# Patient Record
Sex: FEMALE | Race: WHITE | Employment: UNEMPLOYED | URBAN - NONMETROPOLITAN AREA
[De-identification: names, ages, dates, MRNs, and addresses within clinical notes are randomized per-mention and may not be internally consistent; named-entity substitution may affect disease eponyms.]

---

## 2017-10-09 ENCOUNTER — HOSPITAL ENCOUNTER (OUTPATIENT)
Dept: MAMMOGRAPHY | Age: 52
Discharge: HOME | End: 2017-10-09
Payer: COMMERCIAL

## 2017-10-09 DIAGNOSIS — Z12.39 BREAST CANCER SCREENING: ICD-10-CM

## 2017-10-09 PROCEDURE — G0202 SCR MAMMO BI INCL CAD: HCPCS

## 2017-10-30 ENCOUNTER — HOSPITAL ENCOUNTER (OUTPATIENT)
Dept: MRI IMAGING | Age: 52
Discharge: HOME | End: 2017-10-30
Payer: COMMERCIAL

## 2017-10-30 DIAGNOSIS — M54.12 CERVICAL RADICULAR PAIN: ICD-10-CM

## 2017-10-30 DIAGNOSIS — M12.00 CHRONIC POSTRHEUMATIC ARTHROPATHY (HCC): ICD-10-CM

## 2017-10-30 DIAGNOSIS — M05.79 RHEUMATOID ARTHRITIS INVOLVING MULTIPLE SITES WITH POSITIVE RHEUMATOID FACTOR (HCC): ICD-10-CM

## 2017-10-30 DIAGNOSIS — M54.2 NECK PAIN: ICD-10-CM

## 2017-10-30 PROCEDURE — 72141 MRI NECK SPINE W/O DYE: CPT

## 2019-04-22 ENCOUNTER — HOSPITAL ENCOUNTER (OUTPATIENT)
Dept: INTERVENTIONAL RADIOLOGY/VASCULAR | Age: 54
Discharge: HOME | End: 2019-04-22
Payer: COMMERCIAL

## 2019-04-22 ENCOUNTER — HOSPITAL ENCOUNTER (OUTPATIENT)
Dept: NON INVASIVE DIAGNOSTICS | Age: 54
Discharge: HOME | End: 2019-04-22
Payer: COMMERCIAL

## 2019-04-22 ENCOUNTER — ANCILLARY ORDERS (OUTPATIENT)
Dept: INTERVENTIONAL RADIOLOGY/VASCULAR | Age: 54
End: 2019-04-22

## 2019-04-22 VITALS — WEIGHT: 103 LBS | BODY MASS INDEX: 19.45 KG/M2 | HEIGHT: 61 IN

## 2019-04-22 DIAGNOSIS — Z01.810 PREOPERATIVE CARDIOVASCULAR EXAMINATION: ICD-10-CM

## 2019-04-22 LAB
LV EF: 60 %
LVEF MODALITY: NORMAL

## 2019-04-22 PROCEDURE — 93923 UPR/LXTR ART STDY 3+ LVLS: CPT

## 2019-04-22 PROCEDURE — 93017 CV STRESS TEST TRACING ONLY: CPT

## 2019-04-22 PROCEDURE — 3430000000 HC RX DIAGNOSTIC RADIOPHARMACEUTICAL: Performed by: PODIATRIST

## 2019-04-22 PROCEDURE — 2709999900 HC NON-CHARGEABLE SUPPLY

## 2019-04-22 PROCEDURE — 6360000002 HC RX W HCPCS

## 2019-04-22 PROCEDURE — 78452 HT MUSCLE IMAGE SPECT MULT: CPT

## 2019-04-22 PROCEDURE — 93306 TTE W/DOPPLER COMPLETE: CPT

## 2019-04-22 PROCEDURE — A9500 TC99M SESTAMIBI: HCPCS | Performed by: PODIATRIST

## 2019-04-22 RX ADMIN — Medication 9.3 MILLICURIE: at 10:30

## 2019-04-22 RX ADMIN — Medication 32.1 MILLICURIE: at 11:27

## 2019-04-29 ENCOUNTER — HOSPITAL ENCOUNTER (OUTPATIENT)
Age: 54
Discharge: HOME | End: 2019-04-29
Payer: COMMERCIAL

## 2019-04-29 LAB
ANION GAP SERPL CALCULATED.3IONS-SCNC: 15 MEQ/L (ref 8–16)
BUN BLDV-MCNC: 8 MG/DL (ref 7–22)
CALCIUM SERPL-MCNC: 9.4 MG/DL (ref 8.5–10.5)
CHLORIDE BLD-SCNC: 105 MEQ/L (ref 98–111)
CO2: 25 MEQ/L (ref 23–33)
CREAT SERPL-MCNC: 0.6 MG/DL (ref 0.4–1.2)
ERYTHROCYTE [DISTWIDTH] IN BLOOD BY AUTOMATED COUNT: 13.1 % (ref 11.5–14.5)
ERYTHROCYTE [DISTWIDTH] IN BLOOD BY AUTOMATED COUNT: 48.8 FL (ref 35–45)
GFR SERPL CREATININE-BSD FRML MDRD: > 90 ML/MIN/1.73M2
GLUCOSE BLD-MCNC: 103 MG/DL (ref 70–108)
HCT VFR BLD CALC: 45.7 % (ref 37–47)
HEMOGLOBIN: 14.9 GM/DL (ref 12–16)
MCH RBC QN AUTO: 33.1 PG (ref 26–33)
MCHC RBC AUTO-ENTMCNC: 32.6 GM/DL (ref 32.2–35.5)
MCV RBC AUTO: 101.6 FL (ref 81–99)
PLATELET # BLD: 319 THOU/MM3 (ref 130–400)
PMV BLD AUTO: 10.2 FL (ref 9.4–12.4)
POTASSIUM SERPL-SCNC: 4.2 MEQ/L (ref 3.5–5.2)
RBC # BLD: 4.5 MILL/MM3 (ref 4.2–5.4)
SODIUM BLD-SCNC: 145 MEQ/L (ref 135–145)
VITAMIN D 25-HYDROXY: 47 NG/ML (ref 30–100)
WBC # BLD: 6.7 THOU/MM3 (ref 4.8–10.8)

## 2019-04-29 PROCEDURE — 82306 VITAMIN D 25 HYDROXY: CPT

## 2019-04-29 PROCEDURE — 85027 COMPLETE CBC AUTOMATED: CPT

## 2019-04-29 PROCEDURE — 36415 COLL VENOUS BLD VENIPUNCTURE: CPT

## 2019-04-29 PROCEDURE — 80048 BASIC METABOLIC PNL TOTAL CA: CPT

## 2019-05-16 RX ORDER — FAMCICLOVIR 500 MG/1
500 TABLET, FILM COATED ORAL 3 TIMES DAILY PRN
COMMUNITY

## 2019-05-23 ENCOUNTER — ANESTHESIA EVENT (OUTPATIENT)
Dept: OPERATING ROOM | Age: 54
End: 2019-05-23
Payer: COMMERCIAL

## 2019-05-23 ENCOUNTER — ANESTHESIA (OUTPATIENT)
Dept: OPERATING ROOM | Age: 54
End: 2019-05-23
Payer: COMMERCIAL

## 2019-05-23 ENCOUNTER — HOSPITAL ENCOUNTER (OUTPATIENT)
Age: 54
Setting detail: OUTPATIENT SURGERY
Discharge: HOME | End: 2019-05-23
Attending: PODIATRIST | Admitting: PODIATRIST
Payer: COMMERCIAL

## 2019-05-23 VITALS
BODY MASS INDEX: 19.45 KG/M2 | HEIGHT: 61 IN | HEART RATE: 85 BPM | SYSTOLIC BLOOD PRESSURE: 138 MMHG | OXYGEN SATURATION: 97 % | WEIGHT: 103 LBS | RESPIRATION RATE: 16 BRPM | TEMPERATURE: 98.4 F | DIASTOLIC BLOOD PRESSURE: 77 MMHG

## 2019-05-23 VITALS
SYSTOLIC BLOOD PRESSURE: 121 MMHG | TEMPERATURE: 99.5 F | OXYGEN SATURATION: 100 % | RESPIRATION RATE: 7 BRPM | DIASTOLIC BLOOD PRESSURE: 66 MMHG

## 2019-05-23 DIAGNOSIS — M79.671 RIGHT FOOT PAIN: Primary | ICD-10-CM

## 2019-05-23 LAB
EKG ATRIAL RATE: 68 BPM
EKG P AXIS: 61 DEGREES
EKG P-R INTERVAL: 154 MS
EKG Q-T INTERVAL: 398 MS
EKG QRS DURATION: 86 MS
EKG QTC CALCULATION (BAZETT): 423 MS
EKG R AXIS: -38 DEGREES
EKG T AXIS: 40 DEGREES
EKG VENTRICULAR RATE: 68 BPM

## 2019-05-23 PROCEDURE — 7100000001 HC PACU RECOVERY - ADDTL 15 MIN: Performed by: PODIATRIST

## 2019-05-23 PROCEDURE — 2500000003 HC RX 250 WO HCPCS: Performed by: PODIATRIST

## 2019-05-23 PROCEDURE — 7100000010 HC PHASE II RECOVERY - FIRST 15 MIN: Performed by: PODIATRIST

## 2019-05-23 PROCEDURE — 7100000000 HC PACU RECOVERY - FIRST 15 MIN: Performed by: PODIATRIST

## 2019-05-23 PROCEDURE — 93005 ELECTROCARDIOGRAM TRACING: CPT | Performed by: STUDENT IN AN ORGANIZED HEALTH CARE EDUCATION/TRAINING PROGRAM

## 2019-05-23 PROCEDURE — 3600000004 HC SURGERY LEVEL 4 BASE: Performed by: PODIATRIST

## 2019-05-23 PROCEDURE — 2580000003 HC RX 258: Performed by: PODIATRIST

## 2019-05-23 PROCEDURE — 6360000002 HC RX W HCPCS: Performed by: PODIATRIST

## 2019-05-23 PROCEDURE — 6360000002 HC RX W HCPCS: Performed by: ANESTHESIOLOGY

## 2019-05-23 PROCEDURE — 7100000011 HC PHASE II RECOVERY - ADDTL 15 MIN: Performed by: PODIATRIST

## 2019-05-23 PROCEDURE — 3700000001 HC ADD 15 MINUTES (ANESTHESIA): Performed by: PODIATRIST

## 2019-05-23 PROCEDURE — 93010 ELECTROCARDIOGRAM REPORT: CPT | Performed by: INTERNAL MEDICINE

## 2019-05-23 PROCEDURE — 6360000002 HC RX W HCPCS: Performed by: NURSE ANESTHETIST, CERTIFIED REGISTERED

## 2019-05-23 PROCEDURE — 2720000010 HC SURG SUPPLY STERILE: Performed by: PODIATRIST

## 2019-05-23 PROCEDURE — 2709999900 HC NON-CHARGEABLE SUPPLY: Performed by: PODIATRIST

## 2019-05-23 PROCEDURE — C1713 ANCHOR/SCREW BN/BN,TIS/BN: HCPCS | Performed by: PODIATRIST

## 2019-05-23 PROCEDURE — 3700000000 HC ANESTHESIA ATTENDED CARE: Performed by: PODIATRIST

## 2019-05-23 PROCEDURE — 3600000014 HC SURGERY LEVEL 4 ADDTL 15MIN: Performed by: PODIATRIST

## 2019-05-23 DEVICE — WIRE FIX L150MM DIA1.6MM THRD L15MM FOREFOOT/MIDFOOT COMPR: Type: IMPLANTABLE DEVICE | Status: FUNCTIONAL

## 2019-05-23 DEVICE — SCREW BNE L18MM DIA2.7MM ANK S STL ST VAR ANG LOK FULL THRD: Type: IMPLANTABLE DEVICE | Status: FUNCTIONAL

## 2019-05-23 DEVICE — IMPLANTABLE DEVICE: Type: IMPLANTABLE DEVICE | Status: FUNCTIONAL

## 2019-05-23 DEVICE — SCREW BNE L16MM DIA2.7MM ANK S STL ST VAR ANG LOK FULL THRD: Type: IMPLANTABLE DEVICE | Status: FUNCTIONAL

## 2019-05-23 DEVICE — SCREW BNE L14MM DIA2.7MM ANK S STL ST VAR ANG LOK FULL THRD: Type: IMPLANTABLE DEVICE | Status: FUNCTIONAL

## 2019-05-23 RX ORDER — CEFAZOLIN SODIUM 1 G/50ML
1 INJECTION, SOLUTION INTRAVENOUS
Status: COMPLETED | OUTPATIENT
Start: 2019-05-23 | End: 2019-05-23

## 2019-05-23 RX ORDER — LIDOCAINE HYDROCHLORIDE 20 MG/ML
INJECTION, SOLUTION INTRAVENOUS PRN
Status: DISCONTINUED | OUTPATIENT
Start: 2019-05-23 | End: 2019-05-23 | Stop reason: SDUPTHER

## 2019-05-23 RX ORDER — IBUPROFEN 800 MG/1
800 TABLET ORAL 2 TIMES DAILY PRN
Qty: 60 TABLET | Refills: 0 | Status: SHIPPED | OUTPATIENT
Start: 2019-05-23 | End: 2019-08-28 | Stop reason: ALTCHOICE

## 2019-05-23 RX ORDER — OXYCODONE HYDROCHLORIDE AND ACETAMINOPHEN 5; 325 MG/1; MG/1
1 TABLET ORAL EVERY 4 HOURS PRN
Status: CANCELLED | OUTPATIENT
Start: 2019-05-23

## 2019-05-23 RX ORDER — LABETALOL HYDROCHLORIDE 5 MG/ML
10 INJECTION, SOLUTION INTRAVENOUS EVERY 10 MIN PRN
Status: DISCONTINUED | OUTPATIENT
Start: 2019-05-23 | End: 2019-05-23 | Stop reason: HOSPADM

## 2019-05-23 RX ORDER — PROMETHAZINE HYDROCHLORIDE 25 MG/ML
12.5 INJECTION, SOLUTION INTRAMUSCULAR; INTRAVENOUS
Status: DISCONTINUED | OUTPATIENT
Start: 2019-05-23 | End: 2019-05-23 | Stop reason: HOSPADM

## 2019-05-23 RX ORDER — SODIUM CHLORIDE 0.9 % (FLUSH) 0.9 %
10 SYRINGE (ML) INJECTION PRN
Status: CANCELLED | OUTPATIENT
Start: 2019-05-23

## 2019-05-23 RX ORDER — SODIUM CHLORIDE 0.9 % (FLUSH) 0.9 %
10 SYRINGE (ML) INJECTION PRN
Status: DISCONTINUED | OUTPATIENT
Start: 2019-05-23 | End: 2019-05-23 | Stop reason: HOSPADM

## 2019-05-23 RX ORDER — DEXAMETHASONE SODIUM PHOSPHATE 4 MG/ML
INJECTION, SOLUTION INTRA-ARTICULAR; INTRALESIONAL; INTRAMUSCULAR; INTRAVENOUS; SOFT TISSUE PRN
Status: DISCONTINUED | OUTPATIENT
Start: 2019-05-23 | End: 2019-05-23 | Stop reason: SDUPTHER

## 2019-05-23 RX ORDER — FENTANYL CITRATE 50 UG/ML
INJECTION, SOLUTION INTRAMUSCULAR; INTRAVENOUS PRN
Status: DISCONTINUED | OUTPATIENT
Start: 2019-05-23 | End: 2019-05-23 | Stop reason: SDUPTHER

## 2019-05-23 RX ORDER — OXYCODONE HYDROCHLORIDE AND ACETAMINOPHEN 5; 325 MG/1; MG/1
1 TABLET ORAL EVERY 6 HOURS PRN
Qty: 28 TABLET | Refills: 0 | Status: SHIPPED | OUTPATIENT
Start: 2019-05-23 | End: 2019-05-30

## 2019-05-23 RX ORDER — PROPOFOL 10 MG/ML
INJECTION, EMULSION INTRAVENOUS CONTINUOUS PRN
Status: DISCONTINUED | OUTPATIENT
Start: 2019-05-23 | End: 2019-05-23 | Stop reason: SDUPTHER

## 2019-05-23 RX ORDER — OXYCODONE HYDROCHLORIDE AND ACETAMINOPHEN 5; 325 MG/1; MG/1
2 TABLET ORAL EVERY 4 HOURS PRN
Status: CANCELLED | OUTPATIENT
Start: 2019-05-23

## 2019-05-23 RX ORDER — SODIUM CHLORIDE 0.9 % (FLUSH) 0.9 %
10 SYRINGE (ML) INJECTION EVERY 12 HOURS SCHEDULED
Status: DISCONTINUED | OUTPATIENT
Start: 2019-05-23 | End: 2019-05-23 | Stop reason: HOSPADM

## 2019-05-23 RX ORDER — HYDROXYZINE PAMOATE 25 MG/1
CAPSULE ORAL
Qty: 28 CAPSULE | Refills: 0 | Status: SHIPPED | OUTPATIENT
Start: 2019-05-23 | End: 2019-08-28 | Stop reason: ALTCHOICE

## 2019-05-23 RX ORDER — SODIUM CHLORIDE 0.9 % (FLUSH) 0.9 %
10 SYRINGE (ML) INJECTION EVERY 12 HOURS SCHEDULED
Status: CANCELLED | OUTPATIENT
Start: 2019-05-23

## 2019-05-23 RX ORDER — SODIUM CHLORIDE 9 MG/ML
INJECTION, SOLUTION INTRAVENOUS CONTINUOUS
Status: DISCONTINUED | OUTPATIENT
Start: 2019-05-23 | End: 2019-05-23 | Stop reason: HOSPADM

## 2019-05-23 RX ORDER — ONDANSETRON 2 MG/ML
4 INJECTION INTRAMUSCULAR; INTRAVENOUS EVERY 6 HOURS PRN
Status: CANCELLED | OUTPATIENT
Start: 2019-05-23

## 2019-05-23 RX ORDER — FENTANYL CITRATE 50 UG/ML
50 INJECTION, SOLUTION INTRAMUSCULAR; INTRAVENOUS EVERY 5 MIN PRN
Status: DISCONTINUED | OUTPATIENT
Start: 2019-05-23 | End: 2019-05-23 | Stop reason: HOSPADM

## 2019-05-23 RX ORDER — ONDANSETRON 2 MG/ML
INJECTION INTRAMUSCULAR; INTRAVENOUS PRN
Status: DISCONTINUED | OUTPATIENT
Start: 2019-05-23 | End: 2019-05-23 | Stop reason: SDUPTHER

## 2019-05-23 RX ORDER — MIDAZOLAM HYDROCHLORIDE 1 MG/ML
INJECTION INTRAMUSCULAR; INTRAVENOUS PRN
Status: DISCONTINUED | OUTPATIENT
Start: 2019-05-23 | End: 2019-05-23 | Stop reason: SDUPTHER

## 2019-05-23 RX ORDER — BUPIVACAINE HYDROCHLORIDE 5 MG/ML
INJECTION, SOLUTION EPIDURAL; INTRACAUDAL PRN
Status: DISCONTINUED | OUTPATIENT
Start: 2019-05-23 | End: 2019-05-23 | Stop reason: ALTCHOICE

## 2019-05-23 RX ORDER — PROPOFOL 10 MG/ML
INJECTION, EMULSION INTRAVENOUS PRN
Status: DISCONTINUED | OUTPATIENT
Start: 2019-05-23 | End: 2019-05-23 | Stop reason: SDUPTHER

## 2019-05-23 RX ADMIN — PROPOFOL 200 MCG/KG/MIN: 10 INJECTION, EMULSION INTRAVENOUS at 13:21

## 2019-05-23 RX ADMIN — FENTANYL CITRATE 25 MCG: 50 INJECTION INTRAMUSCULAR; INTRAVENOUS at 14:00

## 2019-05-23 RX ADMIN — DEXAMETHASONE SODIUM PHOSPHATE 8 MG: 4 INJECTION, SOLUTION INTRAMUSCULAR; INTRAVENOUS at 13:25

## 2019-05-23 RX ADMIN — FENTANYL CITRATE 25 MCG: 50 INJECTION INTRAMUSCULAR; INTRAVENOUS at 13:51

## 2019-05-23 RX ADMIN — FENTANYL CITRATE 25 MCG: 50 INJECTION INTRAMUSCULAR; INTRAVENOUS at 15:10

## 2019-05-23 RX ADMIN — LIDOCAINE HYDROCHLORIDE 60 MG: 20 INJECTION, SOLUTION INTRAVENOUS at 13:18

## 2019-05-23 RX ADMIN — PROPOFOL 140 MG: 10 INJECTION, EMULSION INTRAVENOUS at 13:18

## 2019-05-23 RX ADMIN — FENTANYL CITRATE 50 MCG: 50 INJECTION, SOLUTION INTRAMUSCULAR; INTRAVENOUS at 16:31

## 2019-05-23 RX ADMIN — CEFAZOLIN SODIUM 1 G: 1 INJECTION, SOLUTION INTRAVENOUS at 13:26

## 2019-05-23 RX ADMIN — FENTANYL CITRATE 25 MCG: 50 INJECTION INTRAMUSCULAR; INTRAVENOUS at 13:38

## 2019-05-23 RX ADMIN — PROPOFOL 30 MG: 10 INJECTION, EMULSION INTRAVENOUS at 15:37

## 2019-05-23 RX ADMIN — PROPOFOL 20 MG: 10 INJECTION, EMULSION INTRAVENOUS at 15:42

## 2019-05-23 RX ADMIN — MIDAZOLAM HYDROCHLORIDE 2 MG: 1 INJECTION, SOLUTION INTRAMUSCULAR; INTRAVENOUS at 13:13

## 2019-05-23 RX ADMIN — ONDANSETRON HYDROCHLORIDE 4 MG: 4 INJECTION, SOLUTION INTRAMUSCULAR; INTRAVENOUS at 13:25

## 2019-05-23 RX ADMIN — PROPOFOL 20 MG: 10 INJECTION, EMULSION INTRAVENOUS at 15:46

## 2019-05-23 RX ADMIN — SODIUM CHLORIDE: 9 INJECTION, SOLUTION INTRAVENOUS at 11:45

## 2019-05-23 ASSESSMENT — PULMONARY FUNCTION TESTS
PIF_VALUE: 9
PIF_VALUE: 8
PIF_VALUE: 2
PIF_VALUE: 8
PIF_VALUE: 10
PIF_VALUE: 3
PIF_VALUE: 9
PIF_VALUE: 8
PIF_VALUE: 8
PIF_VALUE: 7
PIF_VALUE: 9
PIF_VALUE: 8
PIF_VALUE: 12
PIF_VALUE: 12
PIF_VALUE: 8
PIF_VALUE: 2
PIF_VALUE: 8
PIF_VALUE: 8
PIF_VALUE: 9
PIF_VALUE: 9
PIF_VALUE: 8
PIF_VALUE: 9
PIF_VALUE: 8
PIF_VALUE: 9
PIF_VALUE: 9
PIF_VALUE: 8
PIF_VALUE: 8
PIF_VALUE: 9
PIF_VALUE: 8
PIF_VALUE: 9
PIF_VALUE: 3
PIF_VALUE: 3
PIF_VALUE: 8
PIF_VALUE: 9
PIF_VALUE: 9
PIF_VALUE: 4
PIF_VALUE: 8
PIF_VALUE: 9
PIF_VALUE: 8
PIF_VALUE: 8
PIF_VALUE: 2
PIF_VALUE: 8
PIF_VALUE: 9
PIF_VALUE: 9
PIF_VALUE: 12
PIF_VALUE: 8
PIF_VALUE: 3
PIF_VALUE: 8
PIF_VALUE: 8
PIF_VALUE: 9
PIF_VALUE: 8
PIF_VALUE: 9
PIF_VALUE: 9
PIF_VALUE: 2
PIF_VALUE: 8
PIF_VALUE: 9
PIF_VALUE: 8
PIF_VALUE: 9
PIF_VALUE: 8
PIF_VALUE: 9
PIF_VALUE: 8
PIF_VALUE: 8
PIF_VALUE: 9
PIF_VALUE: 8
PIF_VALUE: 9
PIF_VALUE: 8
PIF_VALUE: 9
PIF_VALUE: 8
PIF_VALUE: 18
PIF_VALUE: 8
PIF_VALUE: 8
PIF_VALUE: 9
PIF_VALUE: 12
PIF_VALUE: 9
PIF_VALUE: 9
PIF_VALUE: 8
PIF_VALUE: 9
PIF_VALUE: 9
PIF_VALUE: 8
PIF_VALUE: 9
PIF_VALUE: 8
PIF_VALUE: 8
PIF_VALUE: 9
PIF_VALUE: 9
PIF_VALUE: 8
PIF_VALUE: 10
PIF_VALUE: 7
PIF_VALUE: 3
PIF_VALUE: 9
PIF_VALUE: 9
PIF_VALUE: 12
PIF_VALUE: 8
PIF_VALUE: 9
PIF_VALUE: 8
PIF_VALUE: 8
PIF_VALUE: 9
PIF_VALUE: 9
PIF_VALUE: 8
PIF_VALUE: 9
PIF_VALUE: 2
PIF_VALUE: 9
PIF_VALUE: 8
PIF_VALUE: 9
PIF_VALUE: 8
PIF_VALUE: 9
PIF_VALUE: 8
PIF_VALUE: 8
PIF_VALUE: 9
PIF_VALUE: 8
PIF_VALUE: 5
PIF_VALUE: 9
PIF_VALUE: 8
PIF_VALUE: 12
PIF_VALUE: 8
PIF_VALUE: 9
PIF_VALUE: 8
PIF_VALUE: 9
PIF_VALUE: 9
PIF_VALUE: 10
PIF_VALUE: 8
PIF_VALUE: 9
PIF_VALUE: 8
PIF_VALUE: 9
PIF_VALUE: 9
PIF_VALUE: 8
PIF_VALUE: 2
PIF_VALUE: 8
PIF_VALUE: 17
PIF_VALUE: 8
PIF_VALUE: 9
PIF_VALUE: 8
PIF_VALUE: 13
PIF_VALUE: 8
PIF_VALUE: 9
PIF_VALUE: 9
PIF_VALUE: 5
PIF_VALUE: 8
PIF_VALUE: 9
PIF_VALUE: 9
PIF_VALUE: 8
PIF_VALUE: 9
PIF_VALUE: 9

## 2019-05-23 ASSESSMENT — PAIN SCALES - GENERAL
PAINLEVEL_OUTOF10: 7
PAINLEVEL_OUTOF10: 4
PAINLEVEL_OUTOF10: 3

## 2019-05-23 ASSESSMENT — PAIN - FUNCTIONAL ASSESSMENT: PAIN_FUNCTIONAL_ASSESSMENT: 0-10

## 2019-05-23 NOTE — BRIEF OP NOTE
Brief Postoperative Note  ______________________________________________________________    Patient: Beverly Webb  YOB: 1965  MRN: [id number]  Date of Procedure: 5/23/2019    Pre-Op Diagnosis: RIGHT HALLUX VALGUS, HAMMER TOES 2-5 RIGHT FOOT, SEVERE MPJ DISLOCATION 1-5, RHEUMATOID ARTHRITIS    Post-Op Diagnosis: Same       Procedure(s):  RIGHT 1ST MPJ FUSION, METATARSAL HEAD RESECTION 2-5, TOES 2-5 ARTHRODESIS & PLANTAR SKIN PLASTY    Anesthesia: General, TIVA    Surgeon(s):  Bipin Wagner DPM    Assistant: Brantley Prader, PGY-3    Estimated Blood Loss (mL): less than 50     Complications: None    Specimens:   * No specimens in log *    Implants:  Implant Name Type Inv.  Item Serial No.  Lot No. LRB No. Used   PLATE FUSION VARI-ANGL 1ST MTP 0 DEG RT SM Screw/Plate/Nail/Tod PLATE FUSION VARI-ANGL 1ST MTP 0 DEG RT SM  SYNTHES  Right 1   WIRE COMPRSS [id number] 15MM THR Screw/Plate/Nail/Tod WIRE COMPRSS [id number] 15MM THR  SYNTHES  Right 2   SCREW VARI-ANGL LK 2.7X14MM Screw/Plate/Nail/Tod SCREW VARI-ANGL LK 2.7X14MM  SYNTHES  Right 1   SCREW LK SLFT W/STARDRIVE RECESS 3.5Y41GC Screw/Plate/Nail/Tod SCREW LK SLFT W/STARDRIVE RECESS 0.5T48RG  SYNTHES  Right 1   SCREW VARI-ANGL LK 2.7X18MM Screw/Plate/Nail/Tod SCREW VARI-ANGL LK 2.7X18MM  SYNTHES  Right 4         Drains: * No LDAs found *    Findings: Consistent with diagnosis    Brantley Prader, DPM  Date: 5/23/2019  Time: 4:11 PM

## 2019-05-23 NOTE — OP NOTE
Operative Report  5/23/2019  Sera Marie  48 y.o. Pre-Op Diagnosis: RIGHT HALLUX VALGUS, HAMMER TOES 2-5 RIGHT FOOT, SEVERE MPJ DISLOCATION 1-5, RHEUMATOID ARTHRITIS    Post-Op Diagnosis: Same       Procedure(s):  RIGHT 1ST MPJ FUSION, METATARSAL HEAD RESECTION 2-5, TOES 2-4 ARTHRODESIS, DEROTATIONAL ARTHROPLASTY OF 5TH DIGIT & PLANTAR SKIN PLASTY WITH ADVANCING SKIN FLAP     Anesthesia: General, TIVA    Surgeon(s):  Hernan Wagner DPM    Assistant: Lisy Hutchinson, PGY-3    Estimated Blood Loss (mL): less than 50     Complications: None    Specimens:   * No specimens in log *    Implants:  Implant Name Type Inv. Item Serial No.  Lot No. LRB No. Used   PLATE FUSION VARI-ANGL 1ST MTP 0 DEG RT SM Screw/Plate/Nail/Tod PLATE FUSION VARI-ANGL 1ST MTP 0 DEG RT SM  SYNTHES  Right 1   WIRE COMPRSS [id number] 15MM THR Screw/Plate/Nail/Tod WIRE COMPRSS 1.8C064NP 15MM THR  SYNTHES  Right 2   SCREW VARI-ANGL LK 2.7X14MM Screw/Plate/Nail/Tod SCREW VARI-ANGL LK 2.7X14MM  SYNTHES  Right 1   SCREW LK SLFT W/STARDRIVE RECESS 5.2H32AA Screw/Plate/Nail/Tod SCREW LK SLFT W/STARDRIVE RECESS 2.1F60DR  SYNTHES  Right 1   SCREW VARI-ANGL LK 2.7X18MM Screw/Plate/Nail/Tod SCREW VARI-ANGL LK 2.7X18MM  SYNTHES  Right 4         Drains: * No LDAs found *    Findings: Consistent with diagnosis    Indications: Patient is a 48 y.o. female with a chief complaint of painful dislocated toes of the right foot who is being seen by Dr. Dmitri Fernández and being treated for associated symptoms of the right foot related to rheumatoid arthritis. At this time all conservative options have been exhausted and surgical intervention is necessary. The procedure has been explained to the patient and they understand the risks, benefits and possible complications including infection, non-healing surgical wound, DVT, PE, CRPS, non-union, malunion, continued pain, loss of digit, loss of limb, loss of life.   No promises have been made as to surgical outcome. Procedure: The patient was transported from the pre-op holding to the operating room and placed in a supine position. A pneumatic ankle tourniquet was applied to the right ankle. A pre-operative injection of 30cc of 0.5% marcaine plain was injected into the right foot in a PT and ring block. The right foot was then prepped and draped in the normal aspetic manner. The right foot was then exsanguinated with an esmark and the tourniquet was inflated to 250 mmHg. Attention was directed to the dorsomedial aspect of the first metatarsophalangeal joint of the right foot. A skin marker was used to make an initial incision site over the area approximately 6 cm in length. A 15 scalpel blade was then used to make a full thickness incision and blunt dissection was then carried down to the dorsomedial aspect of the first   metatarsophalangeal joint capsule. It is important to note that all small bleeding vessels were cauterized at this point in time. The capsule was then longitudinally incised and reflected medially and laterally thus exposing the articular cartilage of the 1st MPJ. A guide pin for the Synthes 1st MPJ reamers was driven down to the shaft of the 1st metatarsal. A 20 mm reamer was used to reamer the articular cartilage off of the 1st metatarsal head and a rongeur was used to remove any remaining over hanging bony ledges. The guide pin was then removed from the metatarsal and driven down the proximal phalanx of the hallux. The reamer was used to remove the articular cartilage from the base of the proximal phalanx. The guide pin was removed and the joint was compressed on itself and temporarily fixated with two 0.062 inch k-wires. A Synthes 1st MPJ plate was then temporarily fixated to the dorsal aspect of the joint with olive pins. Fluoroscopy was used to confirm adequate position of the plate and correction of the 1st MPJ.  The distal holes of the plate were then pre-drilled and 3 locking screws were placed in the plate. The proximal holes were then pre-drilled and 3 locking screws were placed in the plate maintaining excellent correction and position of the the 1st MPJ. The proximal k-wire was removed and the distal k-wire was bent and left in place as an additional point of fixation. Fluoroscopy was used to confirm position of the plate and length of all screws. Attention was then directed to the plantar aspect of the right forefoot. A skin marker was used to plan out an elliptical incision over metatarsal heads 2-5. A #15 scalpel blade was used to make a full thickness incision removing the skin over the metatarsal heads. The scalpel blade was then used to make a linear incision over each metatarsal head and a rheumatoid nodule was removed over the 3rd metatarsal head thus exposing each metatarsal head and neck. A TPS sagittal saw was then used to make transverse cuts at each metatarsal neck and metatarsal heads 2-5 were resected with a scalpel blade. Fluoroscopy was used to check length of each resected metatarsal and a small amount of the 3rd metatarsal was removed for a uniform parabola. Attention was then directed to the dorsal aspect of digits 2-5 on the right foot. A skin marker was used to plan out an elliptical transverse incision over the PIPJ of each digit. A #15 scalpel blade was used to make a full thickness incision over each PIPJ and a wedge of skin was removed. The extensor tendon was transversely resected over each PIPJ thus exposing the articular surface of the PIPJ. A TPS sagittal saw was then used to resect the head of the proximal phalanx of digits 2-5 and base of the intermediate phalanx of digits 2-4. A 0.062 inch k-wire was retrograde drilled through the intermediate phalanx and out the end of each toe.  Each k-wire was then driven into the proximal phalanx and down the shaft of each metatarsal. Fluoroscopy was utilized to confirm adequate alignment of each toe and placement of each k-wire down the shaft of each metatarsal. The k-wires were bent and cut and Jergens balls were placed over the tip of each wire. The incision were flushed with copious amounts of sterile saline. Plantarly the distal skin was advanced proximally to obtain an adequate fat pad below each metatarsal. The subcutaneous tissues were re-appoximated with 3-0 vicryl. The skin was closed using 4-0 monocryl. The incisions were dressed with mepitel Ag, 4x4's, kerlix, etc.  The pneumatic ankle tourniquet was then deflated and an immediate hyperemic response was noted to all digits of the right foot. Cast padding, ace bandage, posterior splint, another ace bandage, and a stockinette was then applied. The patient was transported to the PACU with VSS and NVS intact to all digits of the right foot. No complications were noted throughout the procedure. The patient is to be discharged per PACU protocol. The patient is to follow-up with Dr. Durga Mary in the office.     SP Lopez DPM PGY-I  Foot and Ankle Surgical Resident  5/23/2019  4:13 PM

## 2019-05-23 NOTE — ANESTHESIA PRE PROCEDURE
Department of Anesthesiology  Preprocedure Note       Name:  Lyle Tai   Age:  48 y.o.  :  1965                                          MRN:  [id number]         Date:  2019      Surgeon: Fransisco Cruz):  Adal Wagner DPM    Procedure: RIGHT 1ST MPJ FUSION, METATARSAL HEAD RESECTION 2-5, TOES 2-5 ARTHROPLASTY/ARTHRODESIS & PLANTAR SKIN FLAP (Right Foot)    Medications prior to admission:   Prior to Admission medications    Medication Sig Start Date End Date Taking? Authorizing Provider   Tofacitinib Citrate (XELJANZ XR) 11 MG TB24 Take 0.5 tablets by mouth every other day   Yes Historical Provider, MD   OLIVE LEAF PO Take  by mouth. Yes Historical Provider, MD   Glucosamine-Chondroitin (JOINT SUPPORT PO) Take  by mouth. Yes Historical Provider, MD   therapeutic multivitamin-minerals (THERAGRAN-M) tablet Take 1 tablet by mouth daily. Yes Historical Provider, MD   VITAMIN D-3 (COLECALCIFEROL) 400 UNITS TABS Take  by mouth daily. Yes Historical Provider, MD   calcium carbonate-vitamin D (CALCIUM + D) 600-200 MG-UNIT TABS Take  by mouth. Yes Historical Provider, MD   MAGNESIUM ACETATE by Does not apply route. Yes Historical Provider, MD   Probiotic Product (PROBIOTIC FORMULA PO) Take  by mouth. Yes Historical Provider, MD   Acetaminophen (TYLENOL ARTHRITIS PAIN PO) Take  by mouth.    Yes Historical Provider, MD   predniSONE (DELTASONE) 5 MG tablet Take 5 mg by mouth 2 times daily    Yes Historical Provider, MD   famciclovir (FAMVIR) 500 MG tablet Take 500 mg by mouth 3 times daily as needed (for shingles outbreak)    Historical Provider, MD       Current medications:    Current Facility-Administered Medications   Medication Dose Route Frequency Provider Last Rate Last Dose    sodium chloride flush 0.9 % injection 10 mL  10 mL Intravenous 2 times per day Sherrel [place], DPM        sodium chloride flush 0.9 % injection 10 mL  10 mL Intravenous PRN Lonnierel [place], DPSAMPSON        ceFAZolin (ANCEF) 1 g in dextrose 5 % 50 mL IVPB (premix)  1 g Intravenous On Call to Shaheen [address], DPM        0.9 % sodium chloride infusion   Intravenous Continuous Adal C Wagner,  mL/hr at 05/23/19 1145         Allergies: Allergies   Allergen Reactions    Ciprofibrate Other (See Comments)     Chesty tight, numbness in arms, almost passed out    Methylprednisolone Other (See Comments)     Cause heart palpitations    Succinylcholine Other (See Comments)     Magligment hypothermia  (Muscle rigidity, high fever and fast heart rate)    Tramadol Other (See Comments)     Dizzy  Unable to walk        Problem List:    Patient Active Problem List   Diagnosis Code    Nausea & vomiting R11.2    Gastric erosion 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.9    TRINI (acute kidney injury) ([facility] [address] N17.9    Biliary dyskinesia K82.8       Past Medical History:        Diagnosis Date    TRINI (acute kidney injury) ([facility] [address] 12/8/2012    Arthritis     Malignant hyperthermia     RA (rheumatoid arthritis) ([facility] [address]        Past Surgical History:        Procedure Laterality Date    COLONOSCOPY      SKIN BIOPSY  05/2018    lower left leg    TUBAL LIGATION         Social History:    Social History     Tobacco Use    Smoking status: Never Smoker    Smokeless tobacco: Never Used   Substance Use Topics    Alcohol use:  No                                Counseling given: Not Answered      Vital Signs (Current):   Vitals:    05/16/19 1005 05/23/19 1059 05/23/19 1103   BP:  (!) 144/74    Pulse:  77    Resp:  18    Temp:  98.1 °F (36.7 °C)    TempSrc:  Oral    SpO2:  100%    Weight: 103 lb (46.7 kg)     Height: 5' 0.75\" (1.543 m)  5' 0.75\" (1.543 m)                                              BP Readings from Last 3 Encounters:   05/23/19 (!) 144/74   10/08/13 120/84   07/09/13 108/86       NPO Status: Time of last liquid consumption: 2100                        Time of last solid consumption: 2000                        Date of last liquid consumption: 05/22/19 risks discussed with patient, spouse and child/children.       Plan discussed with CRNA and surgical team.                  Amanda Mendez MD   5/23/2019

## 2019-05-24 NOTE — ANESTHESIA POSTPROCEDURE EVALUATION
Department of Anesthesiology  Postprocedure Note    Patient: Kristopher Carter  MRN: [id number]  YOB: 1965  Date of evaluation: 5/23/2019  Time:  8:21 PM     Procedure Summary     Date:  05/23/19 Room / Location:  [place] [facility] [place] [facility]    Anesthesia Start:  1276 Anesthesia Stop:  1371    Procedure:  RIGHT 1ST MPJ FUSION, METATARSAL HEAD RESECTION 2-5, TOES 2-5 ARTHROPLASTY/ARTHRODESIS & PLANTAR SKIN FLAP (Right Foot) Diagnosis:  (RIGHT HALLUX VALGUS, HAMMER TOES, SEVERE MRJ DISLOCATION, RHEUMATOID ARTHRITIS)    Surgeon:  Marisol Salomon DPM Responsible Provider:  Marlou Fabry, MD    Anesthesia Type:  general, TIVA ASA Status:  1          Anesthesia Type: general, TIVA    José Phase I: José Score: 10    José Phase II:      Last vitals: Reviewed and per EMR flowsheets.        Anesthesia Post Evaluation    Patient location during evaluation: PACU  Patient participation: complete - patient participated  Level of consciousness: awake and alert  Pain score: 3  Airway patency: patent  Nausea & Vomiting: no nausea and no vomiting  Complications: no  Cardiovascular status: hemodynamically stable  Respiratory status: spontaneous ventilation and acceptable  Hydration status: stable

## 2019-08-19 ENCOUNTER — HOSPITAL ENCOUNTER (OUTPATIENT)
Age: 54
Discharge: HOME | End: 2019-08-19
Payer: COMMERCIAL

## 2019-08-19 LAB
ANION GAP SERPL CALCULATED.3IONS-SCNC: 11 MEQ/L (ref 8–16)
BUN BLDV-MCNC: 11 MG/DL (ref 7–22)
CALCIUM SERPL-MCNC: 9.6 MG/DL (ref 8.5–10.5)
CHLORIDE BLD-SCNC: 103 MEQ/L (ref 98–111)
CO2: 25 MEQ/L (ref 23–33)
CREAT SERPL-MCNC: 0.5 MG/DL (ref 0.4–1.2)
ERYTHROCYTE [DISTWIDTH] IN BLOOD BY AUTOMATED COUNT: 13.4 % (ref 11.5–14.5)
ERYTHROCYTE [DISTWIDTH] IN BLOOD BY AUTOMATED COUNT: 51.1 FL (ref 35–45)
GFR SERPL CREATININE-BSD FRML MDRD: > 90 ML/MIN/1.73M2
GLUCOSE BLD-MCNC: 100 MG/DL (ref 70–108)
HCT VFR BLD CALC: 45.8 % (ref 37–47)
HEMOGLOBIN: 15 GM/DL (ref 12–16)
MCH RBC QN AUTO: 33.5 PG (ref 26–33)
MCHC RBC AUTO-ENTMCNC: 32.8 GM/DL (ref 32.2–35.5)
MCV RBC AUTO: 102.2 FL (ref 81–99)
PLATELET # BLD: 350 THOU/MM3 (ref 130–400)
PMV BLD AUTO: 10.2 FL (ref 9.4–12.4)
POTASSIUM SERPL-SCNC: 4.3 MEQ/L (ref 3.5–5.2)
RBC # BLD: 4.48 MILL/MM3 (ref 4.2–5.4)
SODIUM BLD-SCNC: 139 MEQ/L (ref 135–145)
WBC # BLD: 10.9 THOU/MM3 (ref 4.8–10.8)

## 2019-08-19 PROCEDURE — 85027 COMPLETE CBC AUTOMATED: CPT

## 2019-08-19 PROCEDURE — 36415 COLL VENOUS BLD VENIPUNCTURE: CPT

## 2019-08-19 PROCEDURE — 80048 BASIC METABOLIC PNL TOTAL CA: CPT

## 2019-08-28 NOTE — PROGRESS NOTES
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 Patient did have surgery here 5/23/19 and did well without any problems; notes in epic.

## 2019-09-04 ENCOUNTER — ANESTHESIA EVENT (OUTPATIENT)
Dept: OPERATING ROOM | Age: 54
End: 2019-09-04
Payer: COMMERCIAL

## 2019-09-05 ENCOUNTER — ANESTHESIA (OUTPATIENT)
Dept: OPERATING ROOM | Age: 54
End: 2019-09-05
Payer: COMMERCIAL

## 2019-09-05 ENCOUNTER — HOSPITAL ENCOUNTER (OUTPATIENT)
Age: 54
Setting detail: OUTPATIENT SURGERY
Discharge: HOME | End: 2019-09-05
Attending: PODIATRIST | Admitting: PODIATRIST
Payer: COMMERCIAL

## 2019-09-05 VITALS
DIASTOLIC BLOOD PRESSURE: 74 MMHG | TEMPERATURE: 97.9 F | HEART RATE: 68 BPM | BODY MASS INDEX: 18.98 KG/M2 | RESPIRATION RATE: 18 BRPM | WEIGHT: 100.53 LBS | SYSTOLIC BLOOD PRESSURE: 154 MMHG | OXYGEN SATURATION: 98 % | HEIGHT: 61 IN

## 2019-09-05 VITALS
OXYGEN SATURATION: 99 % | RESPIRATION RATE: 16 BRPM | SYSTOLIC BLOOD PRESSURE: 105 MMHG | DIASTOLIC BLOOD PRESSURE: 55 MMHG

## 2019-09-05 DIAGNOSIS — M79.675 CHRONIC TOE PAIN, LEFT FOOT: Primary | ICD-10-CM

## 2019-09-05 DIAGNOSIS — G89.29 CHRONIC TOE PAIN, LEFT FOOT: Primary | ICD-10-CM

## 2019-09-05 PROCEDURE — 2580000003 HC RX 258: Performed by: NURSE ANESTHETIST, CERTIFIED REGISTERED

## 2019-09-05 PROCEDURE — 2500000003 HC RX 250 WO HCPCS: Performed by: PODIATRIST

## 2019-09-05 PROCEDURE — 6360000002 HC RX W HCPCS: Performed by: STUDENT IN AN ORGANIZED HEALTH CARE EDUCATION/TRAINING PROGRAM

## 2019-09-05 PROCEDURE — 2580000003 HC RX 258: Performed by: PODIATRIST

## 2019-09-05 PROCEDURE — 7100000001 HC PACU RECOVERY - ADDTL 15 MIN: Performed by: PODIATRIST

## 2019-09-05 PROCEDURE — 7100000011 HC PHASE II RECOVERY - ADDTL 15 MIN: Performed by: PODIATRIST

## 2019-09-05 PROCEDURE — 3700000000 HC ANESTHESIA ATTENDED CARE: Performed by: PODIATRIST

## 2019-09-05 PROCEDURE — 7100000010 HC PHASE II RECOVERY - FIRST 15 MIN: Performed by: PODIATRIST

## 2019-09-05 PROCEDURE — 88305 TISSUE EXAM BY PATHOLOGIST: CPT

## 2019-09-05 PROCEDURE — 3700000001 HC ADD 15 MINUTES (ANESTHESIA): Performed by: PODIATRIST

## 2019-09-05 PROCEDURE — 7100000000 HC PACU RECOVERY - FIRST 15 MIN: Performed by: PODIATRIST

## 2019-09-05 PROCEDURE — 88311 DECALCIFY TISSUE: CPT

## 2019-09-05 PROCEDURE — 6360000002 HC RX W HCPCS: Performed by: ANESTHESIOLOGY

## 2019-09-05 PROCEDURE — 6360000002 HC RX W HCPCS: Performed by: NURSE ANESTHETIST, CERTIFIED REGISTERED

## 2019-09-05 PROCEDURE — 2500000003 HC RX 250 WO HCPCS: Performed by: NURSE ANESTHETIST, CERTIFIED REGISTERED

## 2019-09-05 PROCEDURE — 6360000002 HC RX W HCPCS: Performed by: PODIATRIST

## 2019-09-05 PROCEDURE — 3600000004 HC SURGERY LEVEL 4 BASE: Performed by: PODIATRIST

## 2019-09-05 PROCEDURE — C1713 ANCHOR/SCREW BN/BN,TIS/BN: HCPCS | Performed by: PODIATRIST

## 2019-09-05 PROCEDURE — 2720000010 HC SURG SUPPLY STERILE: Performed by: PODIATRIST

## 2019-09-05 PROCEDURE — 3600000014 HC SURGERY LEVEL 4 ADDTL 15MIN: Performed by: PODIATRIST

## 2019-09-05 PROCEDURE — 2709999900 HC NON-CHARGEABLE SUPPLY: Performed by: PODIATRIST

## 2019-09-05 DEVICE — SCREW BNE L10MM DIA2.7MM ANK S STL ST VAR ANG LOK FULL THRD: Type: IMPLANTABLE DEVICE | Site: FOOT | Status: FUNCTIONAL

## 2019-09-05 DEVICE — SCREW BNE L20MM DIA2.7MM CORT S STL ST T8 STARDRV RECESS: Type: IMPLANTABLE DEVICE | Site: FOOT | Status: FUNCTIONAL

## 2019-09-05 DEVICE — DR GRAFT
Type: IMPLANTABLE DEVICE | Site: FOOT | Status: FUNCTIONAL
Brand: ALLOMATRIX

## 2019-09-05 DEVICE — IMPLANTABLE DEVICE: Type: IMPLANTABLE DEVICE | Site: FOOT | Status: FUNCTIONAL

## 2019-09-05 DEVICE — WIRE FIX L150MM DIA1.6MM THRD L10MM TI PARTIALLY THRDED FOR: Type: IMPLANTABLE DEVICE | Site: FOOT | Status: FUNCTIONAL

## 2019-09-05 DEVICE — WIRE FIX L150MM DIA1.6MM THRD L15MM FOREFOOT/MIDFOOT COMPR: Type: IMPLANTABLE DEVICE | Site: FOOT | Status: FUNCTIONAL

## 2019-09-05 DEVICE — SCREW BNE L14MM DIA2.7MM ANK S STL ST VAR ANG LOK FULL THRD: Type: IMPLANTABLE DEVICE | Site: FOOT | Status: FUNCTIONAL

## 2019-09-05 DEVICE — SCREW BNE L18MM DIA2.7MM ANK S STL ST VAR ANG LOK FULL THRD: Type: IMPLANTABLE DEVICE | Site: FOOT | Status: FUNCTIONAL

## 2019-09-05 RX ORDER — SODIUM CHLORIDE, SODIUM LACTATE, POTASSIUM CHLORIDE, CALCIUM CHLORIDE 600; 310; 30; 20 MG/100ML; MG/100ML; MG/100ML; MG/100ML
INJECTION, SOLUTION INTRAVENOUS CONTINUOUS PRN
Status: DISCONTINUED | OUTPATIENT
Start: 2019-09-05 | End: 2019-09-05 | Stop reason: SDUPTHER

## 2019-09-05 RX ORDER — MORPHINE SULFATE 2 MG/ML
2 INJECTION, SOLUTION INTRAMUSCULAR; INTRAVENOUS EVERY 5 MIN PRN
Status: DISCONTINUED | OUTPATIENT
Start: 2019-09-05 | End: 2019-09-05 | Stop reason: HOSPADM

## 2019-09-05 RX ORDER — HYDROXYZINE PAMOATE 25 MG/1
CAPSULE ORAL
Qty: 28 CAPSULE | Refills: 0 | Status: SHIPPED | OUTPATIENT
Start: 2019-09-05 | End: 2019-12-09

## 2019-09-05 RX ORDER — PROPOFOL 10 MG/ML
INJECTION, EMULSION INTRAVENOUS CONTINUOUS PRN
Status: DISCONTINUED | OUTPATIENT
Start: 2019-09-05 | End: 2019-09-05 | Stop reason: SDUPTHER

## 2019-09-05 RX ORDER — PROPOFOL 10 MG/ML
INJECTION, EMULSION INTRAVENOUS PRN
Status: DISCONTINUED | OUTPATIENT
Start: 2019-09-05 | End: 2019-09-05 | Stop reason: SDUPTHER

## 2019-09-05 RX ORDER — SODIUM CHLORIDE 0.9 % (FLUSH) 0.9 %
10 SYRINGE (ML) INJECTION EVERY 12 HOURS SCHEDULED
Status: DISCONTINUED | OUTPATIENT
Start: 2019-09-05 | End: 2019-09-05 | Stop reason: HOSPADM

## 2019-09-05 RX ORDER — LIDOCAINE HCL/PF 100 MG/5ML
SYRINGE (ML) INJECTION PRN
Status: DISCONTINUED | OUTPATIENT
Start: 2019-09-05 | End: 2019-09-05 | Stop reason: SDUPTHER

## 2019-09-05 RX ORDER — PROMETHAZINE HYDROCHLORIDE 25 MG/ML
12.5 INJECTION, SOLUTION INTRAMUSCULAR; INTRAVENOUS ONCE
Status: COMPLETED | OUTPATIENT
Start: 2019-09-05 | End: 2019-09-05

## 2019-09-05 RX ORDER — FENTANYL CITRATE 50 UG/ML
50 INJECTION, SOLUTION INTRAMUSCULAR; INTRAVENOUS EVERY 5 MIN PRN
Status: DISCONTINUED | OUTPATIENT
Start: 2019-09-05 | End: 2019-09-05 | Stop reason: HOSPADM

## 2019-09-05 RX ORDER — HYDROCODONE BITARTRATE AND ACETAMINOPHEN 5; 325 MG/1; MG/1
1 TABLET ORAL EVERY 4 HOURS PRN
Status: DISCONTINUED | OUTPATIENT
Start: 2019-09-05 | End: 2019-09-05 | Stop reason: HOSPADM

## 2019-09-05 RX ORDER — BUPIVACAINE HYDROCHLORIDE 5 MG/ML
INJECTION, SOLUTION EPIDURAL; INTRACAUDAL PRN
Status: DISCONTINUED | OUTPATIENT
Start: 2019-09-05 | End: 2019-09-05 | Stop reason: ALTCHOICE

## 2019-09-05 RX ORDER — HYDROCODONE BITARTRATE AND ACETAMINOPHEN 5; 325 MG/1; MG/1
2 TABLET ORAL EVERY 4 HOURS PRN
Status: DISCONTINUED | OUTPATIENT
Start: 2019-09-05 | End: 2019-09-05 | Stop reason: HOSPADM

## 2019-09-05 RX ORDER — GLYCOPYRROLATE 1 MG/5 ML
SYRINGE (ML) INTRAVENOUS PRN
Status: DISCONTINUED | OUTPATIENT
Start: 2019-09-05 | End: 2019-09-05 | Stop reason: SDUPTHER

## 2019-09-05 RX ORDER — ONDANSETRON 2 MG/ML
INJECTION INTRAMUSCULAR; INTRAVENOUS PRN
Status: DISCONTINUED | OUTPATIENT
Start: 2019-09-05 | End: 2019-09-05 | Stop reason: SDUPTHER

## 2019-09-05 RX ORDER — LABETALOL 20 MG/4 ML (5 MG/ML) INTRAVENOUS SYRINGE
10 EVERY 10 MIN PRN
Status: DISCONTINUED | OUTPATIENT
Start: 2019-09-05 | End: 2019-09-05 | Stop reason: HOSPADM

## 2019-09-05 RX ORDER — LIDOCAINE HYDROCHLORIDE 10 MG/ML
INJECTION, SOLUTION INFILTRATION; PERINEURAL PRN
Status: DISCONTINUED | OUTPATIENT
Start: 2019-09-05 | End: 2019-09-05 | Stop reason: ALTCHOICE

## 2019-09-05 RX ORDER — SODIUM CHLORIDE 0.9 % (FLUSH) 0.9 %
10 SYRINGE (ML) INJECTION PRN
Status: DISCONTINUED | OUTPATIENT
Start: 2019-09-05 | End: 2019-09-05 | Stop reason: HOSPADM

## 2019-09-05 RX ORDER — DEXAMETHASONE SODIUM PHOSPHATE 4 MG/ML
INJECTION, SOLUTION INTRA-ARTICULAR; INTRALESIONAL; INTRAMUSCULAR; INTRAVENOUS; SOFT TISSUE PRN
Status: DISCONTINUED | OUTPATIENT
Start: 2019-09-05 | End: 2019-09-05 | Stop reason: SDUPTHER

## 2019-09-05 RX ORDER — CEFAZOLIN SODIUM 1 G/50ML
1 INJECTION, SOLUTION INTRAVENOUS
Status: COMPLETED | OUTPATIENT
Start: 2019-09-05 | End: 2019-09-05

## 2019-09-05 RX ORDER — KETAMINE HCL IN NACL, ISO-OSM 100MG/10ML
SYRINGE (ML) INJECTION PRN
Status: DISCONTINUED | OUTPATIENT
Start: 2019-09-05 | End: 2019-09-05 | Stop reason: SDUPTHER

## 2019-09-05 RX ORDER — SODIUM CHLORIDE 0.9 % (FLUSH) 0.9 %
10 SYRINGE (ML) INJECTION PRN
Status: DISCONTINUED | OUTPATIENT
Start: 2019-09-05 | End: 2019-09-05 | Stop reason: SDUPTHER

## 2019-09-05 RX ORDER — FENTANYL CITRATE 50 UG/ML
INJECTION, SOLUTION INTRAMUSCULAR; INTRAVENOUS PRN
Status: DISCONTINUED | OUTPATIENT
Start: 2019-09-05 | End: 2019-09-05 | Stop reason: SDUPTHER

## 2019-09-05 RX ORDER — SODIUM CHLORIDE 0.9 % (FLUSH) 0.9 %
10 SYRINGE (ML) INJECTION EVERY 12 HOURS SCHEDULED
Status: DISCONTINUED | OUTPATIENT
Start: 2019-09-05 | End: 2019-09-05 | Stop reason: SDUPTHER

## 2019-09-05 RX ORDER — MIDAZOLAM HYDROCHLORIDE 1 MG/ML
INJECTION INTRAMUSCULAR; INTRAVENOUS PRN
Status: DISCONTINUED | OUTPATIENT
Start: 2019-09-05 | End: 2019-09-05 | Stop reason: SDUPTHER

## 2019-09-05 RX ORDER — OXYCODONE HYDROCHLORIDE AND ACETAMINOPHEN 5; 325 MG/1; MG/1
1 TABLET ORAL EVERY 6 HOURS PRN
Qty: 28 TABLET | Refills: 0 | Status: SHIPPED | OUTPATIENT
Start: 2019-09-05 | End: 2019-09-12

## 2019-09-05 RX ORDER — SODIUM CHLORIDE 9 MG/ML
INJECTION, SOLUTION INTRAVENOUS CONTINUOUS
Status: DISCONTINUED | OUTPATIENT
Start: 2019-09-05 | End: 2019-09-05 | Stop reason: HOSPADM

## 2019-09-05 RX ADMIN — MORPHINE SULFATE 2 MG: 2 INJECTION, SOLUTION INTRAMUSCULAR; INTRAVENOUS at 11:11

## 2019-09-05 RX ADMIN — FENTANYL CITRATE 50 MCG: 50 INJECTION INTRAMUSCULAR; INTRAVENOUS at 11:19

## 2019-09-05 RX ADMIN — FENTANYL CITRATE 50 MCG: 50 INJECTION INTRAMUSCULAR; INTRAVENOUS at 10:06

## 2019-09-05 RX ADMIN — HYDROMORPHONE HYDROCHLORIDE 0.5 MG: 1 INJECTION, SOLUTION INTRAMUSCULAR; INTRAVENOUS; SUBCUTANEOUS at 13:00

## 2019-09-05 RX ADMIN — Medication 0.2 MG: at 07:53

## 2019-09-05 RX ADMIN — SODIUM CHLORIDE, POTASSIUM CHLORIDE, SODIUM LACTATE AND CALCIUM CHLORIDE: 600; 310; 30; 20 INJECTION, SOLUTION INTRAVENOUS at 09:37

## 2019-09-05 RX ADMIN — FENTANYL CITRATE 50 MCG: 50 INJECTION INTRAMUSCULAR; INTRAVENOUS at 07:53

## 2019-09-05 RX ADMIN — FENTANYL CITRATE 50 MCG: 50 INJECTION INTRAMUSCULAR; INTRAVENOUS at 08:48

## 2019-09-05 RX ADMIN — MORPHINE SULFATE 2 MG: 2 INJECTION, SOLUTION INTRAMUSCULAR; INTRAVENOUS at 11:16

## 2019-09-05 RX ADMIN — FENTANYL CITRATE 50 MCG: 50 INJECTION INTRAMUSCULAR; INTRAVENOUS at 08:19

## 2019-09-05 RX ADMIN — FENTANYL CITRATE 50 MCG: 50 INJECTION INTRAMUSCULAR; INTRAVENOUS at 11:25

## 2019-09-05 RX ADMIN — FENTANYL CITRATE 50 MCG: 50 INJECTION INTRAMUSCULAR; INTRAVENOUS at 08:26

## 2019-09-05 RX ADMIN — Medication 50 MG: at 07:53

## 2019-09-05 RX ADMIN — PROPOFOL 115 MCG/KG/MIN: 10 INJECTION, EMULSION INTRAVENOUS at 07:53

## 2019-09-05 RX ADMIN — CEFAZOLIN SODIUM 1 G: 1 INJECTION, SOLUTION INTRAVENOUS at 08:08

## 2019-09-05 RX ADMIN — FENTANYL CITRATE 50 MCG: 50 INJECTION INTRAMUSCULAR; INTRAVENOUS at 09:45

## 2019-09-05 RX ADMIN — PROPOFOL 150 MG: 10 INJECTION, EMULSION INTRAVENOUS at 07:53

## 2019-09-05 RX ADMIN — PROMETHAZINE HYDROCHLORIDE 12.5 MG: 25 INJECTION INTRAMUSCULAR; INTRAVENOUS at 12:30

## 2019-09-05 RX ADMIN — ONDANSETRON HYDROCHLORIDE 4 MG: 4 INJECTION, SOLUTION INTRAMUSCULAR; INTRAVENOUS at 10:15

## 2019-09-05 RX ADMIN — Medication 100 MG: at 07:53

## 2019-09-05 RX ADMIN — SODIUM CHLORIDE: 9 INJECTION, SOLUTION INTRAVENOUS at 07:48

## 2019-09-05 RX ADMIN — MIDAZOLAM HYDROCHLORIDE 2 MG: 1 INJECTION, SOLUTION INTRAMUSCULAR; INTRAVENOUS at 07:48

## 2019-09-05 RX ADMIN — DEXAMETHASONE SODIUM PHOSPHATE 10 MG: 4 INJECTION, SOLUTION INTRAMUSCULAR; INTRAVENOUS at 07:53

## 2019-09-05 ASSESSMENT — PULMONARY FUNCTION TESTS
PIF_VALUE: 12
PIF_VALUE: 1
PIF_VALUE: 15
PIF_VALUE: 2
PIF_VALUE: 12
PIF_VALUE: 1
PIF_VALUE: 2
PIF_VALUE: 13
PIF_VALUE: 12
PIF_VALUE: 12
PIF_VALUE: 4
PIF_VALUE: 15
PIF_VALUE: 12
PIF_VALUE: 12
PIF_VALUE: 15
PIF_VALUE: 2
PIF_VALUE: 12
PIF_VALUE: 12
PIF_VALUE: 10
PIF_VALUE: 10
PIF_VALUE: 7
PIF_VALUE: 15
PIF_VALUE: 12
PIF_VALUE: 11
PIF_VALUE: 12
PIF_VALUE: 15
PIF_VALUE: 12
PIF_VALUE: 11
PIF_VALUE: 15
PIF_VALUE: 12
PIF_VALUE: 2
PIF_VALUE: 15
PIF_VALUE: 12
PIF_VALUE: 15
PIF_VALUE: 12
PIF_VALUE: 15
PIF_VALUE: 12
PIF_VALUE: 12
PIF_VALUE: 1
PIF_VALUE: 12
PIF_VALUE: 13
PIF_VALUE: 12
PIF_VALUE: 13
PIF_VALUE: 12
PIF_VALUE: 15
PIF_VALUE: 11
PIF_VALUE: 11
PIF_VALUE: 12
PIF_VALUE: 1
PIF_VALUE: 11
PIF_VALUE: 12
PIF_VALUE: 13
PIF_VALUE: 12
PIF_VALUE: 9
PIF_VALUE: 12
PIF_VALUE: 11
PIF_VALUE: 15
PIF_VALUE: 11
PIF_VALUE: 12
PIF_VALUE: 15
PIF_VALUE: 12
PIF_VALUE: 13
PIF_VALUE: 2
PIF_VALUE: 12
PIF_VALUE: 12
PIF_VALUE: 13
PIF_VALUE: 15
PIF_VALUE: 12
PIF_VALUE: 1
PIF_VALUE: 12
PIF_VALUE: 13
PIF_VALUE: 15
PIF_VALUE: 12
PIF_VALUE: 2
PIF_VALUE: 21
PIF_VALUE: 15
PIF_VALUE: 1
PIF_VALUE: 12
PIF_VALUE: 13
PIF_VALUE: 12
PIF_VALUE: 12
PIF_VALUE: 13
PIF_VALUE: 15
PIF_VALUE: 12
PIF_VALUE: 2
PIF_VALUE: 15
PIF_VALUE: 15
PIF_VALUE: 12
PIF_VALUE: 13
PIF_VALUE: 13
PIF_VALUE: 11
PIF_VALUE: 13
PIF_VALUE: 12
PIF_VALUE: 11
PIF_VALUE: 12
PIF_VALUE: 12
PIF_VALUE: 2
PIF_VALUE: 12
PIF_VALUE: 12
PIF_VALUE: 1
PIF_VALUE: 15
PIF_VALUE: 12
PIF_VALUE: 15
PIF_VALUE: 13
PIF_VALUE: 12
PIF_VALUE: 12
PIF_VALUE: 15
PIF_VALUE: 12
PIF_VALUE: 13
PIF_VALUE: 12
PIF_VALUE: 12
PIF_VALUE: 15
PIF_VALUE: 22
PIF_VALUE: 15
PIF_VALUE: 1
PIF_VALUE: 12
PIF_VALUE: 12
PIF_VALUE: 2
PIF_VALUE: 2
PIF_VALUE: 12
PIF_VALUE: 13
PIF_VALUE: 12
PIF_VALUE: 13
PIF_VALUE: 12
PIF_VALUE: 11
PIF_VALUE: 12
PIF_VALUE: 12
PIF_VALUE: 13
PIF_VALUE: 12
PIF_VALUE: 12
PIF_VALUE: 15
PIF_VALUE: 13

## 2019-09-05 ASSESSMENT — PAIN DESCRIPTION - ONSET: ONSET: ON-GOING

## 2019-09-05 ASSESSMENT — PAIN DESCRIPTION - ORIENTATION
ORIENTATION: LEFT

## 2019-09-05 ASSESSMENT — PAIN DESCRIPTION - DESCRIPTORS
DESCRIPTORS: ACHING
DESCRIPTORS: ACHING
DESCRIPTORS: ACHING;DISCOMFORT
DESCRIPTORS: THROBBING

## 2019-09-05 ASSESSMENT — PAIN DESCRIPTION - FREQUENCY
FREQUENCY: CONTINUOUS
FREQUENCY: INTERMITTENT

## 2019-09-05 ASSESSMENT — PAIN SCALES - GENERAL
PAINLEVEL_OUTOF10: 8
PAINLEVEL_OUTOF10: 6
PAINLEVEL_OUTOF10: 6
PAINLEVEL_OUTOF10: 8
PAINLEVEL_OUTOF10: 6
PAINLEVEL_OUTOF10: 4
PAINLEVEL_OUTOF10: 6

## 2019-09-05 ASSESSMENT — PAIN DESCRIPTION - PROGRESSION
CLINICAL_PROGRESSION: GRADUALLY IMPROVING
CLINICAL_PROGRESSION: GRADUALLY WORSENING
CLINICAL_PROGRESSION: NOT CHANGED

## 2019-09-05 ASSESSMENT — PAIN DESCRIPTION - LOCATION
LOCATION: KNEE
LOCATION: FOOT
LOCATION: FOOT

## 2019-09-05 ASSESSMENT — PAIN DESCRIPTION - PAIN TYPE
TYPE: SURGICAL PAIN

## 2019-09-05 ASSESSMENT — PAIN - FUNCTIONAL ASSESSMENT
PAIN_FUNCTIONAL_ASSESSMENT: PREVENTS OR INTERFERES SOME ACTIVE ACTIVITIES AND ADLS
PAIN_FUNCTIONAL_ASSESSMENT: 0-10

## 2019-09-05 NOTE — PROGRESS NOTES
Pt states pain is getting worse.  Due to pt having nausea Dr. Jocelin Longoria called and order received for Dilaudid

## 2019-09-05 NOTE — PROGRESS NOTES
Oriented to sds   9      Refuses flu and pneumonia vaccine. Family updated to register with volunteer out at . Informed family to take belonging with them. Keep nothing of value in room unattended. Informed to get the case number for surgery from volunteer out front to be able to follow them through surgery.

## 2019-09-05 NOTE — ANESTHESIA POSTPROCEDURE EVALUATION
Department of Anesthesiology  Postprocedure Note    Patient: Sheela Leonid  MRN: [id number]  YOB: 1965  Date of evaluation: 9/5/2019  Time:  2:20 PM     Procedure Summary     Date:  09/05/19 Room / Location:  [place] [facility]  / [place] [facility]    Anesthesia Start:  0748 Anesthesia Stop:  1101    Procedure:  LEFT 1ST MPJ FUSION, METATARSAL HEADS 2-5 RESECTION, ARTHRODESIS 2-4 TOES, ARTHROPLASTY 5TH TOE ALL ON THE LEFT (Left Foot) Diagnosis:  (OSTEOPOROSIS, RHEUMATOID ARTHRITIS, HAMMERTOES, HAV AND PAIN ON THE LEFT)    Surgeon:  Cheryl Joseph DPM Responsible Provider:  Eri Sevilla MD    Anesthesia Type:  general ASA Status:  2          Anesthesia Type: general    José Phase I: José Score: 10    José Phase II: José Score: 10    Last vitals: Reviewed and per EMR flowsheets.        Anesthesia Post Evaluation    Patient location during evaluation: PACU  Patient participation: complete - patient participated  Level of consciousness: awake  Airway patency: patent  Nausea & Vomiting: no vomiting and nausea  Complications: no  Cardiovascular status: hemodynamically stable  Respiratory status: acceptable  Hydration status: stable

## 2019-09-06 NOTE — OP NOTE
[address] [place] [address]                                OPERATIVE REPORT    PATIENT NAME: JUSTIN CUNNINGHAM                        :        1965  MED REC NO:   [id number]                           ROOM:  ACCOUNT NO:   [de-identified]                           ADMIT DATE: 2019  PROVIDER:     Judith Fulton DPM    DATE OF PROCEDURE:  2019    PREOPERATIVE DIAGNOSES:  Osteoporosis, rheumatoid arthritis, hammertoes,  HAV, and pain all in left foot. POSTOPERATIVE DIAGNOSES:  Osteoporosis, rheumatoid arthritis,  hammertoes, HAV, and pain all in left foot. OPERATIONS PERFORMED:  1. First MPJ fusion. 2.  Metatarsal head resection 2 through 5.  3.  Arthrodesis 2 through 4 toes and arthroplasty fifth toe, all on the  left. ANESTHESIA:  General.    SURGEON:  Adal Wagner DPM    ASSISTANT:  Judith Fulton DPM, PGY-3    HEMOSTASIS:  Thigh tourniquet 300 mmHg. ESTIMATED BLOOD LOSS:  Approximately 50 mL. MATERIALS:  3-0 Vicryl and 4-0 Monocryl. INJECTABLES:  30 mL of 0.5% Marcaine plain, 30 mL of 1% lidocaine plain. SPECIMENS:  MPJ joints, rheumatoid arthritis left foot. IMPLANTS:  [facility], one Synthes MPJ plate and  accompanying 2.7 screws and four 1.6 K-wires. DRAINS:  None. FINDINGS:  Preoperative diagnosis confirmed. INDICATIONS:  The patient is a 68-year-old female with pain to her left  foot secondary to rheumatoid arthritis. Dr. Alisson Merritt has been treating  conservatively. At the time of surgery, all conservative options had  been exhausted and surgical intervention has been deemed necessary. Dr. Alisson Merritt has explained the procedure in detail to the patient, outlined the  risks and benefits and possible complications and the patient elects to  go forward with the procedure. No guarantees or promises were made as  to the surgical outcome.     Next, Dr. Alisson Merritt met with the patient in the preoperative holding area and  again reviewed the surgical procedure and answered any questions  regarding the case. Consent was reviewed and signed. Dr. Alli Alvarez marked  the operative extremity. Next, the patient was then transferred to the  operating room, placed on the OR table in the supine position. Timeout  was taken, verifying the patient and planned procedure; then general  anesthesia was induced. A well-padded thigh tourniquet was applied to  the left thigh. The operative extremity was then prepped and draped in  normal sterile technique with Chlorhexidine scrub; 30 mL of 0.5%  Marcaine plain, and 30 mL of 1% lidocaine plain was used during the  procedure. PROCEDURE IN DETAIL:  FIRST MPJ FUSION, LEFT FOOT:  Attention was then directed to the first  metatarsophalangeal on the left foot and then an ellipse type of  incision was made from the metatarsal neck distally to the mid shaft of  the proximal phalanx. This allowed us to remove any redundant tissue  after bone was resected and the tourniquet was used. A 15 blade was  used to make a full-thickness incision through the skin and the ellipse  was removed. The extensor hallucis longus was then identified and a  full-thickness incision was made on bone just medial to the EHL tendon  through the length of our incision. The metatarsophalangeal joint was  encountered and freed up medially from any soft tissue and again freed  up laterally exposing the metatarsal head. A 15 blade was then used to  free up the base of the proximal phalanx starting in the joint and  sweeping medially and laterally, releasing the soft tissues from the  base of the proximal phalanx. Once the metatarsal head and phalanx were  adequately released from the soft tissue, McGlamry elevator was then  inserted underneath the metatarsal head and proximally releasing any  sesamoid adhesions or any other soft tissue attachments that were not  released with a 15 blade.   Excellent and  perpendicular as well to the long axis of the bone. This was done on  digits 2 through 5. Next, a TPS saw was used to make a cut on digits 2  through 4 removing the cartilage on the base of the middle phalanx. Once the cartilage was removed, 1.6 K-wires were retrograded from the  middle phalanx out at the distal aspect of the toe on all digits 2  through 5. After the K-wires were retrograded out at the distal aspect  of the toe, they were then threaded into the proximal phalanx and then  fluoroscopy was used to reduce the phalanx onto its corresponding  metatarsal and guide the wire into the medullary canal of its  corresponding metatarsal.  Fluoroscopy was used to ensure that each  K-wire from metatarsal 2 through 5 was able to be threaded into the  medullary canal of metatarsal 2 through 5.       4. Skin plasty advancement to the plantar foot, Left  Attention was again directed to the plantar aspect of the left foot. It was noted that the plantar fat pad was significantly displaced distally. The soft tissue distal to the incision was undermined at the level of the subcutaneous layer. Once the level was undermined, it was able to be advanced proximally so as to relocated the plantar fat bad back underneath of the distal metatarsal shafts. 3-0 vicryl was used to maintain the new location of the plantar fat pad to the proximal most aspect of the plantar incision. The skin incision was re approximated with 4-0 monocryl    Next, the Dorsal incisions were flushed with copious amounts of Irrisept, and the deep tissue was reapproximated with 3-0 Vicryl, skin was reapproximated with 4-0 Monocryl. The tourniquet was let down and hyperemic response was noted of all digits of the foot. The incisions were then dressed with Mepitel Ag, gauze, Kerlix, Ace, cast padding, and a posterior splint. The patient was then transported to the PACU with vital signs and neurovascular intact to all digits of the left foot.   No complications noted and tolerated the procedure. The patient will be discharged per PACU protocol and will follow with Dr. Grisel Lang upon discharge.     SP Arreguin DPM PGY3    D: 09/05/2019 11:07:32       T: 09/05/2019 14:23:04     [email or web address]  Job#: [id number]     Doc#: [id number]    CC:

## 2019-11-17 ENCOUNTER — HOSPITAL ENCOUNTER (EMERGENCY)
Age: 54
Discharge: HOME | End: 2019-11-17
Attending: EMERGENCY MEDICINE
Payer: COMMERCIAL

## 2019-11-17 ENCOUNTER — APPOINTMENT (OUTPATIENT)
Dept: CT IMAGING | Age: 54
End: 2019-11-17
Payer: COMMERCIAL

## 2019-11-17 VITALS
SYSTOLIC BLOOD PRESSURE: 134 MMHG | BODY MASS INDEX: 18.88 KG/M2 | OXYGEN SATURATION: 99 % | WEIGHT: 100 LBS | RESPIRATION RATE: 19 BRPM | TEMPERATURE: 98.6 F | DIASTOLIC BLOOD PRESSURE: 74 MMHG | HEIGHT: 61 IN | HEART RATE: 88 BPM

## 2019-11-17 DIAGNOSIS — H57.04 EPISODIC MYDRIASIS OF LEFT EYE: Primary | ICD-10-CM

## 2019-11-17 DIAGNOSIS — T44.3X1S: ICD-10-CM

## 2019-11-17 LAB
ANION GAP SERPL CALCULATED.3IONS-SCNC: 13 MEQ/L (ref 8–16)
BASOPHILS # BLD: 0.4 %
BASOPHILS ABSOLUTE: 0 THOU/MM3 (ref 0–0.1)
BUN BLDV-MCNC: 10 MG/DL (ref 7–22)
CALCIUM SERPL-MCNC: 9.3 MG/DL (ref 8.5–10.5)
CHLORIDE BLD-SCNC: 103 MEQ/L (ref 98–111)
CO2: 23 MEQ/L (ref 23–33)
CREAT SERPL-MCNC: 0.5 MG/DL (ref 0.4–1.2)
EKG ATRIAL RATE: 78 BPM
EKG P AXIS: 62 DEGREES
EKG P-R INTERVAL: 144 MS
EKG Q-T INTERVAL: 360 MS
EKG QRS DURATION: 80 MS
EKG QTC CALCULATION (BAZETT): 410 MS
EKG R AXIS: -29 DEGREES
EKG T AXIS: 21 DEGREES
EKG VENTRICULAR RATE: 78 BPM
EOSINOPHIL # BLD: 1.8 %
EOSINOPHILS ABSOLUTE: 0.2 THOU/MM3 (ref 0–0.4)
ERYTHROCYTE [DISTWIDTH] IN BLOOD BY AUTOMATED COUNT: 12.8 % (ref 11.5–14.5)
ERYTHROCYTE [DISTWIDTH] IN BLOOD BY AUTOMATED COUNT: 47.5 FL (ref 35–45)
GFR SERPL CREATININE-BSD FRML MDRD: > 90 ML/MIN/1.73M2
GLUCOSE BLD-MCNC: 124 MG/DL (ref 70–108)
GLUCOSE BLD-MCNC: 126 MG/DL (ref 70–108)
HCT VFR BLD CALC: 42.9 % (ref 37–47)
HEMOGLOBIN: 14.1 GM/DL (ref 12–16)
IMMATURE GRANS (ABS): 0.03 THOU/MM3 (ref 0–0.07)
IMMATURE GRANULOCYTES: 0.4 %
LYMPHOCYTES # BLD: 11.5 %
LYMPHOCYTES ABSOLUTE: 1 THOU/MM3 (ref 1–4.8)
MCH RBC QN AUTO: 33.3 PG (ref 26–33)
MCHC RBC AUTO-ENTMCNC: 32.9 GM/DL (ref 32.2–35.5)
MCV RBC AUTO: 101.2 FL (ref 81–99)
MONOCYTES # BLD: 4.1 %
MONOCYTES ABSOLUTE: 0.3 THOU/MM3 (ref 0.4–1.3)
NUCLEATED RED BLOOD CELLS: 0 /100 WBC
OSMOLALITY CALCULATION: 278 MOSMOL/KG (ref 275–300)
PLATELET # BLD: 311 THOU/MM3 (ref 130–400)
PLATELET ESTIMATE: ADEQUATE
PMV BLD AUTO: 10 FL (ref 9.4–12.4)
POTASSIUM SERPL-SCNC: 4 MEQ/L (ref 3.5–5.2)
RBC # BLD: 4.24 MILL/MM3 (ref 4.2–5.4)
SCAN OF BLOOD SMEAR: NORMAL
SEG NEUTROPHILS: 81.8 %
SEGMENTED NEUTROPHILS ABSOLUTE COUNT: 7 THOU/MM3 (ref 1.8–7.7)
SODIUM BLD-SCNC: 139 MEQ/L (ref 135–145)
WBC # BLD: 8.5 THOU/MM3 (ref 4.8–10.8)

## 2019-11-17 PROCEDURE — 93005 ELECTROCARDIOGRAM TRACING: CPT | Performed by: EMERGENCY MEDICINE

## 2019-11-17 PROCEDURE — 99284 EMERGENCY DEPT VISIT MOD MDM: CPT

## 2019-11-17 PROCEDURE — 93010 ELECTROCARDIOGRAM REPORT: CPT | Performed by: INTERNAL MEDICINE

## 2019-11-17 PROCEDURE — 80048 BASIC METABOLIC PNL TOTAL CA: CPT

## 2019-11-17 PROCEDURE — 36415 COLL VENOUS BLD VENIPUNCTURE: CPT

## 2019-11-17 PROCEDURE — 85025 COMPLETE CBC W/AUTO DIFF WBC: CPT

## 2019-11-17 PROCEDURE — 82948 REAGENT STRIP/BLOOD GLUCOSE: CPT

## 2019-11-17 PROCEDURE — 70450 CT HEAD/BRAIN W/O DYE: CPT

## 2019-11-17 ASSESSMENT — ENCOUNTER SYMPTOMS
ABDOMINAL PAIN: 0
BACK PAIN: 0
NAUSEA: 0
COUGH: 0
RHINORRHEA: 0
EYE PAIN: 0
SORE THROAT: 0
VOMITING: 0
WHEEZING: 0
DIARRHEA: 0
EYE DISCHARGE: 0
SHORTNESS OF BREATH: 0

## 2019-12-13 ENCOUNTER — HOSPITAL ENCOUNTER (OUTPATIENT)
Age: 54
Setting detail: OUTPATIENT SURGERY
Discharge: HOME | End: 2019-12-13
Attending: PODIATRIST | Admitting: PODIATRIST
Payer: COMMERCIAL

## 2019-12-13 VITALS
TEMPERATURE: 98.6 F | RESPIRATION RATE: 16 BRPM | WEIGHT: 99.2 LBS | HEART RATE: 69 BPM | HEIGHT: 61 IN | BODY MASS INDEX: 18.73 KG/M2 | OXYGEN SATURATION: 100 % | SYSTOLIC BLOOD PRESSURE: 145 MMHG | DIASTOLIC BLOOD PRESSURE: 79 MMHG

## 2019-12-13 DIAGNOSIS — M79.671 RIGHT FOOT PAIN: Primary | ICD-10-CM

## 2019-12-13 PROCEDURE — 3600000014 HC SURGERY LEVEL 4 ADDTL 15MIN: Performed by: PODIATRIST

## 2019-12-13 PROCEDURE — 2709999900 HC NON-CHARGEABLE SUPPLY: Performed by: PODIATRIST

## 2019-12-13 PROCEDURE — 2500000003 HC RX 250 WO HCPCS: Performed by: PODIATRIST

## 2019-12-13 PROCEDURE — 2580000003 HC RX 258: Performed by: STUDENT IN AN ORGANIZED HEALTH CARE EDUCATION/TRAINING PROGRAM

## 2019-12-13 PROCEDURE — 7100000011 HC PHASE II RECOVERY - ADDTL 15 MIN: Performed by: PODIATRIST

## 2019-12-13 PROCEDURE — 3600000004 HC SURGERY LEVEL 4 BASE: Performed by: PODIATRIST

## 2019-12-13 PROCEDURE — 7100000010 HC PHASE II RECOVERY - FIRST 15 MIN: Performed by: PODIATRIST

## 2019-12-13 PROCEDURE — 6360000002 HC RX W HCPCS: Performed by: STUDENT IN AN ORGANIZED HEALTH CARE EDUCATION/TRAINING PROGRAM

## 2019-12-13 RX ORDER — SODIUM CHLORIDE 9 MG/ML
INJECTION, SOLUTION INTRAVENOUS CONTINUOUS
Status: DISCONTINUED | OUTPATIENT
Start: 2019-12-13 | End: 2019-12-13 | Stop reason: HOSPADM

## 2019-12-13 RX ORDER — BUPIVACAINE HYDROCHLORIDE 5 MG/ML
INJECTION, SOLUTION EPIDURAL; INTRACAUDAL PRN
Status: DISCONTINUED | OUTPATIENT
Start: 2019-12-13 | End: 2019-12-13 | Stop reason: ALTCHOICE

## 2019-12-13 RX ORDER — ONDANSETRON 2 MG/ML
4 INJECTION INTRAMUSCULAR; INTRAVENOUS EVERY 6 HOURS PRN
Status: DISCONTINUED | OUTPATIENT
Start: 2019-12-13 | End: 2019-12-13 | Stop reason: HOSPADM

## 2019-12-13 RX ORDER — DOCUSATE SODIUM 100 MG/1
100 CAPSULE, LIQUID FILLED ORAL 2 TIMES DAILY
Status: DISCONTINUED | OUTPATIENT
Start: 2019-12-13 | End: 2019-12-13 | Stop reason: HOSPADM

## 2019-12-13 RX ORDER — SODIUM CHLORIDE 0.9 % (FLUSH) 0.9 %
10 SYRINGE (ML) INJECTION PRN
Status: DISCONTINUED | OUTPATIENT
Start: 2019-12-13 | End: 2019-12-13 | Stop reason: HOSPADM

## 2019-12-13 RX ORDER — OXYCODONE HYDROCHLORIDE AND ACETAMINOPHEN 5; 325 MG/1; MG/1
1 TABLET ORAL EVERY 6 HOURS PRN
Qty: 28 TABLET | Refills: 0 | Status: SHIPPED | OUTPATIENT
Start: 2019-12-13 | End: 2019-12-20

## 2019-12-13 RX ORDER — SODIUM CHLORIDE 0.9 % (FLUSH) 0.9 %
10 SYRINGE (ML) INJECTION EVERY 12 HOURS SCHEDULED
Status: DISCONTINUED | OUTPATIENT
Start: 2019-12-13 | End: 2019-12-13 | Stop reason: HOSPADM

## 2019-12-13 RX ADMIN — SODIUM CHLORIDE: 9 INJECTION, SOLUTION INTRAVENOUS at 13:59

## 2019-12-13 RX ADMIN — CEFAZOLIN 1 G: 1 INJECTION, POWDER, FOR SOLUTION INTRAMUSCULAR; INTRAVENOUS at 15:56

## 2019-12-13 ASSESSMENT — PAIN SCALES - GENERAL: PAINLEVEL_OUTOF10: 0

## 2019-12-13 ASSESSMENT — PAIN - FUNCTIONAL ASSESSMENT: PAIN_FUNCTIONAL_ASSESSMENT: 0-10

## 2019-12-13 ASSESSMENT — PAIN DESCRIPTION - DESCRIPTORS: DESCRIPTORS: ACHING

## 2020-07-17 ENCOUNTER — HOSPITAL ENCOUNTER (INPATIENT)
Age: 55
LOS: 3 days | Discharge: HOME HEALTH | DRG: 470 | End: 2020-07-20
Attending: INTERNAL MEDICINE | Admitting: INTERNAL MEDICINE
Payer: COMMERCIAL

## 2020-07-17 PROBLEM — S72.002S: Status: ACTIVE | Noted: 2020-07-17

## 2020-07-17 LAB
ANION GAP SERPL CALCULATED.3IONS-SCNC: 9 MEQ/L (ref 8–16)
BUN BLDV-MCNC: 12 MG/DL (ref 7–22)
CALCIUM SERPL-MCNC: 8.1 MG/DL (ref 8.5–10.5)
CHLORIDE BLD-SCNC: 104 MEQ/L (ref 98–111)
CO2: 24 MEQ/L (ref 23–33)
CREAT SERPL-MCNC: 0.5 MG/DL (ref 0.4–1.2)
ERYTHROCYTE [DISTWIDTH] IN BLOOD BY AUTOMATED COUNT: 13 % (ref 11.5–14.5)
ERYTHROCYTE [DISTWIDTH] IN BLOOD BY AUTOMATED COUNT: 49.1 FL (ref 35–45)
GFR SERPL CREATININE-BSD FRML MDRD: > 90 ML/MIN/1.73M2
GLUCOSE BLD-MCNC: 105 MG/DL (ref 70–108)
HCT VFR BLD CALC: 41.5 % (ref 37–47)
HEMOGLOBIN: 13.7 GM/DL (ref 12–16)
INR BLD: 1.13 (ref 0.85–1.13)
MAGNESIUM: 1.8 MG/DL (ref 1.6–2.4)
MCH RBC QN AUTO: 33.7 PG (ref 26–33)
MCHC RBC AUTO-ENTMCNC: 33 GM/DL (ref 32.2–35.5)
MCV RBC AUTO: 102 FL (ref 81–99)
PLATELET # BLD: 258 THOU/MM3 (ref 130–400)
PMV BLD AUTO: 10.4 FL (ref 9.4–12.4)
POTASSIUM REFLEX MAGNESIUM: 3.4 MEQ/L (ref 3.5–5.2)
RBC # BLD: 4.07 MILL/MM3 (ref 4.2–5.4)
SODIUM BLD-SCNC: 137 MEQ/L (ref 135–145)
WBC # BLD: 11.9 THOU/MM3 (ref 4.8–10.8)

## 2020-07-17 PROCEDURE — 80048 BASIC METABOLIC PNL TOTAL CA: CPT

## 2020-07-17 PROCEDURE — 6360000002 HC RX W HCPCS: Performed by: INTERNAL MEDICINE

## 2020-07-17 PROCEDURE — 6820000001 HC L2 TRAUMA SURGERY EVALUATION: Performed by: ORTHOPAEDIC SURGERY

## 2020-07-17 PROCEDURE — 6360000002 HC RX W HCPCS

## 2020-07-17 PROCEDURE — 1200000000 HC SEMI PRIVATE

## 2020-07-17 PROCEDURE — 85610 PROTHROMBIN TIME: CPT

## 2020-07-17 PROCEDURE — 85027 COMPLETE CBC AUTOMATED: CPT

## 2020-07-17 PROCEDURE — 99223 1ST HOSP IP/OBS HIGH 75: CPT | Performed by: FAMILY MEDICINE

## 2020-07-17 PROCEDURE — 6370000000 HC RX 637 (ALT 250 FOR IP): Performed by: INTERNAL MEDICINE

## 2020-07-17 PROCEDURE — 36415 COLL VENOUS BLD VENIPUNCTURE: CPT

## 2020-07-17 PROCEDURE — 83735 ASSAY OF MAGNESIUM: CPT

## 2020-07-17 RX ORDER — SODIUM CHLORIDE 0.9 % (FLUSH) 0.9 %
10 SYRINGE (ML) INJECTION EVERY 12 HOURS SCHEDULED
Status: DISCONTINUED | OUTPATIENT
Start: 2020-07-17 | End: 2020-07-20 | Stop reason: HOSPADM

## 2020-07-17 RX ORDER — M-VIT,TX,IRON,MINS/CALC/FOLIC 27MG-0.4MG
1 TABLET ORAL DAILY
Status: DISCONTINUED | OUTPATIENT
Start: 2020-07-18 | End: 2020-07-20 | Stop reason: HOSPADM

## 2020-07-17 RX ORDER — ACETAMINOPHEN 325 MG/1
650 TABLET ORAL EVERY 6 HOURS PRN
Status: DISCONTINUED | OUTPATIENT
Start: 2020-07-17 | End: 2020-07-20 | Stop reason: HOSPADM

## 2020-07-17 RX ORDER — SODIUM CHLORIDE 0.9 % (FLUSH) 0.9 %
10 SYRINGE (ML) INJECTION PRN
Status: DISCONTINUED | OUTPATIENT
Start: 2020-07-17 | End: 2020-07-20 | Stop reason: HOSPADM

## 2020-07-17 RX ORDER — OYSTER SHELL CALCIUM WITH VITAMIN D 500; 200 MG/1; [IU]/1
1 TABLET, FILM COATED ORAL DAILY
Status: DISCONTINUED | OUTPATIENT
Start: 2020-07-18 | End: 2020-07-20 | Stop reason: HOSPADM

## 2020-07-17 RX ORDER — M-VIT,TX,IRON,MINS/CALC/FOLIC 27MG-0.4MG
1 TABLET ORAL DAILY
Status: DISCONTINUED | OUTPATIENT
Start: 2020-07-17 | End: 2020-07-17

## 2020-07-17 RX ORDER — MORPHINE SULFATE 2 MG/ML
2 INJECTION, SOLUTION INTRAMUSCULAR; INTRAVENOUS
Status: DISCONTINUED | OUTPATIENT
Start: 2020-07-17 | End: 2020-07-20 | Stop reason: HOSPADM

## 2020-07-17 RX ORDER — VITAMIN B COMPLEX
1000 TABLET ORAL DAILY
Status: DISCONTINUED | OUTPATIENT
Start: 2020-07-18 | End: 2020-07-20 | Stop reason: HOSPADM

## 2020-07-17 RX ORDER — ACETAMINOPHEN 650 MG/1
650 SUPPOSITORY RECTAL EVERY 6 HOURS PRN
Status: DISCONTINUED | OUTPATIENT
Start: 2020-07-17 | End: 2020-07-20 | Stop reason: HOSPADM

## 2020-07-17 RX ORDER — PROMETHAZINE HYDROCHLORIDE 25 MG/1
12.5 TABLET ORAL EVERY 6 HOURS PRN
Status: DISCONTINUED | OUTPATIENT
Start: 2020-07-17 | End: 2020-07-20 | Stop reason: HOSPADM

## 2020-07-17 RX ORDER — POLYETHYLENE GLYCOL 3350 17 G/17G
17 POWDER, FOR SOLUTION ORAL DAILY PRN
Status: DISCONTINUED | OUTPATIENT
Start: 2020-07-17 | End: 2020-07-20 | Stop reason: HOSPADM

## 2020-07-17 RX ORDER — ONDANSETRON 2 MG/ML
4 INJECTION INTRAMUSCULAR; INTRAVENOUS EVERY 6 HOURS PRN
Status: DISCONTINUED | OUTPATIENT
Start: 2020-07-17 | End: 2020-07-18 | Stop reason: SDUPTHER

## 2020-07-17 RX ORDER — PREDNISONE 1 MG/1
5 TABLET ORAL 2 TIMES DAILY
Status: DISCONTINUED | OUTPATIENT
Start: 2020-07-17 | End: 2020-07-20 | Stop reason: HOSPADM

## 2020-07-17 RX ORDER — MORPHINE SULFATE 4 MG/ML
INJECTION, SOLUTION INTRAMUSCULAR; INTRAVENOUS
Status: COMPLETED
Start: 2020-07-17 | End: 2020-07-17

## 2020-07-17 RX ORDER — MORPHINE SULFATE 4 MG/ML
4 INJECTION, SOLUTION INTRAMUSCULAR; INTRAVENOUS
Status: DISCONTINUED | OUTPATIENT
Start: 2020-07-17 | End: 2020-07-20 | Stop reason: HOSPADM

## 2020-07-17 RX ORDER — OYSTER SHELL CALCIUM WITH VITAMIN D 500; 200 MG/1; [IU]/1
1 TABLET, FILM COATED ORAL DAILY
Status: DISCONTINUED | OUTPATIENT
Start: 2020-07-17 | End: 2020-07-17

## 2020-07-17 RX ADMIN — PREDNISONE 5 MG: 5 TABLET ORAL at 20:30

## 2020-07-17 RX ADMIN — MORPHINE SULFATE 4 MG: 4 INJECTION, SOLUTION INTRAMUSCULAR; INTRAVENOUS at 23:34

## 2020-07-17 RX ADMIN — MORPHINE SULFATE 4 MG: 4 INJECTION, SOLUTION INTRAMUSCULAR; INTRAVENOUS at 21:27

## 2020-07-17 RX ADMIN — PROMETHAZINE HYDROCHLORIDE 12.5 MG: 25 TABLET ORAL at 21:37

## 2020-07-17 RX ADMIN — MORPHINE SULFATE 4 MG: 4 INJECTION, SOLUTION INTRAMUSCULAR; INTRAVENOUS at 19:18

## 2020-07-17 RX ADMIN — MORPHINE SULFATE 4 MG: 4 INJECTION, SOLUTION INTRAMUSCULAR; INTRAVENOUS at 19:30

## 2020-07-17 ASSESSMENT — PAIN SCALES - GENERAL
PAINLEVEL_OUTOF10: 7
PAINLEVEL_OUTOF10: 8
PAINLEVEL_OUTOF10: 7
PAINLEVEL_OUTOF10: 7
PAINLEVEL_OUTOF10: 8
PAINLEVEL_OUTOF10: 8

## 2020-07-17 ASSESSMENT — PAIN DESCRIPTION - PAIN TYPE: TYPE: ACUTE PAIN

## 2020-07-17 ASSESSMENT — PAIN DESCRIPTION - ORIENTATION: ORIENTATION: RIGHT

## 2020-07-17 ASSESSMENT — PAIN DESCRIPTION - LOCATION: LOCATION: HIP

## 2020-07-17 NOTE — PROGRESS NOTES
0'\"   Transfer from [facility] Dr Aristides Pritchett pt Stanley Valerio [birth year] right hip fx, fell over a kid Hx rheumatoid arthritis on prednisaone vitals, EKG CX OK blood work neg ortho consulted [facility]  Dr Sancho Chu

## 2020-07-17 NOTE — PROGRESS NOTES
[id number] :Pt admitted to [address] In for Complaints of righ hip pain from a hip fracture. IV infusing in RFA without difficulty, 700 ml remaining in the bag. IV site free of s/s of infection or infiltration. Vital signs obtained. Assessment and data collection initiated. Patient complains of right hip pain, of a 7, while lying still. Denies chest pain or shortness of breath, denies nausea. Two nurse skin assessment performed by Chetan Arias RN and Alex Hollins RN. Oriented to room. Patient has an abrasion on right elbow, dried blood noted. Explained patients right to have family, representative or physician notified of their admission. Patient has Declined for physician to be notified. Patient has Declined for family/representative to be notified. The patient is interested in [facility]. [facility] meds to beds program?:  No    Policies and procedures for [facility] explained. All questions answered with no further questions at this time. Fall prevention and safety brochure discussed with patient. Bed alarm on. Call light in reach. Perfect serve message sent to Dr. Alicia Dasilva for orders, and pain medication.

## 2020-07-18 ENCOUNTER — APPOINTMENT (OUTPATIENT)
Dept: GENERAL RADIOLOGY | Age: 55
DRG: 470 | End: 2020-07-18
Attending: INTERNAL MEDICINE
Payer: COMMERCIAL

## 2020-07-18 ENCOUNTER — ANESTHESIA (OUTPATIENT)
Dept: OPERATING ROOM | Age: 55
DRG: 470 | End: 2020-07-18
Payer: COMMERCIAL

## 2020-07-18 ENCOUNTER — ANESTHESIA EVENT (OUTPATIENT)
Dept: OPERATING ROOM | Age: 55
DRG: 470 | End: 2020-07-18
Payer: COMMERCIAL

## 2020-07-18 VITALS — DIASTOLIC BLOOD PRESSURE: 52 MMHG | OXYGEN SATURATION: 96 % | SYSTOLIC BLOOD PRESSURE: 91 MMHG | TEMPERATURE: 97.7 F

## 2020-07-18 LAB
ANION GAP SERPL CALCULATED.3IONS-SCNC: 12 MEQ/L (ref 8–16)
BUN BLDV-MCNC: 9 MG/DL (ref 7–22)
CALCIUM SERPL-MCNC: 8.2 MG/DL (ref 8.5–10.5)
CHLORIDE BLD-SCNC: 102 MEQ/L (ref 98–111)
CO2: 22 MEQ/L (ref 23–33)
CREAT SERPL-MCNC: 0.4 MG/DL (ref 0.4–1.2)
ERYTHROCYTE [DISTWIDTH] IN BLOOD BY AUTOMATED COUNT: 13.2 % (ref 11.5–14.5)
ERYTHROCYTE [DISTWIDTH] IN BLOOD BY AUTOMATED COUNT: 51 FL (ref 35–45)
GFR SERPL CREATININE-BSD FRML MDRD: > 90 ML/MIN/1.73M2
GLUCOSE BLD-MCNC: 83 MG/DL (ref 70–108)
HCT VFR BLD CALC: 40.7 % (ref 37–47)
HEMOGLOBIN: 13.2 GM/DL (ref 12–16)
INR BLD: 1.18 (ref 0.85–1.13)
MAGNESIUM: 1.8 MG/DL (ref 1.6–2.4)
MCH RBC QN AUTO: 33.7 PG (ref 26–33)
MCHC RBC AUTO-ENTMCNC: 32.4 GM/DL (ref 32.2–35.5)
MCV RBC AUTO: 103.8 FL (ref 81–99)
PLATELET # BLD: 219 THOU/MM3 (ref 130–400)
PMV BLD AUTO: 10.4 FL (ref 9.4–12.4)
POTASSIUM REFLEX MAGNESIUM: 3.4 MEQ/L (ref 3.5–5.2)
RBC # BLD: 3.92 MILL/MM3 (ref 4.2–5.4)
SARS-COV-2, NAAT: NOT DETECTED
SODIUM BLD-SCNC: 136 MEQ/L (ref 135–145)
WBC # BLD: 6.8 THOU/MM3 (ref 4.8–10.8)

## 2020-07-18 PROCEDURE — 6360000002 HC RX W HCPCS: Performed by: INTERNAL MEDICINE

## 2020-07-18 PROCEDURE — 3700000000 HC ANESTHESIA ATTENDED CARE: Performed by: ORTHOPAEDIC SURGERY

## 2020-07-18 PROCEDURE — U0002 COVID-19 LAB TEST NON-CDC: HCPCS

## 2020-07-18 PROCEDURE — 36415 COLL VENOUS BLD VENIPUNCTURE: CPT

## 2020-07-18 PROCEDURE — 6360000002 HC RX W HCPCS: Performed by: ORTHOPAEDIC SURGERY

## 2020-07-18 PROCEDURE — 99232 SBSQ HOSP IP/OBS MODERATE 35: CPT | Performed by: FAMILY MEDICINE

## 2020-07-18 PROCEDURE — 80048 BASIC METABOLIC PNL TOTAL CA: CPT

## 2020-07-18 PROCEDURE — 6370000000 HC RX 637 (ALT 250 FOR IP): Performed by: ORTHOPAEDIC SURGERY

## 2020-07-18 PROCEDURE — 73502 X-RAY EXAM HIP UNI 2-3 VIEWS: CPT

## 2020-07-18 PROCEDURE — 2709999900 HC NON-CHARGEABLE SUPPLY: Performed by: ORTHOPAEDIC SURGERY

## 2020-07-18 PROCEDURE — 7100000000 HC PACU RECOVERY - FIRST 15 MIN: Performed by: ORTHOPAEDIC SURGERY

## 2020-07-18 PROCEDURE — 6360000002 HC RX W HCPCS: Performed by: NURSE ANESTHETIST, CERTIFIED REGISTERED

## 2020-07-18 PROCEDURE — 2580000003 HC RX 258: Performed by: ORTHOPAEDIC SURGERY

## 2020-07-18 PROCEDURE — 3600000015 HC SURGERY LEVEL 5 ADDTL 15MIN: Performed by: ORTHOPAEDIC SURGERY

## 2020-07-18 PROCEDURE — 85027 COMPLETE CBC AUTOMATED: CPT

## 2020-07-18 PROCEDURE — 2580000003 HC RX 258: Performed by: NURSE ANESTHETIST, CERTIFIED REGISTERED

## 2020-07-18 PROCEDURE — 85610 PROTHROMBIN TIME: CPT

## 2020-07-18 PROCEDURE — 6360000002 HC RX W HCPCS

## 2020-07-18 PROCEDURE — 3700000001 HC ADD 15 MINUTES (ANESTHESIA): Performed by: ORTHOPAEDIC SURGERY

## 2020-07-18 PROCEDURE — 2500000003 HC RX 250 WO HCPCS: Performed by: NURSE ANESTHETIST, CERTIFIED REGISTERED

## 2020-07-18 PROCEDURE — 6370000000 HC RX 637 (ALT 250 FOR IP): Performed by: INTERNAL MEDICINE

## 2020-07-18 PROCEDURE — 1200000000 HC SEMI PRIVATE

## 2020-07-18 PROCEDURE — C1713 ANCHOR/SCREW BN/BN,TIS/BN: HCPCS | Performed by: ORTHOPAEDIC SURGERY

## 2020-07-18 PROCEDURE — C1776 JOINT DEVICE (IMPLANTABLE): HCPCS | Performed by: ORTHOPAEDIC SURGERY

## 2020-07-18 PROCEDURE — 3600000005 HC SURGERY LEVEL 5 BASE: Performed by: ORTHOPAEDIC SURGERY

## 2020-07-18 PROCEDURE — 7100000001 HC PACU RECOVERY - ADDTL 15 MIN: Performed by: ORTHOPAEDIC SURGERY

## 2020-07-18 PROCEDURE — 83735 ASSAY OF MAGNESIUM: CPT

## 2020-07-18 PROCEDURE — 0SR90JZ REPLACEMENT OF RIGHT HIP JOINT WITH SYNTHETIC SUBSTITUTE, OPEN APPROACH: ICD-10-PCS | Performed by: ORTHOPAEDIC SURGERY

## 2020-07-18 PROCEDURE — 2500000003 HC RX 250 WO HCPCS: Performed by: ORTHOPAEDIC SURGERY

## 2020-07-18 DEVICE — REDAPT LOCKING SCREW 15MM
Type: IMPLANTABLE DEVICE | Site: HIP | Status: FUNCTIONAL
Brand: REDAPT

## 2020-07-18 DEVICE — REFLECTION INTERFIT THREADED HOLE COVER
Type: IMPLANTABLE DEVICE | Site: HIP | Status: FUNCTIONAL
Brand: REFLECTION

## 2020-07-18 DEVICE — REDAPT LOCKING SCREW 20MM
Type: IMPLANTABLE DEVICE | Site: HIP | Status: FUNCTIONAL
Brand: REDAPT

## 2020-07-18 DEVICE — POLARSTEM COLLAR STANDARD                                    NON-CEMENTED WITH TI/HA 1
Type: IMPLANTABLE DEVICE | Site: HIP | Status: FUNCTIONAL
Brand: POLARSTEM

## 2020-07-18 DEVICE — COBALT CHROME 12/14 TAPER FEMORAL                                    HEAD 36MM +4: Type: IMPLANTABLE DEVICE | Site: HIP | Status: FUNCTIONAL

## 2020-07-18 DEVICE — REDAPT LOCKING SCREW 25MM
Type: IMPLANTABLE DEVICE | Site: HIP | Status: FUNCTIONAL
Brand: REDAPT

## 2020-07-18 DEVICE — REDAPT LOCKING SCREW 40MM
Type: IMPLANTABLE DEVICE | Site: HIP | Status: FUNCTIONAL
Brand: REDAPT

## 2020-07-18 DEVICE — R3 0 DEGREE XLPE ACETABULAR LINER                                    36MM INNER DIAMETER X OUTER DIAMETER 52MM
Type: IMPLANTABLE DEVICE | Site: HIP | Status: FUNCTIONAL
Brand: R3

## 2020-07-18 DEVICE — REFLECTION SPHERICAL HEAD SCREW 50MM
Type: IMPLANTABLE DEVICE | Site: HIP | Status: FUNCTIONAL
Brand: REFLECTION

## 2020-07-18 RX ORDER — MEPERIDINE HYDROCHLORIDE 25 MG/ML
12.5 INJECTION INTRAMUSCULAR; INTRAVENOUS; SUBCUTANEOUS ONCE
Status: COMPLETED | OUTPATIENT
Start: 2020-07-18 | End: 2020-07-18

## 2020-07-18 RX ORDER — CYCLOBENZAPRINE HCL 10 MG
10 TABLET ORAL 3 TIMES DAILY PRN
Status: DISCONTINUED | OUTPATIENT
Start: 2020-07-18 | End: 2020-07-20 | Stop reason: HOSPADM

## 2020-07-18 RX ORDER — CEFAZOLIN SODIUM 1 G/50ML
1 INJECTION, SOLUTION INTRAVENOUS EVERY 8 HOURS
Status: COMPLETED | OUTPATIENT
Start: 2020-07-18 | End: 2020-07-19

## 2020-07-18 RX ORDER — KETOROLAC TROMETHAMINE 30 MG/ML
15 INJECTION, SOLUTION INTRAMUSCULAR; INTRAVENOUS EVERY 6 HOURS
Status: CANCELLED | OUTPATIENT
Start: 2020-07-18 | End: 2020-07-20

## 2020-07-18 RX ORDER — MEPERIDINE HYDROCHLORIDE 25 MG/ML
INJECTION INTRAMUSCULAR; INTRAVENOUS; SUBCUTANEOUS
Status: COMPLETED
Start: 2020-07-18 | End: 2020-07-18

## 2020-07-18 RX ORDER — HYDROCODONE BITARTRATE AND ACETAMINOPHEN 5; 325 MG/1; MG/1
2 TABLET ORAL EVERY 4 HOURS PRN
Status: DISCONTINUED | OUTPATIENT
Start: 2020-07-18 | End: 2020-07-18 | Stop reason: ALTCHOICE

## 2020-07-18 RX ORDER — ONDANSETRON 2 MG/ML
4 INJECTION INTRAMUSCULAR; INTRAVENOUS EVERY 6 HOURS PRN
Status: DISCONTINUED | OUTPATIENT
Start: 2020-07-18 | End: 2020-07-20 | Stop reason: HOSPADM

## 2020-07-18 RX ORDER — BUPIVACAINE HYDROCHLORIDE 7.5 MG/ML
INJECTION, SOLUTION INTRASPINAL PRN
Status: DISCONTINUED | OUTPATIENT
Start: 2020-07-18 | End: 2020-07-18 | Stop reason: SDUPTHER

## 2020-07-18 RX ORDER — SODIUM CHLORIDE 9 MG/ML
INJECTION, SOLUTION INTRAVENOUS CONTINUOUS PRN
Status: DISCONTINUED | OUTPATIENT
Start: 2020-07-18 | End: 2020-07-18 | Stop reason: SDUPTHER

## 2020-07-18 RX ORDER — TRANEXAMIC ACID 100 MG/ML
INJECTION, SOLUTION INTRAVENOUS PRN
Status: DISCONTINUED | OUTPATIENT
Start: 2020-07-18 | End: 2020-07-18 | Stop reason: SDUPTHER

## 2020-07-18 RX ORDER — HYDROCODONE BITARTRATE AND ACETAMINOPHEN 5; 325 MG/1; MG/1
1 TABLET ORAL EVERY 4 HOURS PRN
Status: DISCONTINUED | OUTPATIENT
Start: 2020-07-18 | End: 2020-07-18 | Stop reason: ALTCHOICE

## 2020-07-18 RX ORDER — OXYCODONE HYDROCHLORIDE AND ACETAMINOPHEN 5; 325 MG/1; MG/1
2 TABLET ORAL EVERY 4 HOURS PRN
Status: DISCONTINUED | OUTPATIENT
Start: 2020-07-18 | End: 2020-07-20 | Stop reason: HOSPADM

## 2020-07-18 RX ORDER — FENTANYL CITRATE 50 UG/ML
INJECTION, SOLUTION INTRAMUSCULAR; INTRAVENOUS PRN
Status: DISCONTINUED | OUTPATIENT
Start: 2020-07-18 | End: 2020-07-18 | Stop reason: SDUPTHER

## 2020-07-18 RX ORDER — HYDROCODONE BITARTRATE AND ACETAMINOPHEN 5; 325 MG/1; MG/1
1-2 TABLET ORAL EVERY 4 HOURS PRN
Qty: 50 TABLET | Refills: 0 | Status: SHIPPED | OUTPATIENT
Start: 2020-07-18 | End: 2020-07-25

## 2020-07-18 RX ORDER — POTASSIUM CHLORIDE 20 MEQ/1
40 TABLET, EXTENDED RELEASE ORAL ONCE
Status: COMPLETED | OUTPATIENT
Start: 2020-07-18 | End: 2020-07-18

## 2020-07-18 RX ORDER — MIDAZOLAM HYDROCHLORIDE 1 MG/ML
INJECTION INTRAMUSCULAR; INTRAVENOUS PRN
Status: DISCONTINUED | OUTPATIENT
Start: 2020-07-18 | End: 2020-07-18 | Stop reason: SDUPTHER

## 2020-07-18 RX ORDER — PROPOFOL 10 MG/ML
INJECTION, EMULSION INTRAVENOUS CONTINUOUS PRN
Status: DISCONTINUED | OUTPATIENT
Start: 2020-07-18 | End: 2020-07-18 | Stop reason: SDUPTHER

## 2020-07-18 RX ORDER — OXYCODONE HYDROCHLORIDE AND ACETAMINOPHEN 5; 325 MG/1; MG/1
1 TABLET ORAL EVERY 4 HOURS PRN
Status: DISCONTINUED | OUTPATIENT
Start: 2020-07-18 | End: 2020-07-20 | Stop reason: HOSPADM

## 2020-07-18 RX ORDER — CEFAZOLIN SODIUM 1 G/3ML
INJECTION, POWDER, FOR SOLUTION INTRAMUSCULAR; INTRAVENOUS PRN
Status: DISCONTINUED | OUTPATIENT
Start: 2020-07-18 | End: 2020-07-18 | Stop reason: SDUPTHER

## 2020-07-18 RX ADMIN — SODIUM CHLORIDE: 9 INJECTION, SOLUTION INTRAVENOUS at 10:41

## 2020-07-18 RX ADMIN — MORPHINE SULFATE 4 MG: 4 INJECTION, SOLUTION INTRAMUSCULAR; INTRAVENOUS at 04:13

## 2020-07-18 RX ADMIN — MORPHINE SULFATE 4 MG: 4 INJECTION, SOLUTION INTRAMUSCULAR; INTRAVENOUS at 02:16

## 2020-07-18 RX ADMIN — OXYCODONE HYDROCHLORIDE AND ACETAMINOPHEN 1 TABLET: 5; 325 TABLET ORAL at 14:03

## 2020-07-18 RX ADMIN — MEPERIDINE HYDROCHLORIDE: 25 INJECTION, SOLUTION INTRAMUSCULAR; INTRAVENOUS; SUBCUTANEOUS at 10:55

## 2020-07-18 RX ADMIN — PROMETHAZINE HYDROCHLORIDE 12.5 MG: 25 TABLET ORAL at 04:26

## 2020-07-18 RX ADMIN — CEFAZOLIN 2000 MG: 1 INJECTION, POWDER, FOR SOLUTION INTRAMUSCULAR; INTRAVENOUS; PARENTERAL at 09:52

## 2020-07-18 RX ADMIN — SODIUM CHLORIDE: 9 INJECTION, SOLUTION INTRAVENOUS at 09:29

## 2020-07-18 RX ADMIN — FENTANYL CITRATE 50 MCG: 50 INJECTION, SOLUTION INTRAMUSCULAR; INTRAVENOUS at 09:34

## 2020-07-18 RX ADMIN — OXYCODONE HYDROCHLORIDE AND ACETAMINOPHEN 1 TABLET: 5; 325 TABLET ORAL at 18:17

## 2020-07-18 RX ADMIN — CEFAZOLIN SODIUM 1 G: 1 INJECTION, SOLUTION INTRAVENOUS at 19:50

## 2020-07-18 RX ADMIN — POTASSIUM CHLORIDE 40 MEQ: 1500 TABLET, EXTENDED RELEASE ORAL at 14:08

## 2020-07-18 RX ADMIN — MEPERIDINE HYDROCHLORIDE: 25 INJECTION INTRAMUSCULAR; INTRAVENOUS; SUBCUTANEOUS at 10:55

## 2020-07-18 RX ADMIN — FENTANYL CITRATE 50 MCG: 50 INJECTION, SOLUTION INTRAMUSCULAR; INTRAVENOUS at 10:20

## 2020-07-18 RX ADMIN — PROPOFOL 25 MCG/KG/MIN: 10 INJECTION, EMULSION INTRAVENOUS at 09:54

## 2020-07-18 RX ADMIN — TRANEXAMIC ACID 1000 MG: 100 INJECTION, SOLUTION INTRAVENOUS at 09:57

## 2020-07-18 RX ADMIN — MIDAZOLAM HYDROCHLORIDE 1 MG: 1 INJECTION, SOLUTION INTRAMUSCULAR; INTRAVENOUS at 09:34

## 2020-07-18 RX ADMIN — MULTIPLE VITAMINS W/ MINERALS TAB 1 TABLET: TAB at 14:05

## 2020-07-18 RX ADMIN — OXYCODONE HYDROCHLORIDE AND ACETAMINOPHEN 1 TABLET: 5; 325 TABLET ORAL at 22:29

## 2020-07-18 RX ADMIN — PREDNISONE 5 MG: 5 TABLET ORAL at 21:04

## 2020-07-18 RX ADMIN — MORPHINE SULFATE 4 MG: 4 INJECTION, SOLUTION INTRAMUSCULAR; INTRAVENOUS at 06:31

## 2020-07-18 RX ADMIN — MIDAZOLAM HYDROCHLORIDE 1 MG: 1 INJECTION, SOLUTION INTRAMUSCULAR; INTRAVENOUS at 09:29

## 2020-07-18 RX ADMIN — FENTANYL CITRATE 50 MCG: 50 INJECTION, SOLUTION INTRAMUSCULAR; INTRAVENOUS at 10:10

## 2020-07-18 RX ADMIN — VITAMIN D, TAB 1000IU (100/BT) 1000 UNITS: 25 TAB at 14:05

## 2020-07-18 RX ADMIN — MORPHINE SULFATE 4 MG: 4 INJECTION, SOLUTION INTRAMUSCULAR; INTRAVENOUS at 08:29

## 2020-07-18 RX ADMIN — Medication 1 TABLET: at 14:05

## 2020-07-18 RX ADMIN — PREDNISONE 5 MG: 5 TABLET ORAL at 14:05

## 2020-07-18 RX ADMIN — FENTANYL CITRATE 50 MCG: 50 INJECTION, SOLUTION INTRAMUSCULAR; INTRAVENOUS at 09:29

## 2020-07-18 RX ADMIN — BUPIVACAINE HYDROCHLORIDE IN DEXTROSE 1.8 ML: 7.5 INJECTION, SOLUTION SUBARACHNOID at 09:39

## 2020-07-18 ASSESSMENT — PULMONARY FUNCTION TESTS
PIF_VALUE: 0
PIF_VALUE: 1
PIF_VALUE: 0
PIF_VALUE: 1
PIF_VALUE: 0
PIF_VALUE: 0
PIF_VALUE: 1
PIF_VALUE: 1
PIF_VALUE: 0
PIF_VALUE: 1
PIF_VALUE: 0
PIF_VALUE: 0
PIF_VALUE: 1
PIF_VALUE: 0
PIF_VALUE: 1
PIF_VALUE: 0
PIF_VALUE: 1
PIF_VALUE: 0
PIF_VALUE: 1
PIF_VALUE: 0
PIF_VALUE: 1
PIF_VALUE: 0
PIF_VALUE: 1
PIF_VALUE: 0
PIF_VALUE: 1
PIF_VALUE: 0
PIF_VALUE: 1
PIF_VALUE: 0
PIF_VALUE: 0
PIF_VALUE: 1
PIF_VALUE: 0
PIF_VALUE: 1
PIF_VALUE: 0
PIF_VALUE: 1
PIF_VALUE: 0
PIF_VALUE: 1
PIF_VALUE: 0
PIF_VALUE: 1
PIF_VALUE: 0

## 2020-07-18 ASSESSMENT — PAIN SCALES - GENERAL
PAINLEVEL_OUTOF10: 2
PAINLEVEL_OUTOF10: 7
PAINLEVEL_OUTOF10: 2
PAINLEVEL_OUTOF10: 7
PAINLEVEL_OUTOF10: 9
PAINLEVEL_OUTOF10: 2
PAINLEVEL_OUTOF10: 6
PAINLEVEL_OUTOF10: 2
PAINLEVEL_OUTOF10: 4
PAINLEVEL_OUTOF10: 0
PAINLEVEL_OUTOF10: 8
PAINLEVEL_OUTOF10: 6
PAINLEVEL_OUTOF10: 2
PAINLEVEL_OUTOF10: 9

## 2020-07-18 NOTE — PROGRESS NOTES
preop holding:  Temp 98.3  Bilateral nares swabbed per protocol  Pt ring and hair clip removed and handed to  Kaitlin Brower  Pt prewarmed with warming blanket

## 2020-07-18 NOTE — H&P
Laterality Date    ARTHRODESIS Right 5/23/2019    RIGHT 1ST MPJ FUSION, METATARSAL HEAD RESECTION 2-5, TOES 2-5 ARTHROPLASTY/ARTHRODESIS & PLANTAR SKIN FLAP performed by Viviana Guerrero DPM at [address] Left 9/5/2019    LEFT 1ST MPJ FUSION, METATARSAL HEADS 2-5 RESECTION, ARTHRODESIS 2-4 TOES, ARTHROPLASTY 5TH TOE ALL ON THE LEFT performed by Viviana Guerrero DPM at [address] [facility] [address] Right 12/13/2019    RT HALLUX OSTECTOMY performed by Viviana Guerrero DPM at [place] [address] Right     thumb fusion    SKIN BIOPSY  05/2018    lower left leg, melanoma    TUBAL LIGATION         Medications Prior to Admission:      Prior to Admission medications    Medication Sig Start Date End Date Taking? Authorizing Provider   Rizatriptan Benzoate (MAXALT PO) Take by mouth as needed   Yes Historical Provider, MD   Tofacitinib Citrate (XELJANZ XR) 11 MG TB24 Take 0.5 tablets by mouth daily    Yes Historical Provider, MD   famciclovir (FAMVIR) 500 MG tablet Take 500 mg by mouth 3 times daily as needed (for shingles outbreak)   Yes Historical Provider, MD   OLIVE LEAF PO Take by mouth daily    Yes Historical Provider, MD   Glucosamine-Chondroitin (JOINT SUPPORT PO) Take 1 tablet by mouth daily    Yes Historical Provider, MD   therapeutic multivitamin-minerals (THERAGRAN-M) tablet Take 1 tablet by mouth daily. Yes Historical Provider, MD   VITAMIN D-3 (COLECALCIFEROL) 400 UNITS TABS Take  by mouth daily.    Yes Historical Provider, MD   calcium carbonate-vitamin D (CALCIUM + D) 600-200 MG-UNIT TABS Take 1 tablet by mouth daily    Yes Historical Provider, MD   MAGNESIUM ACETATE by Does not apply route For bowels   Yes Historical Provider, MD   Probiotic Product (PROBIOTIC FORMULA PO) Take by mouth daily    Yes Historical Provider, MD   Acetaminophen (TYLENOL ARTHRITIS PAIN PO) Take 650 mg by mouth every 4 hours as needed    Yes Historical Provider, MD   predniSONE (DELTASONE) 5 MG tablet Take 5 mg by mouth 2 times daily    Yes Historical Provider, MD       Allergies:  Succinylcholine; Ciprofibrate; Methylprednisolone; and Tramadol    Social History:      The patient currently lives home    TOBACCO:   reports that she has never smoked. She has never used smokeless tobacco.  ETOH:   reports no history of alcohol use. Family History:       Reviewed in detail and negative for DM, CAD, Cancer, CVA. Positive as follows:        Problem Relation Age of Onset    Heart Disease Mother        Diet:  Diet NPO, After Midnight    REVIEW OF SYSTEMS:   Pertinent positives as noted in the HPI. All other systems reviewed and negative. PHYSICAL EXAM:    /70   Pulse 75   Temp 98.6 °F (37 °C) (Oral)   Resp 18   Ht 5' 1\" (1.549 m)   Wt 101 lb (45.8 kg)   LMP 11/06/2011   SpO2 100%   BMI 19.08 kg/m²     General appearance:  Mild distress, appears stated age and cooperative. HEENT:  MMM, Atraumatic, EOMI  Neck: Supple, with full range of motion. No jugular venous distention. Trachea midline. Respiratory:  Normal respiratory effort, tachypneic  Cardiovascular:  Regular rate and rhythm with normal S1/S2   Abdomen: Soft, non-tender, non-distended  Musculoskeletal:  No clubbing, cyanosis or edema bilaterally, right leg shortened and externally rotated, tender to touch laterally near hip  Skin: Skin color, texture, turgor normal.  No rashes or lesions. Neurologic:  Grossly non-focal.  Psychiatric:  Anxious, in pain,   Peripheral Pulses: +2 palpable, equal bilaterally       Labs:     Recent Labs     07/17/20 1918   WBC 11.9*   HGB 13.7   HCT 41.5        Recent Labs     07/17/20 1918      K 3.4*      CO2 24   BUN 12   CREATININE 0.5   CALCIUM 8.1*     No results for input(s): AST, ALT, BILIDIR, BILITOT, ALKPHOS in the last 72 hours. Recent Labs     07/17/20 1918   INR 1.13     No results for input(s): Daleen Cocks in the last 72 hours.     Urinalysis:      Lab Results Component Value Date    NITRU NEGATIVE 12/24/2012    WBCUA 10-15W/CLUMPS 12/24/2012    BACTERIA NONE 12/24/2012    RBCUA 3-5 12/24/2012    BLOODU NEGATIVE 12/24/2012    SPECGRAV 1.014 12/06/2012    GLUCOSEU NEGATIVE 12/24/2012       Intake & Output:  I/O last 3 completed shifts: In: 178.4 [P.O.:50; I.V.:128.4]  Out: 800 [Urine:800]  No intake/output data recorded. Radiology:   Hip xray - right hip fracture    No orders to display        DVT prophylaxis:scd    Code Status: Full Code    Active Hospital Problems    Diagnosis Date Noted    Hip fx, left, sequela [S72.002S] 07/17/2020         Thank you Ever Mane for the opportunity to be involved in this patient's care.     Electronically signed by Kevan Vicente MD on 7/18/2020 at 12:35 AM

## 2020-07-18 NOTE — CONSULTS
Orthopedic Consult    Requesting Physician: Kailey Coulter:  Right hip fracture     HISTORY OF PRESENT ILLNESS:      The patient is a 50 y.o. female  who presents with right hip fracture after a fall. Was seen at [facility] and sent to [facility] for orthopedic care. No prior injuries to right hip. Pain with ROM.  NVi sensation intact    Past Medical History:    Past Medical History:   Diagnosis Date    TRINI [facility]acute kidney injury) ([facility] [address] 12/8/2012    Arthritis     Malignant hyperthermia     daughters    RA (rheumatoid arthritis) ([facility] [address]        Past Surgical History:    Past Surgical History:   Procedure Laterality Date    ARTHRODESIS Right 5/23/2019    RIGHT 1ST MPJ FUSION, METATARSAL HEAD RESECTION 2-5, TOES 2-5 ARTHROPLASTY/ARTHRODESIS & PLANTAR SKIN FLAP performed by Gelene Osler, DPM at [address] Left 9/5/2019    LEFT 1ST MPJ FUSION, METATARSAL HEADS 2-5 RESECTION, ARTHRODESIS 2-4 TOES, ARTHROPLASTY 5TH TOE ALL ON THE LEFT performed by Gelene Osler, DPM at [address] Right 12/13/2019    RT HALLUX OSTECTOMY performed by Gelene Osler, DPM at [address] Right     thumb fusion    SKIN BIOPSY  05/2018    lower left leg, melanoma    TUBAL LIGATION         Medications Prior to Admission:   Current Facility-Administered Medications   Medication Dose Route Frequency Provider Last Rate Last Dose    potassium chloride (KLOR-CON M) extended release tablet 40 mEq  40 mEq Oral Once Virgen Alamo MD        predniSONE (DELTASONE) tablet 5 mg  5 mg Oral BID Ramón Bermudez MD   5 mg at 07/17/20 2030    Vitamin D (CHOLECALCIFEROL) tablet 1,000 Units  1,000 Units Oral Daily Ramón Bermudez MD        sodium chloride flush 0.9 % injection 10 mL  10 mL Intravenous 2 times per day Ramón Bermudez MD        sodium chloride flush 0.9 % injection 10 mL  10 mL Intravenous PRN Ramón Bermudez MD        acetaminophen (TYLENOL) tablet 650 mg  650 mg Oral Q6H PRN Lashae Hutson MD        Or    acetaminophen (TYLENOL) suppository 650 mg  650 mg Rectal Q6H PRN Lashae Hutson MD        polyethylene glycol (GLYCOLAX) packet 17 g  17 g Oral Daily PRN Lashae Hutson MD        promethazine (PHENERGAN) tablet 12.5 mg  12.5 mg Oral Q6H PRN Lashae Hutson MD   12.5 mg at 07/18/20 0426    Or    ondansetron (ZOFRAN) injection 4 mg  4 mg Intravenous Q6H PRN Lashae Hutson MD        morphine (PF) injection 2 mg  2 mg Intravenous Q2H PRN Lashae Hutson MD        Or   Christy Chiquita morphine injection 4 mg  4 mg Intravenous Q2H PRN Lashae Hutson MD   4 mg at 07/18/20 5632    therapeutic multivitamin-minerals 1 tablet  1 tablet Oral Daily Lashae Hutson MD        calcium-vitamin D (OSCAL-500) 500-200 MG-UNIT per tablet 1 tablet  1 tablet Oral Daily Lashae Hutson MD             Allergies:  Succinylcholine; Ciprofibrate; Methylprednisolone; and Tramadol    Social History:   Social History     Tobacco Use   Smoking Status Never Smoker   Smokeless Tobacco Never Used     Social History     Substance and Sexual Activity   Alcohol Use No     Social History     Substance and Sexual Activity   Drug Use No       Family History:  Family History   Problem Relation Age of Onset    Heart Disease Mother          REVIEW OF SYSTEMS:  Gen: Negative for nausea, vomiting, diarrhea, fever, chills, night sweats  Heart: Negative for HTN, palpitations, chest pain  Lungs: Negative for wheezes, asthma or SOB  GI: Negative for nausea, vomiting  Endo: Negative for diabetes  Heme: Negative for DVT       PHYSICAL EXAM:  Patient Vitals for the past 24 hrs:   BP Temp Temp src Pulse Resp SpO2 Height Weight   07/18/20 0815 134/62 98.7 °F (37.1 °C) Oral 80 18 97 % -- --   07/18/20 0404 116/68 98.8 °F (37.1 °C) Oral 66 18 96 % -- --   07/17/20 2330 129/70 98.6 °F (37 °C) Oral 75 18 100 % -- --   07/17/20 2016 110/71 98.5 °F (36.9 °C) Oral 64 18 100 % -- -- 07/17/20 1804 -- -- -- -- -- -- 5' 1\" (1.549 m) 101 lb (45.8 kg)   07/17/20 1753 138/67 98.6 °F (37 °C) Oral 70 20 100 % -- --     Gen: alert and oriented  Head: normorcephalic, atraumatic  Neck: supple  Heart: RRR  Lungs: No audible wheezes  Abdomen: soft  Pelvis: stable  Extremity right hip pain. Worse with movement shortned externally rotated     DATA:  CBC:   Lab Results   Component Value Date    WBC 6.8 07/18/2020    HGB 13.2 07/18/2020     07/18/2020     BMP:    Lab Results   Component Value Date     07/18/2020    K 3.4 07/18/2020     07/18/2020    CO2 22 07/18/2020    BUN 9 07/18/2020    CREATININE 0.4 07/18/2020    CALCIUM 8.2 07/18/2020    GLUCOSE 83 07/18/2020     PT/INR:    Lab Results   Component Value Date    INR 1.18 07/18/2020     Troponin:    Lab Results   Component Value Date    TROPONINI <0.006 06/23/2013       Radiology: right femoral neck fracture     ASSESSMENT:Active Problems:    Hip fx, left, sequela  Resolved Problems:    * No resolved hospital problems. *       PLAN:  1)  As discussed with Dr Elliott Phelps for right total hip arthroplasty. Risk benefits discussed with patient. Patient would like to proceed at this time.          Suzette Dougherty

## 2020-07-18 NOTE — ANESTHESIA PRE PROCEDURE
Department of Anesthesiology  Preprocedure Note       Name:  Anju Martinez   Age:  50 y.o.  :  1965                                          MRN:  [id number]         Date:  2020      Surgeon: Mireya Valentin):  Jasper Mathur MD    Procedure: Procedure(s):  right total hip arthroplasty    Medications prior to admission:   Prior to Admission medications    Medication Sig Start Date End Date Taking? Authorizing Provider   Rizatriptan Benzoate (MAXALT PO) Take by mouth as needed   Yes Historical Provider, MD   Tofacitinib Citrate (XELJANZ XR) 11 MG TB24 Take 0.5 tablets by mouth daily    Yes Historical Provider, MD   famciclovir (FAMVIR) 500 MG tablet Take 500 mg by mouth 3 times daily as needed (for shingles outbreak)   Yes Historical Provider, MD   OLIVE LEAF PO Take by mouth daily    Yes Historical Provider, MD   Glucosamine-Chondroitin (JOINT SUPPORT PO) Take 1 tablet by mouth daily    Yes Historical Provider, MD   therapeutic multivitamin-minerals (THERAGRAN-M) tablet Take 1 tablet by mouth daily. Yes Historical Provider, MD   VITAMIN D-3 (COLECALCIFEROL) 400 UNITS TABS Take  by mouth daily.    Yes Historical Provider, MD   calcium carbonate-vitamin D (CALCIUM + D) 600-200 MG-UNIT TABS Take 1 tablet by mouth daily    Yes Historical Provider, MD   MAGNESIUM ACETATE by Does not apply route For bowels   Yes Historical Provider, MD   Probiotic Product (PROBIOTIC FORMULA PO) Take by mouth daily    Yes Historical Provider, MD   Acetaminophen (TYLENOL ARTHRITIS PAIN PO) Take 650 mg by mouth every 4 hours as needed    Yes Historical Provider, MD   predniSONE (DELTASONE) 5 MG tablet Take 5 mg by mouth 2 times daily    Yes Historical Provider, MD       Current medications:    Current Facility-Administered Medications   Medication Dose Route Frequency Provider Last Rate Last Dose    potassium chloride (KLOR-CON M) extended release tablet 40 mEq  40 mEq Oral Once Alessia Duarte MD        predniSONE (DELTASONE) tablet 5 mg  5 mg Oral BID Ken Lizama MD   5 mg at 07/17/20 2030    Vitamin D (CHOLECALCIFEROL) tablet 1,000 Units  1,000 Units Oral Daily Ken Lizama MD        sodium chloride flush 0.9 % injection 10 mL  10 mL Intravenous 2 times per day Ken Lizama MD        sodium chloride flush 0.9 % injection 10 mL  10 mL Intravenous PRN Ken Lizama MD        acetaminophen (TYLENOL) tablet 650 mg  650 mg Oral Q6H PRN Ken Lizama MD        Or   [facility] acetaminophen (TYLENOL) suppository 650 mg  650 mg Rectal Q6H PRN Ken Lizama MD        polyethylene glycol (GLYCOLAX) packet 17 g  17 g Oral Daily PRN Ken Lizama MD        promethazine (PHENERGAN) tablet 12.5 mg  12.5 mg Oral Q6H PRN Ken Lizama MD   12.5 mg at 07/18/20 0426    Or    ondansetron (ZOFRAN) injection 4 mg  4 mg Intravenous Q6H PRN Ken Lizama MD        morphine (PF) injection 2 mg  2 mg Intravenous Q2H PRN Ken Lizama MD        Or   [facility] morphine injection 4 mg  4 mg Intravenous Q2H PRN Ken Lizama MD   4 mg at 07/18/20 6424    therapeutic multivitamin-minerals 1 tablet  1 tablet Oral Daily Ken Lizama MD        calcium-vitamin D (OSCAL-500) 500-200 MG-UNIT per tablet 1 tablet  1 tablet Oral Daily Ken Lizama MD           Allergies:     Allergies   Allergen Reactions    Succinylcholine Other (See Comments)     Malignant hyperthermia  (Muscle rigidity, high fever and fast heart rate) in daughters    Ciprofibrate Other (See Comments)     Chesty tight, numbness in arms, almost passed out    Methylprednisolone Other (See Comments)     Cause heart palpitations    Tramadol Other (See Comments)     Dizzy  Unable to walk        Problem List:    Patient Active Problem List   Diagnosis Code    Nausea & vomiting R11.2    Gastric erosion K25.9    TRINI (acute kidney injury) ([facility] [address] N17.9    Biliary dyskinesia K82.8    Hip fx, left, sequela S72.002S       Past Medical History: Diagnosis Date    TRINI ([facility]) ([facility] [address] 12/8/2012    Arthritis     Malignant hyperthermia     daughters    RA (rheumatoid arthritis) ([facility] [address]        Past Surgical History:        Procedure Laterality Date    ARTHRODESIS Right 5/23/2019    RIGHT 1ST MPJ FUSION, METATARSAL HEAD RESECTION 2-5, TOES 2-5 ARTHROPLASTY/ARTHRODESIS & PLANTAR SKIN FLAP performed by Dony Brennan DPM at [address] Left 9/5/2019    LEFT 1ST MPJ FUSION, METATARSAL HEADS 2-5 RESECTION, ARTHRODESIS 2-4 TOES, ARTHROPLASTY 5TH TOE ALL ON THE LEFT performed by Dony Brennan DPM at [address] [facility] Right 12/13/2019    RT HALLUX OSTECTOMY performed by Dony Brennan DPM at [address] [facility] [address]     thumb fusion    SKIN BIOPSY  05/2018    lower left leg, melanoma    TUBAL LIGATION         Social History:    Social History     Tobacco Use    Smoking status: Never Smoker    Smokeless tobacco: Never Used   Substance Use Topics    Alcohol use: No                                Counseling given: Not Answered      Vital Signs (Current):   Vitals:    07/17/20 2016 07/17/20 2330 07/18/20 0404 07/18/20 0815   BP: 110/71 129/70 116/68 134/62   Pulse: 64 75 66 80   Resp: 18 18 18 18   Temp: 98.5 °F (36.9 °C) 98.6 °F (37 °C) 98.8 °F (37.1 °C) 98.7 °F (37.1 °C)   TempSrc: Oral Oral Oral Oral   SpO2: 100% 100% 96% 97%   Weight:       Height:                                                  BP Readings from Last 3 Encounters:   07/18/20 134/62   12/13/19 (!) 145/79   11/17/19 134/74       NPO Status:                                                                                 BMI:   Wt Readings from Last 3 Encounters:   07/17/20 101 lb (45.8 kg)   12/13/19 99 lb 3.2 oz (45 kg)   11/17/19 100 lb (45.4 kg)     Body mass index is 19.08 kg/m².     CBC:   Lab Results   Component Value Date    WBC 6.8 07/18/2020    RBC 3.92 07/18/2020    HGB 13.2 07/18/2020    HCT 40.7 07/18/2020    [facility]

## 2020-07-18 NOTE — PLAN OF CARE
Problem: Skin Integrity:  Goal: Will show no infection signs and symptoms  Description: Will show no infection signs and symptoms  7/18/2020 1725 by García Herring RN  Outcome: Ongoing  7/18/2020 0606 by Shira Zapata RN  Outcome: Ongoing  Note: No signs of new skin breakdown with each assessment. Skin remains warm, dry, intact. Mucous membranes pink & moist. Patient understands the importance of frequent repositioning in order to prevent any skin breakdown. Goal: Absence of new skin breakdown  Description: Absence of new skin breakdown  7/18/2020 1725 by García Herring RN  Outcome: Ongoing  7/18/2020 0606 by Shira Zapata RN  Outcome: Ongoing  Note: Patients skin remains free of any new skin breakdown. Problem: Falls - Risk of:  Goal: Will remain free from falls  Description: Will remain free from falls  7/18/2020 1725 by García Herring RN  Outcome: Ongoing  7/18/2020 0606 by Shira Zapata RN  Outcome: Ongoing  Note: Patient remained free of falls. Call light within reach and used appropriately. Bed alarmed and in lowest position, side rails up x2, personal items within reach and non skid socks worn when ambulating. Patient on bedrest.   Goal: Absence of physical injury  Description: Absence of physical injury  7/18/2020 1725 by García Herring RN  Outcome: Ongoing  7/18/2020 0606 by Shira Zapata RN  Outcome: Ongoing  Note: Patient remains free of any new physical injury. Problem: Pain:  Goal: Pain level will decrease  Description: Pain level will decrease  7/18/2020 1725 by García Herring RN  Outcome: Ongoing  7/18/2020 0606 by Shira Zapata RN  Outcome: Ongoing  Note: Patient rates pain 8/10 with a pain goal of 5/10. Pain controlled with medication and repositioning. Patient satisfied.   Goal: Control of acute pain  Description: Control of acute pain  Outcome: Ongoing  Goal: Control of chronic pain  Description: Control of chronic pain  Outcome: Ongoing     Problem: Neurological  Goal: Maximum potential motor/sensory/cognitive function  7/18/2020 1725 by Neal Olson RN  Outcome: Ongoing  7/18/2020 0606 by Antwan Hammonds RN  Outcome: Ongoing  Note: Patient is alert and oriented x4. All extremities have pulses of +1 or +2. Denies any numbness and tingling. Limited movement to LLE d/t injury/trauma. Problem: Cardiovascular  Goal: No DVT, peripheral vascular complications  3/43/5833 1725 by Neal Olson RN  Outcome: Ongoing  7/18/2020 0606 by Antwan Hammonds RN  Outcome: Ongoing  Note: No signs and symptoms of DVT. Calves soft and nontender. Patient compliant with SCDs to help in prevention of DVTs. Problem: Respiratory  Goal: No pulmonary complications  8/65/4917 1725 by Neal Olson RN  Outcome: Ongoing  7/18/2020 0606 by Antwan Hammonds RN  Outcome: Ongoing  Note: Lung sounds clear and chest expansion symmetrical. O2 sats are 92% or greater on room air. Problem: GI  Goal: No bowel complications  8/16/9108 1725 by Neal Olson RN  Outcome: Ongoing  7/18/2020 0606 by Antwan Hammonds RN  Outcome: Ongoing  Note: Patient denies any n/v/d. Bowel sounds active x4 quadrants. No bowel movement yet this shift. Problem:   Goal: Adequate urinary output  7/18/2020 1725 by Neal Olson RN  Outcome: Ongoing  7/18/2020 0606 by Antwan Hammonds RN  Outcome: Ongoing  Note: Patient has adequate clear, yellow urine output via loza. Denies any dysuria or urgency. Problem: Nutrition  Goal: Optimal nutrition therapy  7/18/2020 1725 by Neal Olson RN  Outcome: Ongoing  7/18/2020 0606 by Antwan Hammonds RN  Outcome: Ongoing  Note: Patient on general diet tolerating well, pt on NPO after midnight. Pt had a couple episodes of nausea with phenergan given and relieving nausea.      Problem: Musculor/Skeletal Functional Status  Goal: Highest potential functional level  7/18/2020 1725 by Neal Olson RN  Outcome: Ongoing  7/18/2020 0606 by Antwan Hammonds RN  Outcome: Ongoing  Note: Patient on bedrest at this time. Limited movement to LLE d/t injury/trauma. Patient also has rheumatoid arthritis. Problem: Discharge Planning:  Goal: Discharged to appropriate level of care  Description: Discharged to appropriate level of care  7/18/2020 1725 by Mirtha Santana RN  Outcome: Ongoing  7/18/2020 0606 by Abimbola Palomares RN  Outcome: Ongoing  Note:   Discharge planning in progress. Patient is planning to go home with  at discharge.  and  assisting with discharge needs. Care plan reviewed with patient and spouse. Patient and spouse verbalize understanding of the plan of care and contribute to goal setting.

## 2020-07-18 NOTE — PROGRESS NOTES
[facility]  OCCUPATIONAL THERAPY MISSED TREATMENT NOTE  [facility] 7K  [id number]/[id number]-A      Date: 2020  Patient Name: Evelyn Dorsey        CSN: [id number]   : 1965  (50 y.o.)  Gender: female                REASON FOR MISSED TREATMENT: OT evaluation on hold at this time. Pt pending ortho consult. Will re-attempt as time allows and once plan of care has been established.

## 2020-07-18 NOTE — PROGRESS NOTES
Patient has family history of Malignant hyperthermia due to succinylcholine use. Please let the anesthesiologist know to use different muscle relaxant. Also patient has RA, please be aware when intubate and prevent hyperextention of the head.

## 2020-07-18 NOTE — ANESTHESIA POSTPROCEDURE EVALUATION
Department of Anesthesiology  Postprocedure Note    Patient: Maricarmen Benton  MRN: [id number]  YOB: 1965  Date of evaluation: 7/18/2020  Time:  12:00 PM     Procedure Summary     Date:  07/18/20 Room / Location:  [address] [facility] [place]    Anesthesia Start:  0929 Anesthesia Stop:  1048    Procedure:  right total hip arthroplasty (Right Hip) Diagnosis:  (Right Hip Fx.)    Surgeon:  Bulmaro Esqueda MD Responsible Provider:  Ofe Rice MD    Anesthesia Type:  spinal ASA Status:  3          Anesthesia Type: spinal    José Phase I: José Score: 10    José Phase II:      Last vitals: Reviewed and per EMR flowsheets.        Anesthesia Post Evaluation    Patient location during evaluation: PACU  Patient participation: complete - patient participated  Level of consciousness: awake  Airway patency: patent  Nausea & Vomiting: no vomiting and no nausea  Complications: no  Cardiovascular status: hemodynamically stable  Respiratory status: acceptable  Hydration status: stable

## 2020-07-18 NOTE — OP NOTE
infusion or need for diuretic therapy in surgery 07/18/20 0815   Site Assessment No urethral drainage 07/18/20 0815   Urine Color Yellow 07/18/20 0815   Urine Appearance Clear 07/18/20 0815   Output (mL) 50 mL 07/18/20 0409       Findings: confirmed    Detailed Description of Procedure:       INDICATION FOR PROCEDURE     The patient is an 50y.o.-year-old with a history of a fall. Patient complained of right hip pain. The patient has rheumatoid arthritis  and was admitted with a  Closed displaced femoral neck fracture. Hospitalist was consulted for medical clearance. They felt patient to be of acceptable risk. Discussed with patient and elected to proceed. PROCEDURE DETAILS     The patient was taken to the operating room, underwent a general anesthetic, placed in lateral position, fracture side up. Care was taken to padall bony prominences. Timeout was taken, consent was confirmed. The patient received appropriate IV antibiotics with in 30 minutes of incision. Started with a lateral approach to the hip with skin knife followed by electrocautery down to the iliotibial band. The iliotibial band was then split. Charnley C retractor was put in position and took down the anterior third of the gluteus medius tendon. Placed the leg in a figure-of-four, made a cut about a fingerbreadth above the lesser trochanter. We removed the remaining head and neck. Placed Hohmann's around the acetabulum, cleaned soft tissue from the acetabulum deepened the acetabulum Reamed up sequentially to size 52. Implanted size 52 cup at 45 degrees abduction, 20 degrees of anteversion. We utilized the readapt locking cup. Patient has very poor bone quality. Also her posterior wall is very deficient. We placed multiple screws 1 nonlocking screw and additional locking screws throughout the construct to get the best purchase. We had reamed as deep as we felt comfortable. She is a fairly small stature so the cup was a bit proud.   A standard 36 mm liner was implanted. We then used the  followed by the canal finder. We lateralized, reamed a bit and broached to a size 1 and implanted a size 1 stem. We trialed head and neck lengths, elected to go with a 4 neck length. It was stable throughout all range of motion. Limb lengths appear to be appropriate. Wounds were thoroughly irrigated. Repaired the abductor and capsule back with #5 Ethibond through bone tunnels. The capsule with injected with Duromorph, Toradol, marcaine with epi, and tranexamic acid. The iliotibial band was repaired with #2 Tony Braga. Skin was repaired with 0 Quill and Prineo . The patient was then awakened and returned to the recovery room in fair condition. POSTOPERATIVE PLAN: Weightbearing as tolerated, total hip arthroplasty protocol. First postop visit will be a clinical exam, no x-rays in 8 weeks. The physician assistant assisted throughout the procedure with positioning, draping, retraction, wound closure, dressing and splint application.     Dario Chandra MD      Electronically signed by Dario Chandra MD on 7/18/2020 at 10:26 AM

## 2020-07-18 NOTE — PROGRESS NOTES
Hospitalist Progress Note      Patient:  Feng Merrill    Unit/Bed:7K-07/007-A  YOB: 1965  MRN: [id number]   Acct: [de-identified]   PCP: Мария Nunez  Date of Admission: 7/17/2020    Assessment and Plan:        Right femoral neck fracture  - pain control  - IVF  - Ortho consult  - PT/OT after intervention  - scd  - ppi   - telemetry monitoring  - Preoperative/surgery risk: RCRI 0, - class 1 with 3.9% , 30 day risk of death, MI or cardiac death  Patient scheduled for right hip fracture repair as of 7/18/2020. Continue pain control. Rheumatoid Arthritis  - continue prednisone  - hold DMARD  Patient has increased risk of fractures because she is a chronically on steroid  Hypokalemia  -Correct when necessary     Leukocytosis  - Likely reactive       PMH, PSH, SH, FHX reviewed in chart review snap shot in EPIC    CC: Right hip pain    HPI: Initial admission HPI by admitting physician reviewed as below:  50 y.o. female with hx of arthritis, who presented to [address] [place] [address] with a fall that she sustained earlier today. She was transferred to our facility from an outside ER. Patient fell over a kid leading to a right sided hip pain which was later found to be her hip fracture. Patient is known autoimmune related arthritis and is on prednisone and DMARD. When I evaluated the patient, she was found to have significant  pain and discomfort of the right hip especially with movement. She had received morphine which was helping until they try to reposition her. During my examination, patient was falling asleep but also complaining of feeling uneasy and in pain.  was at bedside. Patient denies any chest pain, shortness of breath, abdominal pain, numbness tingling sensations at this time. Her main concern is pain control at this time. She is admitted to the medical floor with consultations to orthopedic surgery.   For further details and updates please refer to assessment and plan at the beginning of the note. ROS (10 point review of systems completed. Pertinent positives noted. Otherwise ROS is negative) : Right hip pain  PMH:  Per HPI and reviewed in the chart  SHX: Reviewed in the chart, also the patient  FHX: Reviewed in the chart, also with the patient  Allergies: Reviewed in the chart  Medications:       potassium chloride  40 mEq Oral Once    predniSONE  5 mg Oral BID    vitamin D3  1,000 Units Oral Daily    sodium chloride flush  10 mL Intravenous 2 times per day    therapeutic multivitamin-minerals  1 tablet Oral Daily    calcium-vitamin D  1 tablet Oral Daily   Subjective 7/18/2020:  at the bedside, patient mentioned her hip pain under control currently after taking pain medication, patient scheduled for ORIF today. Currently hemodynamically stable. 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 Nursing notes, labs, vitals reviewed. Vital Signs:   Vitals reviewed and compared with the previous ones  /62   Pulse 80   Temp 98.7 °F (37.1 °C) (Oral)   Resp 18   Ht 5' 1\" (1.549 m)   Wt 101 lb (45.8 kg)   LMP 11/06/2011   SpO2 97%   BMI 19.08 kg/m²      Intake/Output Summary (Last 24 hours) at 7/18/2020 0900  Last data filed at 7/18/2020 0409  Gross per 24 hour   Intake 859.2 ml   Output 850 ml   Net 9.2 ml        General:   Age-appropriate, very thin  HEENT:  normocephalic and atraumatic. No scleral icterus. PERRLA. Neck: supple. No thyromegaly. Lungs: clear to auscultation. No abnormal breathing sounds appreciated. Cardiac: S1, S2, RRR without murmur. No JVD. Abdomen: soft. Increased abdominal girth, nontender. Bowel sounds positive. Extremities: Tenderness of the right hip, right leg internally rotated, no bilateral pedal edema. Vasculature: capillary refill < 3 seconds. Pedal pulses intact bilaterally.   Skin: warm and dry. Not clammy. Psych:  Alert to time, person and place. Communicable. Affect appropriate  Lymph:  No supraclavicular ,and no anterior cervical lymphadenopathy. Neurologic:  No focal deficit. CN II-XII grossly intact. Motor and Sensory capacity intact in all extremities. Labs:   Recent Labs     07/17/20 1918 07/18/20  0802   WBC 11.9* 6.8   HGB 13.7 13.2   HCT 41.5 40.7    219     Recent Labs     07/17/20 1918 07/18/20  0802    136   K 3.4* 3.4*    102   CO2 24 22*   BUN 12 9   CREATININE 0.5 0.4   CALCIUM 8.1* 8.2*     No results for input(s): AST, ALT, BILIDIR, BILITOT, ALKPHOS in the last 72 hours. Recent Labs     07/17/20 1918 07/18/20  0802   INR 1.13 1.18*     No results for input(s): Thelda Rough in the last 72 hours. Microbiology:    Blood culture #1: No results found for: BC    Blood culture #2:No results found for: Elda Aviles    Organism:No results found for: ORG    No results found for: LABGRAM    MRSA culture only:No results found for: [address] [place] [address]    Urine culture: No results found for: LABURIN    Respiratory culture: No results found for: CULTRESP    Aerobic and Anaerobic :  No results found for: LABAERO  No results found for: LABANAE    Urinalysis:      Lab Results   Component Value Date    NITRU NEGATIVE 12/24/2012    WBCUA 10-15W/CLUMPS 12/24/2012    BACTERIA NONE 12/24/2012    RBCUA 3-5 12/24/2012    BLOODU NEGATIVE 12/24/2012    SPECGRAV 1.014 12/06/2012    GLUCOSEU NEGATIVE 12/24/2012       Radiology:  XR HIP RIGHT (2-3 VIEWS)   Final Result      Fracture of the femoral neck.       Final report electronically signed by Dr. Willy Marroquin on 7/18/2020 8:57 AM        Xr Hip Right (2-3 Views)    Result Date: 7/18/2020  PROCEDURE: XR HIP RIGHT (2-3 VIEWS) CLINICAL INFORMATION: Right hip fracture TECHNIQUE: 2 views of the right hip COMPARISON: None FINDINGS: There is a comminuted fracture of the femoral neck with slight superior displacement of the distal fracture fragment. No dislocation is identified. Bones are osteopenic. Phleboliths are present in the pelvis. There are scattered soft tissue calcifications. Fracture of the femoral neck. Final report electronically signed by Dr. Perry Dallas on 7/18/2020 8:57 AM      Discussed plan with patient and family. Patient and family verbalized understanding and agree. All questions addressed with concerns. Please excuse my TYPOS!     Electronically signed by Ck Lambert MD on 7/18/2020 at 9:00 AM

## 2020-07-18 NOTE — PROGRESS NOTES
1045  Pt. Awake and oriented on adm. To pacu. Spinal level with normal sensation to mid thigh on right side and dull sensation on the left. Gross movement of feet bilaterally. Pt. States pain is \"just a little\". Left hip dressing intact and dry. Ice pack applied. 1055  Pt. Medicated for shivering. 1058  Pt. Resting quietly with eyes closed. o2 sat < 90%. o2 per nasal cannula applied at 2 liters. 1105  Pt. Taking ice chips without difficulty. 1120  Pt. Resting quietly without complaints. 1130  Report called to 7K.  1215  pacu criteria met. Transfer to [facility] [phone].

## 2020-07-18 NOTE — PROGRESS NOTES
[facility]  PHYSICAL THERAPY MISSED TREATMENT NOTE  ACUTE CARE  STR ORTHOPEDICS 7K              Missed Treatment  Holding PT evaluation at this time. Pt pending ortho consult. Will re-attempt as time allows and plan of care has been established.

## 2020-07-18 NOTE — PLAN OF CARE
Problem: Skin Integrity:  Goal: Will show no infection signs and symptoms  Description: Will show no infection signs and symptoms  Outcome: Ongoing  Note: No signs of new skin breakdown with each assessment. Skin remains warm, dry, intact. Mucous membranes pink & moist. Patient understands the importance of frequent repositioning in order to prevent any skin breakdown. Problem: Skin Integrity:  Goal: Absence of new skin breakdown  Description: Absence of new skin breakdown  Outcome: Ongoing  Note: Patients skin remains free of any new skin breakdown. Problem: Falls - Risk of:  Goal: Will remain free from falls  Description: Will remain free from falls  Outcome: Ongoing  Note: Patient remained free of falls. Call light within reach and used appropriately. Bed alarmed and in lowest position, side rails up x2, personal items within reach and non skid socks worn when ambulating. Patient on bedrest.      Problem: Falls - Risk of:  Goal: Absence of physical injury  Description: Absence of physical injury  Outcome: Ongoing  Note: Patient remains free of any new physical injury. Problem: Pain:  Goal: Pain level will decrease  Description: Pain level will decrease  Outcome: Ongoing  Note: Patient rates pain 8/10 with a pain goal of 5/10. Pain controlled with medication and repositioning. Patient satisfied. Problem: Neurological  Goal: Maximum potential motor/sensory/cognitive function  Outcome: Ongoing  Note: Patient is alert and oriented x4. All extremities have pulses of +1 or +2. Denies any numbness and tingling. Limited movement to LLE d/t injury/trauma. Problem: Cardiovascular  Goal: No DVT, peripheral vascular complications  Outcome: Ongoing  Note: No signs and symptoms of DVT. Calves soft and nontender. Patient compliant with SCDs to help in prevention of DVTs.      Problem: Respiratory  Goal: No pulmonary complications  Outcome: Ongoing  Note: Lung sounds clear and chest expansion symmetrical. O2 sats are 92% or greater on room air. Problem: GI  Goal: No bowel complications  Outcome: Ongoing  Note: Patient denies any n/v/d. Bowel sounds active x4 quadrants. No bowel movement yet this shift. Problem:   Goal: Adequate urinary output  Outcome: Ongoing  Note: Patient has adequate clear, yellow urine output via loza. Denies any dysuria or urgency. Problem: Nutrition  Goal: Optimal nutrition therapy  Outcome: Ongoing  Note: Patient on general diet tolerating well, pt on NPO after midnight. Pt had a couple episodes of nausea with phenergan given and relieving nausea. Problem: Musculor/Skeletal Functional Status  Goal: Highest potential functional level  Outcome: Ongoing  Note: Patient on bedrest at this time. Limited movement to LLE d/t injury/trauma. Patient also has rheumatoid arthritis. Problem: Discharge Planning:  Goal: Discharged to appropriate level of care  Description: Discharged to appropriate level of care  Outcome: Ongoing  Note:   Discharge planning in progress. Patient is planning to go home with  at discharge.  and  assisting with discharge needs. Care plan reviewed with patient. Patient verbalize understanding of the plan of care and contribute to goal setting.

## 2020-07-19 ENCOUNTER — APPOINTMENT (OUTPATIENT)
Dept: GENERAL RADIOLOGY | Age: 55
DRG: 470 | End: 2020-07-19
Attending: INTERNAL MEDICINE
Payer: COMMERCIAL

## 2020-07-19 LAB
ANION GAP SERPL CALCULATED.3IONS-SCNC: 9 MEQ/L (ref 8–16)
BASOPHILS # BLD: 0.1 %
BASOPHILS ABSOLUTE: 0 THOU/MM3 (ref 0–0.1)
BUN BLDV-MCNC: 6 MG/DL (ref 7–22)
CALCIUM SERPL-MCNC: 8.2 MG/DL (ref 8.5–10.5)
CHLORIDE BLD-SCNC: 108 MEQ/L (ref 98–111)
CO2: 22 MEQ/L (ref 23–33)
CREAT SERPL-MCNC: 0.5 MG/DL (ref 0.4–1.2)
EOSINOPHIL # BLD: 0.3 %
EOSINOPHILS ABSOLUTE: 0 THOU/MM3 (ref 0–0.4)
ERYTHROCYTE [DISTWIDTH] IN BLOOD BY AUTOMATED COUNT: 13 % (ref 11.5–14.5)
ERYTHROCYTE [DISTWIDTH] IN BLOOD BY AUTOMATED COUNT: 49.5 FL (ref 35–45)
GFR SERPL CREATININE-BSD FRML MDRD: > 90 ML/MIN/1.73M2
GLUCOSE BLD-MCNC: 113 MG/DL (ref 70–108)
HCT VFR BLD CALC: 36.9 % (ref 37–47)
HEMOGLOBIN: 12.1 GM/DL (ref 12–16)
IMMATURE GRANS (ABS): 0.02 THOU/MM3 (ref 0–0.07)
IMMATURE GRANULOCYTES: 0.3 %
LYMPHOCYTES # BLD: 5.4 %
LYMPHOCYTES ABSOLUTE: 0.4 THOU/MM3 (ref 1–4.8)
MCH RBC QN AUTO: 33.8 PG (ref 26–33)
MCHC RBC AUTO-ENTMCNC: 32.8 GM/DL (ref 32.2–35.5)
MCV RBC AUTO: 103.1 FL (ref 81–99)
MONOCYTES # BLD: 6.7 %
MONOCYTES ABSOLUTE: 0.5 THOU/MM3 (ref 0.4–1.3)
NUCLEATED RED BLOOD CELLS: 0 /100 WBC
PLATELET # BLD: 193 THOU/MM3 (ref 130–400)
PMV BLD AUTO: 10.5 FL (ref 9.4–12.4)
POTASSIUM REFLEX MAGNESIUM: 4.1 MEQ/L (ref 3.5–5.2)
RBC # BLD: 3.58 MILL/MM3 (ref 4.2–5.4)
SEG NEUTROPHILS: 87.2 %
SEGMENTED NEUTROPHILS ABSOLUTE COUNT: 6.1 THOU/MM3 (ref 1.8–7.7)
SODIUM BLD-SCNC: 139 MEQ/L (ref 135–145)
WBC # BLD: 7 THOU/MM3 (ref 4.8–10.8)

## 2020-07-19 PROCEDURE — 99231 SBSQ HOSP IP/OBS SF/LOW 25: CPT | Performed by: FAMILY MEDICINE

## 2020-07-19 PROCEDURE — 94760 N-INVAS EAR/PLS OXIMETRY 1: CPT

## 2020-07-19 PROCEDURE — 97116 GAIT TRAINING THERAPY: CPT

## 2020-07-19 PROCEDURE — 85025 COMPLETE CBC W/AUTO DIFF WBC: CPT

## 2020-07-19 PROCEDURE — 6370000000 HC RX 637 (ALT 250 FOR IP): Performed by: ORTHOPAEDIC SURGERY

## 2020-07-19 PROCEDURE — 73552 X-RAY EXAM OF FEMUR 2/>: CPT

## 2020-07-19 PROCEDURE — 1200000000 HC SEMI PRIVATE

## 2020-07-19 PROCEDURE — 97166 OT EVAL MOD COMPLEX 45 MIN: CPT

## 2020-07-19 PROCEDURE — 97530 THERAPEUTIC ACTIVITIES: CPT

## 2020-07-19 PROCEDURE — 97535 SELF CARE MNGMENT TRAINING: CPT

## 2020-07-19 PROCEDURE — 2580000003 HC RX 258: Performed by: ORTHOPAEDIC SURGERY

## 2020-07-19 PROCEDURE — 36415 COLL VENOUS BLD VENIPUNCTURE: CPT

## 2020-07-19 PROCEDURE — 6360000002 HC RX W HCPCS: Performed by: ORTHOPAEDIC SURGERY

## 2020-07-19 PROCEDURE — 97162 PT EVAL MOD COMPLEX 30 MIN: CPT

## 2020-07-19 PROCEDURE — 80048 BASIC METABOLIC PNL TOTAL CA: CPT

## 2020-07-19 RX ADMIN — OXYCODONE HYDROCHLORIDE AND ACETAMINOPHEN 1 TABLET: 5; 325 TABLET ORAL at 21:20

## 2020-07-19 RX ADMIN — OXYCODONE HYDROCHLORIDE AND ACETAMINOPHEN 1 TABLET: 5; 325 TABLET ORAL at 08:55

## 2020-07-19 RX ADMIN — PREDNISONE 5 MG: 5 TABLET ORAL at 20:09

## 2020-07-19 RX ADMIN — MULTIPLE VITAMINS W/ MINERALS TAB 1 TABLET: TAB at 08:56

## 2020-07-19 RX ADMIN — CYCLOBENZAPRINE 10 MG: 10 TABLET, FILM COATED ORAL at 08:55

## 2020-07-19 RX ADMIN — Medication 1 TABLET: at 08:56

## 2020-07-19 RX ADMIN — CYCLOBENZAPRINE 10 MG: 10 TABLET, FILM COATED ORAL at 17:05

## 2020-07-19 RX ADMIN — ENOXAPARIN SODIUM 30 MG: 30 INJECTION SUBCUTANEOUS at 08:56

## 2020-07-19 RX ADMIN — PREDNISONE 5 MG: 5 TABLET ORAL at 08:56

## 2020-07-19 RX ADMIN — OXYCODONE HYDROCHLORIDE AND ACETAMINOPHEN 1 TABLET: 5; 325 TABLET ORAL at 03:12

## 2020-07-19 RX ADMIN — VITAMIN D, TAB 1000IU (100/BT) 1000 UNITS: 25 TAB at 08:56

## 2020-07-19 RX ADMIN — POLYETHYLENE GLYCOL 3350 17 G: 17 POWDER, FOR SOLUTION ORAL at 08:59

## 2020-07-19 RX ADMIN — OXYCODONE HYDROCHLORIDE AND ACETAMINOPHEN 1 TABLET: 5; 325 TABLET ORAL at 13:01

## 2020-07-19 RX ADMIN — Medication 10 ML: at 09:06

## 2020-07-19 RX ADMIN — CEFAZOLIN SODIUM 1 G: 1 INJECTION, SOLUTION INTRAVENOUS at 03:12

## 2020-07-19 RX ADMIN — OXYCODONE HYDROCHLORIDE AND ACETAMINOPHEN 1 TABLET: 5; 325 TABLET ORAL at 17:04

## 2020-07-19 RX ADMIN — Medication 10 ML: at 20:10

## 2020-07-19 ASSESSMENT — PAIN SCALES - GENERAL
PAINLEVEL_OUTOF10: 4
PAINLEVEL_OUTOF10: 5
PAINLEVEL_OUTOF10: 7
PAINLEVEL_OUTOF10: 7
PAINLEVEL_OUTOF10: 4
PAINLEVEL_OUTOF10: 2
PAINLEVEL_OUTOF10: 4
PAINLEVEL_OUTOF10: 2
PAINLEVEL_OUTOF10: 4

## 2020-07-19 ASSESSMENT — PAIN - FUNCTIONAL ASSESSMENT: PAIN_FUNCTIONAL_ASSESSMENT: PREVENTS OR INTERFERES SOME ACTIVE ACTIVITIES AND ADLS

## 2020-07-19 ASSESSMENT — PAIN DESCRIPTION - LOCATION
LOCATION: HIP

## 2020-07-19 ASSESSMENT — PAIN DESCRIPTION - ORIENTATION
ORIENTATION: RIGHT

## 2020-07-19 ASSESSMENT — PAIN DESCRIPTION - PAIN TYPE
TYPE: ACUTE PAIN

## 2020-07-19 ASSESSMENT — PAIN DESCRIPTION - FREQUENCY: FREQUENCY: CONTINUOUS

## 2020-07-19 ASSESSMENT — PAIN DESCRIPTION - PROGRESSION: CLINICAL_PROGRESSION: NOT CHANGED

## 2020-07-19 ASSESSMENT — PAIN DESCRIPTION - DESCRIPTORS: DESCRIPTORS: ACHING;NAGGING

## 2020-07-19 ASSESSMENT — PAIN DESCRIPTION - ONSET: ONSET: ON-GOING

## 2020-07-19 NOTE — PLAN OF CARE
Problem: Skin Integrity:  Goal: Will show no infection signs and symptoms  Description: Will show no infection signs and symptoms  7/19/2020 0550 by Kellie Herrera RN  Outcome: Ongoing  Note: No signs of new skin breakdown with each assessment. Skin remains warm, dry, intact. Mucous membranes pink & moist. Patient understands the importance of frequent repositioning in order to prevent any skin breakdown. Surgical incision to right hip prineo tape dressing clean, dry, and intact. Problem: Skin Integrity:  Goal: Absence of new skin breakdown  Description: Absence of new skin breakdown  7/19/2020 0550 by Kellie Herrera RN  Outcome: Ongoing  Note: Patient remains free of any new skin breakdown. Problem: Falls - Risk of:  Goal: Will remain free from falls  Description: Will remain free from falls  7/19/2020 0550 by Kellie Herrera RN  Outcome: Ongoing  Note: Patient remained free of falls. Call light within reach and used appropriately. Bed alarmed and in lowest position, side rails up x2, personal items within reach and non skid socks worn when ambulating. Patient hasn't been up yet this shift. Will be working with PT/OT for day shift. Problem: Falls - Risk of:  Goal: Absence of physical injury  Description: Absence of physical injury  7/19/2020 0550 by Kellie Herrera RN  Outcome: Ongoing  Note: Patient remains free of any new physical injury. Problem: Pain:  Goal: Pain level will decrease  Description: Pain level will decrease  7/19/2020 0550 by Kellie Herrera RN  Outcome: Ongoing  Note: Patient rates pain 2/10 with a pain goal of 1/10. Pain controlled with medication and repositioning. Patient satisfied. Problem: Neurological  Goal: Maximum potential motor/sensory/cognitive function  7/19/2020 0550 by Kellie Herrera RN  Outcome: Ongoing  Note: Patient is alert and oriented x4. All extremities have pulses of +1 or +2. Denies any numbness and tingling.  Limited movement to LLE d/t injury/trauma. Problem: Cardiovascular  Goal: No DVT, peripheral vascular complications  0/08/2389 0550 by Tania Nunez RN  Outcome: Ongoing  Note: No signs and symptoms of DVT. Calves soft and nontender. Patient compliant with SCDs to help in prevention of DVTs. Problem: GI  Goal: No bowel complications  3/88/5847 0550 by Tania Nunez RN  Outcome: Ongoing  Note: Patient denies any n/v/d. Bowel sounds active x4 quadrants. No bowel movement yet this shift. Problem:   Goal: Adequate urinary output  7/19/2020 0550 by Tania Nunez RN  Outcome: Ongoing  Note: Patient has adequate clear, yellow urine output via loza. Denies any dysuria or urgency. Problem: Nutrition  Goal: Optimal nutrition therapy  7/19/2020 0550 by Tania Nunez RN  Outcome: Ongoing  Note: Patient on general diet tolerating well. Denies any N/V. Problem: Musculor/Skeletal Functional Status  Goal: Highest potential functional level  7/19/2020 0550 by Tania Nunez RN  Outcome: Ongoing  Note: Limited movement to LLE d/t injury/trauma. Patient also has rheumatoid arthritis. PT/OT working with patient during day. Problem: Discharge Planning:  Goal: Discharged to appropriate level of care  Description: Discharged to appropriate level of care  7/19/2020 0550 by Tania Nunez RN  Outcome: Ongoing  Note: Discharge planning in progress. Patient is planning to go home with  at discharge.  and  assisting with discharge needs. Care plan reviewed with patient. Patient verbalize understanding of the plan of care and contribute to goal setting.

## 2020-07-19 NOTE — PROGRESS NOTES
[facility] 62  INPATIENT OCCUPATIONAL THERAPY  [facility] ORTHOPEDICS 7K  EVALUATION    Time:   Time In: 1306  Time Out: 1048  Timed Code Treatment Minutes: 23 Minutes  Minutes: 33          Date: 2020  Patient Name: Anusha Johansen,   Gender: female      MRN: [id number]  : 1965  (50 y.o.)  Referring Practitioner: Micheal Silver MD  Diagnosis: Hip fx, left, sequela  Additional Pertinent Hx: Per EMR: \"48 y.o. female with hx of arthritis, who presented to [facility] with a fall that she sustained earlier today. She was transferred to our facility from an outside ER. Patient fell over a kid leading to a right sided hip pain which was later found to be her hip fracture. Patient is known autoimmune related arthritis and is on prednisone and DMARD. \" Pt is s/p right KULDEEP on 20. Restrictions/Precautions:  Restrictions/Precautions: Weight Bearing  Right Lower Extremity Weight Bearing: Weight Bearing As Tolerated  Position Activity Restriction  Hip Precautions: No hip flexion > 90 degrees, No ADduction, No hip internal rotation    Subjective  Chart Reviewed: Yes, Orders, Progress Notes, History and Physical, Operative Notes  Patient assessed for rehabilitation services?: Yes  Family / Caregiver Present: Yes()    Subjective: RN approved OT. Pt sitting EOB talking to  upon arrival. Pt agreeable to OT.     Pain:  Pain Assessment  Patient Currently in Pain: Yes  Pain Assessment: 0-10  Pain Level: 4  Pain Type: Acute pain  Pain Location: Hip  Pain Orientation: Right    Social/Functional History:  Lives With: Daughter, Spouse  Type of Home: House  Home Layout: Two level, Able to Live on Main level with bedroom/bathroom  Home Access: Stairs to enter with rails  Entrance Stairs - Number of Steps: 5  Entrance Stairs - Rails: Both  Home Equipment: [employer]   Bathroom Shower/Tub: Tub/Shower unit, Shower chair with back, Walk-in shower  Bathroom Toilet: Handicap height  Bathroom Accessibility: Accessible  IADL Comments: Pt completes meal prep and laundry       ADL Assistance: Independent  Homemaking Assistance: Independent  Homemaking Responsibilities: Yes  Ambulation Assistance: Independent  Transfer Assistance: Independent    Active : Yes  Mode of Transportation: Car, Truck  Leisure & Hobbies: Working in [employer], fill bird feeders, spending time with grandchildren  IADL Comments: Pt completes meal prep and laundry  Additional Comments: Pt reports I prior to admission. Cognition/Orientation:     Overall Cognitive Status: WFL    ADL's:  Grooming: Stand by assistance, Setup(pt sat EOB to complete grooming tasks)  LE Dressing: Dependent/Total       Functional Mobility:  Bed mobility  Sit to Supine: Maximum assistance(with LEs)  Scooting: Stand by assistance    Functional Mobility  Functional - Mobility Device: Rolling Walker  Activity: To/from bathroom  Assist Level: Contact guard assistance  Functional Mobility Comments: Pt with slow pace and small steps     Balance:  Balance  Sitting Balance: Stand by assistance  Standing Balance: Contact guard assistance  Standing Balance  Time: ~4 min  Activity: ambulating in room    Transfers:  Sit to stand: Minimal assistance  Stand to sit: Contact guard assistance    Upper Extremity Assessment:   LUE AROM : WFL  RUE AROM : WFL  Right Hand AROM: Exceptions  Right Hand General AROM: digits 4 and 5 with limited AROM due to arthritis    LUE Strength  Gross LUE Strength: Exceptions to [facility]  LUE Strength Comment: 4/5  RUE Strength  Gross RUE Strength: Exceptions to [facility]  RUE Strength Comment: 4/5    Sensation  Overall Sensation Status: [facility]       Activity Tolerance: Patient limited by pain       Assessment:  Assessment: PTA, pt was completing ADLs and IADLs independently. Pt now requires assist with transfers, functional mobility and ADL tasks.  Skilled OT intervention recommended to increase safety and independence with self car tasks and functional mobility to ensure safe transition to next level of care and return to OF. Performance deficits / Impairments: Decreased functional mobility , Decreased endurance, Decreased coordination, Decreased ADL status, Decreased balance, Decreased strength, Decreased high-level IADLs  REQUIRES OT FOLLOW UP: Yes  Safety Devices in place: Yes    Treatment Initiated: Treatment and education initiated within context of evaluation. Evaluation time included review of current medical information, gathering information related to past medical, social and functional history, completion of standardized testing, formal and informal observation of tasks, assessment of data and development of plan of care and goals. Treatment time included skilled education and facilitation of tasks to increase safety and independence with ADL's for improved functional independence and quality of life. Discharge Recommendations:  Continue to assess pending progress, Patient would benefit from continued therapy after discharge    Patient Education:  OT Education: OT Role, Plan of Care, Precautions, ADL Adaptive Strategies, Family Education  Patient Education: Home Health OT    Equipment Recommendations: Other: Continue to assess pending progress.  reports he is going to look for walker.     Plan:  Times per week: 6x  Current Treatment Recommendations: Strengthening, Patient/Caregiver Education & Training, Balance Training, Functional Mobility Training, Endurance Training, Safety Education & Training, Self-Care / ADL    Goals:  Patient goals : Get better and go home  Short term goals  Time Frame for Short term goals: untiil discharge  Short term goal 1: Complete BUE strengthening exercises x 10 reps to increase strength and endurance needed to complete ADL tasks  Short term goal 2: Complete LB dressing with LHAE PRN and min A to increase independence with dressing tasks  Short term goal 3: Navigate to/from bathroom and  distances with RW, SBA, and no cues for walker safety to increase independence with toileting routine  Short term goal 4: Complete dyn standing task x 4 min with SBA to increase independence with sinkside grooming  Short term goal 5: Complete functional transfers with SBA to increase independence with toileting routine  See long-term goal time frame for expected duration of plan of care. If no long-term goals established, a short length of stay is anticipated. Following session, patient left in safe position with all fall risk precautions in place.

## 2020-07-19 NOTE — PROGRESS NOTES
Orthopaedic Progress Note      SUBJECTIVE   Ms. Nash Dickerson is post op day # 1     Patient notes right total hip arthroplasty after hip fracture       OBJECTIVE      Physical    VITALS:  BP (!) 144/77   Pulse 80   Temp 98.3 °F (36.8 °C) (Oral)   Resp 18   Ht 5' 1\" (1.549 m)   Wt 101 lb (45.8 kg)   LMP 11/06/2011   SpO2 100%   BMI 19.08 kg/m²   I/O last 3 completed shifts:   In: 3731.6 [P.O.:860; I.V.:2871.6]  Out: 4350 [Urine:4150; Blood:200]      Doing well pain controlled  Hx of RA limited ROM of bilateral knees       Data  CBC:   Lab Results   Component Value Date    WBC 7.0 07/19/2020    HGB 12.1 07/19/2020     07/19/2020     BMP:    Lab Results   Component Value Date     07/19/2020    K 4.1 07/19/2020     07/19/2020    CO2 22 07/19/2020    BUN 6 07/19/2020    CREATININE 0.5 07/19/2020    CALCIUM 8.2 07/19/2020    GLUCOSE 113 07/19/2020     Uric Acid:  No components found for: URIC  PT/INR:    Lab Results   Component Value Date    INR 1.18 07/18/2020     Troponin:    Lab Results   Component Value Date    TROPONINI <0.006 06/23/2013     Urine Culture:  No components found for: CURINE      Current Inpatient Medications    Current Facility-Administered Medications: enoxaparin (LOVENOX) injection 30 mg, 30 mg, Subcutaneous, Daily  ondansetron (ZOFRAN) injection 4 mg, 4 mg, Intravenous, Q6H PRN  cyclobenzaprine (FLEXERIL) tablet 10 mg, 10 mg, Oral, TID PRN  oxyCODONE-acetaminophen (PERCOCET) 5-325 MG per tablet 1 tablet, 1 tablet, Oral, Q4H PRN **OR** oxyCODONE-acetaminophen (PERCOCET) 5-325 MG per tablet 2 tablet, 2 tablet, Oral, Q4H PRN  predniSONE (DELTASONE) tablet 5 mg, 5 mg, Oral, BID  Vitamin D (CHOLECALCIFEROL) tablet 1,000 Units, 1,000 Units, Oral, Daily  sodium chloride flush 0.9 % injection 10 mL, 10 mL, Intravenous, 2 times per day  sodium chloride flush 0.9 % injection 10 mL, 10 mL, Intravenous, PRN  acetaminophen (TYLENOL) tablet 650 mg, 650 mg, Oral, Q6H PRN **OR** acetaminophen (TYLENOL) suppository 650 mg, 650 mg, Rectal, Q6H PRN  polyethylene glycol (GLYCOLAX) packet 17 g, 17 g, Oral, Daily PRN  promethazine (PHENERGAN) tablet 12.5 mg, 12.5 mg, Oral, Q6H PRN **OR** [DISCONTINUED] ondansetron (ZOFRAN) injection 4 mg, 4 mg, Intravenous, Q6H PRN  morphine (PF) injection 2 mg, 2 mg, Intravenous, Q2H PRN **OR** morphine injection 4 mg, 4 mg, Intravenous, Q2H PRN  therapeutic multivitamin-minerals 1 tablet, 1 tablet, Oral, Daily  calcium-vitamin D (OSCAL-500) 500-200 MG-UNIT per tablet 1 tablet, 1 tablet, Oral, Daily        PLAN    PT and OT  Discuss discharge plans home vs rehab

## 2020-07-19 NOTE — PROGRESS NOTES
[facility]  INPATIENT PHYSICAL THERAPY  DAILY NOTE  [facility] ORTHOPEDICS 7K - 7K-07/007-A    Time In: 0198  Time Out: 1421  Timed Code Treatment Minutes: 26 Minutes  Minutes: 26          Date: 2020  Patient Name: Anusha Johansen,  Gender:  female        MRN: [id number]  : 1965  (50 y.o.)     Referring Practitioner: Tara Araya MD  Diagnosis: hip fx, sequel  Additional Pertinent Hx: Per EMR: \"48 y.o. female with hx of arthritis, who presented to [facility] with a fall that she sustained earlier today. She was transferred to our facility from an outside ER. Patient fell over a kid leading to a right sided hip pain which was later found to be her hip fracture. Patient is known autoimmune related arthritis and is on prednisone and DMARD. \" Pt is s/p right KULDEEP on 20. Prior Level of Function:  Lives With: Daughter, Spouse  Type of Home: House  Home Layout: Two level, Able to Live on Main level with bedroom/bathroom  Home Access: Stairs to enter with rails  Entrance Stairs - Number of Steps: 5  Entrance Stairs - Rails: Both  Home Equipment: [employer]   Bathroom Shower/Tub: Tub/Shower unit, Shower chair with back, Walk-in shower  Bathroom Toilet: Handicap height  Bathroom Accessibility: Accessible    ADL Assistance: Independent  Homemaking Assistance: Independent  Homemaking Responsibilities: Yes  Ambulation Assistance: Independent  Transfer Assistance: Independent  Active : Yes  IADL Comments: Pt completes meal prep and laundry  Additional Comments: Pt reports I prior to admission. Restrictions/Precautions:  Restrictions/Precautions: Weight Bearing  Right Lower Extremity Weight Bearing: Weight Bearing As Tolerated  Position Activity Restriction  Hip Precautions: No hip flexion > 90 degrees, No ADduction, No hip internal rotation    SUBJECTIVE: RN approved PT session. Pt in supine upon entry and was agreeable to PT interventions.      Of note, pt noted that her knees have been contracted for 15+ years which could be adversely affecting her gait. Pt also reports having significant thigh pain of the right mid shaft with weight bearing activity only. Pt is able to demonstrate AROM seated EOB with limited pain, with WB pain is up to 8/10. RN notified. Concerned that this is related to bone pain and it is significantly limiting her in ambulation. RN notified. Pt is very motivated and eager to ambulate. PAIN: 8/10: with WB through right LE    OBJECTIVE:  Bed Mobility:  Supine to Sit: Stand By Assistance  Sit to Supine: Stand By Assistance   HOB flat, use of bed rail    Transfers:  Sit to Stand: Stand By Assistance, [employer], with increased time for completion, cues for hand placement, to/from chair with arms  Stand to Sit:Stand By Assistance, with increased time for completion, cues for hand placement, to/from chair with arms    Ambulation:  Contact Guard Assistance, with cues for safety, with increased time for completion  Distance: 8ft  Surface: Level Tile  Device:Rolling Walker  Gait Deviations: Forward Flexed Posture, Slow Beatriz, Decreased Step Length Bilaterally, Decreased Weight Shift Right and limited weight bearing on the right, unable to progress left LLE forward, pt dragging left LE to slide fwd    Balance:  Static Standing Balance: Contact Guard Assistance  Dynamic Standing Balance: Contact Guard Assistance    Exercise:  Patient was guided in 1 set(s) 10 reps of exercise to both lower extremities. Ankle pumps, Seated marches and Long arc quads. Exercises were completed for increased independence with functional mobility. Functional Outcome Measures: Completed  -PAC Inpatient Mobility Raw Score : 16  AM-PAC Inpatient T-Scale Score : 40.78    ASSESSMENT:  Assessment: Patient progressing toward established goals. Discussed the need for further rehab prior to d/c home as she is unable to ambulate distances long enough to the bathroom. Activity Tolerance:  Patient tolerance of  treatment: good. Pt is very motivated and eager to return home. Equipment Recommendations: Other: IF d/c home she will need 2WW  Discharge Recommendations:  Continue to assess pending progress, Pt would benefit from rehab prior to d/c home, as she is unable to ambulate household distances    Plan: Times per week: 6xBID,1xqd  Current Treatment Recommendations: Strengthening, Gait Training, Patient/Caregiver Education & Training, Stair training, ROM, Equipment Evaluation, Education, & procurement, Balance Training, Functional Mobility Training, Endurance Training, Neuromuscular Re-education, Home Exercise Program, Transfer Training, Safety Education & Training    Patient Education  Patient Education: Plan of Care, Home Exercise Program, [employer] Mobility, Transfers    Goals:  Patient goals : go home  Short term goals  Time Frame for Short term goals: by discharge  Short term goal 1: bed mobility with supervision A for ease of transfers  Short term goal 2: ambulate > 150ft without AD and supervision A to prepare for home/community mobility  Short term goal 3: sit <> stand with supervision A for ease of transfers  Short term goal 4: negotiate 5 steps with bilat HR and stand-by assist to prepare for home d/c  Long term goals  Time Frame for Long term goals : N/A secondary to short ELOS    Following session, patient left in safe position with all fall risk precautions in place.

## 2020-07-19 NOTE — PROGRESS NOTES
[facility]  INPATIENT PHYSICAL THERAPY  EVALUATION  [facility] ORTHOPEDICS 7K - 7K-07/007-A    Time In: 2507  Time Out: 4914  Timed Code Treatment Minutes: 10 Minutes  Minutes: 23          Date: 2020  Patient Name: Gamaliel Trevizo,  Gender:  female        MRN: [id number]  : 1965  (50 y.o.)      Referring Practitioner: Lary Sagastume MD  Diagnosis: hip fx, sequel  Additional Pertinent Hx: Per EMR: \"48 y.o. female with hx of arthritis, who presented to [facility] with a fall that she sustained earlier today. She was transferred to our facility from an outside ER. Patient fell over a kid leading to a right sided hip pain which was later found to be her hip fracture. Patient is known autoimmune related arthritis and is on prednisone and DMARD. \" Pt is s/p right KULDEEP on 20. Restrictions/Precautions:  Restrictions/Precautions: Weight Bearing  Right Lower Extremity Weight Bearing: Weight Bearing As Tolerated  Position Activity Restriction  Hip Precautions: No hip flexion > 90 degrees, No ADduction, No hip internal rotation    Subjective:  Chart Reviewed: Yes  Patient assessed for rehabilitation services?: Yes  Family / Caregiver Present: Yes  Subjective: RN approved PT evaluation. Pt in supine upon entry and was eager to get OOB. Pt in bedside chair with all needs in reach. Chair alarm on, RN aware.     General:  Overall Orientation Status: Within Functional Limits  Follows Commands: Within Functional Limits    Vision: Impaired  Vision Exceptions: Wears glasses at all times    Hearing: Within functional limits         Pain: 3-4/10: right hip    Social/Functional History:    Lives With: Daughter, Spouse(+ 4 grandchildren)  Type of Home: House  Home Layout: Two level, Able to Live on Main level with bedroom/bathroom  Home Access: Stairs to enter with rails  Entrance Stairs - Number of Steps: 5  Entrance Stairs - Rails: Both  Home Equipment: [employer]     Bathroom Shower/Tub: Tub/Shower unit, Shower chair with back, Walk-in shower  Bathroom Toilet: Handicap height  Bathroom Accessibility: Accessible  IADL Comments: Pt completes meal prep and laundry       ADL Assistance: [address]: Independent  Homemaking Responsibilities: Yes  Ambulation Assistance: Independent  Transfer Assistance: Independent    Active : Yes  Mode of Transportation: Car, [employer]: Working in [employer], fill bird feeders, spending time with grandchildren  Additional Comments: Pt reports I prior to admission. OBJECTIVE:  Range of Motion:  Bilateral Lower Extremity: WFL    Strength:  Bilateral Lower Extremity: pain limited on the right LE, LLE WFL    Balance:  Static Standing Balance: Stand By Assistance, Contact Guard Assistance  Dynamic Standing Balance: Contact Guard Assistance, Minimal Assistance    Bed Mobility:  Supine to Sit: Stand By Assistance, with verbal cues , with increased time for completion, HOB flat    Transfers:  Sit to Stand: [employer], with increased time for completion, cues for hand placement, to/from chair with arms  Stand to Sit:Stand By Assistance, with increased time for completion, cues for hand placement, to/from chair with arms    Ambulation:  Contact Guard Assistance, Minimal Assistance, with cues for safety, with verbal cues , with increased time for completion  Distance: 8ft  Surface: Level Tile  Device:Rolling Walker  Gait Deviations: Forward Flexed Posture, Slow Beatriz, Decreased Step Length Bilaterally, Decreased Weight Shift Bilaterally and Decreased Heel Strike Bilaterally    Exercise:  Patient was guided in 1 set(s) 10 reps of exercise to both lower extremities. Ankle pumps, Glut sets and Quad sets. Exercises were completed for increased independence with functional mobility.     Functional Outcome Measures: Completed  AM-PAC Inpatient Mobility Raw Score : 15  AM-PAC Inpatient T-Scale Score : 39.45    ASSESSMENT:  Activity Tolerance:  Patient tolerance of  treatment: good. Pain limited this date with WB. Treatment Initiated: Treatment and education initiated within context of evaluation. Evaluation time included review of current medical information, gathering information related to past medical, social and functional history, completion of standardized testing, formal and informal observation of tasks, assessment of data and development of plan of care and goals. Treatment time included skilled education and facilitation of tasks to increase safety and independence with functional mobility for improved independence and quality of life. HEP initiated. Reviewed hip precautions. Transfers and gait training completed with cueing for improved functional I and safety with mobility. Assessment: Body structures, Functions, Activity limitations: Decreased functional mobility , Decreased posture, Decreased endurance, Decreased strength, Decreased balance, Increased pain  Assessment: Pt is s/p fall resulting in right displaced femoral neck fracture. Pt is s/p POD1 from KULDEEP by Dr. Warner Sepulveda. Pt is limited by pain at time of evaluation and demonstrates difficulty with gait. She will benefit from skilled PT services during admission and post d/c for improved functional I and safety with mobility. REQUIRES PT FOLLOW UP: Yes    Discharge Recommendations:  Discharge Recommendations: Continue to assess pending progress    Patient Education:  PT Education: Goals, PT Role, Plan of Care    Equipment Recommendations:   Other: IF d/c home she will need 2WW    Plan:  Times per week: 6xBID,1xqd  Current Treatment Recommendations: Strengthening, Gait Training, Patient/Caregiver Education & Training, Stair training, ROM, Equipment Evaluation, Education, & procurement, Balance Training, Functional Mobility Training, Endurance Training, Neuromuscular Re-education, Home Exercise Program, Transfer Training, Safety Education & Training    Goals:  Patient goals : go home  Short term goals  Time Frame for Short term goals: by discharge  Short term goal 1: bed mobility with supervision A for ease of transfers  Short term goal 2: ambulate > 150ft without AD and supervision A to prepare for home/community mobility  Short term goal 3: sit <> stand with supervision A for ease of transfers  Short term goal 4: negotiate 5 steps with bilat HR and stand-by assist to prepare for home d/c  Long term goals  Time Frame for Long term goals : N/A secondary to short ELOS    Following session, patient left in safe position with all fall risk precautions in place.

## 2020-07-19 NOTE — PROGRESS NOTES
Hospitalist Progress Note      Patient:  Katerin Deluna    Unit/Bed:7K-07/007-A  YOB: 1965  MRN: [id number]   Acct: [de-identified]   PCP: Jeff Santo  Date of Admission: 7/17/2020    Assessment and Plan:        Right femoral neck fracture status post right hip arthroplasty as of 7/18/2020  - pain control  - IVF  - Ortho consult  - PT/OT after intervention  - scd  - ppi   - telemetry monitoring  - Preoperative/surgery risk: RCRI 0, - class 1 with 3.9% , 30 day risk of death, MI or cardiac death  Patient scheduled for right hip fracture repair as of 7/18/2020. Continue pain control. 7/19/20: Doing well, did not reach goal for discharge. Patient opted to go home with her , still debating whether going to rehab or home. Rheumatoid Arthritis  - continue prednisone  - hold DMARD  Patient has increased risk of fractures because she is a chronically on steroid  Hypokalemia/resolved  -Correct when necessary     Leukocytosis/resolved  - Likely reactive       PMH, PSH, SH, FHX reviewed in chart review snap shot in EPIC    CC: Right hip pain    HPI: Initial admission HPI by admitting physician reviewed as below:  50 y.o. female with hx of arthritis, who presented to [facility] with a fall that she sustained earlier today. She was transferred to our facility from an outside ER. Patient fell over a kid leading to a right sided hip pain which was later found to be her hip fracture. Patient is known autoimmune related arthritis and is on prednisone and DMARD. When I evaluated the patient, she was found to have significant  pain and discomfort of the right hip especially with movement. She had received morphine which was helping until they try to reposition her. During my examination, patient was falling asleep but also complaining of feeling uneasy and in pain.  was at bedside.   Patient denies any chest pain, shortness of breath, abdominal pain, numbness tingling sensations at this time. Her main concern is pain control at this time. She is admitted to the medical floor with consultations to orthopedic surgery. For further details and updates please refer to assessment and plan at the beginning of the note. ROS (10 point review of systems completed. Pertinent positives noted. Otherwise ROS is negative) : Right hip pain  PMH:  Per HPI and reviewed in the chart  SHX: Reviewed in the chart, also the patient  FHX: Reviewed in the chart, also with the patient  Allergies: Reviewed in the chart  Medications:       enoxaparin  30 mg Subcutaneous Daily    predniSONE  5 mg Oral BID    vitamin D3  1,000 Units Oral Daily    sodium chloride flush  10 mL Intravenous 2 times per day    therapeutic multivitamin-minerals  1 tablet Oral Daily    calcium-vitamin D  1 tablet Oral Daily   Subjective 7/18/2020:  at the bedside, patient mentioned her hip pain under control currently after taking pain medication, patient scheduled for ORIF today. Currently hemodynamically stable. 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 Nursing notes, labs, vitals reviewed. 7/19/2020: Patient improved but did not reach goal for discharge, slightly having pain at the right hip after the procedure. We will monitor her pain tonight and possible discharge tomorrow. Vital Signs:   Vitals reviewed and compared with the previous ones  /66   Pulse 71   Temp 98.7 °F (37.1 °C) (Oral)   Resp 16   Ht 5' 1\" (1.549 m)   Wt 101 lb (45.8 kg)   LMP 11/06/2011   SpO2 97%   BMI 19.08 kg/m²      Intake/Output Summary (Last 24 hours) at 7/19/2020 0804  Last data filed at 7/19/2020 0308  Gross per 24 hour   Intake 3731.6 ml   Output 4350 ml   Net -618.4 ml        General:   Age-appropriate, very thin  HEENT:  normocephalic and atraumatic. No scleral icterus. PERRLA.   Neck: supple. No thyromegaly. Lungs: clear to auscultation. No abnormal breathing sounds appreciated. Cardiac: S1, S2, RRR without murmur. No JVD. Abdomen: soft. Increased abdominal girth, nontender. Bowel sounds positive. Extremities: Tenderness of the right hip covered, no bilateral pedal edema. Vasculature: capillary refill < 3 seconds. Pedal pulses intact bilaterally. Skin:  warm and dry. Not clammy. Psych:  Alert to time, person and place. Communicable. Affect appropriate  Lymph:  No supraclavicular ,and no anterior cervical lymphadenopathy. Neurologic:  No focal deficit. CN II-XII grossly intact. Motor and Sensory capacity intact in all extremities. Labs:   Recent Labs     07/17/20 1918 07/18/20  0802 07/19/20  0626   WBC 11.9* 6.8 7.0   HGB 13.7 13.2 12.1   HCT 41.5 40.7 36.9*    219 193     Recent Labs     07/17/20 1918 07/18/20  0802 07/19/20  0626    136 139   K 3.4* 3.4* 4.1    102 108   CO2 24 22* 22*   BUN 12 9 6*   CREATININE 0.5 0.4 0.5   CALCIUM 8.1* 8.2* 8.2*     No results for input(s): AST, ALT, BILIDIR, BILITOT, ALKPHOS in the last 72 hours. Recent Labs     07/17/20 1918 07/18/20  0802   INR 1.13 1.18*     No results for input(s): Mirian Kalata in the last 72 hours.     Microbiology:    Blood culture #1: No results found for: BC    Blood culture #2:No results found for: Viridiana Marcela    Organism:No results found for: ORG    No results found for: LABGRAM    MRSA culture only:No results found for: [facility]    Urine culture: No results found for: LABURIN    Respiratory culture: No results found for: CULTRESP    Aerobic and Anaerobic :  No results found for: LABAERO  No results found for: LABANAE    Urinalysis:      Lab Results   Component Value Date    NITRU NEGATIVE 12/24/2012    WBCUA 10-15W/CLUMPS 12/24/2012    BACTERIA NONE 12/24/2012    RBCUA 3-5 12/24/2012    BLOODU NEGATIVE 12/24/2012    SPECGRAV 1.014 12/06/2012    GLUCOSEU NEGATIVE 12/24/2012 Radiology:  XR HIP RIGHT (2-3 VIEWS)   Final Result      Satisfactory postsurgical appearance of right hip prosthesis. Final report electronically signed by Dr. Gwyn Martínez on 7/18/2020 11:55 AM      XR HIP RIGHT (2-3 VIEWS)   Final Result      Fracture of the femoral neck. Final report electronically signed by Dr. Gwyn Martínez on 7/18/2020 8:57 AM        Xr Hip Right (2-3 Views)    Result Date: 7/18/2020  PROCEDURE: XR HIP RIGHT (2-3 VIEWS) CLINICAL INFORMATION: Right hip fracture TECHNIQUE: 2 views of the right hip COMPARISON: None FINDINGS: There is a comminuted fracture of the femoral neck with slight superior displacement of the distal fracture fragment. No dislocation is identified. Bones are osteopenic. Phleboliths are present in the pelvis. There are scattered soft tissue calcifications. Fracture of the femoral neck. Final report electronically signed by Dr. Gwyn Martínez on 7/18/2020 8:57 AM      Discussed plan with patient and family. Patient and family verbalized understanding and agree. All questions addressed with concerns. Please excuse my TYPOS!     Electronically signed by Adine Schlatter, MD on 7/19/2020 at 8:04 AM

## 2020-07-20 VITALS
RESPIRATION RATE: 16 BRPM | TEMPERATURE: 97.4 F | HEART RATE: 93 BPM | WEIGHT: 101 LBS | SYSTOLIC BLOOD PRESSURE: 127 MMHG | OXYGEN SATURATION: 97 % | BODY MASS INDEX: 19.07 KG/M2 | HEIGHT: 61 IN | DIASTOLIC BLOOD PRESSURE: 85 MMHG

## 2020-07-20 LAB
ANION GAP SERPL CALCULATED.3IONS-SCNC: 10 MEQ/L (ref 8–16)
BASOPHILS # BLD: 0.1 %
BASOPHILS ABSOLUTE: 0 THOU/MM3 (ref 0–0.1)
BUN BLDV-MCNC: 8 MG/DL (ref 7–22)
CALCIUM SERPL-MCNC: 8.8 MG/DL (ref 8.5–10.5)
CHLORIDE BLD-SCNC: 106 MEQ/L (ref 98–111)
CO2: 24 MEQ/L (ref 23–33)
CREAT SERPL-MCNC: 0.4 MG/DL (ref 0.4–1.2)
EOSINOPHIL # BLD: 0.3 %
EOSINOPHILS ABSOLUTE: 0 THOU/MM3 (ref 0–0.4)
ERYTHROCYTE [DISTWIDTH] IN BLOOD BY AUTOMATED COUNT: 13.1 % (ref 11.5–14.5)
ERYTHROCYTE [DISTWIDTH] IN BLOOD BY AUTOMATED COUNT: 50.1 FL (ref 35–45)
GFR SERPL CREATININE-BSD FRML MDRD: > 90 ML/MIN/1.73M2
GLUCOSE BLD-MCNC: 97 MG/DL (ref 70–108)
HCT VFR BLD CALC: 37.8 % (ref 37–47)
HEMOGLOBIN: 12.3 GM/DL (ref 12–16)
IMMATURE GRANS (ABS): 0.03 THOU/MM3 (ref 0–0.07)
IMMATURE GRANULOCYTES: 0.4 %
LYMPHOCYTES # BLD: 11.5 %
LYMPHOCYTES ABSOLUTE: 0.8 THOU/MM3 (ref 1–4.8)
MCH RBC QN AUTO: 33.7 PG (ref 26–33)
MCHC RBC AUTO-ENTMCNC: 32.5 GM/DL (ref 32.2–35.5)
MCV RBC AUTO: 103.6 FL (ref 81–99)
MONOCYTES # BLD: 7.5 %
MONOCYTES ABSOLUTE: 0.5 THOU/MM3 (ref 0.4–1.3)
NUCLEATED RED BLOOD CELLS: 0 /100 WBC
PLATELET # BLD: 212 THOU/MM3 (ref 130–400)
PMV BLD AUTO: 10.7 FL (ref 9.4–12.4)
POTASSIUM REFLEX MAGNESIUM: 3.8 MEQ/L (ref 3.5–5.2)
RBC # BLD: 3.65 MILL/MM3 (ref 4.2–5.4)
SEG NEUTROPHILS: 80.2 %
SEGMENTED NEUTROPHILS ABSOLUTE COUNT: 5.7 THOU/MM3 (ref 1.8–7.7)
SODIUM BLD-SCNC: 140 MEQ/L (ref 135–145)
WBC # BLD: 7.1 THOU/MM3 (ref 4.8–10.8)

## 2020-07-20 PROCEDURE — 6370000000 HC RX 637 (ALT 250 FOR IP): Performed by: ORTHOPAEDIC SURGERY

## 2020-07-20 PROCEDURE — 97110 THERAPEUTIC EXERCISES: CPT

## 2020-07-20 PROCEDURE — 97530 THERAPEUTIC ACTIVITIES: CPT

## 2020-07-20 PROCEDURE — 36415 COLL VENOUS BLD VENIPUNCTURE: CPT

## 2020-07-20 PROCEDURE — 97116 GAIT TRAINING THERAPY: CPT

## 2020-07-20 PROCEDURE — 99239 HOSP IP/OBS DSCHRG MGMT >30: CPT | Performed by: FAMILY MEDICINE

## 2020-07-20 PROCEDURE — 6360000002 HC RX W HCPCS: Performed by: ORTHOPAEDIC SURGERY

## 2020-07-20 PROCEDURE — 97535 SELF CARE MNGMENT TRAINING: CPT

## 2020-07-20 PROCEDURE — 80048 BASIC METABOLIC PNL TOTAL CA: CPT

## 2020-07-20 PROCEDURE — 85025 COMPLETE CBC W/AUTO DIFF WBC: CPT

## 2020-07-20 PROCEDURE — 6370000000 HC RX 637 (ALT 250 FOR IP): Performed by: NURSE PRACTITIONER

## 2020-07-20 RX ORDER — POLYETHYLENE GLYCOL 3350 17 G/17G
17 POWDER, FOR SOLUTION ORAL DAILY PRN
Qty: 527 G | Refills: 0 | Status: SHIPPED | OUTPATIENT
Start: 2020-07-20 | End: 2020-08-20

## 2020-07-20 RX ORDER — OXYCODONE HYDROCHLORIDE AND ACETAMINOPHEN 5; 325 MG/1; MG/1
1 TABLET ORAL EVERY 6 HOURS PRN
Qty: 8 TABLET | Refills: 0 | Status: SHIPPED | OUTPATIENT
Start: 2020-07-20 | End: 2020-07-22

## 2020-07-20 RX ADMIN — CYCLOBENZAPRINE 10 MG: 10 TABLET, FILM COATED ORAL at 01:38

## 2020-07-20 RX ADMIN — MULTIPLE VITAMINS W/ MINERALS TAB 1 TABLET: TAB at 10:23

## 2020-07-20 RX ADMIN — OXYCODONE HYDROCHLORIDE AND ACETAMINOPHEN 1 TABLET: 5; 325 TABLET ORAL at 08:05

## 2020-07-20 RX ADMIN — CYCLOBENZAPRINE 10 MG: 10 TABLET, FILM COATED ORAL at 10:24

## 2020-07-20 RX ADMIN — Medication 1 TABLET: at 10:24

## 2020-07-20 RX ADMIN — OXYCODONE HYDROCHLORIDE AND ACETAMINOPHEN 1 TABLET: 5; 325 TABLET ORAL at 16:15

## 2020-07-20 RX ADMIN — ENOXAPARIN SODIUM 30 MG: 30 INJECTION SUBCUTANEOUS at 10:23

## 2020-07-20 RX ADMIN — PREDNISONE 5 MG: 5 TABLET ORAL at 10:24

## 2020-07-20 RX ADMIN — OXYCODONE HYDROCHLORIDE AND ACETAMINOPHEN 1 TABLET: 5; 325 TABLET ORAL at 01:36

## 2020-07-20 RX ADMIN — DICLOFENAC 2 G: 10 GEL TOPICAL at 10:24

## 2020-07-20 RX ADMIN — OXYCODONE HYDROCHLORIDE AND ACETAMINOPHEN 1 TABLET: 5; 325 TABLET ORAL at 12:12

## 2020-07-20 RX ADMIN — VITAMIN D, TAB 1000IU (100/BT) 1000 UNITS: 25 TAB at 10:24

## 2020-07-20 ASSESSMENT — PAIN SCALES - GENERAL
PAINLEVEL_OUTOF10: 4
PAINLEVEL_OUTOF10: 5
PAINLEVEL_OUTOF10: 3
PAINLEVEL_OUTOF10: 4
PAINLEVEL_OUTOF10: 6

## 2020-07-20 NOTE — CARE COORDINATION
Date: 2020  Patient Name: Danay Wynne        MRN: [id number]   Account: [de-identified]   : 1965  (50 y.o.)  Gender: female   Referring Practitioner: Farzana Kunz MD  Diagnosis: Hip fx, left, sequela  Additional Pertinent Hx: Per EMR: \"48 y.o. female with hx of arthritis, who presented to [facility] with a fall that she sustained earlier today. She was transferred to our facility from an outside ER. Patient fell over a kid leading to a right sided hip pain which was later found to be her hip fracture. Patient is known autoimmune related arthritis and is on prednisone and DMARD. \" Pt is s/p right KULDEEP on 20. Sera Bean requires a Bedside Commode to complete bathing, toileting, dressing and grooming tasks. Patient requires a Bedside Commode due to Upper Extremity/Lower Extremity Weakness and limited ambulation and is unable to walk to the bathroom at home. Without the Bedside Commode, Sera Bean is at increased risk for falls and would require increased assistance for ADL's and mobility.     Electronically signed by Es Ross RN on 2020 at 9:02 AM

## 2020-07-20 NOTE — PROGRESS NOTES
Sakina Hidalgo was evaluated today and a DME order was entered for a wheeled walker with Bilateral platforms because she requires this to successfully complete daily living tasks of toileting, personal cares, ambulating, meal preparation and taking own medications. Pt has a h/o Rheumatoid Arthritis. A wheeled walker is necessary due to the patient's unsteady gait, upper body weakness, and inability to  an ambulation device; and she can ambulate only by pushing a walker instead of a lesser assistive device such as a cane, crutch, or standard walker. The need for this equipment was discussed with the patient and she understands and is in agreement.

## 2020-07-20 NOTE — PROGRESS NOTES
[facility] [facility]  Occupational Therapy  Daily Note  Time:   Time In: 1100  Time Out: 1139  Timed Code Treatment Minutes: 44 Minutes  Minutes: 39          Date: 2020  Patient Name: Corine Snellen,   Gender: female      Room: -007-A  MRN: [id number]  : 1965  (50 y.o.)  Referring Practitioner: Chavo Donis MD  Diagnosis: Hip fx, left, sequela  Additional Pertinent Hx: Per EMR: \"48 y.o. female with hx of arthritis, who presented to [facility] with a fall that she sustained earlier today. She was transferred to our facility from an outside ER. Patient fell over a kid leading to a right sided hip pain which was later found to be her hip fracture. Patient is known autoimmune related arthritis and is on prednisone and DMARD. \" Pt is s/p right KULDEEP on 20. Restrictions/Precautions:  Restrictions/Precautions: Weight Bearing  Right Lower Extremity Weight Bearing: Weight Bearing As Tolerated  Position Activity Restriction  Hip Precautions: No hip flexion > 90 degrees, No ADduction, No hip internal rotation    SUBJECTIVE: Nurse ok'd session. Patient lying in bed upon arrival. Agreeable to OT session     PAIN: Soreness in RLE     COGNITION: WFL    ADL:   Grooming: Stand By Assistance. Standing sinkside to wash hands after toileting tasks  Bathing: Minimal Assistance. For BLE below knees d/t hip precautions. Education provided to patient on use of long handled sponge, verbalizing understanding. Able to wash art area/bottom while standing with SBA. Washed remaining areas with setup  Upper Extremity Dressing: with set-up. To doff/don gown seated EOB  Lower Extremity Dressing: Stand By Assistance. Utilizing LHAE to doff/don socks after education/demonstration provided  Toileting: Stand By Assistance. For hygiene  Toilet Transfer: Stand By Assistance. To/from RTS using grab bar. *Education provided to patient on [address]- patient interested in purchasing. BALANCE:  Sitting Balance:  Modified Independent. Standing Balance: Stand By Assistance. BED MOBILITY:  Supine to Sit: Supervision - with use of side rail    TRANSFERS:  Sit to Stand:  Contact Guard Assistance - SBA. From various surfaces  Stand to Sit: Stand By Assistance. To various surfaces    FUNCTIONAL MOBILITY:  Assistive Device: Rolling Walker and BUE platforms   Assist Level:  Stand By Assistance. Distance: To and from bathroom  Slow pace, no LOB noted     ASSESSMENT:     Activity Tolerance:  Patient tolerance of  treatment: fair. Discharge Recommendations: Continue to assess pending progress, Patient would benefit from continued therapy after discharge  Equipment Recommendations: Other: Lorrie Cheney- called and ordered 7/20  Plan: Times per week: 6x  Current Treatment Recommendations: Strengthening, Patient/Caregiver Education & Training, Balance Training, Functional Mobility Training, Endurance Training, Safety Education & Training, Self-Care / ADL    Patient Education  Patient Education: ADL's, LHAE     Goals  Short term goals  Time Frame for Short term goals: untiil discharge  Short term goal 1: Complete BUE strengthening exercises x 10 reps to increase strength and endurance needed to complete ADL tasks  Short term goal 2: Complete LB dressing with LHAE PRN and min A to increase independence with dressing tasks  Short term goal 3: Navigate to/from bathroom and HH distances with RW, SBA, and no cues for walker safety to increase independence with toileting routine  Short term goal 4: Complete dyn standing task x 4 min with SBA to increase independence with sinkside grooming  Short term goal 5: Complete functional transfers with SBA to increase independence with toileting routine    Following session, patient left in safe position with all fall risk precautions in place.

## 2020-07-20 NOTE — CARE COORDINATION
7/20/20, 9:46 AM EDT  DISCHARGE PLANNING EVALUATION:    Sera Arndt       Admitted from: ED 7/17/2020/ Lahof 26 day: 3   [facility] Reason for admit: Hip fx, left, sequela [S72.002S] Status: inpatient  Admit order signed?: yes  PMH:  has a past medical history of TRINI (acute kidney injury) ([facility] [address] Arthritis, Malignant hyperthermia, and RA (rheumatoid arthritis) ([facility] [address] Procedure: 7/18/20 surgery by Dr iKa Carmona: right total hip arthroplasty   Pertinent abnormal Imaging:right hip fx  Medications:  Scheduled Meds:   diclofenac sodium  2 g Topical BID    enoxaparin  30 mg Subcutaneous Daily    predniSONE  5 mg Oral BID    vitamin D3  1,000 Units Oral Daily    sodium chloride flush  10 mL Intravenous 2 times per day    therapeutic multivitamin-minerals  1 tablet Oral Daily    calcium-vitamin D  1 tablet Oral Daily     Continuous Infusions:   Pertinent Info/Orders/Treatment Plan:  PT/OT, Pain management. N/V checks. I&O. Diet: DIET GENERAL;   Smoking status:  reports that she has never smoked. She has never used smokeless tobacco.   PCP: Salvatore Zhao  Readmission 30 days or less: no  Readmission Risk Score: 9%    Discharge Planning Evaluation  Current Residence:  Private Residence  Living Arrangements:  Family Members, Children, Spouse/Significant Other   Support Systems:  Children, Family Members, Spouse/Significant Other  Current Services PTA:     Potential Assistance Needed:  N/A  Potential Assistance Purchasing Medications:  No  Does patient want to participate in local refill/ meds to beds program?  No  Type of Home Care Services:  None  Patient expects to be discharged to:  home  Expected Discharge date:  07/21/20  Follow Up Appointment: Best Day/ Time: Wednesday PM    Patient Goals/Plan/Treatment Preferences: I spoke with Sera, along with Roya Orellana, [address] [facility]  and daughters today. Therapy recommending bilateral platform rolling walker and commode.  Ordered from [facility] DME per patient request. To be delivered to her room today. Jorie Cabot, arranging [facility]. Transportation/Food Security/Housekeeping Addressed:  No issues identified.     Evaluation: yes

## 2020-07-20 NOTE — PROGRESS NOTES
during session. PAIN: 4/10: R hip    OBJECTIVE:  Bed Mobility:  Sit to Supine: Stand By Assistance, with increased time for completion, bed flat     Transfers:  Sit to Stand: Contact Guard Assistance  Stand to [employer] Assistance    Ambulation:  Stand By Assistance, Contact Guard Assistance  Distance: ~80ft x1, 10ft x1  Surface: Level Tile  Device:RW and B UE platforms  Gait Deviations: Forward Flexed Posture, Slow Beatriz, Decreased Step Length Bilaterally, Decreased Weight Shift Right, Decreased Gait Speed, Decreased Heel Strike Bilaterally and Mild Path Deviations    **extra time adjusting RW and platforms to proper height for patient safety    Balance:  Dynamic Standing Balance: Stand By Assistance    Exercise:  Patient was guided in 2 set(s) 10 reps of exercise to both lower extremities. Ankle pumps, Glut sets, Seated marches and Long arc quads. Exercises were completed for increased independence with functional mobility. Functional Outcome Measures: Completed  AM-PAC Inpatient Mobility Raw Score : 16  AM-PAC Inpatient T-Scale Score : 40.78    ASSESSMENT:  Assessment: Patient progressing toward established goals. Activity Tolerance:  Patient tolerance of  treatment: good. Equipment Recommendations: Other: IF d/c home she will need 2WW  Discharge Recommendations:  Continue to assess pending progress    Plan: Times per week: 6xBID,1xqd  Current Treatment Recommendations: Strengthening, Gait Training, Patient/Caregiver Education & Training, Stair training, ROM, Equipment Evaluation, Education, & procurement, Balance Training, Functional Mobility Training, Endurance Training, Neuromuscular Re-education, Home Exercise Program, Transfer Training, Safety Education & Training    Patient Education  Patient Education: Plan of Care, Home Exercise Program, Precautions/Restrictions, [employer] Mobility, Transfers, Gait, Verbal Exercise Instruction    Goals:  Patient goals : go home  Short term goals  Time Frame for Short term goals: by discharge  Short term goal 1: bed mobility with supervision A for ease of transfers  Short term goal 2: ambulate > 150ft without AD and supervision A to prepare for home/community mobility  Short term goal 3: sit <> stand with supervision A for ease of transfers  Short term goal 4: negotiate 5 steps with bilat HR and stand-by assist to prepare for home d/c  Long term goals  Time Frame for Long term goals : N/A secondary to short ELOS    Following session, patient left in safe position with all fall risk precautions in place.

## 2020-07-20 NOTE — DISCHARGE SUMMARY
at 7/20/2020 1411  Last data filed at 7/20/2020 0825  Gross per 24 hour   Intake 1490 ml   Output 1400 ml   Net 90 ml       1. General appearance: No apparent distress, appears stated age and cooperative. 2. HEENT: Normal cephalic, atraumatic without obvious deformity. Pupils equal, round, and reactive to light. Extra ocular muscles intact. Conjunctivae/corneas clear. 3. Neck: Supple, with full range of motion. No jugular venous distention. Trachea midline. 4. Respiratory:  Normal respiratory effort. Clear to auscultation, bilaterally without Rales/Wheezes/Rhonchi. 5. Cardiovascular: Regular rate and rhythm with normal S1/S2 without murmurs, rubs or gallops. 6. Abdomen: Soft, non-tender, non-distended with normal bowel sounds. 7. Musculoskeletal: Right hip covered, slight tenderness at the area. No clubbing, cyanosis or edema bilaterally. 8. Skin: Skin color, texture, turgor normal.  No rashes or lesions. 9. Neurologic:  Neurovascularly intact without any focal sensory/motor deficits. Cranial nerves: II-XII intact, grossly non-focal.  10. Psychiatric: Alert and oriented, thought content appropriate, normal insight  11. Capillary Refill: Brisk,< 3 seconds   12. Peripheral Pulses: +2 palpable, equal bilaterally       Discharge Medications:-      Medication List      START taking these medications    enoxaparin 30 MG/0.3ML injection  Commonly known as:  Lovenox  Inject 0.3 mLs into the skin daily     HYDROcodone-acetaminophen 5-325 MG per tablet  Commonly known as:  Norco  Take 1-2 tablets by mouth every 4 hours as needed for Pain for up to 7 days.      polyethylene glycol 17 g packet  Commonly known as:  GLYCOLAX  Take 17 g by mouth daily as needed for Constipation        CONTINUE taking these medications    Calcium + D 600-200 MG-UNIT Tabs  Generic drug:  calcium carbonate-vitamin D     famciclovir 500 MG tablet  Commonly known as:  FAMVIR     JOINT SUPPORT PO     MAGNESIUM ACETATE     MAXALT PO     OLIVE Ref Range    Est, Glom Filt Rate >90 ml/min/1.73m2   Magnesium    Collection Time: 07/17/20  7:18 PM   Result Value Ref Range    Magnesium 1.8 1.6 - 2.4 mg/dL   COVID-19    Collection Time: 07/18/20  2:09 AM   Result Value Ref Range    SARS-CoV-2, NAAT NOT DETECTED NOT DETECT   Basic Metabolic Panel w/ Reflex to MG    Collection Time: 07/18/20  8:02 AM   Result Value Ref Range    Sodium 136 135 - 145 meq/L    Potassium reflex Magnesium 3.4 (L) 3.5 - 5.2 meq/L    Chloride 102 98 - 111 meq/L    CO2 22 (L) 23 - 33 meq/L    Glucose 83 70 - 108 mg/dL    BUN 9 7 - 22 mg/dL    CREATININE 0.4 0.4 - 1.2 mg/dL    Calcium 8.2 (L) 8.5 - 10.5 mg/dL   CBC    Collection Time: 07/18/20  8:02 AM   Result Value Ref Range    WBC 6.8 4.8 - 10.8 thou/mm3    RBC 3.92 (L) 4.20 - 5.40 mill/mm3    Hemoglobin 13.2 12.0 - 16.0 gm/dl    Hematocrit 40.7 37.0 - 47.0 %    .8 (H) 81.0 - 99.0 fL    MCH 33.7 (H) 26.0 - 33.0 pg    MCHC 32.4 32.2 - 35.5 gm/dl    RDW-CV 13.2 11.5 - 14.5 %    RDW-SD 51.0 (H) 35.0 - 45.0 fL    Platelets 588 564 - 675 thou/mm3    MPV 10.4 9.4 - 12.4 fL   Protime-INR    Collection Time: 07/18/20  8:02 AM   Result Value Ref Range    INR 1.18 (H) 0.85 - 1.13   Anion Gap    Collection Time: 07/18/20  8:02 AM   Result Value Ref Range    Anion Gap 12.0 8.0 - 16.0 meq/L   Glomerular Filtration Rate, Estimated    Collection Time: 07/18/20  8:02 AM   Result Value Ref Range    Est, Glom Filt Rate >90 ml/min/1.73m2   Magnesium    Collection Time: 07/18/20  8:02 AM   Result Value Ref Range    Magnesium 1.8 1.6 - 2.4 mg/dL   CBC Auto Differential    Collection Time: 07/19/20  6:26 AM   Result Value Ref Range    WBC 7.0 4.8 - 10.8 thou/mm3    RBC 3.58 (L) 4.20 - 5.40 mill/mm3    Hemoglobin 12.1 12.0 - 16.0 gm/dl    Hematocrit 36.9 (L) 37.0 - 47.0 %    .1 (H) 81.0 - 99.0 fL    MCH 33.8 (H) 26.0 - 33.0 pg    MCHC 32.8 32.2 - 35.5 gm/dl    RDW-CV 13.0 11.5 - 14.5 %    RDW-SD 49.5 (H) 35.0 - 45.0 fL    Platelets 950 938 - 999 thou/mm3    MPV 10.5 9.4 - 12.4 fL    Seg Neutrophils 87.2 %    Lymphocytes 5.4 %    Monocytes 6.7 %    Eosinophils 0.3 %    Basophils 0.1 %    Immature Granulocytes 0.3 %    Segs Absolute 6.1 1.8 - 7.7 thou/mm3    Lymphocytes Absolute 0.4 (L) 1.0 - 4.8 thou/mm3    Monocytes Absolute 0.5 0.4 - 1.3 thou/mm3    Eosinophils Absolute 0.0 0.0 - 0.4 thou/mm3    Basophils Absolute 0.0 0.0 - 0.1 thou/mm3    Immature Grans (Abs) 0.02 0.00 - 0.07 thou/mm3    nRBC 0 /100 wbc   Basic Metabolic Panel w/ Reflex to MG    Collection Time: 07/19/20  6:26 AM   Result Value Ref Range    Sodium 139 135 - 145 meq/L    Potassium reflex Magnesium 4.1 3.5 - 5.2 meq/L    Chloride 108 98 - 111 meq/L    CO2 22 (L) 23 - 33 meq/L    Glucose 113 (H) 70 - 108 mg/dL    BUN 6 (L) 7 - 22 mg/dL    CREATININE 0.5 0.4 - 1.2 mg/dL    Calcium 8.2 (L) 8.5 - 10.5 mg/dL   Anion Gap    Collection Time: 07/19/20  6:26 AM   Result Value Ref Range    Anion Gap 9.0 8.0 - 16.0 meq/L   Glomerular Filtration Rate, Estimated    Collection Time: 07/19/20  6:26 AM   Result Value Ref Range    Est, Glom Filt Rate >90 ml/min/1.73m2   CBC Auto Differential    Collection Time: 07/20/20  7:29 AM   Result Value Ref Range    WBC 7.1 4.8 - 10.8 thou/mm3    RBC 3.65 (L) 4.20 - 5.40 mill/mm3    Hemoglobin 12.3 12.0 - 16.0 gm/dl    Hematocrit 37.8 37.0 - 47.0 %    .6 (H) 81.0 - 99.0 fL    MCH 33.7 (H) 26.0 - 33.0 pg    MCHC 32.5 32.2 - 35.5 gm/dl    RDW-CV 13.1 11.5 - 14.5 %    RDW-SD 50.1 (H) 35.0 - 45.0 fL    Platelets 717 514 - 683 thou/mm3    MPV 10.7 9.4 - 12.4 fL    Seg Neutrophils 80.2 %    Lymphocytes 11.5 %    Monocytes 7.5 %    Eosinophils 0.3 %    Basophils 0.1 %    Immature Granulocytes 0.4 %    Segs Absolute 5.7 1.8 - 7.7 thou/mm3    Lymphocytes Absolute 0.8 (L) 1.0 - 4.8 thou/mm3    Monocytes Absolute 0.5 0.4 - 1.3 thou/mm3    Eosinophils Absolute 0.0 0.0 - 0.4 thou/mm3    Basophils Absolute 0.0 0.0 - 0.1 thou/mm3    Immature Grans (Abs) 0.03 0.00 - 0.07 thou/mm3    nRBC 0 /100 wbc   Basic Metabolic Panel w/ Reflex to MG    Collection Time: 07/20/20  7:29 AM   Result Value Ref Range    Sodium 140 135 - 145 meq/L    Potassium reflex Magnesium 3.8 3.5 - 5.2 meq/L    Chloride 106 98 - 111 meq/L    CO2 24 23 - 33 meq/L    Glucose 97 70 - 108 mg/dL    BUN 8 7 - 22 mg/dL    CREATININE 0.4 0.4 - 1.2 mg/dL    Calcium 8.8 8.5 - 10.5 mg/dL   Anion Gap    Collection Time: 07/20/20  7:29 AM   Result Value Ref Range    Anion Gap 10.0 8.0 - 16.0 meq/L   Glomerular Filtration Rate, Estimated    Collection Time: 07/20/20  7:29 AM   Result Value Ref Range    Est, Glom Filt Rate >90 ml/min/1.73m2        Microbiology:    Blood culture #1: No results found for: BC    Blood culture #2:No results found for: BLOODCULT2    Organism:  No results found for: LABGRAM    MRSA culture only:No results found for: [address] [place] [address]    Urine culture: No results found for: LABURIN  No results found for: ORG     Respiratory culture: No results found for: CULTRESP    Aerobic and Anaerobic :  No results found for: LABAERO  No results found for: LABANAE    Urinalysis:      Lab Results   Component Value Date    NITRU NEGATIVE 12/24/2012    WBCUA 10-15W/CLUMPS 12/24/2012    BACTERIA NONE 12/24/2012    RBCUA 3-5 12/24/2012    BLOODU NEGATIVE 12/24/2012    SPECGRAV 1.014 12/06/2012    GLUCOSEU NEGATIVE 12/24/2012       Radiology:-  Xr Hip Right (2-3 Views)    Result Date: 7/18/2020  PROCEDURE: XR HIP RIGHT (2-3 VIEWS) CLINICAL INFORMATION: Arthroplasty TECHNIQUE: 2 mobile views of the right hip COMPARISON: Right hip 7/18/2020 FINDINGS: The femoral and acetabular components of a new right hip prosthesis are in satisfactory position. No abnormal lucencies are seen. There is no fracture or dislocation. Surgical soft tissue disruption and subcutaneous emphysema are noted. Satisfactory postsurgical appearance of right hip prosthesis.  Final report electronically signed by Dr. Jordin Zuniga on 7/18/2020 11:55 AM    Xr Hip Right (2-3 Views)    Result Date: 7/18/2020  PROCEDURE: XR HIP RIGHT (2-3 VIEWS) CLINICAL INFORMATION: Right hip fracture TECHNIQUE: 2 views of the right hip COMPARISON: None FINDINGS: There is a comminuted fracture of the femoral neck with slight superior displacement of the distal fracture fragment. No dislocation is identified. Bones are osteopenic. Phleboliths are present in the pelvis. There are scattered soft tissue calcifications. Fracture of the femoral neck. Final report electronically signed by Dr. Marline Fitzgerald on 7/18/2020 8:57 AM       Follow-up scheduled after discharge :-    today with Giovanna Anderson  in the next few days     Consultations during this hospital stay:-  [] NONE [] Cardiology  [] Nephrology  [] Hemo onco   [] GI   [] ID  [] Endocrine  [] Pulm    [] Neuro    [] Psych   [] Urology  [] ENT   [] [address]   [x]Ortho    []CV surg    [] Palliative  [] Hospice [] Pain management   [x]    []TCU   [x] PT/OT  OTHERS:-     Disposition: home with home health care PT/OT  Condition at Discharge: Stable    Discharge Medications:      Fredrick Hyatt   Home Medication Instructions PWW:[id number]    Printed on:07/20/20 1411   Medication Information                      Acetaminophen (TYLENOL ARTHRITIS PAIN PO)  Take 650 mg by mouth every 4 hours as needed              calcium carbonate-vitamin D (CALCIUM + D) 600-200 MG-UNIT TABS  Take 1 tablet by mouth daily              enoxaparin (LOVENOX) 30 MG/0.3ML injection  Inject 0.3 mLs into the skin daily             famciclovir (FAMVIR) 500 MG tablet  Take 500 mg by mouth 3 times daily as needed (for shingles outbreak)             Glucosamine-Chondroitin (JOINT SUPPORT PO)  Take 1 tablet by mouth daily              HYDROcodone-acetaminophen (NORCO) 5-325 MG per tablet  Take 1-2 tablets by mouth every 4 hours as needed for Pain for up to 7 days.              MAGNESIUM ACETATE  by Does not apply route For bowels             OLIVE LEAF PO  Take by mouth daily              polyethylene glycol (GLYCOLAX) 17 g packet  Take 17 g by mouth daily as needed for Constipation             predniSONE (DELTASONE) 5 MG tablet  Take 5 mg by mouth 2 times daily              Probiotic Product (PROBIOTIC FORMULA PO)  Take by mouth daily              Rizatriptan Benzoate (MAXALT PO)  Take by mouth as needed             therapeutic multivitamin-minerals (THERAGRAN-M) tablet  Take 1 tablet by mouth daily. Tofacitinib Citrate (XELJANZ XR) 11 MG TB24  Take 0.5 tablets by mouth daily              VITAMIN D-3 (COLECALCIFEROL) 400 UNITS TABS  Take  by mouth daily.                      Time Spent:- 40 minutes    Electronically signed by Adine Schlatter, MD on 7/20/2020 at 2:11 PM  Discharging Hospitalist

## 2020-07-20 NOTE — PROGRESS NOTES
Daily  sodium chloride flush 0.9 % injection 10 mL, 10 mL, Intravenous, 2 times per day  sodium chloride flush 0.9 % injection 10 mL, 10 mL, Intravenous, PRN  acetaminophen (TYLENOL) tablet 650 mg, 650 mg, Oral, Q6H PRN **OR** acetaminophen (TYLENOL) suppository 650 mg, 650 mg, Rectal, Q6H PRN  polyethylene glycol (GLYCOLAX) packet 17 g, 17 g, Oral, Daily PRN  promethazine (PHENERGAN) tablet 12.5 mg, 12.5 mg, Oral, Q6H PRN **OR** [DISCONTINUED] ondansetron (ZOFRAN) injection 4 mg, 4 mg, Intravenous, Q6H PRN  morphine (PF) injection 2 mg, 2 mg, Intravenous, Q2H PRN **OR** morphine injection 4 mg, 4 mg, Intravenous, Q2H PRN  therapeutic multivitamin-minerals 1 tablet, 1 tablet, Oral, Daily  calcium-vitamin D (OSCAL-500) 500-200 MG-UNIT per tablet 1 tablet, 1 tablet, Oral, Daily    . ASSESSMENT AND PLAN  WBAT RLE  PT/OT patient having difficulty with regular walker, please try platform walker. Diclofenec gel for right knee pain.    Will benefit from home health PT/OT  Anticipate home with family

## 2020-07-20 NOTE — PLAN OF CARE
Problem: Skin Integrity:  Goal: Will show no infection signs and symptoms  Description: Will show no infection signs and symptoms  7/20/2020 0334 by Sajan Cruz RN  Outcome: Ongoing  Note: No signs of new skin breakdown with each assessment. Skin remains warm, dry, intact. Mucous membranes pink & moist. Patient understands the importance of frequent repositioning in order to prevent any skin breakdown. Surgical incision to right hip prineo tape dressing clean, dry, and intact. Problem: Skin Integrity:  Goal: Absence of new skin breakdown  Description: Absence of new skin breakdown  7/20/2020 0334 by Sajan Cruz RN  Outcome: Ongoing  Note: Patients skin remains free of any new skin breakdown. Problem: Falls - Risk of:  Goal: Will remain free from falls  Description: Will remain free from falls  7/20/2020 0334 by Sajan Cruz RN  Outcome: Ongoing  Note: Patient remained free of falls. Call light within reach and used appropriately. Bed alarmed and in lowest position, side rails up x2, personal items within reach and non skid socks worn when ambulating. Patient hasn't been up yet this shift. Will be working with PT/OT for day shift. Problem: Falls - Risk of:  Goal: Absence of physical injury  Description: Absence of physical injury  7/20/2020 0334 by Sajan Cruz RN  Outcome: Ongoing  Note: Patient remains free of any new physical injury. Problem: Pain:  Goal: Pain level will decrease  Description: Pain level will decrease  7/20/2020 0334 by Sajan Cruz RN  Outcome: Ongoing  Note: Patient rates pain 2/10 with a pain goal of 1/10. Pain controlled with medication and repositioning. Patient satisfied. Problem: Neurological  Goal: Maximum potential motor/sensory/cognitive function  7/20/2020 0334 by Sajan Cruz RN  Outcome: Ongoing  Note: Patient is alert and oriented x4. All extremities have pulses of +1 or +2. Denies any numbness and tingling.  Limited movement to LLE d/t injury/trauma. Problem: Cardiovascular  Goal: No DVT, peripheral vascular complications  1/35/6022 0334 by Tadeo Nash RN  Outcome: Ongoing  Note: No signs and symptoms of DVT. Calves soft and nontender. Patient compliant with SCDs to help in prevention of DVTs. Problem: Respiratory  Goal: No pulmonary complications  7/06/5772 0334 by Tadeo Nash RN  Outcome: Ongoing  Note: Lung sounds clear and chest expansion symmetrical. O2 sats are 92% or greater on room air. Problem: GI  Goal: No bowel complications  6/03/5093 0334 by Tadeo Nash RN  Outcome: Ongoing  Note: Patient denies any n/v/d. Bowel sounds active x4 quadrants. No bowel movement yet this shift. Problem:   Goal: Adequate urinary output  7/20/2020 0334 by Tadeo Nash RN  Outcome: Ongoing  Note: Patient has adequate clear, yellow urine output via loza. Denies any dysuria or urgency. Problem: Nutrition  Goal: Optimal nutrition therapy  7/20/2020 0334 by Tadeo Nash RN  Outcome: Ongoing  Note: Patient on general diet tolerating well. Denies any N/V. Problem: Musculor/Skeletal Functional Status  Goal: Highest potential functional level  7/20/2020 0334 by Tadeo Nash RN  Outcome: Ongoing  Note: Limited movement to LLE d/t injury/trauma. Patient also has rheumatoid arthritis. PT/OT working with patient during day. Problem: Discharge Planning:  Goal: Discharged to appropriate level of care  Description: Discharged to appropriate level of care  7/20/2020 0334 by Tadeo Nash RN  Outcome: Ongoing  Note: Discharge planning in progress. Patient is planning to go home with  at discharge.  and  assisting with discharge needs. Care plan reviewed with patient. Patient verbalize understanding of the plan of care and contribute to goal setting.

## 2020-07-20 NOTE — PROGRESS NOTES
Pt discharged home with home health and daughter. She has discharge instructions with her and has no questions at this time. She will get percocet from [facility] tomorrow and was given 2 days worth by hospitalist until then. Okay'd by BASSAM Daniel with Dr. Cornelia Solano. All times and dose of medications given in AVS. She was transported by staff to [facility] with face mask in place and all personal belongings.

## 2020-07-20 NOTE — PROGRESS NOTES
Assistance, with head of bed raised, with increased time for completion  Sit to Supine: Stand By Assistance, with increased time for completion     Transfers:  Sit to Stand: Stand By Assistance  Stand to Sit:Stand By Assistance    Ambulation:  Stand By Assistance  Distance: ~100ft x1  Surface: Level Tile  Device:RW and B UE platforms  Gait Deviations: Forward Flexed Posture, Slow Beatriz, Decreased Step Length Bilaterally, Decreased Weight Shift Right, Decreased Gait Speed, Decreased Heel Strike Bilaterally, Narrow Base of Support and Decreased Terminal Knee Extension    Exercise:  Patient was guided in 1 set(s) 10 reps of exercise to both lower extremities. Ankle pumps, Glut sets, Quad sets, Heelslides, Short arc quads, Hip abduction/adduction and Straight leg raises. Exercises were completed for increased independence with functional mobility. Educated on HEP and provided handouts for home. Functional Outcome Measures: Completed  -PAC Inpatient Mobility Raw Score : 16  AM-PAC Inpatient T-Scale Score : 40.78    ASSESSMENT:  Assessment: Patient progressing toward established goals. Activity Tolerance:  Patient tolerance of  treatment: good. Equipment Recommendations: Other: IF d/c home she will need 2WW  Discharge Recommendations:  Home with Home health PT    Plan: Times per week: 6xBID,1xqd  Current Treatment Recommendations: Strengthening, Gait Training, Patient/Caregiver Education & Training, Stair training, ROM, Equipment Evaluation, Education, & procurement, Balance Training, Functional Mobility Training, Endurance Training, Neuromuscular Re-education, Home Exercise Program, Transfer Training, Safety Education & Training    Patient Education  Patient Education: Plan of Care, Home Exercise Program, Precautions/Restrictions, [employer] Mobility, Transfers, Gait, Verbal Exercise Instruction    Goals:  Patient goals : go home  Short term goals  Time Frame for Short term goals: by discharge  Short term goal 1: bed mobility with supervision A for ease of transfers  Short term goal 2: ambulate > 150ft without AD and supervision A to prepare for home/community mobility  Short term goal 3: sit <> stand with supervision A for ease of transfers  Short term goal 4: negotiate 5 steps with bilat HR and stand-by assist to prepare for home d/c  Long term goals  Time Frame for Long term goals : N/A secondary to short ELOS    Following session, patient left in safe position with all fall risk precautions in place.

## 2020-07-20 NOTE — CARE COORDINATION
DISCHARGE/PLANNING EVALUATION  7/20/20, 11:53 AM EDT    Reason for Referral: discharge needs   Mental Status:  Alert, oriented   Decision Making:  Makes own decisions   Family/Social/Home Environment:  Sera lives at home with her , who can help with her care as needed. Sera is independent with personal care, manages household tasks, is an active . She manages meal prep and housekeeping. Current Services including food security, transportation and housekeeping:  No current services, family will help with transportation , housekeeping, meals. Current Equipment: wheelchair   Payment Source: [employer]  Concerns or Barriers to Discharge: [facility]  Post acute provider list with quality measures, geographic area and applicable managed care information provided. Questions regarding selection process answered: declined list, prefers [facility]    Teach Back Method used with patient regarding care plan and discharge plan  Patient  verbalizes understanding of the plan of care and contributes to goal setting. Patient goals, treatment preferences and discharge plan:  Spoke with Sera, who plans home with family support. Care manager is arranging equipment. Sera is requesting [facility]. Will need HH orders. Referral made to [facility].        Electronically signed by VANESSA Brewster on 7/20/2020 at 11:53 AM

## 2020-12-01 ENCOUNTER — HOSPITAL ENCOUNTER (OUTPATIENT)
Dept: WOMENS IMAGING | Age: 55
Discharge: HOME | End: 2020-12-01
Payer: COMMERCIAL

## 2020-12-01 PROCEDURE — 77080 DXA BONE DENSITY AXIAL: CPT

## 2020-12-01 PROCEDURE — 77063 BREAST TOMOSYNTHESIS BI: CPT

## 2020-12-07 ENCOUNTER — HOSPITAL ENCOUNTER (OUTPATIENT)
Dept: WOMENS IMAGING | Age: 55
End: 2020-12-07
Payer: COMMERCIAL

## 2020-12-07 ENCOUNTER — HOSPITAL ENCOUNTER (OUTPATIENT)
Dept: WOMENS IMAGING | Age: 55
Discharge: HOME | End: 2020-12-07
Payer: COMMERCIAL

## 2020-12-07 PROCEDURE — G0279 TOMOSYNTHESIS, MAMMO: HCPCS

## 2021-03-13 ENCOUNTER — APPOINTMENT (OUTPATIENT)
Dept: CT IMAGING | Age: 56
DRG: 392 | End: 2021-03-13
Payer: COMMERCIAL

## 2021-03-13 ENCOUNTER — HOSPITAL ENCOUNTER (INPATIENT)
Age: 56
LOS: 4 days | Discharge: HOME | DRG: 392 | End: 2021-03-17
Attending: FAMILY MEDICINE | Admitting: INTERNAL MEDICINE
Payer: COMMERCIAL

## 2021-03-13 DIAGNOSIS — R19.7 DIARRHEA, UNSPECIFIED TYPE: ICD-10-CM

## 2021-03-13 DIAGNOSIS — R10.32 LEFT LOWER QUADRANT ABDOMINAL PAIN: Primary | ICD-10-CM

## 2021-03-13 PROBLEM — K63.89 COLONIC MASS: Status: ACTIVE | Noted: 2021-03-13

## 2021-03-13 PROBLEM — R10.30 LOWER ABDOMINAL PAIN: Status: ACTIVE | Noted: 2021-03-13

## 2021-03-13 LAB
ALBUMIN SERPL-MCNC: 3.6 G/DL (ref 3.5–5.1)
ALP BLD-CCNC: 66 U/L (ref 38–126)
ALT SERPL-CCNC: 11 U/L (ref 11–66)
ANION GAP SERPL CALCULATED.3IONS-SCNC: 12 MEQ/L (ref 8–16)
AST SERPL-CCNC: 22 U/L (ref 5–40)
BACTERIA: ABNORMAL /HPF
BASOPHILS # BLD: 0.3 %
BASOPHILS ABSOLUTE: 0 THOU/MM3 (ref 0–0.1)
BILIRUB SERPL-MCNC: 0.6 MG/DL (ref 0.3–1.2)
BILIRUBIN DIRECT: < 0.2 MG/DL (ref 0–0.3)
BILIRUBIN URINE: NEGATIVE
BLOOD, URINE: NEGATIVE
BUN BLDV-MCNC: 9 MG/DL (ref 7–22)
CALCIUM SERPL-MCNC: 9.4 MG/DL (ref 8.5–10.5)
CASTS 2: ABNORMAL /LPF
CASTS UA: ABNORMAL /LPF
CHARACTER, URINE: ABNORMAL
CHLORIDE BLD-SCNC: 102 MEQ/L (ref 98–111)
CO2: 25 MEQ/L (ref 23–33)
COLOR: YELLOW
CREAT SERPL-MCNC: 0.5 MG/DL (ref 0.4–1.2)
CRYSTALS, UA: ABNORMAL
EKG ATRIAL RATE: 78 BPM
EKG P AXIS: 60 DEGREES
EKG P-R INTERVAL: 148 MS
EKG Q-T INTERVAL: 408 MS
EKG QRS DURATION: 84 MS
EKG QTC CALCULATION (BAZETT): 465 MS
EKG R AXIS: -32 DEGREES
EKG T AXIS: 21 DEGREES
EKG VENTRICULAR RATE: 78 BPM
EOSINOPHIL # BLD: 0.1 %
EOSINOPHILS ABSOLUTE: 0 THOU/MM3 (ref 0–0.4)
EPITHELIAL CELLS, UA: ABNORMAL /HPF
ERYTHROCYTE [DISTWIDTH] IN BLOOD BY AUTOMATED COUNT: 12.9 % (ref 11.5–14.5)
ERYTHROCYTE [DISTWIDTH] IN BLOOD BY AUTOMATED COUNT: 49 FL (ref 35–45)
GFR SERPL CREATININE-BSD FRML MDRD: > 90 ML/MIN/1.73M2
GLUCOSE BLD-MCNC: 88 MG/DL (ref 70–108)
GLUCOSE URINE: NEGATIVE MG/DL
HCT VFR BLD CALC: 44.1 % (ref 37–47)
HEMOGLOBIN: 14.1 GM/DL (ref 12–16)
IMMATURE GRANS (ABS): 0.05 THOU/MM3 (ref 0–0.07)
IMMATURE GRANULOCYTES: 0.4 %
KETONES, URINE: NEGATIVE
LACTIC ACID: 1.6 MMOL/L (ref 0.5–2.2)
LEUKOCYTE ESTERASE, URINE: ABNORMAL
LIPASE: 34.3 U/L (ref 5.6–51.3)
LYMPHOCYTES # BLD: 13.5 %
LYMPHOCYTES ABSOLUTE: 1.9 THOU/MM3 (ref 1–4.8)
MCH RBC QN AUTO: 32.4 PG (ref 26–33)
MCHC RBC AUTO-ENTMCNC: 32 GM/DL (ref 32.2–35.5)
MCV RBC AUTO: 101.4 FL (ref 81–99)
MISCELLANEOUS 2: ABNORMAL
MONOCYTES # BLD: 6.9 %
MONOCYTES ABSOLUTE: 1 THOU/MM3 (ref 0.4–1.3)
NITRITE, URINE: NEGATIVE
NUCLEATED RED BLOOD CELLS: 0 /100 WBC
OSMOLALITY CALCULATION: 275.6 MOSMOL/KG (ref 275–300)
PH UA: >= 9 (ref 5–9)
PLATELET # BLD: 304 THOU/MM3 (ref 130–400)
PMV BLD AUTO: 10.9 FL (ref 9.4–12.4)
POTASSIUM SERPL-SCNC: 3.5 MEQ/L (ref 3.5–5.2)
PROCALCITONIN: 0.12 NG/ML (ref 0.01–0.09)
PROTEIN UA: NEGATIVE
RBC # BLD: 4.35 MILL/MM3 (ref 4.2–5.4)
RBC URINE: ABNORMAL /HPF
RENAL EPITHELIAL, UA: ABNORMAL
SEG NEUTROPHILS: 78.8 %
SEGMENTED NEUTROPHILS ABSOLUTE COUNT: 11.1 THOU/MM3 (ref 1.8–7.7)
SODIUM BLD-SCNC: 139 MEQ/L (ref 135–145)
SPECIFIC GRAVITY, URINE: 1.01 (ref 1–1.03)
TOTAL PROTEIN: 6.7 G/DL (ref 6.1–8)
TROPONIN T: < 0.01 NG/ML
UROBILINOGEN, URINE: 0.2 EU/DL (ref 0–1)
WBC # BLD: 14.1 THOU/MM3 (ref 4.8–10.8)
WBC UA: ABNORMAL /HPF
YEAST: ABNORMAL

## 2021-03-13 PROCEDURE — 80053 COMPREHEN METABOLIC PANEL: CPT

## 2021-03-13 PROCEDURE — 99253 IP/OBS CNSLTJ NEW/EST LOW 45: CPT | Performed by: SURGERY

## 2021-03-13 PROCEDURE — 82248 BILIRUBIN DIRECT: CPT

## 2021-03-13 PROCEDURE — 74177 CT ABD & PELVIS W/CONTRAST: CPT

## 2021-03-13 PROCEDURE — 99283 EMERGENCY DEPT VISIT LOW MDM: CPT

## 2021-03-13 PROCEDURE — 36415 COLL VENOUS BLD VENIPUNCTURE: CPT

## 2021-03-13 PROCEDURE — 84145 PROCALCITONIN (PCT): CPT

## 2021-03-13 PROCEDURE — 6360000004 HC RX CONTRAST MEDICATION: Performed by: FAMILY MEDICINE

## 2021-03-13 PROCEDURE — 81001 URINALYSIS AUTO W/SCOPE: CPT

## 2021-03-13 PROCEDURE — 96374 THER/PROPH/DIAG INJ IV PUSH: CPT

## 2021-03-13 PROCEDURE — 96375 TX/PRO/DX INJ NEW DRUG ADDON: CPT

## 2021-03-13 PROCEDURE — 93005 ELECTROCARDIOGRAM TRACING: CPT | Performed by: STUDENT IN AN ORGANIZED HEALTH CARE EDUCATION/TRAINING PROGRAM

## 2021-03-13 PROCEDURE — 96376 TX/PRO/DX INJ SAME DRUG ADON: CPT

## 2021-03-13 PROCEDURE — 84484 ASSAY OF TROPONIN QUANT: CPT

## 2021-03-13 PROCEDURE — 83605 ASSAY OF LACTIC ACID: CPT

## 2021-03-13 PROCEDURE — 85025 COMPLETE CBC W/AUTO DIFF WBC: CPT

## 2021-03-13 PROCEDURE — 6360000002 HC RX W HCPCS

## 2021-03-13 PROCEDURE — 2580000003 HC RX 258: Performed by: STUDENT IN AN ORGANIZED HEALTH CARE EDUCATION/TRAINING PROGRAM

## 2021-03-13 PROCEDURE — 2580000003 HC RX 258: Performed by: INTERNAL MEDICINE

## 2021-03-13 PROCEDURE — 1200000003 HC TELEMETRY R&B

## 2021-03-13 PROCEDURE — 6370000000 HC RX 637 (ALT 250 FOR IP): Performed by: INTERNAL MEDICINE

## 2021-03-13 PROCEDURE — 87040 BLOOD CULTURE FOR BACTERIA: CPT

## 2021-03-13 PROCEDURE — 6360000002 HC RX W HCPCS: Performed by: STUDENT IN AN ORGANIZED HEALTH CARE EDUCATION/TRAINING PROGRAM

## 2021-03-13 PROCEDURE — 83690 ASSAY OF LIPASE: CPT

## 2021-03-13 PROCEDURE — 2500000003 HC RX 250 WO HCPCS: Performed by: INTERNAL MEDICINE

## 2021-03-13 PROCEDURE — 6360000002 HC RX W HCPCS: Performed by: INTERNAL MEDICINE

## 2021-03-13 PROCEDURE — 99223 1ST HOSP IP/OBS HIGH 75: CPT | Performed by: INTERNAL MEDICINE

## 2021-03-13 RX ORDER — FAMOTIDINE 20 MG/1
20 TABLET, FILM COATED ORAL 2 TIMES DAILY
Status: DISCONTINUED | OUTPATIENT
Start: 2021-03-13 | End: 2021-03-17 | Stop reason: HOSPADM

## 2021-03-13 RX ORDER — MORPHINE SULFATE 4 MG/ML
4 INJECTION, SOLUTION INTRAMUSCULAR; INTRAVENOUS ONCE
Status: DISCONTINUED | OUTPATIENT
Start: 2021-03-13 | End: 2021-03-13

## 2021-03-13 RX ORDER — SODIUM CHLORIDE 9 MG/ML
INJECTION, SOLUTION INTRAVENOUS CONTINUOUS
Status: DISCONTINUED | OUTPATIENT
Start: 2021-03-13 | End: 2021-03-14

## 2021-03-13 RX ORDER — MORPHINE SULFATE 2 MG/ML
2 INJECTION, SOLUTION INTRAMUSCULAR; INTRAVENOUS
Status: DISCONTINUED | OUTPATIENT
Start: 2021-03-13 | End: 2021-03-17 | Stop reason: HOSPADM

## 2021-03-13 RX ORDER — ONDANSETRON 2 MG/ML
4 INJECTION INTRAMUSCULAR; INTRAVENOUS EVERY 6 HOURS PRN
Status: DISCONTINUED | OUTPATIENT
Start: 2021-03-13 | End: 2021-03-17 | Stop reason: HOSPADM

## 2021-03-13 RX ORDER — SODIUM CHLORIDE 0.9 % (FLUSH) 0.9 %
10 SYRINGE (ML) INJECTION PRN
Status: DISCONTINUED | OUTPATIENT
Start: 2021-03-13 | End: 2021-03-17 | Stop reason: HOSPADM

## 2021-03-13 RX ORDER — POLYETHYLENE GLYCOL 3350 17 G/17G
17 POWDER, FOR SOLUTION ORAL DAILY PRN
Status: DISCONTINUED | OUTPATIENT
Start: 2021-03-13 | End: 2021-03-17 | Stop reason: HOSPADM

## 2021-03-13 RX ORDER — MAGNESIUM SULFATE IN WATER 40 MG/ML
2000 INJECTION, SOLUTION INTRAVENOUS PRN
Status: DISCONTINUED | OUTPATIENT
Start: 2021-03-13 | End: 2021-03-14

## 2021-03-13 RX ORDER — PROMETHAZINE HYDROCHLORIDE 25 MG/1
12.5 TABLET ORAL EVERY 6 HOURS PRN
Status: DISCONTINUED | OUTPATIENT
Start: 2021-03-13 | End: 2021-03-17 | Stop reason: HOSPADM

## 2021-03-13 RX ORDER — POTASSIUM CHLORIDE 7.45 MG/ML
10 INJECTION INTRAVENOUS PRN
Status: DISCONTINUED | OUTPATIENT
Start: 2021-03-13 | End: 2021-03-14

## 2021-03-13 RX ORDER — ONDANSETRON 2 MG/ML
INJECTION INTRAMUSCULAR; INTRAVENOUS
Status: COMPLETED
Start: 2021-03-13 | End: 2021-03-13

## 2021-03-13 RX ORDER — ACETAMINOPHEN 650 MG/1
650 SUPPOSITORY RECTAL EVERY 6 HOURS PRN
Status: DISCONTINUED | OUTPATIENT
Start: 2021-03-13 | End: 2021-03-17 | Stop reason: HOSPADM

## 2021-03-13 RX ORDER — MORPHINE SULFATE 4 MG/ML
4 INJECTION, SOLUTION INTRAMUSCULAR; INTRAVENOUS ONCE
Status: COMPLETED | OUTPATIENT
Start: 2021-03-13 | End: 2021-03-13

## 2021-03-13 RX ORDER — ACETAMINOPHEN 325 MG/1
650 TABLET ORAL EVERY 6 HOURS PRN
Status: DISCONTINUED | OUTPATIENT
Start: 2021-03-13 | End: 2021-03-17 | Stop reason: HOSPADM

## 2021-03-13 RX ORDER — MORPHINE SULFATE 2 MG/ML
2 INJECTION, SOLUTION INTRAMUSCULAR; INTRAVENOUS ONCE
Status: COMPLETED | OUTPATIENT
Start: 2021-03-13 | End: 2021-03-13

## 2021-03-13 RX ORDER — POTASSIUM CHLORIDE 20 MEQ/1
40 TABLET, EXTENDED RELEASE ORAL PRN
Status: DISCONTINUED | OUTPATIENT
Start: 2021-03-13 | End: 2021-03-14

## 2021-03-13 RX ORDER — SODIUM CHLORIDE 0.9 % (FLUSH) 0.9 %
10 SYRINGE (ML) INJECTION EVERY 12 HOURS SCHEDULED
Status: DISCONTINUED | OUTPATIENT
Start: 2021-03-13 | End: 2021-03-17 | Stop reason: HOSPADM

## 2021-03-13 RX ORDER — PREDNISONE 10 MG/1
5 TABLET ORAL 2 TIMES DAILY
Status: CANCELLED | OUTPATIENT
Start: 2021-03-13

## 2021-03-13 RX ADMIN — ONDANSETRON 4 MG: 2 INJECTION INTRAMUSCULAR; INTRAVENOUS at 08:05

## 2021-03-13 RX ADMIN — ACETAMINOPHEN 650 MG: 325 TABLET ORAL at 18:21

## 2021-03-13 RX ADMIN — MORPHINE SULFATE 2 MG: 2 INJECTION, SOLUTION INTRAMUSCULAR; INTRAVENOUS at 19:26

## 2021-03-13 RX ADMIN — SODIUM CHLORIDE, PRESERVATIVE FREE 10 ML: 5 INJECTION INTRAVENOUS at 16:18

## 2021-03-13 RX ADMIN — SODIUM CHLORIDE: 9 INJECTION, SOLUTION INTRAVENOUS at 16:24

## 2021-03-13 RX ADMIN — METRONIDAZOLE 500 MG: 500 INJECTION, SOLUTION INTRAVENOUS at 16:28

## 2021-03-13 RX ADMIN — ENOXAPARIN SODIUM 40 MG: 40 INJECTION SUBCUTANEOUS at 18:16

## 2021-03-13 RX ADMIN — MORPHINE SULFATE 2 MG: 2 INJECTION, SOLUTION INTRAMUSCULAR; INTRAVENOUS at 22:42

## 2021-03-13 RX ADMIN — FAMOTIDINE 20 MG: 20 TABLET, FILM COATED ORAL at 20:39

## 2021-03-13 RX ADMIN — CEFTRIAXONE SODIUM 1000 MG: 1 INJECTION, POWDER, FOR SOLUTION INTRAMUSCULAR; INTRAVENOUS at 11:13

## 2021-03-13 RX ADMIN — IOPAMIDOL 80 ML: 755 INJECTION, SOLUTION INTRAVENOUS at 08:52

## 2021-03-13 RX ADMIN — MORPHINE SULFATE 4 MG: 4 INJECTION, SOLUTION INTRAMUSCULAR; INTRAVENOUS at 08:06

## 2021-03-13 RX ADMIN — MORPHINE SULFATE 2 MG: 2 INJECTION, SOLUTION INTRAMUSCULAR; INTRAVENOUS at 10:44

## 2021-03-13 RX ADMIN — MORPHINE SULFATE 2 MG: 2 INJECTION, SOLUTION INTRAMUSCULAR; INTRAVENOUS at 16:17

## 2021-03-13 ASSESSMENT — PAIN DESCRIPTION - ONSET
ONSET: GRADUAL
ONSET: ON-GOING
ONSET: ON-GOING

## 2021-03-13 ASSESSMENT — ENCOUNTER SYMPTOMS
VOMITING: 0
SINUS PAIN: 0
CHEST TIGHTNESS: 0
EYE ITCHING: 0
DIARRHEA: 1
NAUSEA: 1
RHINORRHEA: 0
EYE DISCHARGE: 0
BLOOD IN STOOL: 0
COLOR CHANGE: 0
ABDOMINAL PAIN: 1
ABDOMINAL DISTENTION: 0
SHORTNESS OF BREATH: 0
EYE REDNESS: 0
SINUS PRESSURE: 0

## 2021-03-13 ASSESSMENT — PAIN SCALES - GENERAL
PAINLEVEL_OUTOF10: 4
PAINLEVEL_OUTOF10: 10
PAINLEVEL_OUTOF10: 3
PAINLEVEL_OUTOF10: 5

## 2021-03-13 ASSESSMENT — PAIN DESCRIPTION - PAIN TYPE
TYPE: ACUTE PAIN

## 2021-03-13 ASSESSMENT — PAIN DESCRIPTION - PROGRESSION
CLINICAL_PROGRESSION: NOT CHANGED
CLINICAL_PROGRESSION: GRADUALLY WORSENING
CLINICAL_PROGRESSION: NOT CHANGED

## 2021-03-13 ASSESSMENT — PAIN - FUNCTIONAL ASSESSMENT
PAIN_FUNCTIONAL_ASSESSMENT: PREVENTS OR INTERFERES SOME ACTIVE ACTIVITIES AND ADLS

## 2021-03-13 ASSESSMENT — PAIN DESCRIPTION - FREQUENCY: FREQUENCY: CONTINUOUS

## 2021-03-13 ASSESSMENT — PAIN DESCRIPTION - LOCATION: LOCATION: HEAD;ABDOMEN

## 2021-03-13 ASSESSMENT — PAIN DESCRIPTION - DESCRIPTORS
DESCRIPTORS: ACHING;CRAMPING
DESCRIPTORS: ACHING;CRAMPING;HEADACHE
DESCRIPTORS: ACHING;HEADACHE;CRAMPING

## 2021-03-13 ASSESSMENT — PAIN DESCRIPTION - ORIENTATION: ORIENTATION: LOWER

## 2021-03-13 NOTE — CONSULTS
[address] [id number]                                  CONSULTATION    PATIENT NAME: JUSTIN CUNNINGHAM                        :        1965  MED REC NO:   [id number]                           ROOM:       0011  ACCOUNT NO:   [de-identified]                           ADMIT DATE: 2021  PROVIDER:     Efrain Mc. Rosie Romero MD    CONSULT DATE:  2021    CHIEF COMPLAINT:  Acute abdominal pain, possible colitis, possible  diverticulitis. HISTORY OF PRESENT ILLNESS:  The patient is a 59-year-old white female  who has a long history of rheumatoid arthritis, is cared for by  rheumatologist in [facility], who has been on steroids long-term and  currently takes 5 mg twice a day. The patient had onset yesterday of  pressure in her lower abdomen that increased in severity. She has had  no nausea or vomiting. She denies any prior history of similar pain,  presented to the emergency room for evaluation. She was noted to have  diffuse lower abdominal pain with questionable peritonitis, and a CT  scan was performed being read as inflammation of the ascending colon,  hepatic flexure with a possible density in the ascending colon versus  inflammatory mass, and then also rectosigmoid inflammatory process. I  was called from the emergency room for possibility of diverticulitis and  the patient again is being seen in the emergency room. Her last  colonoscopy was 10 years ago per Dr. William Ward. She did recently just  have her father diagnosed with colon carcinoma. She denied any blood in  her stool. PAST MEDICAL HISTORY:  Positive mainly for the significant rheumatoid  arthritis. She also has a history of melanoma of the left leg. She  denies diabetes. She does have a history of osteoporosis. No known  coronary artery disease or hypertension. PAST SURGICAL HISTORY:  Includes a remote tubal ligation.   She has had  an excision of a melanoma of the left thigh. She has had a right hip  replacement. She has had bilateral podiatry surgery on her feet, again  secondary to the RA. She has also had a right thumb operated on, again  secondary to RA. MEDICATIONS:  Kylee Cross, _____ p.r.n., glucosamine, vitamin D,  calcium carbonate, probiotics, Tylenol Arthritis, and Deltasone as  mentioned 5 mg b.i.d. SOCIAL HISTORY:  She is a nondrinker, nonsmoker. FAMILY HISTORY:  Not contributable at this time. PHYSICAL EXAMINATION:  GENERAL:  The patient is a 42-year-old white female. She is being seen  in the emergency room bed 12. She does appear to be uncomfortable but  stable. HEAD, EYES, EARS, NOSE, THROAT:  Pupils are equal.  EOMs are intact. Sclerae are clear. External auditory canals are normal.  NECK:  Soft. No palpable masses. CARDIAC:  S1, S2.  CHEST:  Respirations are clear. ABDOMEN:  Nondistended. She is tender diffusely but more in the lower  abdomen. She definitely has guarding. There is no peritoneal  irritation. EXTREMITIES:  Upper and lower extremities, good range of motion. She  has obvious rheumatoid appearance of the hands. Lower legs are slender. No edema. She has a well-healed incision of the left thigh. ASSESSMENT/PLAN:  1. Acute onset of abdominal pain. CT scan is suggestive of more of a  colitis versus just diverticulitis. The patient has no known  diverticulosis in the past, but her last colonoscopy was 10 years ago. Complicating all of this is the fact that she is on steroids which can  certainly mask abdominal processes that are more severe than what the  exam would entail. She has no free air. I believe it would be  appropriate for a Medicine admit with GI consultation. She certainly  could have a mass in the right colon but it also is likely inflammation. Question then would be why the colitis. She has not been on antibiotics  recently. She is also on steroids.   She is not having diarrhea, so

## 2021-03-13 NOTE — H&P
Hospitalist H+P      Patient:  Isabelle Nip    Unit/Bed:12/012A  YOB: 1965  MRN: [id number]   Acct: [de-identified]     PCP: Colton Lewis  Date of Admission: 3/13/2021        Assessment and Plan:        1. Abdominal pain lower quadrants: We will consult general surgery and GI. We will start empiric antibiotic Rocephin and Flagyl, patient has an allergy to Cipro. Full liquid diet, will check a lactic acid in the a.m., will trend WBC along with BMP  2. Colon mass versus possible abscess: We will obtain blood cultures, will empirically start IV antibiotics  3. Diarrhea we will check enteric pathogens molecular panel. CC: Abdominal pain    HPI: 61-year-old white female presents emergency department for evaluation of abdominal pain. She experienced sudden onset of abdominal pain located in her lower abdomen which started last night. During the night pain progressively got worse. She had multiple bouts of diarrhea. She is states she has had nonbloody bowel movements that were diarrhea in nature. She ate her past medical history includes rheumatoid arthritis and multiple surgeries secondary to rheumatoid arthritis. Last colonoscopy was approximately 10 years ago    ROS (14 point review of systems completed. Pertinent positives noted.  Otherwise ROS is negative) : Chills  Abdominal pain  Diarrhea  Nausea    PMH:  Per HPI and       Diagnosis Date    TRINI (acute kidney injury) ([facility] [address] 12/8/2012    Arthritis     Malignant hyperthermia     daughters    RA (rheumatoid arthritis) ([facility] [address]      SHX:        Procedure Laterality Date    ARTHRODESIS Right 5/23/2019    RIGHT 1ST MPJ FUSION, METATARSAL HEAD RESECTION 2-5, TOES 2-5 ARTHROPLASTY/ARTHRODESIS & PLANTAR SKIN FLAP performed by Mónica Thompson DPM at [address] [place] [address] Left 9/5/2019    LEFT 1ST MPJ FUSION, METATARSAL HEADS 2-5 RESECTION, ARTHRODESIS 2-4 TOES, ARTHROPLASTY 5TH TOE ALL ON THE LEFT performed by Mónica Thompson DPM at [address] [place] [address] COLONOSCOPY      HEEL SPUR SURGERY Right 12/13/2019    RT HALLUX OSTECTOMY performed by Andrew Brumfield DPM at [facility] Right 7/18/2020    right total hip arthroplasty performed by Queen Osler, MD at [address] [place] [address] Right     thumb fusion    SKIN BIOPSY  05/2018    lower left leg, melanoma    TUBAL LIGATION       FHX:       Problem Relation Age of Onset    Heart Disease Mother      SOCHX:   Social History     Socioeconomic History    Marital status:      Spouse name: None    Number of children: None    Years of education: None    Highest education level: None   Occupational History    None   Social Needs    Financial resource strain: None    Food insecurity     Worry: None     Inability: None    Transportation needs     Medical: None     Non-medical: None   Tobacco Use    Smoking status: Never Smoker    Smokeless tobacco: Never Used   Substance and Sexual Activity    Alcohol use: No    Drug use: No    Sexual activity: Yes     Partners: Male   Lifestyle    Physical activity     Days per week: None     Minutes per session: None    Stress: None   Relationships    Social connections     Talks on phone: None     Gets together: None     Attends Gnosticism service: None     Active member of club or organization: None     Attends meetings of clubs or organizations: None     Relationship status: None    Intimate partner violence     Fear of current or ex partner: None     Emotionally abused: None     Physically abused: None     Forced sexual activity: None   Other Topics Concern    None   Social History Narrative    None      Allergies: Succinylcholine, Ciprofibrate, Methylprednisolone, and Tramadol  Medications:       cefTRIAXone (ROCEPHIN) IV  1,000 mg Intravenous Once    metroNIDAZOLE  500 mg Intravenous Once     Prior to Admission medications    Medication Sig Start Date End Date Taking?  Authorizing Provider   enoxaparin (LOVENOX) 30 MG/0.3ML injection Inject 0.3 mLs into the skin daily 7/18/20   Edward Jacobson MD   Rizatriptan Benzoate (MAXALT PO) Take by mouth as needed    Historical Provider, MD   Tofacitinib Citrate (XELJANZ XR) 11 MG TB24 Take 0.5 tablets by mouth daily     Historical Provider, MD   famciclovir (FAMVIR) 500 MG tablet Take 500 mg by mouth 3 times daily as needed (for shingles outbreak)    Historical Provider, MD   OLIVE LEAF PO Take by mouth daily     Historical Provider, MD   Glucosamine-Chondroitin (JOINT SUPPORT PO) Take 1 tablet by mouth daily     Historical Provider, MD   therapeutic multivitamin-minerals (THERAGRAN-M) tablet Take 1 tablet by mouth daily. Historical Provider, MD   VITAMIN D-3 (COLECALCIFEROL) 400 UNITS TABS Take  by mouth daily. Historical Provider, MD   calcium carbonate-vitamin D (CALCIUM + D) 600-200 MG-UNIT TABS Take 1 tablet by mouth daily     Historical Provider, MD   MAGNESIUM ACETATE by Does not apply route For bowels    Historical Provider, MD   Probiotic Product (PROBIOTIC FORMULA PO) Take by mouth daily     Historical Provider, MD   Acetaminophen (TYLENOL ARTHRITIS PAIN PO) Take 650 mg by mouth every 4 hours as needed     Historical Provider, MD   predniSONE (DELTASONE) 5 MG tablet Take 5 mg by mouth 2 times daily     Historical Provider, MD      PHYSICAL EXAM:    /63   Pulse 90   Temp 100.4 °F (38 °C) (Oral)   Resp 17   LMP 11/06/2011   SpO2 98%     General appearance:  No apparent distress, appears stated age and cooperative. HEENT:  Normal cephalic, atraumatic without obvious deformity. Pupils equal, round, and reactive to light. Extra ocular muscles intact. Conjunctivae/corneas clear. Neck: Supple, with full range of motion. no jugular venous distention. Trachea midline. no carotid bruits  Respiratory:  Normal respiratory effort. Clear to auscultation, bilaterally without Rales/Wheezes/Rhonchi.  Breath sounds equal bilaterally  Cardiovascular:  Regular rate and rhythm with normal S1/S2 without murmurs, rubs or gallops. PMI non displaced  Abdomen: Abdominal tenderness, guarding and rebound present, hypoactive bowel sounds, psoas sign positive on the left. Musculoskeletal:  No clubbing, cyanosis or edema bilaterally. Limited range of motion with deformity secondary to rheumatoid arthritis. Skin: Skin color, texture, turgor normal.  No rashes or lesions, or suspicious lesions. Neurologic:  Neurovascularly intact without any focal sensory/motor deficits. Cranial nerves: II-XII intact, grossly non-focal.  Psychiatric:  Alert and oriented, thought content appropriate, normal insight  Capillary Refill: Brisk,< 2 seconds   Peripheral Pulses: +2 palpable, equal bilaterally upper and lower extremities  Lymphatics: no lymphadenopathy    Data: (All radiographs, tracings, PFTs, and imaging are personally viewed and interpreted unless otherwise noted).  WBC 14.4   Creatinine 0.5  Recent Labs     03/13/21  0740   WBC 14.1*   HGB 14.1   HCT 44.1         Recent Labs     03/13/21  0740      K 3.5      CO2 25   BUN 9   CREATININE 0.5   CALCIUM 9.4      Recent Labs     03/13/21  0740   AST 22   ALT 11   BILIDIR <0.2   BILITOT 0.6   ALKPHOS 66       No results for input(s): INR in the last 72 hours.  No results for input(s): [place] in the last 72 hours. Radiology reports-images reviewed in PACS  Ct Abdomen Pelvis W Iv Contrast Additional Contrast? None    Result Date: 3/13/2021  PROCEDURE: CT ABDOMEN PELVIS W IV CONTRAST CLINICAL INFORMATION: Acute abdominal pain . COMPARISON: CT scan of the abdomen and pelvis dated 6 December 2012. . TECHNIQUE: Axial 5 mm CT images were obtained through the abdomen and pelvis after the administration of intravenous contrast. Coronal and sagittal reconstructions were obtained.  All CT scans at this facility use dose modulation, iterative reconstruction, and/or weight-based dosing when appropriate to reduce radiation dose to as low as 3/13/2021 9:11 AM      Electronically signed by Harika Reyes DO on 3/13/2021 at 11:39 AM

## 2021-03-13 NOTE — ED NOTES
Patient with complaints of pain, medicated per order. Denies other needs. Will hang antibiotics once blood cultures are drawn.      Casa Aparicio RN  03/13/21 [id number]

## 2021-03-13 NOTE — ED NOTES
Patient updated on current plan of care, denies any needs currently. Patient not in any distress at this time. Spoke with provider about patient's results.      Susana Bella RN  03/13/21 [id number]

## 2021-03-13 NOTE — PROGRESS NOTES
Dr. Loni Plascencia notified patient is having pain/cramping of 7/10 in lower abdomen, and headache. Orders received for morphine. Pt. Stated she received morphine in the ED and it worked well for her pain. Verified with pharmacy.

## 2021-03-13 NOTE — PROGRESS NOTES
Reji Otero NP notified Dr. Ellen Sauceda ordered a General surgery consult. He thought Dr. Aaron Mandujano may have seen her in the ED, but I don't see a note so I'm not sure if the consult was complete. Reji Otero NP replied Dr. Aaron Mandujano saw her in the ED and aware of consult.

## 2021-03-13 NOTE — Clinical Note
Patient Class: Inpatient [101]   REQUIRED: Diagnosis: Colonic mass [[id number]]   Estimated Length of Stay: Estimated stay of more than 2 midnights   Admitting Provider: Grisel Worthington [[id number]]   Preferred Department: [facility]   Telemetry Bed Required?: Yes

## 2021-03-13 NOTE — ED NOTES
Patient ambulated to bathroom without difficulty. Denies needs. No distress noted.      Grisel Pierre RN  03/13/21 [id number]

## 2021-03-13 NOTE — ED PROVIDER NOTES
[place] ENCOUNTER          Pt Name: Guilherme Burch  MRN: [id number]  [place] 1965  Date of evaluation: 3/13/2021  Treating Resident Physician: Jennifer Gutierres DO  Supervising Physician: Nick Altamirano MD    CHIEF COMPLAINT       Chief Complaint   Patient presents with    Abdominal Pain     RLQ     History obtained from the patient. HISTORY OF PRESENT ILLNESS    HPI  Sera Painter is a 50 y.o. female who presents to the emergency department for evaluation of abdominal pain. The patient states that she experienced sudden onset of abdominal pain located to her lower abdomen which started last night, characterized initially as a \"pressure\" and progressing to \"sharp and stabbing \" pain. She reports a severity of 10/10. Reports associated symptoms of diarrhea and states she has had 5 nonbloody diarrheal bowel movements overnight. She reports a prior surgical history of tubal ligation and hip replacement. Reports nausea, no vomiting. The patient has no other acute complaints at this time. REVIEW OF SYSTEMS   Review of Systems   Constitutional: Positive for chills. Negative for fever. HENT: Negative for rhinorrhea, sinus pressure and sinus pain. Eyes: Negative for discharge, redness and itching. Respiratory: Negative for chest tightness and shortness of breath. Cardiovascular: Negative for chest pain. Gastrointestinal: Positive for abdominal pain, diarrhea and nausea. Negative for abdominal distention, blood in stool and vomiting. Genitourinary: Negative for difficulty urinating, dysuria, frequency, hematuria and urgency. Musculoskeletal: Negative for arthralgias. Skin: Negative for color change and pallor. Neurological: Negative for dizziness, light-headedness and headaches.          PAST MEDICAL AND SURGICAL HISTORY     Past Medical History:   Diagnosis Date    TRINI (acute kidney injury) ([facility] [address] 12/8/2012    Arthritis     Does not apply route For bowels, Disp: , Rfl:     Probiotic Product (PROBIOTIC FORMULA PO), Take by mouth daily , Disp: , Rfl:     Acetaminophen (TYLENOL ARTHRITIS PAIN PO), Take 650 mg by mouth every 4 hours as needed , Disp: , Rfl:     predniSONE (DELTASONE) 5 MG tablet, Take 5 mg by mouth 2 times daily , Disp: , Rfl:       SOCIAL HISTORY     Social History     Social History Narrative    Not on file     Social History     Tobacco Use    Smoking status: Never Smoker    Smokeless tobacco: Never Used   Substance Use Topics    Alcohol use: No    Drug use: No         ALLERGIES     Allergies   Allergen Reactions    Succinylcholine Other (See Comments)     Malignant hyperthermia  (Muscle rigidity, high fever and fast heart rate) in daughters    Ciprofibrate Other (See Comments)     Chesty tight, numbness in arms, almost passed out    Methylprednisolone Other (See Comments)     Cause heart palpitations    Tramadol Other (See Comments)     Dizzy  Unable to walk          FAMILY HISTORY     Family History   Problem Relation Age of Onset    Heart Disease Mother          PREVIOUS RECORDS   Previous records reviewed:       PHYSICAL EXAM     ED Triage Vitals [03/13/21 0737]   BP Temp Temp Source Pulse Resp SpO2 Height Weight   129/73 100.4 °F (38 °C) Oral 90 16 100 % -- --     Initial vital signs and nursing assessment reviewed and abnormal from fever. There is no height or weight on file to calculate BMI. Pulsoximetry is normal per my interpretation. Additional Vital Signs:  Vitals:    03/13/21 1112   BP: 119/63   Pulse: 90   Resp: 17   Temp:    SpO2: 98%       Physical Exam  Vitals signs and nursing note reviewed. Constitutional:       General: She is in acute distress. Appearance: She is ill-appearing. Comments: Patient appears ill-appearing and is unable to lie comfortably in bed due to pain. HENT:      Head: Normocephalic and atraumatic. Nose: Nose normal. No congestion or rhinorrhea. Mouth/Throat:      Mouth: Mucous membranes are dry. Pharynx: Oropharynx is clear. No oropharyngeal exudate or posterior oropharyngeal erythema. Eyes:      Extraocular Movements: Extraocular movements intact. Pupils: Pupils are equal, round, and reactive to light. Neck:      Musculoskeletal: Normal range of motion and neck supple. Cardiovascular:      Rate and Rhythm: Normal rate and regular rhythm. Pulses: Normal pulses. Heart sounds: Normal heart sounds. Pulmonary:      Effort: Pulmonary effort is normal.      Breath sounds: Normal breath sounds. Abdominal:      General: Abdomen is flat. Palpations: Abdomen is soft. Tenderness: There is abdominal tenderness. There is guarding and rebound. Comments: There is severe tenderness to palpation in the hypogastric and left lower quadrants with rebound and guarding. Psoas sign positive on the left. Obturator sign negative. Musculoskeletal: Normal range of motion. General: No swelling or tenderness. Right lower leg: No edema. Left lower leg: No edema. Skin:     General: Skin is warm and dry. Neurological:      General: No focal deficit present. Mental Status: She is alert and oriented to person, place, and time. Mental status is at baseline. EKG Interpretation    Interpreted by emergency department physician    Rhythm: normal sinus   Rate: 78  Axis: left  Ectopy: none  Conduction: normal  ST Segments: normal  T Waves: normal  Q Waves: none    Clinical Impression: NSR at a rate of [address]   Initial Assessment:   1. Acute abdominal pain and diarrhea with peritoneal signs. Location of pain suggesting diverticulitis. Plan:    IV, IV fluid bolus   Cardiac workup. Septic workup. Abdominal labs: CBC, BMP, Hepatic function panel, Lipase, lactic acid   UA   Zofran, morphine.         ED RESULTS   Laboratory results:  Labs Reviewed   CBC WITH AUTO DIFFERENTIAL - Abnormal; Notable for the following components:       Result Value    WBC 14.1 (*)     .4 (*)     MCHC 32.0 (*)     RDW-SD 49.0 (*)     Segs Absolute 11.1 (*)     All other components within normal limits   URINE WITH REFLEXED MICRO - Abnormal; Notable for the following components:    Leukocyte Esterase, Urine SMALL (*)     All other components within normal limits   PROCALCITONIN - Abnormal; Notable for the following components:    Procalcitonin 0.12 (*)     All other components within normal limits   CULTURE, BLOOD 1   CULTURE, BLOOD 2   BASIC METABOLIC PANEL   HEPATIC FUNCTION PANEL   LIPASE   TROPONIN   LACTIC ACID, PLASMA   ANION GAP   GLOMERULAR FILTRATION RATE, ESTIMATED   OSMOLALITY       Radiologic studies results:  CT ABDOMEN PELVIS W IV CONTRAST Additional Contrast? None   Final Result      1. Abnormal density in the rectosigmoid colon consistent with inflammatory process. 2. Abnormal mucosal thickening in the right colon and hepatic flexure. 3. Abnormal density in the ascending colon measuring 3.4 x 2.5 x 2.8 cm which could represent an inflammatory process versus mass. 4. Possible fibroid in the fundus of the uterus measuring 4 x 2.4 cm in size. 5. Mild diffuse fatty replacement of liver. 6. Punctate calcification left kidney with no associated hydronephrosis. Tiny right renal cyst.   7. Mild diffuse fatty replacement in the liver. 8. Right hip replacement in place. **This report has been created using voice recognition software. It may contain minor errors which are inherent in voice recognition technology. **      Final report electronically signed by DR Tania Baker on 3/13/2021 9:11 AM          ED Medications administered this visit:   Medications   metronidazole (FLAGYL) 500 mg in NaCl 100 mL IVPB premix (has no administration in time range)   morphine injection 4 mg (4 mg Intravenous Given 3/13/21 0806)   ondansetron (ZOFRAN) 4 MG/2ML injection (4 mg  Given 3/13/21 0805)   iopamidol (ISOVUE-370) 76 % injection 80 mL (80 mLs Intravenous Given 3/13/21 0852)   cefTRIAXone (ROCEPHIN) 1000 mg IVPB in 50 mL D5W minibag (1,000 mg Intravenous New Bag 3/13/21 1113)   morphine (PF) injection 2 mg (2 mg Intravenous Given 3/13/21 1044)         ED COURSE     ED Course as of Mar 13 1155   Sat Mar 13, 2021   1037 Consulted with Dr. James Guallpa, who agreed to see the patient during the hospital course.     [DO]      ED Course User Index  [DO] Obed Horvath DO       Strict return precautions and follow up instructions were discussed with the patient prior to discharge, with which the patient agrees. MEDICATION CHANGES     New Prescriptions    No medications on file         FINAL DISPOSITION   Work-up in the emergency department remarkable for a CT abdomen pelvis which is showing an abnormal density in the rectosigmoid colon consistent with inflammatory process, and abnormal density in the a sending colon which could represent inflammatory process versus mass. CT is nonspecific for the patient's pain, however given the patient's localization to the left lower quadrant and suprapubic area, peritoneal signs, and elevated WBC, will plan to empirically treat the patient with Rocephin and Flagyl for suspected diverticulitis. This case was discussed with the on-call general surgeon, Dr. James Guallpa, who stated that the patient could have a more severe intra-abdominal process due to the patient's current use of steroids for rheumatoid arthritis. He evaluated the patient in the emergency department and agreed to follow patient throughout the hospital course. This case was discussed with the admitting hospitalist, Dr. Alhaji Hobbs, who agreed to accept the patient for admission to the hospital.  I discussed the treatment plan with the patient and all questions were answered.   The patient was subsequently admitted to the hospital.      Final diagnoses:   Left lower quadrant abdominal pain   Diarrhea, unspecified type     Condition: condition: good  Dispo: Admit to med/surg floor      This transcription was electronically signed. Parts of this transcriptions may have been dictated by use of voice recognition software and electronically transcribed, and parts may have been transcribed with the assistance of an ED scribe. The transcription may contain errors not detected in proofreading. Please refer to my supervising physician's documentation if my documentation differs.     Electronically Signed: Luc Carrasco, 03/13/21, 11:55 AM          Luc Carrasco DO  Resident  03/13/21 6374

## 2021-03-13 NOTE — ED TRIAGE NOTES
Patient presents with lower abdominal pain. She states RLQ and LLQ as well as suprapubic. Patient crying out in pain. States she has never had this pain before. Patient able to ambulate with assistance to provide urine sample. Patient states pain is constant with intermittent sharp stabbing pain. The patient reports nausea. She states she had diarrhea yesterday. Patient started an anti-fungal a few days ago for a foot rash. She denies any black or bloody stools. Patient reports history of tubal ligation but denies any other abdominal surgical history.

## 2021-03-14 LAB
ANION GAP SERPL CALCULATED.3IONS-SCNC: 13 MEQ/L (ref 8–16)
BASOPHILS # BLD: 0.5 %
BASOPHILS ABSOLUTE: 0.1 THOU/MM3 (ref 0–0.1)
BUN BLDV-MCNC: 7 MG/DL (ref 7–22)
CALCIUM SERPL-MCNC: 8.4 MG/DL (ref 8.5–10.5)
CHLORIDE BLD-SCNC: 102 MEQ/L (ref 98–111)
CO2: 14 MEQ/L (ref 23–33)
CREAT SERPL-MCNC: 0.4 MG/DL (ref 0.4–1.2)
EOSINOPHIL # BLD: 0.1 %
EOSINOPHILS ABSOLUTE: 0 THOU/MM3 (ref 0–0.4)
ERYTHROCYTE [DISTWIDTH] IN BLOOD BY AUTOMATED COUNT: 13.2 % (ref 11.5–14.5)
ERYTHROCYTE [DISTWIDTH] IN BLOOD BY AUTOMATED COUNT: 53.1 FL (ref 35–45)
GFR SERPL CREATININE-BSD FRML MDRD: > 90 ML/MIN/1.73M2
GLUCOSE BLD-MCNC: 58 MG/DL (ref 70–108)
HCT VFR BLD CALC: 42 % (ref 37–47)
HEMOGLOBIN: 12.8 GM/DL (ref 12–16)
IMMATURE GRANS (ABS): 0.05 THOU/MM3 (ref 0–0.07)
IMMATURE GRANULOCYTES: 0.4 %
LACTIC ACID: 0.8 MMOL/L (ref 0.5–2.2)
LYMPHOCYTES # BLD: 6.9 %
LYMPHOCYTES ABSOLUTE: 0.9 THOU/MM3 (ref 1–4.8)
MAGNESIUM: 1.9 MG/DL (ref 1.6–2.4)
MCH RBC QN AUTO: 32.7 PG (ref 26–33)
MCHC RBC AUTO-ENTMCNC: 30.5 GM/DL (ref 32.2–35.5)
MCV RBC AUTO: 107.4 FL (ref 81–99)
MONOCYTES # BLD: 6.5 %
MONOCYTES ABSOLUTE: 0.8 THOU/MM3 (ref 0.4–1.3)
NUCLEATED RED BLOOD CELLS: 0 /100 WBC
PLATELET # BLD: 249 THOU/MM3 (ref 130–400)
PMV BLD AUTO: 10.4 FL (ref 9.4–12.4)
POTASSIUM REFLEX MAGNESIUM: 3.5 MEQ/L (ref 3.5–5.2)
RBC # BLD: 3.91 MILL/MM3 (ref 4.2–5.4)
SEG NEUTROPHILS: 85.6 %
SEGMENTED NEUTROPHILS ABSOLUTE COUNT: 10.6 THOU/MM3 (ref 1.8–7.7)
SODIUM BLD-SCNC: 129 MEQ/L (ref 135–145)
WBC # BLD: 12.4 THOU/MM3 (ref 4.8–10.8)

## 2021-03-14 PROCEDURE — 6370000000 HC RX 637 (ALT 250 FOR IP): Performed by: INTERNAL MEDICINE

## 2021-03-14 PROCEDURE — 99232 SBSQ HOSP IP/OBS MODERATE 35: CPT | Performed by: INTERNAL MEDICINE

## 2021-03-14 PROCEDURE — 85025 COMPLETE CBC W/AUTO DIFF WBC: CPT

## 2021-03-14 PROCEDURE — 6360000002 HC RX W HCPCS: Performed by: INTERNAL MEDICINE

## 2021-03-14 PROCEDURE — 1200000003 HC TELEMETRY R&B

## 2021-03-14 PROCEDURE — 83735 ASSAY OF MAGNESIUM: CPT

## 2021-03-14 PROCEDURE — 2580000003 HC RX 258: Performed by: INTERNAL MEDICINE

## 2021-03-14 PROCEDURE — 2500000003 HC RX 250 WO HCPCS: Performed by: INTERNAL MEDICINE

## 2021-03-14 PROCEDURE — 99232 SBSQ HOSP IP/OBS MODERATE 35: CPT | Performed by: SURGERY

## 2021-03-14 PROCEDURE — 36415 COLL VENOUS BLD VENIPUNCTURE: CPT

## 2021-03-14 PROCEDURE — 80048 BASIC METABOLIC PNL TOTAL CA: CPT

## 2021-03-14 PROCEDURE — 83605 ASSAY OF LACTIC ACID: CPT

## 2021-03-14 PROCEDURE — 6370000000 HC RX 637 (ALT 250 FOR IP): Performed by: NURSE PRACTITIONER

## 2021-03-14 RX ORDER — PREDNISONE 1 MG/1
5 TABLET ORAL 2 TIMES DAILY
Status: DISCONTINUED | OUTPATIENT
Start: 2021-03-14 | End: 2021-03-17 | Stop reason: HOSPADM

## 2021-03-14 RX ORDER — DEXTROSE AND SODIUM CHLORIDE 5; .9 G/100ML; G/100ML
INJECTION, SOLUTION INTRAVENOUS CONTINUOUS
Status: DISCONTINUED | OUTPATIENT
Start: 2021-03-14 | End: 2021-03-17 | Stop reason: HOSPADM

## 2021-03-14 RX ORDER — POTASSIUM CHLORIDE 20 MEQ/1
40 TABLET, EXTENDED RELEASE ORAL ONCE
Status: COMPLETED | OUTPATIENT
Start: 2021-03-14 | End: 2021-03-14

## 2021-03-14 RX ORDER — RIZATRIPTAN BENZOATE 5 MG/1
10 TABLET, ORALLY DISINTEGRATING ORAL DAILY PRN
Status: DISCONTINUED | OUTPATIENT
Start: 2021-03-14 | End: 2021-03-17 | Stop reason: HOSPADM

## 2021-03-14 RX ORDER — DICYCLOMINE HYDROCHLORIDE 10 MG/1
10 CAPSULE ORAL 4 TIMES DAILY
Status: DISCONTINUED | OUTPATIENT
Start: 2021-03-14 | End: 2021-03-15

## 2021-03-14 RX ADMIN — DICYCLOMINE HYDROCHLORIDE 10 MG: 10 CAPSULE ORAL at 16:30

## 2021-03-14 RX ADMIN — MORPHINE SULFATE 2 MG: 2 INJECTION, SOLUTION INTRAMUSCULAR; INTRAVENOUS at 23:25

## 2021-03-14 RX ADMIN — ENOXAPARIN SODIUM 40 MG: 40 INJECTION SUBCUTANEOUS at 16:30

## 2021-03-14 RX ADMIN — DEXTROSE AND SODIUM CHLORIDE: 5; 900 INJECTION, SOLUTION INTRAVENOUS at 14:56

## 2021-03-14 RX ADMIN — MAGNESIUM SULFATE HEPTAHYDRATE 1000 MG: 500 INJECTION, SOLUTION INTRAMUSCULAR; INTRAVENOUS at 13:12

## 2021-03-14 RX ADMIN — ACETAMINOPHEN 650 MG: 325 TABLET ORAL at 22:24

## 2021-03-14 RX ADMIN — METRONIDAZOLE 500 MG: 500 INJECTION, SOLUTION INTRAVENOUS at 23:35

## 2021-03-14 RX ADMIN — ACETAMINOPHEN 650 MG: 325 TABLET ORAL at 12:07

## 2021-03-14 RX ADMIN — METRONIDAZOLE 500 MG: 500 INJECTION, SOLUTION INTRAVENOUS at 09:19

## 2021-03-14 RX ADMIN — FAMOTIDINE 20 MG: 20 TABLET, FILM COATED ORAL at 12:14

## 2021-03-14 RX ADMIN — DICYCLOMINE HYDROCHLORIDE 10 MG: 10 CAPSULE ORAL at 12:12

## 2021-03-14 RX ADMIN — ONDANSETRON 4 MG: 2 INJECTION INTRAMUSCULAR; INTRAVENOUS at 04:00

## 2021-03-14 RX ADMIN — DICYCLOMINE HYDROCHLORIDE 10 MG: 10 CAPSULE ORAL at 20:12

## 2021-03-14 RX ADMIN — PREDNISONE 5 MG: 1 TABLET ORAL at 12:29

## 2021-03-14 RX ADMIN — SODIUM CHLORIDE: 9 INJECTION, SOLUTION INTRAVENOUS at 07:34

## 2021-03-14 RX ADMIN — CEFTRIAXONE SODIUM 1000 MG: 1 INJECTION, POWDER, FOR SOLUTION INTRAMUSCULAR; INTRAVENOUS at 11:51

## 2021-03-14 RX ADMIN — RIZATRIPTAN BENZOATE 10 MG: 5 TABLET, ORALLY DISINTEGRATING ORAL at 09:10

## 2021-03-14 RX ADMIN — METRONIDAZOLE 500 MG: 500 INJECTION, SOLUTION INTRAVENOUS at 16:31

## 2021-03-14 RX ADMIN — ONDANSETRON 4 MG: 2 INJECTION INTRAMUSCULAR; INTRAVENOUS at 23:33

## 2021-03-14 RX ADMIN — FAMOTIDINE 20 MG: 20 TABLET, FILM COATED ORAL at 20:12

## 2021-03-14 RX ADMIN — POTASSIUM CHLORIDE 40 MEQ: 1500 TABLET, EXTENDED RELEASE ORAL at 12:07

## 2021-03-14 RX ADMIN — METRONIDAZOLE 500 MG: 500 INJECTION, SOLUTION INTRAVENOUS at 00:10

## 2021-03-14 ASSESSMENT — PAIN DESCRIPTION - PROGRESSION
CLINICAL_PROGRESSION: NOT CHANGED

## 2021-03-14 ASSESSMENT — PAIN SCALES - GENERAL
PAINLEVEL_OUTOF10: 7
PAINLEVEL_OUTOF10: 10
PAINLEVEL_OUTOF10: 0
PAINLEVEL_OUTOF10: 9
PAINLEVEL_OUTOF10: 0
PAINLEVEL_OUTOF10: 4

## 2021-03-14 ASSESSMENT — PAIN DESCRIPTION - DESCRIPTORS
DESCRIPTORS: ACHING;CRAMPING;HEADACHE
DESCRIPTORS: HEADACHE
DESCRIPTORS: ACHING;CRAMPING;HEADACHE

## 2021-03-14 ASSESSMENT — PAIN DESCRIPTION - PAIN TYPE
TYPE: ACUTE PAIN

## 2021-03-14 ASSESSMENT — PAIN DESCRIPTION - FREQUENCY
FREQUENCY: CONTINUOUS
FREQUENCY: CONTINUOUS

## 2021-03-14 ASSESSMENT — PAIN DESCRIPTION - ONSET: ONSET: ON-GOING

## 2021-03-14 ASSESSMENT — PAIN - FUNCTIONAL ASSESSMENT: PAIN_FUNCTIONAL_ASSESSMENT: PREVENTS OR INTERFERES SOME ACTIVE ACTIVITIES AND ADLS

## 2021-03-14 ASSESSMENT — PAIN DESCRIPTION - LOCATION: LOCATION: ABDOMEN;HEAD

## 2021-03-14 NOTE — PROGRESS NOTES
Patient into shower,  at side for minimal assist. Patient cheery, smiling, laughing, and moving faster than yesterday. Patient verbalized how much better she is feeling. Patient verbalized she has no abdominal pain unless to touch, and headache/migraine is gone.

## 2021-03-14 NOTE — PLAN OF CARE
Problem: Falls - Risk of:  Goal: Will remain free from falls  Description: Will remain free from falls  3/14/2021 0131 by Mauro Eubanks RN  Outcome: Met This Shift  3/13/2021 1932 by Kalin Nava RN  Outcome: Ongoing  Goal: Absence of physical injury  Description: Absence of physical injury  Outcome: Met This Shift     Problem: Pain:  Goal: Pain level will decrease  Description: Pain level will decrease  Outcome: Met This Shift  Goal: Control of acute pain  Description: Control of acute pain  Outcome: Met This Shift  Goal: Control of chronic pain  Description: Control of chronic pain  Outcome: Met This Shift   Pt resting in bed, pain controlled with current regimen. Will continue to monitor.

## 2021-03-14 NOTE — PROGRESS NOTES
Patient up to bathroom, brushed teeth, requested to sit in chair for a while. Patient said she is feeling better than yesterday. Patient is smiling, talking more than yesterday.

## 2021-03-14 NOTE — PROGRESS NOTES
Pt admitted to  5K11 via in a wheelchair from ED. Complaints: lower abdominal pain. IV none infusing into the hand right, condition patent and no redness. IV site free of s/s of infection or infiltration. Vital signs obtained. Assessment and data collection initiated. Two nurse skin assessment performed by SHAMIR Del Toro RN. Oriented to room. Policies and procedures for 5 explained. All questions answered with no further questions at this time. Fall prevention and safety brochure discussed with patient. Bed alarm on. Call light in reach. Oriented to room. Ziyad Allison RN 3/13/2021 8:20 PM     Explained patients right to have family, representative or physician notified of their admission. Patient has Declined for physician to be notified. Patient has Declined for family/representative to be notified.

## 2021-03-14 NOTE — PROGRESS NOTES
Patient back to bed, wants to take a nap, then requesting a shower after her antibiotic is finished.

## 2021-03-14 NOTE — PROGRESS NOTES
[facility] [address]  Daily Progress Note  Pt Name: [address] Record Number: [id number]  Date of Birth 1965   Today's Date: 3/14/2021    HD#1    CHIEF COMPLAINT colitis vs diverticulitis    SUBJECTIVE  Patient cramping better but did have emesis    OBJECTIVE  CURRENT VITALS /65   Pulse 79   Temp 98 °F (36.7 °C) (Oral)   Resp 18   Ht 5' (1.524 m)   Wt 100 lb (45.4 kg)   LMP 11/06/2011   SpO2 100%   BMI 19.53 kg/m²   LUNGS: Lungs clear   ABDOMEN: BS + but diminished  Guarding both lower quadrants upper abd fairly nl  WOUNDS: n/a  24 HR INTAKE/OUTPUT :     Intake/Output Summary (Last 24 hours) at 3/14/2021 0717  Last data filed at 3/13/2021 1617  Gross per 24 hour   Intake 10 ml   Output --   Net 10 ml     DRAIN/TUBE OUTPUT :      LABS  CBC :   Lab Results   Component Value Date    WBC 12.4 03/14/2021    HGB 12.8 03/14/2021    HCT 42.0 03/14/2021     03/14/2021     BMP:   Lab Results   Component Value Date     03/14/2021    K 3.5 03/14/2021     03/14/2021    CO2 14 03/14/2021    BUN 7 03/14/2021    CREATININE 0.4 03/14/2021    MG 1.9 03/14/2021       ASSESSMENT  1. I favor colitis vs diverticulitis  No peritoneal signs      PLAN  1.   Cont present plans      Brenda Giordano  Electronically signed 3/14/2021 at 7:17 AM

## 2021-03-14 NOTE — PROGRESS NOTES
No return call from distribution. Notified house supervisor, stated he will send a bag through tube system.

## 2021-03-14 NOTE — PROGRESS NOTES
Patient denied having a BM today. No stool sample collected. Patient discharged by NP Emeli Burrows. Patient provided with discharge instructions Rx and instructions on follow up care. Patient out of ED ambulatory accompanied by self. Patient was seen and evaluated by a provider without this nurse.

## 2021-03-14 NOTE — PROGRESS NOTES
Hospitalist      Patient:  Blue Anderson    Unit/Bed:5K-11/011-A  YOB: 1965  MRN: [id number]   Acct: [de-identified]     PCP: Antonio Vaughn  Date of Admission: 3/13/2021        Assessment and Plan:        1. Abdominal pain lower quadrants: We will consult general surgery and GI. We will start empiric antibiotic Rocephin and Flagyl, patient has an allergy to Cipro. Full liquid diet, will check a lactic acid in the a.m., will trend WBC along with BMP  2. Colon mass versus possible abscess: We will obtain blood cultures, will empirically start IV antibiotics  3. Diarrhea we will check enteric pathogens molecular panel. 3.14.2021 hyponatremia, hypokalemia, hypoglycemia, hypomagnemia, will adjust IV fluids and correct electrolyte imbalance. WBC is decreasing, urinalysis is negative, blood cultures are negative growth to date. CC: Abdominal pain    HPI: 42-year-old white female presents emergency department for evaluation of abdominal pain. She experienced sudden onset of abdominal pain located in her lower abdomen which started last night. During the night pain progressively got worse. She had multiple bouts of diarrhea. She is states she has had nonbloody bowel movements that were diarrhea in nature. She ate her past medical history includes rheumatoid arthritis and multiple surgeries secondary to rheumatoid arthritis. Last colonoscopy was approximately 10 years ago    ROS (14 point review of systems completed. Pertinent positives noted.  Otherwise ROS is negative) : Chills  Abdominal pain  Diarrhea  Nausea    PMH:  Per HPI and       Diagnosis Date    TRINI (acute kidney injury) ([facility] [address] 12/8/2012    Arthritis     Malignant hyperthermia     daughters    RA (rheumatoid arthritis) ([facility] [address]      SHX:        Procedure Laterality Date    ARTHRODESIS Right 5/23/2019    RIGHT 1ST MPJ FUSION, METATARSAL HEAD RESECTION 2-5, TOES 2-5 ARTHROPLASTY/ARTHRODESIS & PLANTAR SKIN FLAP performed by Susanna Izquierdo None     Emotionally abused: None     Physically abused: None     Forced sexual activity: None   Other Topics Concern    None   Social History Narrative    None      Allergies: Succinylcholine, Ciprofibrate, Methylprednisolone, and Tramadol  Medications:     sodium chloride 75 mL/hr at 03/14/21 0734      potassium chloride  40 mEq Oral Once    magnesium sulfate  1,000 mg Intravenous Once    Tofacitinib Citrate ER  0.5 tablet Oral Daily    sodium chloride flush  10 mL Intravenous 2 times per day    enoxaparin  40 mg Subcutaneous Q24H    famotidine  20 mg Oral BID    metroNIDAZOLE  500 mg Intravenous Q8H    cefTRIAXone (ROCEPHIN) IV  1,000 mg Intravenous Q24H     Prior to Admission medications    Medication Sig Start Date End Date Taking? Authorizing Provider   Rizatriptan Benzoate (MAXALT PO) Take by mouth as needed   Yes Historical Provider, MD   Tofacitinib Citrate (XELJANZ XR) 11 MG TB24 Take 0.5 tablets by mouth daily    Yes Historical Provider, MD   OLIVE LEAF PO Take by mouth daily    Yes Historical Provider, MD   Glucosamine-Chondroitin (JOINT SUPPORT PO) Take 1 tablet by mouth daily    Yes Historical Provider, MD   therapeutic multivitamin-minerals (THERAGRAN-M) tablet Take 1 tablet by mouth daily. Yes Historical Provider, MD   VITAMIN D-3 (COLECALCIFEROL) 400 UNITS TABS Take  by mouth daily.    Yes Historical Provider, MD   calcium carbonate-vitamin D (CALCIUM + D) 600-200 MG-UNIT TABS Take 1 tablet by mouth daily    Yes Historical Provider, MD   MAGNESIUM ACETATE by Does not apply route For bowels   Yes Historical Provider, MD   Probiotic Product (PROBIOTIC FORMULA PO) Take by mouth daily    Yes Historical Provider, MD   Acetaminophen (TYLENOL ARTHRITIS PAIN PO) Take 650 mg by mouth every 4 hours as needed    Yes Historical Provider, MD   predniSONE (DELTASONE) 5 MG tablet Take 5 mg by mouth 2 times daily    Yes Historical Provider, MD   famciclovir (FAMVIR) 500 MG tablet Take 500 mg by mouth 3 times daily as needed (for shingles outbreak)    Historical Provider, MD      PHYSICAL EXAM:    /69   Pulse 78   Temp 98.8 °F (37.1 °C) (Oral)   Resp 16   Ht 5' (1.524 m)   Wt 100 lb (45.4 kg)   LMP 11/06/2011   SpO2 97%   BMI 19.53 kg/m²     General appearance:  No apparent distress, appears stated age and cooperative. HEENT:  Normal cephalic, atraumatic without obvious deformity. Pupils equal, round, and reactive to light. Extra ocular muscles intact. Conjunctivae/corneas clear. Neck: Supple, with full range of motion. no jugular venous distention. Trachea midline. no carotid bruits  Respiratory:  Normal respiratory effort. Clear to auscultation, bilaterally without Rales/Wheezes/Rhonchi. Breath sounds equal bilaterally  Cardiovascular:  Regular rate and rhythm with normal S1/S2 without murmurs, rubs or gallops. PMI non displaced  Abdomen: Abdominal tenderness, guarding and rebound present, hypoactive bowel sounds, psoas sign positive on the left. Musculoskeletal:  No clubbing, cyanosis or edema bilaterally. Limited range of motion with deformity secondary to rheumatoid arthritis. Skin: Skin color, texture, turgor normal.  No rashes or lesions, or suspicious lesions. Neurologic:  Neurovascularly intact without any focal sensory/motor deficits. Cranial nerves: II-XII intact, grossly non-focal.  Psychiatric:  Alert and oriented, thought content appropriate, normal insight  Capillary Refill: Brisk,< 2 seconds   Peripheral Pulses: +2 palpable, equal bilaterally upper and lower extremities  Lymphatics: no lymphadenopathy    Data: (All radiographs, tracings, PFTs, and imaging are personally viewed and interpreted unless otherwise noted).     WBC 12.4   Creatinine 0. 4  Recent Labs     03/13/21  0740 03/14/21  0553   WBC 14.1* 12.4*   HGB 14.1 12.8   HCT 44.1 42.0    249     Recent Labs     03/13/21  0740 03/14/21  0553    129*   K 3.5 3.5    102   CO2 25 14*   BUN 9 7   CREATININE 0.5 0.4   CALCIUM 9.4 8.4*     Recent Labs     03/13/21  0740   AST 22   ALT 11   BILIDIR <0.2   BILITOT 0.6   ALKPHOS 66     No results for input(s): INR in the last 72 hours. No results for input(s): [employer] in the last 72 hours. Radiology reports-images reviewed in PACS  Ct Abdomen Pelvis W Iv Contrast Additional Contrast? None    Result Date: 3/13/2021  PROCEDURE: CT ABDOMEN PELVIS W IV CONTRAST CLINICAL INFORMATION: Acute abdominal pain . COMPARISON: CT scan of the abdomen and pelvis dated 6 December 2012. . TECHNIQUE: Axial 5 mm CT images were obtained through the abdomen and pelvis after the administration of intravenous contrast. Coronal and sagittal reconstructions were obtained. All CT scans at this facility use dose modulation, iterative reconstruction, and/or weight-based dosing when appropriate to reduce radiation dose to as low as reasonably achievable. FINDINGS: There is slight increased density in the right and left lower lobes posteriorly. .   The base of the heart is within appropriate limits. There is mild diffuse fatty replacement in the liver. The spleen is normal. The adrenals and pancreas are normal. The gallbladder is normal.    There is punctate calcification in the left kidney. There is no associated hydronephrosis versus a tiny right renal cyst is present. No renal masses are noted. No abnormalities of the small bowel loops are noted. The IVC and aorta are of normal caliber. There is no adenopathy. The urinary bladder is normal. Is a possible fibroid in the fundus of the uterus measuring 4 x 2.4 cm in size. There is no pelvic free fluid. There is a 3.4 x 2.5 x 2.8 cm abnormal density in the ascending colon which could represent an inflammatory process versus mass. There appears be abnormal abnormal mucosal thickening in the region of the hepatic flexure and proximal transverse colon.  There is abnormal mucosal thickening in the rectosigmoid colon consistent with inflammatory process. . There is no adenopathy. There is a right hip replacement in place. 1. Abnormal density in the rectosigmoid colon consistent with inflammatory process. 2. Abnormal mucosal thickening in the right colon and hepatic flexure. 3. Abnormal density in the ascending colon measuring 3.4 x 2.5 x 2.8 cm which could represent an inflammatory process versus mass. 4. Possible fibroid in the fundus of the uterus measuring 4 x 2.4 cm in size. 5. Mild diffuse fatty replacement of liver. 6. Punctate calcification left kidney with no associated hydronephrosis. Tiny right renal cyst. 7. Mild diffuse fatty replacement in the liver. 8. Right hip replacement in place. **This report has been created using voice recognition software. It may contain minor errors which are inherent in voice recognition technology. ** Final report electronically signed by DR William Lawson on 3/13/2021 9:11 AM      Electronically signed by Sanjeev Esclaante DO on 3/14/2021 at 10:10 AM

## 2021-03-14 NOTE — PLAN OF CARE
Problem: Falls - Risk of:  Goal: Will remain free from falls  Description: Will remain free from falls  3/14/2021 1927 by Cedrick Dugan RN  Outcome: Ongoing   Patient ambulating in room, gait steady, free from falls  Problem: Pain:  Goal: Pain level will decrease  Description: Pain level will decrease  3/14/2021 1927 by Cedrick Dugan RN  Outcome: Ongoing   Gave patient pain medication, which helped with pain. Patient having no pain later in the day, medication helpful.

## 2021-03-14 NOTE — PROGRESS NOTES
Placed hat in toilet to collect stool specimen if patient has BM. Patient verbalized understanding to notify RN if specimen collected.

## 2021-03-14 NOTE — PROGRESS NOTES
Called pharmacy for a bag of IV fluids D5/.0.9%NS per new order. Pharmacy said to call distribution. Left message for Distribution.

## 2021-03-14 NOTE — PROGRESS NOTES
Notified Dr. Mary Ellen Garber via secure message: Patient said she takes Rizatriptan (Maxalt) 10mg as needed for her migraine headaches. She has been nauseated and vomitedx1, with dry heaves this morning. She thinks it may be from her headache that is worsening. Her  brought the pills in this morning, can this be ordered? Dr. Mary Ellen Garber called back with orders to resume home medications.

## 2021-03-14 NOTE — PROGRESS NOTES
Pt. Refused scheduled PO meds until the Maxalt starts to work for her headache/migraine, which is causing nausea. Patient said it can take up to 2 hours to help. Patient refused zofran for nausea.

## 2021-03-14 NOTE — CONSULTS
Pt Name: Adelia Coombs  MRN: [id number]  [id number]  YOB: 1965  Admit Date: 3/13/2021  7:28 AM  Date of evaluation: 3/14/2021  Primary Care Physician: Sheryle Sandy   [id number]   Dictating for Dr Nellie Doran    Requesting Provider:   Onofre Arrington, DO    [facility] Consult    Indication:  Colon mass on CT scan? History:  The patient is a 50 y.o. female admitted 3- for abd pain. She has hx RA on xeljanz and prednisone long term use. Pt states she had onset abdominal pain 2 days ago. It was a mild pressure in the Am and after supper time it was sharp, severe, 10/10. Moving, sitting up, passing gas makes this worse. Nothing helps. She has associated diarrhea that last a few hours without melena, hematochezia, nausea, vomiting, chills, and currently a migraine which is not unusual for her. She denies melena, hematochezia, fevers, sick contacts, or hematemesis. She reports a daughter with crohns disease. She denies NSAIDS. Last EGD:  2012 [facility], gastric eroison in antrum  Last Colonoscopy:  2012    PROCEDURE: CT ABDOMEN PELVIS W IV CONTRAST 3-       CLINICAL INFORMATION: Acute abdominal pain .       COMPARISON: CT scan of the abdomen and pelvis dated 6 December 2012. .       TECHNIQUE: Axial 5 mm CT images were obtained through the abdomen and pelvis after the administration of intravenous contrast. Coronal and sagittal reconstructions were obtained.       All CT scans at this facility use dose modulation, iterative reconstruction, and/or weight-based dosing when appropriate to reduce radiation dose to as low as reasonably achievable.       FINDINGS:           There is slight increased density in the right and left lower lobes posteriorly. .   The base of the heart is within appropriate limits.       There is mild diffuse fatty replacement in the liver.  The spleen is normal. The adrenals and pancreas are normal. The gallbladder is normal.    There is punctate calcification in the left kidney. There is no associated hydronephrosis versus a tiny right    renal cyst is present. No renal masses are noted.           No abnormalities of the small bowel loops are noted.  The IVC and aorta are of normal caliber. There is no adenopathy.       The urinary bladder is normal. Is a possible fibroid in the fundus of the uterus measuring 4 x 2.4 cm in size. There is no pelvic free fluid.  There is a 3.4 x 2.5 x 2.8 cm abnormal density in the ascending colon which could represent an inflammatory    process versus mass. There appears be abnormal abnormal mucosal thickening in the region of the hepatic flexure and proximal transverse colon. There is abnormal mucosal thickening in the rectosigmoid colon consistent with inflammatory process. . There is    no adenopathy. There is a right hip replacement in place.                   Impression       1. Abnormal density in the rectosigmoid colon consistent with inflammatory process. 2. Abnormal mucosal thickening in the right colon and hepatic flexure. 3. Abnormal density in the ascending colon measuring 3.4 x 2.5 x 2.8 cm which could represent an inflammatory process versus mass. 4. Possible fibroid in the fundus of the uterus measuring 4 x 2.4 cm in size. 5. Mild diffuse fatty replacement of liver. 6. Punctate calcification left kidney with no associated hydronephrosis. Tiny right renal cyst.   7. Mild diffuse fatty replacement in the liver. 8. Right hip replacement in place.           **This report has been created using voice recognition software. It may contain minor errors which are inherent in voice recognition technology. **       Final report electronically signed by DR Samira De La Vega on 3/13/2021 9:11 AM         Active Hospital Problems    Diagnosis Date Noted    Colonic mass [K63.89] 03/13/2021    Left lower quadrant abdominal pain [R10.32] 03/13/2021    Diarrhea [R19.7] 03/13/2021     Past Medical History:        Diagnosis Date    TRINI (acute kidney injury) ([facility] [address] 12/8/2012    Arthritis     Malignant hyperthermia     daughters    RA (rheumatoid arthritis) ([facility] [address]      Past Surgical History:        Procedure Laterality Date    ARTHRODESIS Right 5/23/2019    RIGHT 1ST MPJ FUSION, METATARSAL HEAD RESECTION 2-5, TOES 2-5 ARTHROPLASTY/ARTHRODESIS & PLANTAR SKIN FLAP performed by Vickie Gómez DPM at [address] Left 9/5/2019    LEFT 1ST MPJ FUSION, METATARSAL HEADS 2-5 RESECTION, ARTHRODESIS 2-4 TOES, ARTHROPLASTY 5TH TOE ALL ON THE LEFT performed by Vickie Gómez DPM at [address] Right 12/13/2019    RT HALLUX OSTECTOMY performed by Vickie Gómez DPM at . [facility] [address] Right 7/18/2020    right total hip arthroplasty performed by Samson Díaz MD at [address] Right     thumb fusion    SKIN BIOPSY  05/2018    lower left leg, melanoma    TUBAL LIGATION       Allergies:  Succinylcholine, Ciprofibrate, Methylprednisolone, and Tramadol  Home Meds:  Prior to Admission medications    Medication Sig Start Date End Date Taking? Authorizing Provider   Rizatriptan Benzoate (MAXALT PO) Take by mouth as needed   Yes Historical Provider, MD   Tofacitinib Citrate (XELJANZ XR) 11 MG TB24 Take 0.5 tablets by mouth daily    Yes Historical Provider, MD   OLIVE LEAF PO Take by mouth daily    Yes Historical Provider, MD   Glucosamine-Chondroitin (JOINT SUPPORT PO) Take 1 tablet by mouth daily    Yes Historical Provider, MD   therapeutic multivitamin-minerals (THERAGRAN-M) tablet Take 1 tablet by mouth daily. Yes Historical Provider, MD   VITAMIN D-3 (COLECALCIFEROL) 400 UNITS TABS Take  by mouth daily.    Yes Historical Provider, MD   calcium carbonate-vitamin D (CALCIUM + D) 600-200 MG-UNIT TABS Take 1 tablet by mouth daily    Yes Historical Provider, MD   MAGNESIUM ACETATE by Does not apply route For bowels   Yes Historical Provider, MD   Probiotic Product (PROBIOTIC FORMULA PO) Take by mouth daily    Yes Historical Provider, MD   Acetaminophen (TYLENOL ARTHRITIS PAIN PO) Take 650 mg by mouth every 4 hours as needed    Yes Historical Provider, MD   predniSONE (DELTASONE) 5 MG tablet Take 5 mg by mouth 2 times daily    Yes Historical Provider, MD   famciclovir (FAMVIR) 500 MG tablet Take 500 mg by mouth 3 times daily as needed (for shingles outbreak)    Historical Provider, MD      Current Meds:       Current Facility-Administered Medications:     Tofacitinib Citrate ER TB24 0.5 tablet, 0.5 tablet, Oral, Daily, William Caruso,     sodium chloride flush 0.9 % injection 10 mL, 10 mL, Intravenous, 2 times per day, William Caruso,     sodium chloride flush 0.9 % injection 10 mL, 10 mL, Intravenous, PRN, Cheril Gosselin Hornbeck, DO, 10 mL at 03/13/21 1618    enoxaparin (LOVENOX) injection 40 mg, 40 mg, Subcutaneous, Q24H, William Caruso, DO, 40 mg at 03/13/21 1816    promethazine (PHENERGAN) tablet 12.5 mg, 12.5 mg, Oral, Q6H PRN **OR** ondansetron (ZOFRAN) injection 4 mg, 4 mg, Intravenous, Q6H PRN, William Caruso, DO, 4 mg at 03/14/21 0400    polyethylene glycol (GLYCOLAX) packet 17 g, 17 g, Oral, Daily PRN, William Caruso, DO    acetaminophen (TYLENOL) tablet 650 mg, 650 mg, Oral, Q6H PRN, 650 mg at 03/13/21 1821 **OR** acetaminophen (TYLENOL) suppository 650 mg, 650 mg, Rectal, Q6H PRN, William Caruso, DO    magnesium sulfate 2000 mg in 50 mL IVPB premix, 2,000 mg, Intravenous, PRN, William Caruso, DO    potassium chloride (KLOR-CON M) extended release tablet 40 mEq, 40 mEq, Oral, PRN **OR** potassium bicarb-citric acid (EFFER-K) effervescent tablet 40 mEq, 40 mEq, Oral, PRN **OR** potassium chloride 10 mEq/100 mL IVPB (Peripheral Line), 10 mEq, Intravenous, PRN, William Caruso, DO    famotidine (PEPCID) tablet 20 mg, 20 mg, Oral, BID, William Caruso DO, 20 mg at 03/13/21 2039    metronidazole (FLAGYL) 500 mg in NaCl 100 mL IVPB premix, 500 mg, Intravenous, Q8H, William Caruso DO, Stopped at 03/14/21 0110    cefTRIAXone (ROCEPHIN) 1000 mg IVPB in 50 mL D5W minibag, 1,000 mg, Intravenous, Q24H, Sanju Nicholas, DO    morphine (PF) injection 2 mg, 2 mg, Intravenous, Q3H PRN, Sanju Nicholas DO, 2 mg at 03/13/21 2242    0.9 % sodium chloride infusion, , Intravenous, Continuous, Sanju Nicholas, DO, Last Rate: 75 mL/hr at 03/14/21 0734, New Bag at 03/14/21 0734   Social History:   Social History     Socioeconomic History    Marital status:      Spouse name: Rogers Serrato Number of children: 3    Years of education: 12    Highest education level: Not on file   Occupational History    Not on file   Social Needs    Financial resource strain: Not on file    Food insecurity     Worry: Not on file     Inability: Not on file   [employer] needs     Medical: Not on file     Non-medical: Not on file   Tobacco Use    Smoking status: Never Smoker    Smokeless tobacco: Never Used   Substance and Sexual Activity    Alcohol use: Not Currently     Comment: joya westbrook had glass of wine    Drug use: Yes     Types: Marijuana     Comment: medical marijuana since June 2020 for migraines and RA    Sexual activity: Yes     Partners: Male   Lifestyle    Physical activity     Days per week: Not on file     Minutes per session: Not on file    Stress: Not on file   Relationships    Social connections     Talks on phone: Not on file     Gets together: Not on file     Attends Orthodox service: Not on file     Active member of club or organization: Not on file     Attends meetings of clubs or organizations: Not on file     Relationship status: Not on file    Intimate partner violence     Fear of current or ex partner: Not on file     Emotionally abused: Not on file     Physically abused: Not on file     Forced sexual activity: Not on file   Other Topics Concern    Not on file   Social History Narrative    Not on file     Family History:   Father with colon cancer and liver cancer, he drinks etoh but unsure if he has cirrhosis per pt  Daughter with crohns  Family History   Problem Relation Age of Onset    Heart Disease Mother     Atrial Fibrillation Mother     Cancer Mother     Alcohol Abuse Father     Cancer Father     Colon Cancer Father     Arthritis Maternal Grandmother        ROS:  General : no fever, weight loss +chills   Eyes: No dipolpia  ENT: No sinus infection or vocal hoarseness   Cardiovascular: No  chest pain. Respiratory: No cough, SOB  GI:+abd pain, nausea, vomiting, diarrhea. No melena, hematochezia, constipation, or diarrhea. : No dysuria  GYN: No abnormal menstrual bleeding  Musculoskeletal: No new painful or swollen joints. Skin: No rashes, jaundice  Neurologic: +migraines  Emotional: No anxiety or depression  Endocrine:  No thyroid or diabetes. Blood: No anemia, blood product or blood product transfusion   Allergic/Immunologic: No lupus or HIV  Cancer: No personal history of cancer     Physical Exam:  /69   Pulse 78   Temp 98.8 °F (37.1 °C) (Oral)   Resp 16   Ht 5' (1.524 m)   Wt 100 lb (45.4 kg)   LMP 11/06/2011   SpO2 97%   BMI 19.53 kg/m²     The patient is a 50 y.o. female in no acute distress. HEAD: Normal cephalic/atraumatic. Extra-occular motions intact bilaterally. NECK:  Trachea is midline. CHEST: Rises equally on inspiration. Clear to auscultation bilaterally. CARDIOVASCULAR: Regular rate and rhythm without murmurs, rubs or gallops. ABDOMEN: Soft and nondistended with normal bowel sounds. No Hepatosplemomegaly or masses noted   Tender:  +generalized tenderness moderate to severe  EXTREMITIES: no cyanosis, clubbing, or edema. DERM: no rash or jaundice. NEURO: alert and oriented times four with appropriate affect    Recent Labs     03/13/21  0740 03/14/21  0553   WBC 14.1* 12.4*   HGB 14.1 12.8   HCT 44.1 42.0    249   AST 22  --    ALT 11  --    BILITOT 0.6  --    ALKPHOS 66  --         Assessment:  1.  Lower Abdominal pain, onset 2 days ago  2. Leukocytosis, 12.4, elevated procalcitonin  3. Chills  4. Nausea with vomiting  5. Diarrhea, lasted few hours, none since  6. Colitis  7. Abnormal density in the ascending colon measuring 3.4 x 2.5 x 2.8 cm which could represent an inflammatory process versus mass  8. Abnormal density in the rectosigmoid colon consistent with inflammatory process  9. Abnormal mucosal thickening in the right colon and hepatic flexure  10. Macrocytosis without anemia  11. Hyponatremia  12. Rheumatoid arthritis, on xeljanz and prednisone  13. Family hx colon cancer in father and live cancer\  15. Family hx crohns in daughter    Plan:  1. Follow GI panel  2. Surgery following  3. Antibiotics, on rocephin and flagyl  4. Suspect inflammatory process, will treat colitis. Colonoscopy timing to be determined once colitis has been treated in 6 weeks or pending clinical course if symptoms are not improving later this week. .  5. Pt on full liquids but is not really consuming much  6. Antiemetics as needed  7. Supportive care per primary  8. CBC, BMP in AM  9. Start bentyl for abdominal pain QID  10.  Follow      Assessment and Plan discussed with Dr Xenia Wheeler, APRN, CNP, 3/14/2021, 8:20 AM

## 2021-03-14 NOTE — PROGRESS NOTES
Patient to 5K 11 from ED for dx of colonic mass and lower abdominal pain. Assessment being completed.

## 2021-03-15 LAB
ANION GAP SERPL CALCULATED.3IONS-SCNC: 9 MEQ/L (ref 8–16)
BASOPHILS # BLD: 0.2 %
BASOPHILS ABSOLUTE: 0 THOU/MM3 (ref 0–0.1)
BUN BLDV-MCNC: 4 MG/DL (ref 7–22)
CALCIUM IONIZED: 1.1 MMOL/L (ref 1.12–1.32)
CALCIUM SERPL-MCNC: 8.3 MG/DL (ref 8.5–10.5)
CHLORIDE BLD-SCNC: 111 MEQ/L (ref 98–111)
CO2: 18 MEQ/L (ref 23–33)
CREAT SERPL-MCNC: 0.4 MG/DL (ref 0.4–1.2)
EOSINOPHIL # BLD: 0.5 %
EOSINOPHILS ABSOLUTE: 0 THOU/MM3 (ref 0–0.4)
ERYTHROCYTE [DISTWIDTH] IN BLOOD BY AUTOMATED COUNT: 12.9 % (ref 11.5–14.5)
ERYTHROCYTE [DISTWIDTH] IN BLOOD BY AUTOMATED COUNT: 47.8 FL (ref 35–45)
GFR SERPL CREATININE-BSD FRML MDRD: > 90 ML/MIN/1.73M2
GLUCOSE BLD-MCNC: 95 MG/DL (ref 70–108)
HCT VFR BLD CALC: 38.7 % (ref 37–47)
HEMOGLOBIN: 12.5 GM/DL (ref 12–16)
IMMATURE GRANS (ABS): 0.02 THOU/MM3 (ref 0–0.07)
IMMATURE GRANULOCYTES: 0.3 %
LYMPHOCYTES # BLD: 15.8 %
LYMPHOCYTES ABSOLUTE: 1 THOU/MM3 (ref 1–4.8)
MAGNESIUM: 1.9 MG/DL (ref 1.6–2.4)
MCH RBC QN AUTO: 32.4 PG (ref 26–33)
MCHC RBC AUTO-ENTMCNC: 32.3 GM/DL (ref 32.2–35.5)
MCV RBC AUTO: 100.3 FL (ref 81–99)
MONOCYTES # BLD: 6.9 %
MONOCYTES ABSOLUTE: 0.4 THOU/MM3 (ref 0.4–1.3)
NUCLEATED RED BLOOD CELLS: 0 /100 WBC
PLATELET # BLD: 269 THOU/MM3 (ref 130–400)
PMV BLD AUTO: 10.8 FL (ref 9.4–12.4)
POTASSIUM REFLEX MAGNESIUM: 3.4 MEQ/L (ref 3.5–5.2)
POTASSIUM SERPL-SCNC: 3.4 MEQ/L (ref 3.5–5.2)
PROCALCITONIN: 0.22 NG/ML (ref 0.01–0.09)
RBC # BLD: 3.86 MILL/MM3 (ref 4.2–5.4)
SEG NEUTROPHILS: 76.3 %
SEGMENTED NEUTROPHILS ABSOLUTE COUNT: 4.9 THOU/MM3 (ref 1.8–7.7)
SODIUM BLD-SCNC: 138 MEQ/L (ref 135–145)
WBC # BLD: 6.4 THOU/MM3 (ref 4.8–10.8)

## 2021-03-15 PROCEDURE — 6370000000 HC RX 637 (ALT 250 FOR IP): Performed by: NURSE PRACTITIONER

## 2021-03-15 PROCEDURE — 80048 BASIC METABOLIC PNL TOTAL CA: CPT

## 2021-03-15 PROCEDURE — 6370000000 HC RX 637 (ALT 250 FOR IP): Performed by: INTERNAL MEDICINE

## 2021-03-15 PROCEDURE — 85025 COMPLETE CBC W/AUTO DIFF WBC: CPT

## 2021-03-15 PROCEDURE — 6360000002 HC RX W HCPCS: Performed by: INTERNAL MEDICINE

## 2021-03-15 PROCEDURE — 84145 PROCALCITONIN (PCT): CPT

## 2021-03-15 PROCEDURE — 82330 ASSAY OF CALCIUM: CPT

## 2021-03-15 PROCEDURE — 2580000003 HC RX 258: Performed by: INTERNAL MEDICINE

## 2021-03-15 PROCEDURE — 2500000003 HC RX 250 WO HCPCS: Performed by: INTERNAL MEDICINE

## 2021-03-15 PROCEDURE — 99232 SBSQ HOSP IP/OBS MODERATE 35: CPT | Performed by: INTERNAL MEDICINE

## 2021-03-15 PROCEDURE — APPSS30 APP SPLIT SHARED TIME 16-30 MINUTES: Performed by: NURSE PRACTITIONER

## 2021-03-15 PROCEDURE — 1200000003 HC TELEMETRY R&B

## 2021-03-15 PROCEDURE — 99232 SBSQ HOSP IP/OBS MODERATE 35: CPT | Performed by: SURGERY

## 2021-03-15 PROCEDURE — 83735 ASSAY OF MAGNESIUM: CPT

## 2021-03-15 PROCEDURE — 36415 COLL VENOUS BLD VENIPUNCTURE: CPT

## 2021-03-15 RX ORDER — DICYCLOMINE HYDROCHLORIDE 10 MG/1
10 CAPSULE ORAL
Status: DISCONTINUED | OUTPATIENT
Start: 2021-03-15 | End: 2021-03-17

## 2021-03-15 RX ORDER — POTASSIUM CHLORIDE 20 MEQ/1
40 TABLET, EXTENDED RELEASE ORAL ONCE
Status: COMPLETED | OUTPATIENT
Start: 2021-03-15 | End: 2021-03-15

## 2021-03-15 RX ADMIN — POTASSIUM CHLORIDE 40 MEQ: 1500 TABLET, EXTENDED RELEASE ORAL at 10:05

## 2021-03-15 RX ADMIN — PREDNISONE 5 MG: 1 TABLET ORAL at 22:39

## 2021-03-15 RX ADMIN — METRONIDAZOLE 500 MG: 500 INJECTION, SOLUTION INTRAVENOUS at 17:11

## 2021-03-15 RX ADMIN — FAMOTIDINE 20 MG: 20 TABLET, FILM COATED ORAL at 09:35

## 2021-03-15 RX ADMIN — DEXTROSE AND SODIUM CHLORIDE: 5; 900 INJECTION, SOLUTION INTRAVENOUS at 09:36

## 2021-03-15 RX ADMIN — ENOXAPARIN SODIUM 40 MG: 40 INJECTION SUBCUTANEOUS at 17:10

## 2021-03-15 RX ADMIN — PREDNISONE 5 MG: 1 TABLET ORAL at 10:06

## 2021-03-15 RX ADMIN — DICYCLOMINE HYDROCHLORIDE 10 MG: 10 CAPSULE ORAL at 17:10

## 2021-03-15 RX ADMIN — DICYCLOMINE HYDROCHLORIDE 10 MG: 10 CAPSULE ORAL at 10:05

## 2021-03-15 RX ADMIN — POLYETHYLENE GLYCOL 3350 17 G: 17 POWDER, FOR SOLUTION ORAL at 05:47

## 2021-03-15 RX ADMIN — FAMOTIDINE 20 MG: 20 TABLET, FILM COATED ORAL at 22:39

## 2021-03-15 RX ADMIN — METRONIDAZOLE 500 MG: 500 INJECTION, SOLUTION INTRAVENOUS at 10:03

## 2021-03-15 RX ADMIN — CEFTRIAXONE SODIUM 1000 MG: 1 INJECTION, POWDER, FOR SOLUTION INTRAMUSCULAR; INTRAVENOUS at 11:31

## 2021-03-15 RX ADMIN — METRONIDAZOLE 500 MG: 500 INJECTION, SOLUTION INTRAVENOUS at 23:55

## 2021-03-15 ASSESSMENT — PAIN SCALES - GENERAL: PAINLEVEL_OUTOF10: 0

## 2021-03-15 NOTE — PROGRESS NOTES
Hospitalist      Patient:  [facility], [place]    Unit/Bed:5K-11/011-A  YOB: 1965  MRN: [id number]   Acct: [de-identified]     PCP: Colton Lewis  Date of Admission: 3/13/2021        Assessment and Plan:        1. Abdominal pain lower quadrants: We will consult general surgery and GI. We will start empiric antibiotic Rocephin and Flagyl, patient has an allergy to Cipro. Full liquid diet, will check a lactic acid in the a.m., will trend WBC along with BMP  2. Colon mass versus possible abscess: We will obtain blood cultures, will empirically start IV antibiotics  3. Diarrhea we will check enteric pathogens molecular panel. 3.14.2021 hyponatremia, hypokalemia, hypoglycemia, hypomagnemia, will adjust IV fluids and correct electrolyte imbalance. WBC is decreasing, urinalysis is negative, blood cultures are negative growth to date. 3.15.2021 patient hypokalemic today will correct. Patient states she does not feel like herself today. Denies any pain, cramping, numbness, or tingling, admits to slight nausea, nausea may be secondary to metronidazole. WBC decreasing, glucose is back within the normal range.,  Lactic acid is 0.8, procalcitonin 0.12. May need to repeat CT of abdomen in a.m.    CC: Abdominal pain    HPI: 61-year-old white female presents emergency department for evaluation of abdominal pain. She experienced sudden onset of abdominal pain located in her lower abdomen which started last night. During the night pain progressively got worse. She had multiple bouts of diarrhea. She is states she has had nonbloody bowel movements that were diarrhea in nature. She ate her past medical history includes rheumatoid arthritis and multiple surgeries secondary to rheumatoid arthritis. Last colonoscopy was approximately 10 years ago    ROS (14 point review of systems completed. Pertinent positives noted.  Otherwise ROS is negative) : Chills  Abdominal pain  Diarrhea  Nausea    PMH:  Per HPI and Diagnosis Date    TRINI (acute kidney injury) ([facility] [address] 12/8/2012    Arthritis     Malignant hyperthermia     daughters    RA (rheumatoid arthritis) ([facility] [address]      SHX:        Procedure Laterality Date    ARTHRODESIS Right 5/23/2019    RIGHT 1ST MPJ FUSION, METATARSAL HEAD RESECTION 2-5, TOES 2-5 ARTHROPLASTY/ARTHRODESIS & PLANTAR SKIN FLAP performed by Anastacio Philippe DPM at [address] Left 9/5/2019    LEFT 1ST MPJ FUSION, METATARSAL HEADS 2-5 RESECTION, ARTHRODESIS 2-4 TOES, ARTHROPLASTY 5TH TOE ALL ON THE LEFT performed by Anastacio Philippe DPM at [address] Right 12/13/2019    RT HALLUX OSTECTOMY performed by Anastacio Philippe DPM at [address] Right 7/18/2020    right total hip arthroplasty performed by Kim Petersen MD at [address] [facility] Right     thumb fusion    SKIN BIOPSY  05/2018    lower left leg, melanoma    TUBAL LIGATION       FHX:       Problem Relation Age of Onset    Heart Disease Mother     Atrial Fibrillation Mother     Cancer Mother     Alcohol Abuse Father     Cancer Father     Colon Cancer Father     Arthritis Maternal Grandmother      SOCHX:   Social History     Socioeconomic History    Marital status:      Spouse name: Nata Rios Number of children: 3    Years of education: 15    Highest education level: None   Occupational History    None   Social Needs    Financial resource strain: None    Food insecurity     Worry: None     Inability: None    Transportation needs     Medical: None     Non-medical: None   Tobacco Use    Smoking status: Never Smoker    Smokeless tobacco: Never Used   Substance and Sexual Activity    Alcohol use: Not Currently     Comment: joya westbrook had glass of wine    Drug use: Yes     Types: Marijuana     Comment: medical marijuana since June 2020 for migraines and RA    Sexual activity: Yes     Partners: Male   Lifestyle    Physical activity     Days per week: None Minutes per session: None    Stress: None   Relationships    Social connections     Talks on phone: None     Gets together: None     Attends [employer] service: None     Active member of club or organization: None     Attends meetings of clubs or organizations: None     Relationship status: None    Intimate partner violence     Fear of current or ex partner: None     Emotionally abused: None     Physically abused: None     Forced sexual activity: None   Other Topics Concern    None   Social History Narrative    None      Allergies: Succinylcholine, Ciprofibrate, Methylprednisolone, and Tramadol  Medications:     dextrose 5 % and 0.9 % NaCl 75 mL/hr at 03/14/21 1456      potassium chloride  40 mEq Oral Once    dicyclomine  10 mg Oral 4x Daily    predniSONE  5 mg Oral BID    Tofacitinib Citrate ER  0.5 tablet Oral Daily    sodium chloride flush  10 mL Intravenous 2 times per day    enoxaparin  40 mg Subcutaneous Q24H    famotidine  20 mg Oral BID    metroNIDAZOLE  500 mg Intravenous Q8H    cefTRIAXone (ROCEPHIN) IV  1,000 mg Intravenous Q24H     Prior to Admission medications    Medication Sig Start Date End Date Taking? Authorizing Provider   Rizatriptan Benzoate (MAXALT PO) Take by mouth as needed   Yes Historical Provider, MD   Tofacitinib Citrate (XELJANZ XR) 11 MG TB24 Take 0.5 tablets by mouth daily    Yes Historical Provider, MD   OLIVE LEAF PO Take by mouth daily    Yes Historical Provider, MD   Glucosamine-Chondroitin (JOINT SUPPORT PO) Take 1 tablet by mouth daily    Yes Historical Provider, MD   therapeutic multivitamin-minerals (THERAGRAN-M) tablet Take 1 tablet by mouth daily. Yes Historical Provider, MD   VITAMIN D-3 (COLECALCIFEROL) 400 UNITS TABS Take  by mouth daily.    Yes Historical Provider, MD   calcium carbonate-vitamin D (CALCIUM + D) 600-200 MG-UNIT TABS Take 1 tablet by mouth daily    Yes Historical Provider, MD   MAGNESIUM ACETATE by Does not apply route For bowels   Yes Historical Provider, MD   Probiotic Product (PROBIOTIC FORMULA PO) Take by mouth daily    Yes Historical Provider, MD   Acetaminophen (TYLENOL ARTHRITIS PAIN PO) Take 650 mg by mouth every 4 hours as needed    Yes Historical Provider, MD   predniSONE (DELTASONE) 5 MG tablet Take 5 mg by mouth 2 times daily    Yes Historical Provider, MD   famciclovir (FAMVIR) 500 MG tablet Take 500 mg by mouth 3 times daily as needed (for shingles outbreak)    Historical Provider, MD      PHYSICAL EXAM:    BP (!) 147/78   Pulse 69   Temp 98.7 °F (37.1 °C) (Oral)   Resp 16   Ht 5' (1.524 m)   Wt 98 lb (44.5 kg)   LMP 11/06/2011   SpO2 99%   BMI 19.14 kg/m²     General appearance:  No apparent distress, appears stated age and cooperative. HEENT:  Normal cephalic, atraumatic without obvious deformity. Pupils equal, round, and reactive to light. Extra ocular muscles intact. Conjunctivae/corneas clear. Neck: Supple, with full range of motion. no jugular venous distention. Trachea midline. no carotid bruits  Respiratory:  Normal respiratory effort. Clear to auscultation, bilaterally without Rales/Wheezes/Rhonchi. Breath sounds equal bilaterally  Cardiovascular:  Regular rate and rhythm with normal S1/S2 without murmurs, rubs or gallops. PMI non displaced  Abdomen: Abdominal tenderness, guarding and rebound present, hypoactive bowel sounds, psoas sign positive on the left. Musculoskeletal:  No clubbing, cyanosis or edema bilaterally. Limited range of motion with deformity secondary to rheumatoid arthritis. Skin: Skin color, texture, turgor normal.  No rashes or lesions, or suspicious lesions. Neurologic:  Neurovascularly intact without any focal sensory/motor deficits.  Cranial nerves: II-XII intact, grossly non-focal.  Psychiatric:  Alert and oriented, thought content appropriate, normal insight  Capillary Refill: Brisk,< 2 seconds   Peripheral Pulses: +2 palpable, equal bilaterally upper and lower extremities  Lymphatics: no lymphadenopathy    Data: (All radiographs, tracings, PFTs, and imaging are personally viewed and interpreted unless otherwise noted).  WBC 12.4   Creatinine 0. 4  Recent Labs     03/13/21  0740 03/14/21  0553 03/15/21  0508   WBC 14.1* 12.4* 6.4   HGB 14.1 12.8 12.5   HCT 44.1 42.0 38.7    249 269     Recent Labs     03/13/21  0740 03/14/21  0553 03/15/21  0508    129* 138   K 3.5 3.5 3.4*  3.4*    102 111   CO2 25 14* 18*   BUN 9 7 4*   CREATININE 0.5 0.4 0.4   CALCIUM 9.4 8.4* 8.3*     Recent Labs     03/13/21  0740   AST 22   ALT 11   BILIDIR <0.2   BILITOT 0.6   ALKPHOS 66     No results for input(s): INR in the last 72 hours. No results for input(s): Enriqueta [place] in the last 72 hours. Radiology reports-images reviewed in PACS  Ct Abdomen Pelvis W Iv Contrast Additional Contrast? None    Result Date: 3/13/2021  PROCEDURE: CT ABDOMEN PELVIS W IV CONTRAST CLINICAL INFORMATION: Acute abdominal pain . COMPARISON: CT scan of the abdomen and pelvis dated 6 December 2012. . TECHNIQUE: Axial 5 mm CT images were obtained through the abdomen and pelvis after the administration of intravenous contrast. Coronal and sagittal reconstructions were obtained. All CT scans at this facility use dose modulation, iterative reconstruction, and/or weight-based dosing when appropriate to reduce radiation dose to as low as reasonably achievable. FINDINGS: There is slight increased density in the right and left lower lobes posteriorly. .   The base of the heart is within appropriate limits. There is mild diffuse fatty replacement in the liver. The spleen is normal. The adrenals and pancreas are normal. The gallbladder is normal.    There is punctate calcification in the left kidney. There is no associated hydronephrosis versus a tiny right renal cyst is present. No renal masses are noted. No abnormalities of the small bowel loops are noted. The IVC and aorta are of normal caliber. There is no adenopathy. The urinary bladder is normal. Is a possible fibroid in the fundus of the uterus measuring 4 x 2.4 cm in size. There is no pelvic free fluid. There is a 3.4 x 2.5 x 2.8 cm abnormal density in the ascending colon which could represent an inflammatory process versus mass. There appears be abnormal abnormal mucosal thickening in the region of the hepatic flexure and proximal transverse colon. There is abnormal mucosal thickening in the rectosigmoid colon consistent with inflammatory process. . There is no adenopathy. There is a right hip replacement in place. 1. Abnormal density in the rectosigmoid colon consistent with inflammatory process. 2. Abnormal mucosal thickening in the right colon and hepatic flexure. 3. Abnormal density in the ascending colon measuring 3.4 x 2.5 x 2.8 cm which could represent an inflammatory process versus mass. 4. Possible fibroid in the fundus of the uterus measuring 4 x 2.4 cm in size. 5. Mild diffuse fatty replacement of liver. 6. Punctate calcification left kidney with no associated hydronephrosis. Tiny right renal cyst. 7. Mild diffuse fatty replacement in the liver. 8. Right hip replacement in place. **This report has been created using voice recognition software. It may contain minor errors which are inherent in voice recognition technology. ** Final report electronically signed by DR Federico Liu on 3/13/2021 9:11 AM      Electronically signed by Dalton Bernal DO on 3/15/2021 at 9:09 AM

## 2021-03-15 NOTE — CARE COORDINATION
3/15/21, 11:27 AM EDT  DISCHARGE PLANNING EVALUATION:    Anni Jones       Admitted: 3/13/2021/ PRINCE Causey day: 2   Location: [facility]11/011-A Reason for admit: Colonic mass [K63.89]   PMH:  has a past medical history of TRINI (acute kidney injury) ([facility] [address] Arthritis, Malignant hyperthermia, and RA (rheumatoid arthritis) ([facility] [address] Barriers to Discharge:  Patient presents with abdominal pain, nausea, and diarrhea. GI and General Surgery following, conservative treatment planned. IV fluids, Lovenox, Rocephin, IV Flagyl, prn pain medications and antiemetics, daily BMP and CBC, full liquid diet, oxygen, daily weights, telemetry, up with assistance. PCP: Adrien Davidson  Readmission Risk Score: 13%    Patient Goals/Plan/Treatment Preferences: Met with Sera. She is from home with her . She can afford her medications and denies a need for DME. Her  will transport her home at discharge. Sera is independent managing her health care needs and declines HH. Transportation/Food Security/Housekeeping Addressed:  No issues identified.

## 2021-03-15 NOTE — PROGRESS NOTES
Pt stated she feels \"weird and not herself,\" she denies pain, nausea, cramping, numbness, and tingling. Vitals taken and all WNL.

## 2021-03-15 NOTE — PROGRESS NOTES
Gastroenterology Progress Note:     Patient Name:  Jose F Diallo   MRN: [id number]  [id number]  YOB: 1965  Admit Date: 3/13/2021  7:28 AM  Primary Care Physician: Roni Blue   5K-11/011-A     Patient seen and examined. 24 hours events and chart reviewed. Subjective: Patient resting in bed. She continues to have lower abdominal pain, but says she is feeling a little better since she ate breakfast. She had nausea and vomiting last night, nothing today. She tolerated full liquids for breakfast. No bowel movement, but is passing a lot of flatus. She complains of a lot of abdominal cramping. Objective:  BP (!) 147/78   Pulse 69   Temp 98.7 °F (37.1 °C) (Oral)   Resp 16   Ht 5' (1.524 m)   Wt 98 lb (44.5 kg)   LMP 11/06/2011   SpO2 99%   BMI 19.14 kg/m²     Physical Exam:    General:  Nourished in no distress  HEENT: Atraumatic, normocephalic. Moist oral mucous membranes. Neck: Supple without adenopathy, JVD, thyromegaly or masses. Trachea midline. CV: Heart RRR, no murmurs, rubs, gallops. Resp: Even, easy without cough or accessory use. Lungs clear to ascultation bilaterally. Abd: Round, soft, tender to lower abdomen with palpation. No hepatosplenomegaly or mass present. Active bowel sounds heard. No distention noted. Ext:  Without cyanosis, clubbing, edema. Skin: Pink, warm, dry  Neuro:  Alert, oriented x 3 with no obvious deficits.        Rectal: deferred    Labs:   CBC:   Lab Results   Component Value Date    WBC 6.4 03/15/2021    HGB 12.5 03/15/2021    HCT 38.7 03/15/2021    .3 03/15/2021     03/15/2021     BMP:   Lab Results   Component Value Date     03/15/2021    K 3.4 03/15/2021    K 3.4 03/15/2021     03/15/2021    CO2 18 03/15/2021    BUN 4 03/15/2021    CREATININE 0.4 03/15/2021    CALCIUM 8.3 03/15/2021     PT/INR:   Lab Results   Component Value Date    INR 1.18 07/18/2020     Lipids:   Lab Results   Component Value Date    ALKPHOS 66 03/13/2021 ALT 11 03/13/2021    AST 22 03/13/2021    BILITOT 0.6 03/13/2021    BILIDIR <0.2 03/13/2021    LABALBU 3.6 03/13/2021    AMYLASE 64 12/06/2012    LIPASE 34.3 03/13/2021     Current Meds:  Scheduled Meds:   dicyclomine  10 mg Oral 4x Daily    predniSONE  5 mg Oral BID    Tofacitinib Citrate ER  0.5 tablet Oral Daily    sodium chloride flush  10 mL Intravenous 2 times per day    enoxaparin  40 mg Subcutaneous Q24H    famotidine  20 mg Oral BID    metroNIDAZOLE  500 mg Intravenous Q8H    cefTRIAXone (ROCEPHIN) IV  1,000 mg Intravenous Q24H     Continuous Infusions:   dextrose 5 % and 0.9 % NaCl 75 mL/hr at 03/15/21 0936       Assessment:  49 yo F admitted 03/13/21 for abd pain with associated diarrhea. CT A/P demonstrated abnormal density in the rectosigmoid colon consistent with inflammatory process, mucosal thickening in the right colon & hepatic flexure, abnormal density in the ascending colon could represent inflammatory process vs mass, mild diffuse fatty liver. H/O RA on Xeljanz & prednisone. Daughter with Crohn's disease. 1. Lower abdominal pain  2. Leukocytosis- resolved  3. N/V  4. Diarrhea- no bowel movement since admission  5. Abnormal density in the ascending colon & rectosigmoid colon- noted on imaging  6. Abnormal mucosal thickening in the right colon & hepatic flexure  7. Father with a history of colon & liver CA  8. Daughter with Crohn's Disease  9. RA- on Xeljanz & Prednisone  10. Hypokalemia    Plan:     Continue ATBs   Pain & nausea control per primary   Full liquid diet as tolerated   Continue Bentyl    Suspect inflammatory process, will treat colitis. If no improvement, will consider colonoscopy later this week. If symptoms resolved after treatment, can consider outpatient colonoscopy.     General surgery on board   Supportive care per primary team  Will follow    Case reviewed and impression/plan reviewed in collaboration with Dr. Adalgisa Sarmiento  Electronically signed by Millicent Zuniga Marie Stanley - CNP on 3/15/2021 at 11:48 AM     Alex

## 2021-03-15 NOTE — PLAN OF CARE
Problem: Falls - Risk of:  Goal: Will remain free from falls  Description: Will remain free from falls  3/15/2021 0042 by Pineda Layne RN  Outcome: Met This Shift  3/14/2021 1927 by Mason Fajardo RN  Outcome: Ongoing  3/14/2021 1839 by Mason Fajardo RN  Outcome: Ongoing  Goal: Absence of physical injury  Description: Absence of physical injury  Outcome: Met This Shift     Problem: Pain:  Goal: Pain level will decrease  Description: Pain level will decrease  3/15/2021 0042 by Pineda Layne RN  Outcome: Met This Shift  3/14/2021 1927 by Mason Fajardo RN  Outcome: Ongoing  3/14/2021 1839 by Mason Fajardo RN  Outcome: Ongoing  Goal: Control of chronic pain  Description: Control of chronic pain  Outcome: Met This Shift     Problem: Pain:  Goal: Control of acute pain  Description: Control of acute pain  Outcome: Ongoing     Problem: Nausea/Vomiting:  Goal: Able to drink  Description: Able to drink  Outcome: Ongoing  Goal: Able to eat  Description: Able to eat  Outcome: Ongoing   Pt ate dinner, abdominal cramping returned, IV morphine given, pt having nausea and vomiting, required IV zofran.

## 2021-03-15 NOTE — PLAN OF CARE
Problem: Falls - Risk of:  Goal: Will remain free from falls  Description: Will remain free from falls  Outcome: Ongoing   Patient has remained free from falls/concern for falls during this shift. Ambulated in room and showered without issues/unsteady gait. Problem: Pain:  Description: Pain management should include both nonpharmacologic and pharmacologic interventions. Goal: Control of acute pain  Description: Control of acute pain  Outcome: Ongoing   Patient has had acute pain well managed this shift, reporting little to no pain present and has remained free from headaches as well. Problem: Nausea/Vomiting:  Goal: Absence of nausea/vomiting  Description: Absence of nausea/vomiting  Outcome: Ongoing   Patient has denied nausea and has not had any episodes of vomiting this shift. Has tolerated meals well, and stated that she feels better after eating. Care plan reviewed with patient. Patient verbalizes understanding of the plan of care and contributes to goal setting.

## 2021-03-15 NOTE — PROGRESS NOTES
[address] [place] [address]   Dr. Paris Mccabe  Daily Progress Note  Pt Name: Joan Wilson  Medical Record Number: [id number]  Date of Birth 1965   Today's Date: 3/15/2021    HD#2    CHIEF COMPLAINT colitis vs diverticulitis    SUBJECTIVE  Patient states she \"had a rough evening\"  Abdominal pain/cramping last evening after flatus. No BM. Denies N&V. Tolerating a full liquid diet. No chest pain or SOB. OBJECTIVE  CURRENT VITALS BP (!) 147/78   Pulse 69   Temp 98.7 °F (37.1 °C) (Oral)   Resp 16   Ht 5' (1.524 m)   Wt 98 lb (44.5 kg)   LMP 11/06/2011   SpO2 99%   BMI 19.14 kg/m²   LUNGS: Lungs clear   ABDOMEN: BS + but diminished  Guarding both lower quadrants upper abd fairly nl, BS active   WOUNDS: n/a  24 HR INTAKE/OUTPUT :     Intake/Output Summary (Last 24 hours) at 3/15/2021 0954  Last data filed at 3/15/2021 0810  Gross per 24 hour   Intake 2976.67 ml   Output 400 ml   Net 2576.67 ml     DRAIN/TUBE OUTPUT :      LABS  CBC :   Lab Results   Component Value Date    WBC 6.4 03/15/2021    HGB 12.5 03/15/2021    HCT 38.7 03/15/2021     03/15/2021     BMP:   Lab Results   Component Value Date     03/15/2021    K 3.4 03/15/2021    K 3.4 03/15/2021     03/15/2021    CO2 18 03/15/2021    BUN 4 03/15/2021    CREATININE 0.4 03/15/2021    MG 1.9 03/15/2021       ASSESSMENT  1. CT - inflammatory process ? Colitis vs diverticulitis, liley colitis  2. Leukocytosis resolved  3. Nausea, vomiting, chills and diarrhea resolved  4. HX RA chronic steroids and medical marijuana           PLAN  1. Conservative treatment   2. IV antibiotics   3. Full liquids   4. Pain and nausea control   5. Activity as tolerated   6. GI on board   7. GI and DVT prophylaxis   8. Repeat CT scan tomorrow          Electronically signed by JALEESA Siegel CNP on 3/15/2021 at 10:02 AM Patient seen and examined independently by me. Above discussed and I agree with CNP.    Labs, cultures, and radiographs where available were reviewed. See orders for the updated patient care plan.     Kymberly Gunn MD, patient last early evening was feeling well but then had onset again of cramping her exam this morning shows certainly no peritonitis but guarding in the left and right lower quadrants I still believe colitis is more likely recommend we repeat CT scan tomorrow and reassess  3/15/2021   8:00 PM

## 2021-03-16 ENCOUNTER — APPOINTMENT (OUTPATIENT)
Dept: CT IMAGING | Age: 56
DRG: 392 | End: 2021-03-16
Payer: COMMERCIAL

## 2021-03-16 LAB
ANION GAP SERPL CALCULATED.3IONS-SCNC: 10 MEQ/L (ref 8–16)
BASOPHILS # BLD: 0.7 %
BASOPHILS ABSOLUTE: 0 THOU/MM3 (ref 0–0.1)
BUN BLDV-MCNC: 3 MG/DL (ref 7–22)
CALCIUM SERPL-MCNC: 9 MG/DL (ref 8.5–10.5)
CHLORIDE BLD-SCNC: 112 MEQ/L (ref 98–111)
CO2: 22 MEQ/L (ref 23–33)
CREAT SERPL-MCNC: 0.4 MG/DL (ref 0.4–1.2)
EOSINOPHIL # BLD: 0.4 %
EOSINOPHILS ABSOLUTE: 0 THOU/MM3 (ref 0–0.4)
ERYTHROCYTE [DISTWIDTH] IN BLOOD BY AUTOMATED COUNT: 12.7 % (ref 11.5–14.5)
ERYTHROCYTE [DISTWIDTH] IN BLOOD BY AUTOMATED COUNT: 45.9 FL (ref 35–45)
GFR SERPL CREATININE-BSD FRML MDRD: > 90 ML/MIN/1.73M2
GLUCOSE BLD-MCNC: 124 MG/DL (ref 70–108)
HCT VFR BLD CALC: 39.8 % (ref 37–47)
HEMOGLOBIN: 13.1 GM/DL (ref 12–16)
IMMATURE GRANS (ABS): 0.01 THOU/MM3 (ref 0–0.07)
IMMATURE GRANULOCYTES: 0.2 %
LYMPHOCYTES # BLD: 16.5 %
LYMPHOCYTES ABSOLUTE: 0.9 THOU/MM3 (ref 1–4.8)
MCH RBC QN AUTO: 32.7 PG (ref 26–33)
MCHC RBC AUTO-ENTMCNC: 32.9 GM/DL (ref 32.2–35.5)
MCV RBC AUTO: 99.3 FL (ref 81–99)
MONOCYTES # BLD: 7.4 %
MONOCYTES ABSOLUTE: 0.4 THOU/MM3 (ref 0.4–1.3)
NUCLEATED RED BLOOD CELLS: 0 /100 WBC
PLATELET # BLD: 299 THOU/MM3 (ref 130–400)
PMV BLD AUTO: 10.6 FL (ref 9.4–12.4)
POTASSIUM REFLEX MAGNESIUM: 4 MEQ/L (ref 3.5–5.2)
RBC # BLD: 4.01 MILL/MM3 (ref 4.2–5.4)
SEG NEUTROPHILS: 74.8 %
SEGMENTED NEUTROPHILS ABSOLUTE COUNT: 4.3 THOU/MM3 (ref 1.8–7.7)
SODIUM BLD-SCNC: 144 MEQ/L (ref 135–145)
WBC # BLD: 5.7 THOU/MM3 (ref 4.8–10.8)

## 2021-03-16 PROCEDURE — 6360000002 HC RX W HCPCS: Performed by: INTERNAL MEDICINE

## 2021-03-16 PROCEDURE — 36415 COLL VENOUS BLD VENIPUNCTURE: CPT

## 2021-03-16 PROCEDURE — 74177 CT ABD & PELVIS W/CONTRAST: CPT

## 2021-03-16 PROCEDURE — 6360000004 HC RX CONTRAST MEDICATION: Performed by: NURSE PRACTITIONER

## 2021-03-16 PROCEDURE — 85025 COMPLETE CBC W/AUTO DIFF WBC: CPT

## 2021-03-16 PROCEDURE — 99232 SBSQ HOSP IP/OBS MODERATE 35: CPT | Performed by: SURGERY

## 2021-03-16 PROCEDURE — 6370000000 HC RX 637 (ALT 250 FOR IP): Performed by: NURSE PRACTITIONER

## 2021-03-16 PROCEDURE — 2580000003 HC RX 258: Performed by: INTERNAL MEDICINE

## 2021-03-16 PROCEDURE — 6370000000 HC RX 637 (ALT 250 FOR IP): Performed by: INTERNAL MEDICINE

## 2021-03-16 PROCEDURE — 99232 SBSQ HOSP IP/OBS MODERATE 35: CPT | Performed by: INTERNAL MEDICINE

## 2021-03-16 PROCEDURE — 2500000003 HC RX 250 WO HCPCS: Performed by: INTERNAL MEDICINE

## 2021-03-16 PROCEDURE — 1200000003 HC TELEMETRY R&B

## 2021-03-16 PROCEDURE — APPSS30 APP SPLIT SHARED TIME 16-30 MINUTES: Performed by: NURSE PRACTITIONER

## 2021-03-16 PROCEDURE — 80048 BASIC METABOLIC PNL TOTAL CA: CPT

## 2021-03-16 RX ADMIN — DEXTROSE AND SODIUM CHLORIDE: 5; 900 INJECTION, SOLUTION INTRAVENOUS at 08:31

## 2021-03-16 RX ADMIN — METRONIDAZOLE 500 MG: 500 INJECTION, SOLUTION INTRAVENOUS at 08:22

## 2021-03-16 RX ADMIN — METRONIDAZOLE 500 MG: 500 INJECTION, SOLUTION INTRAVENOUS at 23:55

## 2021-03-16 RX ADMIN — PREDNISONE 5 MG: 1 TABLET ORAL at 20:50

## 2021-03-16 RX ADMIN — DICYCLOMINE HYDROCHLORIDE 10 MG: 10 CAPSULE ORAL at 16:56

## 2021-03-16 RX ADMIN — PREDNISONE 5 MG: 1 TABLET ORAL at 08:25

## 2021-03-16 RX ADMIN — DICYCLOMINE HYDROCHLORIDE 10 MG: 10 CAPSULE ORAL at 06:11

## 2021-03-16 RX ADMIN — FAMOTIDINE 20 MG: 20 TABLET, FILM COATED ORAL at 20:50

## 2021-03-16 RX ADMIN — CEFTRIAXONE SODIUM 1000 MG: 1 INJECTION, POWDER, FOR SOLUTION INTRAMUSCULAR; INTRAVENOUS at 10:12

## 2021-03-16 RX ADMIN — METRONIDAZOLE 500 MG: 500 INJECTION, SOLUTION INTRAVENOUS at 16:56

## 2021-03-16 RX ADMIN — IOHEXOL 50 ML: 240 INJECTION, SOLUTION INTRATHECAL; INTRAVASCULAR; INTRAVENOUS; ORAL at 16:14

## 2021-03-16 RX ADMIN — ENOXAPARIN SODIUM 40 MG: 40 INJECTION SUBCUTANEOUS at 17:52

## 2021-03-16 RX ADMIN — IOPAMIDOL 80 ML: 755 INJECTION, SOLUTION INTRAVENOUS at 16:15

## 2021-03-16 RX ADMIN — FAMOTIDINE 20 MG: 20 TABLET, FILM COATED ORAL at 08:22

## 2021-03-16 RX ADMIN — DICYCLOMINE HYDROCHLORIDE 10 MG: 10 CAPSULE ORAL at 10:11

## 2021-03-16 RX ADMIN — POLYETHYLENE GLYCOL 3350 17 G: 17 POWDER, FOR SOLUTION ORAL at 09:44

## 2021-03-16 ASSESSMENT — PAIN SCALES - GENERAL
PAINLEVEL_OUTOF10: 0

## 2021-03-16 NOTE — PLAN OF CARE
Problem: Falls - Risk of:  Goal: Will remain free from falls  Description: Will remain free from falls  3/16/2021 0830 by Jazzmine Fofana RN  Outcome: Ongoing   Patient has transferred and ambulated within room well, without concerns for falling or unsteady gait. Slight weakness noted, but patient takes safety precautions and moves slowly. Will continue to monitor. Problem: Pain:  Description: Pain management should include both nonpharmacologic and pharmacologic interventions. Goal: Pain level will decrease  Description: Pain level will decrease  3/16/2021 0830 by Jazzmine Fofana RN  Outcome: Ongoing   Patient has denied pain/abd cramps, and stated that her pain level has been excellent since she has been eating food consistently yesterday/today. Will continue to monitor and offer nonpharmacologic/pharmacologic pain interventions if necessary. Problem: Nausea/Vomiting:  Goal: Absence of nausea/vomiting  Description: Absence of nausea/vomiting  3/16/2021 0830 by Jazzmine Fofana RN  Outcome: Ongoing   Patient has denied nausea and has not had any episodes of vomiting yesterday or so far this shift. Care plan reviewed with patient. Patient verbalizes understanding of the plan of care and contributes to goal setting.

## 2021-03-16 NOTE — CARE COORDINATION
3/16/21, 9:54 AM EDT    DISCHARGE ON GOING EVALUATION    Sera [facility] day: 3  Location: -11/011-A Reason for admit: Colonic mass [K63.89]   Barriers to Discharge: GI and General Surgery following, conservative tx, IV fluids, Rocephin, IV Flagyl, Lovenox, prn pain medications and antiemetics, full liquid diet, repeat CT of abdomen, daily weights, telemetry, up with assistance. PCP: Whit Gutierrez  Readmission Risk Score: 11%  Patient Goals/Plan/Treatment Preferences: Sera is from home with her . She denies needs at discharge.

## 2021-03-16 NOTE — PROGRESS NOTES
Gastroenterology Progress Note:     Patient Name:  Blue Anderson   MRN: [id number]  [id number]  YOB: 1965  Admit Date: 3/13/2021  7:28 AM  Primary Care Physician: Antonio Vaughn   5K-11/011-A     Patient seen and examined. 24 hours events and chart reviewed. Subjective: Patient resting in bed. She states she is feeling much better. Her right-sided abdominal discomfort is almost completely gone. Her left-sided abdominal pain/discomfort is much improved. She denies nausea & vomiting. She is tolerating a full liquid diet and was able to tolerate 3 meals yesterday. Objective:  /67   Pulse 83   Temp 98.3 °F (36.8 °C) (Oral)   Resp 16   Ht 5' (1.524 m)   Wt 98 lb (44.5 kg)   LMP 11/06/2011   SpO2 95%   BMI 19.14 kg/m²     Physical Exam:    General:  Nourished in no distress  HEENT: Atraumatic, normocephalic. Moist oral mucous membranes. Neck: Supple without adenopathy, JVD, thyromegaly or masses. Trachea midline. CV: Heart RRR, no murmurs, rubs, gallops. Resp: Even, easy without cough or accessory use. Lungs clear to ascultation bilaterally. Abd: Round, soft, tender to left abdomen with palaption. No hepatosplenomegaly or mass present. Active bowel sounds heard. No distention noted. Ext:  Without cyanosis, clubbing, edema. Skin: Pink, warm, dry  Neuro:  Alert, oriented x 3 with no obvious deficits.        Rectal: deferred    Labs:   CBC:   Lab Results   Component Value Date    WBC 5.7 03/16/2021    HGB 13.1 03/16/2021    HCT 39.8 03/16/2021    MCV 99.3 03/16/2021     03/16/2021     BMP:   Lab Results   Component Value Date     03/16/2021    K 4.0 03/16/2021     03/16/2021    CO2 22 03/16/2021    BUN 3 03/16/2021    CREATININE 0.4 03/16/2021    CALCIUM 9.0 03/16/2021     PT/INR:   Lab Results   Component Value Date    INR 1.18 07/18/2020     Lipids:   Lab Results   Component Value Date    ALKPHOS 66 03/13/2021    ALT 11 03/13/2021    AST 22 03/13/2021    BILITOT 0.6 03/13/2021    BILIDIR <0.2 03/13/2021    LABALBU 3.6 03/13/2021    AMYLASE 64 12/06/2012    LIPASE 34.3 03/13/2021     Current Meds:  Scheduled Meds:   dicyclomine  10 mg Oral TID AC    predniSONE  5 mg Oral BID    Tofacitinib Citrate ER  0.5 tablet Oral Daily    sodium chloride flush  10 mL Intravenous 2 times per day    enoxaparin  40 mg Subcutaneous Q24H    famotidine  20 mg Oral BID    metroNIDAZOLE  500 mg Intravenous Q8H    cefTRIAXone (ROCEPHIN) IV  1,000 mg Intravenous Q24H     Continuous Infusions:   dextrose 5 % and 0.9 % NaCl 75 mL/hr at 03/16/21 0831       Assessment:  49 yo F admitted 03/13/21 for abd pain with associated diarrhea. CT A/P demonstrated abnormal density in the rectosigmoid colon consistent with inflammatory process, mucosal thickening in the right colon & hepatic flexure, abnormal density in the ascending colon could represent inflammatory process vs mass, mild diffuse fatty liver. H/O RA on Xeljanz & prednisone. Daughter with Crohn's disease. 1. Lower abdominal pain- improving  2. Leukocytosis- resolved  3. N/V- resolved  4. Diarrhea- no bowel movement since admission  5. Abnormal density in the ascending colon & rectosigmoid colon- noted on imaging  6. Abnormal mucosal thickening in the right colon & hepatic flexure  7. Father with a history of colon & liver CA  8. Daughter with Crohn's Disease  9. RA- on Xeljanz & Prednisone  10.  Hypokalemia- resolved    Plan:    · Continue ATBs  · Pain & nausea control per primary  · Okay to advance diet from GI standpoint  · Continue Bentyl   · Will need colonoscopy outpatient  · Repeat CT scan per surgery  · General surgery on board  · Case discussed with general surgery, Delphine MCCORMACK  · Supportive care per primary team  Will follow    Case reviewed and impression/plan reviewed in collaboration with Dr. Enzo Hull  Electronically signed by JALEESA Bermeo CNP on 3/16/2021 at 11:59 AM    [facility]

## 2021-03-16 NOTE — PROGRESS NOTES
discussed and I agree with CNP. Labs, cultures, and radiographs where available were reviewed. See orders for the updated patient care plan.     Nghia Oconnor MD, patient feels much better today with sitting up smiling still having some left lower quadrant pain but is hungry and tolerating full liquids we will still proceed with repeat CT scan ending results we will likely advance to regular diet soon  3/16/2021   2:41 PM

## 2021-03-16 NOTE — PROGRESS NOTES
Pt is A+O x3. Pt has no complaints of dizziness, hearing or vision deficits at this time. Pupils went from 3 mm to 2 mm when exposed to light. Pupils are equal, round, and reactive to light. Lung sounds are clear throughout. Chest movement is symmetrical with moderate depth. Bowel sounds are active in all quadrants. Pt states she has not had a bowel movement in about three days. Abdomen is non-tender and has no masses. Pt has full range of motion in upper and lower extremities. Upper and lower extremities are pink, warm, and dry. Capillary refill and skin turgor are both >3 seconds. Arm drift is negative. Hand grasp is strong and equal bilaterally. Pedal push and pull are strong and equal bilaterally. Pt has rheumatoid arthritis in hands bilaterally, with limited mobility of the hands. Pt is independent when ambulating with no assistive device. Pt has IV in left forearm. Dressing is clean, dry, and intact. Pt denies pain at this time. Bedside table within reach. Call light in reach.

## 2021-03-16 NOTE — PROGRESS NOTES
Hospitalist      Patient:  Olu Jordan    Unit/Bed:5K-11/011-A  YOB: 1965  MRN: [id number]   Acct: [de-identified]     PCP: Chase Villanueva  Date of Admission: 3/13/2021        Assessment and Plan:        1. Abdominal pain lower quadrants: We will consult general surgery and GI. We will start empiric antibiotic Rocephin and Flagyl, patient has an allergy to Cipro. Full liquid diet, will check a lactic acid in the a.m., will trend WBC along with BMP  2. Colon mass versus possible abscess: We will obtain blood cultures, will empirically start IV antibiotics  3. Diarrhea we will check enteric pathogens molecular panel. 3.14.2021 hyponatremia, hypokalemia, hypoglycemia, hypomagnemia, will adjust IV fluids and correct electrolyte imbalance. WBC is decreasing, urinalysis is negative, blood cultures are negative growth to date. 3.15.2021 patient hypokalemic today will correct. Patient states she does not feel like herself today. Denies any pain, cramping, numbness, or tingling, admits to slight nausea, nausea may be secondary to metronidazole. WBC decreasing, glucose is back within the normal range.,  Lactic acid is 0.8, procalcitonin 0.12. May need to repeat CT of abdomen in a.m.    3.16.2021 CT of abdomen pelvis with IV/oral contrast pending. Patient states that she feels much improved today almost back to baseline. Possible discharge in the next 1 to 2 days if improvement on CT scan and continue with p.o. antibiotics on an outpatient basis, and follow-up with GI    CC: Abdominal pain    HPI: 72-year-old white female presents emergency department for evaluation of abdominal pain. She experienced sudden onset of abdominal pain located in her lower abdomen which started last night. During the night pain progressively got worse. She had multiple bouts of diarrhea. She is states she has had nonbloody bowel movements that were diarrhea in nature.   She ate her past medical history includes rheumatoid arthritis and multiple surgeries secondary to rheumatoid arthritis. Last colonoscopy was approximately 10 years ago    ROS (14 point review of systems completed. Pertinent positives noted.  Otherwise ROS is negative) : Chills  Abdominal pain  Diarrhea  Nausea    PMH:  Per HPI and       Diagnosis Date    TRINI (acute kidney injury) ([facility] [address] 12/8/2012    Arthritis     Malignant hyperthermia     daughters    RA (rheumatoid arthritis) ([facility] [address]      SHX:        Procedure Laterality Date    ARTHRODESIS Right 5/23/2019    RIGHT 1ST MPJ FUSION, METATARSAL HEAD RESECTION 2-5, TOES 2-5 ARTHROPLASTY/ARTHRODESIS & PLANTAR SKIN FLAP performed by Bob Pena DPM at [address] Left 9/5/2019    LEFT 1ST MPJ FUSION, METATARSAL HEADS 2-5 RESECTION, ARTHRODESIS 2-4 TOES, ARTHROPLASTY 5TH TOE ALL ON THE LEFT performed by Bob Pena DPM at [address] Right 12/13/2019    RT HALLUX OSTECTOMY performed by Bob Pena DPM at [address] [facility] [facility] Right 7/18/2020    right total hip arthroplasty performed by Johan Monzon MD at [place] [address] Right     thumb fusion    SKIN BIOPSY  05/2018    lower left leg, melanoma    TUBAL LIGATION       FHX:       Problem Relation Age of Onset    Heart Disease Mother     Atrial Fibrillation Mother     Cancer Mother     Alcohol Abuse Father     Cancer Father     Colon Cancer Father     Arthritis Maternal Grandmother      SOCHX:   Social History     Socioeconomic History    Marital status:      Spouse name: Kim Crowley Number of children: 3    Years of education: 15    Highest education level: None   Occupational History    None   Social Needs    Financial resource strain: None    Food insecurity     Worry: None     Inability: None    Transportation needs     Medical: None     Non-medical: None   Tobacco Use    Smoking status: Never Smoker    Smokeless tobacco: Never Used   Substance and Sexual Activity    Alcohol use: Not Currently     Comment: joya westbrook had glass of wine    Drug use: Yes     Types: Marijuana     Comment: medical marijuana since June 2020 for migraines and RA    Sexual activity: Yes     Partners: Male   Lifestyle    Physical activity     Days per week: None     Minutes per session: None    Stress: None   Relationships    Social connections     Talks on phone: None     Gets together: None     Attends [employer] service: None     Active member of club or organization: None     Attends meetings of clubs or organizations: None     Relationship status: None    Intimate partner violence     Fear of current or ex partner: None     Emotionally abused: None     Physically abused: None     Forced sexual activity: None   Other Topics Concern    None   Social History Narrative    None      Allergies: Succinylcholine, Ciprofibrate, Methylprednisolone, and Tramadol  Medications:     dextrose 5 % and 0.9 % NaCl 75 mL/hr at 03/16/21 0831      dicyclomine  10 mg Oral TID AC    predniSONE  5 mg Oral BID    Tofacitinib Citrate ER  0.5 tablet Oral Daily    sodium chloride flush  10 mL Intravenous 2 times per day    enoxaparin  40 mg Subcutaneous Q24H    famotidine  20 mg Oral BID    metroNIDAZOLE  500 mg Intravenous Q8H    cefTRIAXone (ROCEPHIN) IV  1,000 mg Intravenous Q24H     Prior to Admission medications    Medication Sig Start Date End Date Taking? Authorizing Provider   Rizatriptan Benzoate (MAXALT PO) Take by mouth as needed   Yes Historical Provider, MD   Tofacitinib Citrate (XELJANZ XR) 11 MG TB24 Take 0.5 tablets by mouth daily    Yes Historical Provider, MD   OLIVE LEAF PO Take by mouth daily    Yes Historical Provider, MD   Glucosamine-Chondroitin (JOINT SUPPORT PO) Take 1 tablet by mouth daily    Yes Historical Provider, MD   therapeutic multivitamin-minerals (THERAGRAN-M) tablet Take 1 tablet by mouth daily.    Yes Historical Provider, MD   VITAMIN D-3 (COLECALCIFEROL) 400 UNITS TABS Take  by mouth daily. Yes Historical Provider, MD   calcium carbonate-vitamin D (CALCIUM + D) 600-200 MG-UNIT TABS Take 1 tablet by mouth daily    Yes Historical Provider, MD   MAGNESIUM ACETATE by Does not apply route For bowels   Yes Historical Provider, MD   Probiotic Product (PROBIOTIC FORMULA PO) Take by mouth daily    Yes Historical Provider, MD   Acetaminophen (TYLENOL ARTHRITIS PAIN PO) Take 650 mg by mouth every 4 hours as needed    Yes Historical Provider, MD   predniSONE (DELTASONE) 5 MG tablet Take 5 mg by mouth 2 times daily    Yes Historical Provider, MD   famciclovir (FAMVIR) 500 MG tablet Take 500 mg by mouth 3 times daily as needed (for shingles outbreak)    Historical Provider, MD      PHYSICAL EXAM:    /67   Pulse 65   Temp 98.5 °F (36.9 °C) (Oral)   Resp 16   Ht 5' (1.524 m)   Wt 98 lb (44.5 kg)   LMP 11/06/2011   SpO2 98%   BMI 19.14 kg/m²     General appearance:  No apparent distress, appears stated age and cooperative. HEENT:  Normal cephalic, atraumatic without obvious deformity. Pupils equal, round, and reactive to light. Extra ocular muscles intact. Conjunctivae/corneas clear. Neck: Supple, with full range of motion. no jugular venous distention. Trachea midline. no carotid bruits  Respiratory:  Normal respiratory effort. Clear to auscultation, bilaterally without Rales/Wheezes/Rhonchi. Breath sounds equal bilaterally  Cardiovascular:  Regular rate and rhythm with normal S1/S2 without murmurs, rubs or gallops. PMI non displaced  Abdomen: Less abdominal tenderness today, no guarding and rebound present, active bowel sounds, psoas sign negative on the left. Musculoskeletal:  No clubbing, cyanosis or edema bilaterally. Limited range of motion with deformity secondary to rheumatoid arthritis. Skin: Skin color, texture, turgor normal.  No rashes or lesions, or suspicious lesions. Neurologic:  Neurovascularly intact without any focal sensory/motor deficits. Cranial nerves: II-XII intact, grossly non-focal.  Psychiatric:  Alert and oriented, thought content appropriate, normal insight  Capillary Refill: Brisk,< 2 seconds   Peripheral Pulses: +2 palpable, equal bilaterally upper and lower extremities  Lymphatics: no lymphadenopathy    Data: (All radiographs, tracings, PFTs, and imaging are personally viewed and interpreted unless otherwise noted).    Recent Labs     03/14/21  0553 03/15/21  0508 03/16/21  0557   WBC 12.4* 6.4 5.7   HGB 12.8 12.5 13.1   HCT 42.0 38.7 39.8    269 299     Recent Labs     03/14/21  0553 03/15/21  0508 03/16/21  0557   * 138 144   K 3.5 3.4*  3.4* 4.0    111 112*   CO2 14* 18* 22*   BUN 7 4* 3*   CREATININE 0.4 0.4 0.4   CALCIUM 8.4* 8.3* 9.0     No results for input(s): AST, ALT, BILIDIR, BILITOT, ALKPHOS in the last 72 hours. No results for input(s): INR in the last 72 hours. No results for input(s): Genesis Spencer in the last 72 hours. Radiology reports-images reviewed in PACS  Ct Abdomen Pelvis W Iv Contrast Additional Contrast? None    Result Date: 3/13/2021  PROCEDURE: CT ABDOMEN PELVIS W IV CONTRAST CLINICAL INFORMATION: Acute abdominal pain . COMPARISON: CT scan of the abdomen and pelvis dated 6 December 2012. . TECHNIQUE: Axial 5 mm CT images were obtained through the abdomen and pelvis after the administration of intravenous contrast. Coronal and sagittal reconstructions were obtained. All CT scans at this facility use dose modulation, iterative reconstruction, and/or weight-based dosing when appropriate to reduce radiation dose to as low as reasonably achievable. FINDINGS: There is slight increased density in the right and left lower lobes posteriorly. .   The base of the heart is within appropriate limits. There is mild diffuse fatty replacement in the liver. The spleen is normal. The adrenals and pancreas are normal. The gallbladder is normal.    There is punctate calcification in the left kidney. There is no associated hydronephrosis versus a tiny right renal cyst is present. No renal masses are noted. No abnormalities of the small bowel loops are noted. The IVC and aorta are of normal caliber. There is no adenopathy. The urinary bladder is normal. Is a possible fibroid in the fundus of the uterus measuring 4 x 2.4 cm in size. There is no pelvic free fluid. There is a 3.4 x 2.5 x 2.8 cm abnormal density in the ascending colon which could represent an inflammatory process versus mass. There appears be abnormal abnormal mucosal thickening in the region of the hepatic flexure and proximal transverse colon. There is abnormal mucosal thickening in the rectosigmoid colon consistent with inflammatory process. . There is no adenopathy. There is a right hip replacement in place. 1. Abnormal density in the rectosigmoid colon consistent with inflammatory process. 2. Abnormal mucosal thickening in the right colon and hepatic flexure. 3. Abnormal density in the ascending colon measuring 3.4 x 2.5 x 2.8 cm which could represent an inflammatory process versus mass. 4. Possible fibroid in the fundus of the uterus measuring 4 x 2.4 cm in size. 5. Mild diffuse fatty replacement of liver. 6. Punctate calcification left kidney with no associated hydronephrosis. Tiny right renal cyst. 7. Mild diffuse fatty replacement in the liver. 8. Right hip replacement in place. **This report has been created using voice recognition software. It may contain minor errors which are inherent in voice recognition technology. ** Final report electronically signed by DR Jaren Macedo on 3/13/2021 9:11 AM      Electronically signed by John Paul Mayorga DO on 3/16/2021 at 10:19 AM

## 2021-03-17 VITALS
OXYGEN SATURATION: 97 % | HEIGHT: 60 IN | BODY MASS INDEX: 19.24 KG/M2 | RESPIRATION RATE: 16 BRPM | WEIGHT: 98 LBS | HEART RATE: 90 BPM | TEMPERATURE: 98 F | SYSTOLIC BLOOD PRESSURE: 141 MMHG | DIASTOLIC BLOOD PRESSURE: 74 MMHG

## 2021-03-17 PROBLEM — K63.89 COLONIC MASS: Status: RESOLVED | Noted: 2021-03-13 | Resolved: 2021-03-17

## 2021-03-17 PROBLEM — R10.32 LEFT LOWER QUADRANT ABDOMINAL PAIN: Status: RESOLVED | Noted: 2021-03-13 | Resolved: 2021-03-17

## 2021-03-17 LAB
ADENOVIRUS F 40 41 PCR: NOT DETECTED
ANION GAP SERPL CALCULATED.3IONS-SCNC: 9 MEQ/L (ref 8–16)
ASTROVIRUS PCR: NOT DETECTED
BASOPHILS # BLD: 0.6 %
BASOPHILS ABSOLUTE: 0 THOU/MM3 (ref 0–0.1)
BUN BLDV-MCNC: 10 MG/DL (ref 7–22)
CALCIUM SERPL-MCNC: 8.7 MG/DL (ref 8.5–10.5)
CAMPYLOBACTER PCR: NOT DETECTED
CHLORIDE BLD-SCNC: 109 MEQ/L (ref 98–111)
CLOSTRIDIUM DIFFICILE, PCR: NOT DETECTED
CO2: 23 MEQ/L (ref 23–33)
CREAT SERPL-MCNC: 0.5 MG/DL (ref 0.4–1.2)
CRYPTOSPORIDIUM PCR: NOT DETECTED
CYCLOSPORA CAYETANENSIS PCR: NOT DETECTED
E COLI 0157 PCR: NORMAL
E COLI ENTEROAGGREGATIVE PCR: NOT DETECTED
E COLI ENTEROPATHOGENIC PCR: NOT DETECTED
E COLI ENTEROTOXIGENIC PCR: NOT DETECTED
E COLI SHIGA LIKE TOXIN PCR: NOT DETECTED
E COLI SHIGELLA/ENTEROINVASIVE PCR: NOT DETECTED
E HISTOLYTICA GI FILM ARRAY: NOT DETECTED
EOSINOPHIL # BLD: 0.4 %
EOSINOPHILS ABSOLUTE: 0 THOU/MM3 (ref 0–0.4)
ERYTHROCYTE [DISTWIDTH] IN BLOOD BY AUTOMATED COUNT: 13 % (ref 11.5–14.5)
ERYTHROCYTE [DISTWIDTH] IN BLOOD BY AUTOMATED COUNT: 48.8 FL (ref 35–45)
GFR SERPL CREATININE-BSD FRML MDRD: > 90 ML/MIN/1.73M2
GIARDIA LAMBLIA PCR: NOT DETECTED
GLUCOSE BLD-MCNC: 113 MG/DL (ref 70–108)
HCT VFR BLD CALC: 38.6 % (ref 37–47)
HEMOGLOBIN: 12.7 GM/DL (ref 12–16)
IMMATURE GRANS (ABS): 0.02 THOU/MM3 (ref 0–0.07)
IMMATURE GRANULOCYTES: 0.4 %
LYMPHOCYTES # BLD: 19.3 %
LYMPHOCYTES ABSOLUTE: 0.9 THOU/MM3 (ref 1–4.8)
MCH RBC QN AUTO: 33.2 PG (ref 26–33)
MCHC RBC AUTO-ENTMCNC: 32.9 GM/DL (ref 32.2–35.5)
MCV RBC AUTO: 101 FL (ref 81–99)
MONOCYTES # BLD: 6.6 %
MONOCYTES ABSOLUTE: 0.3 THOU/MM3 (ref 0.4–1.3)
NOROVIRUS GI GII PCR: NOT DETECTED
NUCLEATED RED BLOOD CELLS: 0 /100 WBC
PLATELET # BLD: 316 THOU/MM3 (ref 130–400)
PLESIOMONAS SHIGELLOIDES PCR: NOT DETECTED
PMV BLD AUTO: 10.9 FL (ref 9.4–12.4)
POTASSIUM REFLEX MAGNESIUM: 3.8 MEQ/L (ref 3.5–5.2)
PROCALCITONIN: 0.11 NG/ML (ref 0.01–0.09)
RBC # BLD: 3.82 MILL/MM3 (ref 4.2–5.4)
ROTAVIRUS A PCR: NOT DETECTED
SALMONELLA PCR: NOT DETECTED
SAPOVIRUS PCR: NOT DETECTED
SEG NEUTROPHILS: 72.7 %
SEGMENTED NEUTROPHILS ABSOLUTE COUNT: 3.4 THOU/MM3 (ref 1.8–7.7)
SODIUM BLD-SCNC: 141 MEQ/L (ref 135–145)
VIBRIO CHOLERAE PCR: NOT DETECTED
VIBRIO PCR: NOT DETECTED
WBC # BLD: 4.7 THOU/MM3 (ref 4.8–10.8)
YERSINIA ENTEROCOLITICA PCR: NOT DETECTED

## 2021-03-17 PROCEDURE — 6370000000 HC RX 637 (ALT 250 FOR IP): Performed by: INTERNAL MEDICINE

## 2021-03-17 PROCEDURE — 6360000002 HC RX W HCPCS: Performed by: INTERNAL MEDICINE

## 2021-03-17 PROCEDURE — 84145 PROCALCITONIN (PCT): CPT

## 2021-03-17 PROCEDURE — 36415 COLL VENOUS BLD VENIPUNCTURE: CPT

## 2021-03-17 PROCEDURE — 2580000003 HC RX 258: Performed by: INTERNAL MEDICINE

## 2021-03-17 PROCEDURE — 6360000002 HC RX W HCPCS: Performed by: STUDENT IN AN ORGANIZED HEALTH CARE EDUCATION/TRAINING PROGRAM

## 2021-03-17 PROCEDURE — 80048 BASIC METABOLIC PNL TOTAL CA: CPT

## 2021-03-17 PROCEDURE — 99232 SBSQ HOSP IP/OBS MODERATE 35: CPT | Performed by: SURGERY

## 2021-03-17 PROCEDURE — 99239 HOSP IP/OBS DSCHRG MGMT >30: CPT | Performed by: INTERNAL MEDICINE

## 2021-03-17 PROCEDURE — 85025 COMPLETE CBC W/AUTO DIFF WBC: CPT

## 2021-03-17 PROCEDURE — APPSS30 APP SPLIT SHARED TIME 16-30 MINUTES: Performed by: NURSE PRACTITIONER

## 2021-03-17 PROCEDURE — 2500000003 HC RX 250 WO HCPCS: Performed by: INTERNAL MEDICINE

## 2021-03-17 PROCEDURE — 6370000000 HC RX 637 (ALT 250 FOR IP): Performed by: NURSE PRACTITIONER

## 2021-03-17 PROCEDURE — 0097U HC GI PTHGN MULT REV TRANS & AMP PRB TECH 22 TRGT: CPT

## 2021-03-17 RX ORDER — CIPROFLOXACIN 500 MG/1
500 TABLET, FILM COATED ORAL 2 TIMES DAILY
Qty: 6 TABLET | Refills: 0 | Status: SHIPPED
Start: 2021-03-17 | End: 2021-03-17

## 2021-03-17 RX ORDER — DICYCLOMINE HYDROCHLORIDE 10 MG/1
10 CAPSULE ORAL 2 TIMES DAILY PRN
Status: DISCONTINUED | OUTPATIENT
Start: 2021-03-17 | End: 2021-03-17 | Stop reason: HOSPADM

## 2021-03-17 RX ORDER — METRONIDAZOLE 500 MG/1
500 TABLET ORAL 3 TIMES DAILY
Qty: 6 TABLET | Refills: 0 | Status: SHIPPED | OUTPATIENT
Start: 2021-03-17 | End: 2021-03-19

## 2021-03-17 RX ORDER — SULFAMETHOXAZOLE AND TRIMETHOPRIM 800; 160 MG/1; MG/1
1 TABLET ORAL 2 TIMES DAILY
Qty: 10 TABLET | Refills: 0 | Status: SHIPPED | OUTPATIENT
Start: 2021-03-17 | End: 2021-03-22

## 2021-03-17 RX ORDER — DIPHENHYDRAMINE HYDROCHLORIDE 50 MG/ML
25 INJECTION INTRAMUSCULAR; INTRAVENOUS ONCE
Status: COMPLETED | OUTPATIENT
Start: 2021-03-17 | End: 2021-03-17

## 2021-03-17 RX ORDER — DICYCLOMINE HYDROCHLORIDE 10 MG/1
10 CAPSULE ORAL 2 TIMES DAILY PRN
Qty: 120 CAPSULE | Refills: 0 | Status: SHIPPED | OUTPATIENT
Start: 2021-03-17 | End: 2021-03-31

## 2021-03-17 RX ADMIN — CEFTRIAXONE SODIUM 1000 MG: 1 INJECTION, POWDER, FOR SOLUTION INTRAMUSCULAR; INTRAVENOUS at 15:37

## 2021-03-17 RX ADMIN — METRONIDAZOLE 500 MG: 500 INJECTION, SOLUTION INTRAVENOUS at 09:59

## 2021-03-17 RX ADMIN — ENOXAPARIN SODIUM 40 MG: 40 INJECTION SUBCUTANEOUS at 15:44

## 2021-03-17 RX ADMIN — DICYCLOMINE HYDROCHLORIDE 10 MG: 10 CAPSULE ORAL at 06:06

## 2021-03-17 RX ADMIN — DIPHENHYDRAMINE HYDROCHLORIDE 25 MG: 50 INJECTION, SOLUTION INTRAMUSCULAR; INTRAVENOUS at 15:37

## 2021-03-17 RX ADMIN — PREDNISONE 5 MG: 1 TABLET ORAL at 09:59

## 2021-03-17 RX ADMIN — FAMOTIDINE 20 MG: 20 TABLET, FILM COATED ORAL at 10:01

## 2021-03-17 NOTE — DISCHARGE SUMMARY
Hospital Medicine Discharge Summary      Patient Identification:   Isabelle Mansfield   : 1965  MRN: [id number]   Account: [de-identified]      Patient's PCP: Colton Lewis    Admit Date: 3/13/2021     Discharge Date:   3/17/2021    Admitting Physician: Hemalatha Manzo DO     Discharge Physician: Claudia Barron MD PGY-1      Discharge Diagnoses:  1. Abdominal pain lower quadrants, resolved: Initial 3/13 CT abdomen/pelvis demonstrated abnormal density in the rectosigmoid colon consistent with inflammatory process, mucosal thickening in the right colon & hepatic flexure, abnormal density in the ascending colon could represent inflammatory process vs mass, mild diffuse fatty liver. 3/16 CT of abdomen pelvis revealed previously described abnormality involving the ascending colon is not visualized. Patient was started on Flagyl and Rocephin. Pain improved throughout hospital stay. Will discharge patient on Ciprofloxacin 500 mg BID and Flagyl 500 TID for the remaining 7 day course. Scheduled colonoscopy 21 with Dr. Uriel Pendleton (GI)  2. Diarrhea, resolved - GI molecular panel->negative. Will follow with GI as outpatient  3. Hx of RA - continue Xeljanz and Prednisone       The patient was seen and examined on day of discharge and this discharge summary is in conjunction with any daily progress note from day of discharge. Hospital Course:   Isabelle Mansfield is a 50 y.o. female admitted to [address] [place] [address] on 3/13/2021 for abdominal pain. She experienced sudden onset of abdominal pain located in her lower abdomen which started last night. During the night pain progressively got worse. She had multiple bouts of diarrhea. She is states she has had nonbloody bowel movements that were diarrhea in nature. She ate her past medical history includes rheumatoid arthritis and multiple surgeries secondary to rheumatoid arthritis. Last colonoscopy was approximately 10 years ago.      3/14/21 hyponatremia, hypokalemia, hypoglycemia, hypomagnemia, will adjust IV fluids and correct electrolyte imbalance. WBC is decreasing, urinalysis is negative, blood cultures are negative growth to date.     3/15/21 patient hypokalemic today will correct. Patient states she does not feel like herself today. Denies any pain, cramping, numbness, or tingling, admits to slight nausea, nausea may be secondary to metronidazole. WBC decreasing, glucose is back within the normal range.,  Lactic acid is 0.8, procalcitonin 0.12. May need to repeat CT of abdomen in a.m.     3/16/21 CT of abdomen pelvis revealed previously described abnormality involving the ascending colon is not visualized. Persistent thickening in the region of the sigmoid colon with some diverticula. Patient states that she feels much improved today almost back to baseline. Possible discharge in the next 1 to 2 days if improvement on CT scan and continue with p.o. antibiotics on an outpatient basis, and follow-up with GI.    3/17/21 Patient stable for discharge. She is hemodynamically stable. Okay per GEN surge and GI for discharge. We will continue p.o. antibiotics as outpatient to complete 7-day therapy course. Patient scheduled for colonoscopy on April 29, 2021 with Dr. Susan Rojas. Exam:     Vitals:  Vitals:    03/16/21 2057 03/16/21 2130 03/17/21 0315 03/17/21 0745   BP: 136/65 131/67 134/75 (!) 156/78   Pulse: 82 81 68 65   Resp: 16 16 14 15   Temp: 98.1 °F (36.7 °C) 98.3 °F (36.8 °C) 98.1 °F (36.7 °C) 98.1 °F (36.7 °C)   TempSrc: Oral Oral Oral Oral   SpO2: 96% 96% 97% 98%   Weight:       Height:         Weight: Weight: 98 lb (44.5 kg)     24 hour intake/output:    Intake/Output Summary (Last 24 hours) at 3/17/2021 1246  Last data filed at 3/17/2021 1220  Gross per 24 hour   Intake 1433.08 ml   Output 3070 ml   Net -1636.92 ml         General appearance:  No apparent distress, appears stated age and cooperative.   HEENT:  Normal cephalic, atraumatic without obvious deformity. Pupils equal, round, and reactive to light. Extra ocular muscles intact. Conjunctivae/corneas clear. Neck: Supple, with full range of motion. No jugular venous distention. Trachea midline. Respiratory:  Normal respiratory effort. Clear to auscultation, bilaterally without Rales/Wheezes/Rhonchi. Cardiovascular:  Regular rate and rhythm with normal S1/S2 without murmurs, rubs or gallops. Abdomen: Soft, non-tender, non-distended with normal bowel sounds. Musculoskeletal:  No clubbing, cyanosis or edema bilaterally. Full range of motion without deformity. Skin: Skin color, texture, turgor normal.  No rashes or lesions. Neurologic:  Neurovascularly intact without any focal sensory/motor deficits. Cranial nerves: II-XII intact, grossly non-focal.  Psychiatric:  Alert and oriented, thought content appropriate, normal insight  Capillary Refill: Brisk,< 3 seconds   Peripheral Pulses: +2 palpable, equal bilaterally       Labs: For convenience and continuity at follow-up the following most recent labs are provided:      CBC:    Lab Results   Component Value Date    WBC 4.7 03/17/2021    HGB 12.7 03/17/2021    HCT 38.6 03/17/2021     03/17/2021       Renal:    Lab Results   Component Value Date     03/17/2021    K 3.8 03/17/2021     03/17/2021    CO2 23 03/17/2021    BUN 10 03/17/2021    CREATININE 0.5 03/17/2021    CALCIUM 8.7 03/17/2021         Significant Diagnostic Studies    Radiology:   CT ABDOMEN PELVIS W IV CONTRAST Additional Contrast? Oral   Final Result      1. The previously described abnormality involving the ascending colon is not visualized on the current examination. There is persistent thickening in the region of the sigmoid colon with some diverticula. This could be due to incomplete distention    although mild acute diverticulitis could have a similar appearance. 2. Hypoattenuated appearance of the liver which may be due to fatty infiltration. **This report has been created using voice recognition software. It may contain minor errors which are inherent in voice recognition technology. **      Final report electronically signed by Dr Erick Black on 3/16/2021 4:40 PM      CT ABDOMEN PELVIS W IV CONTRAST Additional Contrast? None   Final Result      1. Abnormal density in the rectosigmoid colon consistent with inflammatory process. 2. Abnormal mucosal thickening in the right colon and hepatic flexure. 3. Abnormal density in the ascending colon measuring 3.4 x 2.5 x 2.8 cm which could represent an inflammatory process versus mass. 4. Possible fibroid in the fundus of the uterus measuring 4 x 2.4 cm in size. 5. Mild diffuse fatty replacement of liver. 6. Punctate calcification left kidney with no associated hydronephrosis. Tiny right renal cyst.   7. Mild diffuse fatty replacement in the liver. 8. Right hip replacement in place. **This report has been created using voice recognition software. It may contain minor errors which are inherent in voice recognition technology. **      Final report electronically signed by DR Adelia Ocasio on 3/13/2021 9:11 AM             Consults:     IP CONSULT TO GENERAL SURGERY  IP CONSULT TO GI  IP CONSULT TO GENERAL SURGERY    Disposition: Home  Condition at Discharge: Stable    Code Status:  Full Code     Patient Instructions:    Discharge lab work: Activity: activity as tolerated  Diet: DIET LOW FIBER;       Follow-up visits:   Margaret Boss MD  [facility]  [place] [address]  [address]    On 4/29/2021  colonoscopy at 9:00 am, arrive by 8:00 am         Discharge Medications:      Donte Wheeler   Home Medication Instructions FGX:[id number]    Printed on:03/17/21 1246   Medication Information                      Acetaminophen (TYLENOL ARTHRITIS PAIN PO)  Take 650 mg by mouth every 4 hours as needed              calcium carbonate-vitamin D (CALCIUM + D) 600-200 MG-UNIT TABS  Take 1 tablet by mouth daily              ciprofloxacin (CIPRO) 500 MG tablet  Take 1 tablet by mouth 2 times daily for 3 days             dicyclomine (BENTYL) 10 MG capsule  Take 1 capsule by mouth 2 times daily as needed (abdominal cramping and pain)             famciclovir (FAMVIR) 500 MG tablet  Take 500 mg by mouth 3 times daily as needed (for shingles outbreak)             Glucosamine-Chondroitin (JOINT SUPPORT PO)  Take 1 tablet by mouth daily              MAGNESIUM ACETATE  by Does not apply route For bowels             metroNIDAZOLE (FLAGYL) 500 MG tablet  Take 1 tablet by mouth 3 times daily for 2 days             OLIVE LEAF PO  Take by mouth daily              predniSONE (DELTASONE) 5 MG tablet  Take 5 mg by mouth 2 times daily              Probiotic Product (PROBIOTIC FORMULA PO)  Take by mouth daily              Rizatriptan Benzoate (MAXALT PO)  Take by mouth as needed             therapeutic multivitamin-minerals (THERAGRAN-M) tablet  Take 1 tablet by mouth daily. Tofacitinib Citrate (XELJANZ XR) 11 MG TB24  Take 0.5 tablets by mouth daily              VITAMIN D-3 (COLECALCIFEROL) 400 UNITS TABS  Take  by mouth daily. Time Spent on discharge is more than 30 minutes in the examination, evaluation, counseling and review of medications and discharge plan. Signed: Thank you Melva Ca for the opportunity to be involved in this patient's care.     Electronically signed by Sabrina Gold MD PGY-1 Internal Medicine Resident on 3/17/2021 at 12:46 PM

## 2021-03-17 NOTE — PROGRESS NOTES
[address] [place] [address]   Dr. Yulia Stanton  Daily Progress Note  Pt Name: Anu Gore Record Number: [id number]  Date of Birth 1965   Today's Date: 3/17/2021    HD#4    CHIEF COMPLAINT colitis vs diverticulitis    SUBJECTIVE  Patient states she is feeling much better today, she is in shower, discussed CT scan. Tolerating diet. Denies N&V  NO chest pain or SOB. Chart has been reviewed and updated by nursing staff. OBJECTIVE  CURRENT VITALS BP (!) 156/78   Pulse 65   Temp 98.1 °F (36.7 °C) (Oral)   Resp 15   Ht 5' (1.524 m)   Wt 98 lb (44.5 kg)   LMP 11/06/2011   SpO2 98%   BMI 19.14 kg/m²   LUNGS: Lungs clear   ABDOMEN: BS + but diminished. RLQ tenderness has improved,  LLQ tenderness is better. BS active   WOUNDS: n/a  24 HR INTAKE/OUTPUT :     Intake/Output Summary (Last 24 hours) at 3/17/2021 1243  Last data filed at 3/17/2021 1220  Gross per 24 hour   Intake 1433.08 ml   Output 3070 ml   Net -1636.92 ml     DRAIN/TUBE OUTPUT :      LABS  CBC :   Lab Results   Component Value Date    WBC 4.7 03/17/2021    HGB 12.7 03/17/2021    HCT 38.6 03/17/2021     03/17/2021     BMP:   Lab Results   Component Value Date     03/17/2021    K 3.8 03/17/2021     03/17/2021    CO2 23 03/17/2021    BUN 10 03/17/2021    CREATININE 0.5 03/17/2021    MG 1.9 03/15/2021     Narrative   PROCEDURE: CT ABDOMEN PELVIS W IV CONTRAST       CLINICAL INFORMATION: 53-year-old female with history of abdominal pain for days ago with a sudden onset.  Follow-up exam .       COMPARISON: CT dated 3/13/2021.       TECHNIQUE: 5 mm axial CT images were obtained through the abdomen and pelvis after the administration of intravenous and oral contrast. Coronal and sagittal reconstructions were obtained.       All CT scans at this facility use dose modulation, iterative reconstruction, and/or weight-based dosing when appropriate to reduce radiation dose to as low as reasonably achievable.       FINDINGS: There is some atelectasis at the lung bases. There is no pleural effusion or pneumothorax.       The base of the heart is within acceptable limits.       There is hypoattenuation of the liver which may be due to fatty infiltration.       The spleen, pancreas, gallbladder and adrenal glands are within normal limits.       The kidneys are symmetric in size, shape and degree of enhancement. There is no hydronephrosis.       There is no evidence of a small bowel obstruction.       Contrast material has transited the GI tract to the level of the ascending colon. The previously described abnormality involving the ascending colon is not well visualized on the current exam. However, there is persistence of a slightly thickened    appearance in the sigmoid colon with numerous diverticula.       There is a right total hip arthroplasty. This is causing streak artifact in the pelvis which slightly limits evaluation.       The uterus is present.       The urinary bladder is grossly normal.       There is no free intraperitoneal air.       The aorta and the IVC are normal in caliber.       The bones are intact. There are no acute fractures.           Impression       1. The previously described abnormality involving the ascending colon is not visualized on the current examination. There is persistent thickening in the region of the sigmoid colon with some diverticula. This could be due to incomplete distention    although mild acute diverticulitis could have a similar appearance. 2. Hypoattenuated appearance of the liver which may be due to fatty infiltration.                   **This report has been created using voice recognition software. It may contain minor errors which are inherent in voice recognition technology. **       Final report electronically signed by Dr Hays on 3/16/2021 4:40 PM           ASSESSMENT  1. CT - possible mild acute diverticulitis, improving inflammation   2.  Leukocytosis resolved  3. LLQ pain  improving   4. HX RA chronic steroids and medical marijuana           PLAN  1. Conservative treatment   2. IV antibiotics   3. Soft diet   4. Pain and nausea control   5. Activity as tolerated   6. GI on board   7. GI and DVT prophylaxis   8. CT scan reviewed and discussed   9. Okay from a surgical standpoint for discharge         Electronically signed by JALEESA Stover CNP on 3/17/2021 at 12:43 PM Patient seen and examined independently by me. Above discussed and I agree with CNP. Labs, cultures, and radiographs where available were reviewed. See orders for the updated patient care plan.     Martha Donahue MD, patient is feeling better actually having loose stool CT repeat shows ascending colon to be improved some mild sigmoid thickening with some diverticula still difficult to say if patient had a colitis or diverticulitis nonetheless she is improved medicine is planning on sending her home she can follow-up with me in the office as needed  3/17/2021   7:31 PM

## 2021-03-17 NOTE — PROGRESS NOTES
Pain assessment completed at this time. Pt had complaints of pain earlier today. Pt states she is \"feeling a lot better this afternoon. \" Pt denies any pain at this time. No further changes since previous assessment. Continuing to monitor for pain. Call light in reach.

## 2021-03-17 NOTE — CARE COORDINATION
Patient is discharged to home today with no services added/reqeusted. Hospital follow-up appointments in place. 3/17/21, 2:38 PM EDT    Patient goals/plan/ treatment preferences discussed by  and . Patient goals/plan/ treatment preferences reviewed with patient/ family. Patient/ family verbalize understanding of discharge plan and are in agreement with goal/plan/treatment preferences. Understanding was demonstrated using the teach back method. AVS provided by RN at time of discharge, which includes all necessary medical information pertaining to the patients current course of illness, treatment, post-discharge goals of care, and treatment preferences.

## 2021-03-17 NOTE — PROGRESS NOTES
Patient discharged from [facility] at this time with personal belongings/AVS and medications from meds to beds in possession upon departure. Pt's  present and transporting pt home. Discharge instructions provided and pt denied further questions or concerns. Chart broken down and files placed in yellow bin.

## 2021-03-19 LAB
BLOOD CULTURE, ROUTINE: NORMAL
BLOOD CULTURE, ROUTINE: NORMAL

## 2021-04-02 ENCOUNTER — HOSPITAL ENCOUNTER (EMERGENCY)
Age: 56
Discharge: HOME | End: 2021-04-03
Attending: EMERGENCY MEDICINE
Payer: COMMERCIAL

## 2021-04-02 VITALS
RESPIRATION RATE: 13 BRPM | DIASTOLIC BLOOD PRESSURE: 81 MMHG | HEIGHT: 60 IN | BODY MASS INDEX: 19.63 KG/M2 | HEART RATE: 80 BPM | TEMPERATURE: 98.7 F | WEIGHT: 100 LBS | OXYGEN SATURATION: 98 % | SYSTOLIC BLOOD PRESSURE: 132 MMHG

## 2021-04-02 DIAGNOSIS — K52.9 COLITIS: Primary | ICD-10-CM

## 2021-04-02 DIAGNOSIS — R10.32 LEFT LOWER QUADRANT ABDOMINAL PAIN: ICD-10-CM

## 2021-04-02 LAB
ANION GAP SERPL CALCULATED.3IONS-SCNC: 8 MEQ/L (ref 8–16)
BACTERIA: ABNORMAL /HPF
BASOPHILS # BLD: 0.3 %
BASOPHILS ABSOLUTE: 0 THOU/MM3 (ref 0–0.1)
BILIRUBIN URINE: NEGATIVE
BLOOD, URINE: NEGATIVE
BUN BLDV-MCNC: 11 MG/DL (ref 7–22)
CALCIUM SERPL-MCNC: 9 MG/DL (ref 8.5–10.5)
CASTS 2: ABNORMAL /LPF
CASTS UA: ABNORMAL /LPF
CHARACTER, URINE: CLEAR
CHLORIDE BLD-SCNC: 104 MEQ/L (ref 98–111)
CO2: 26 MEQ/L (ref 23–33)
COLOR: YELLOW
CREAT SERPL-MCNC: 0.5 MG/DL (ref 0.4–1.2)
CRYSTALS, UA: ABNORMAL
EOSINOPHIL # BLD: 0.2 %
EOSINOPHILS ABSOLUTE: 0 THOU/MM3 (ref 0–0.4)
EPITHELIAL CELLS, UA: ABNORMAL /HPF
ERYTHROCYTE [DISTWIDTH] IN BLOOD BY AUTOMATED COUNT: 13.1 % (ref 11.5–14.5)
ERYTHROCYTE [DISTWIDTH] IN BLOOD BY AUTOMATED COUNT: 49.3 FL (ref 35–45)
GFR SERPL CREATININE-BSD FRML MDRD: > 90 ML/MIN/1.73M2
GLUCOSE BLD-MCNC: 112 MG/DL (ref 70–108)
GLUCOSE URINE: NEGATIVE MG/DL
HCT VFR BLD CALC: 42.5 % (ref 37–47)
HEMOGLOBIN: 13.4 GM/DL (ref 12–16)
IMMATURE GRANS (ABS): 0.04 THOU/MM3 (ref 0–0.07)
IMMATURE GRANULOCYTES: 0.3 %
KETONES, URINE: NEGATIVE
LEUKOCYTE ESTERASE, URINE: ABNORMAL
LYMPHOCYTES # BLD: 5.1 %
LYMPHOCYTES ABSOLUTE: 0.7 THOU/MM3 (ref 1–4.8)
MCH RBC QN AUTO: 32.3 PG (ref 26–33)
MCHC RBC AUTO-ENTMCNC: 31.5 GM/DL (ref 32.2–35.5)
MCV RBC AUTO: 102.4 FL (ref 81–99)
MISCELLANEOUS 2: ABNORMAL
MONOCYTES # BLD: 6.7 %
MONOCYTES ABSOLUTE: 0.9 THOU/MM3 (ref 0.4–1.3)
NITRITE, URINE: NEGATIVE
NUCLEATED RED BLOOD CELLS: 0 /100 WBC
OSMOLALITY CALCULATION: 275.8 MOSMOL/KG (ref 275–300)
PH UA: 7.5 (ref 5–9)
PLATELET # BLD: 307 THOU/MM3 (ref 130–400)
PMV BLD AUTO: 10.8 FL (ref 9.4–12.4)
POTASSIUM REFLEX MAGNESIUM: 4.5 MEQ/L (ref 3.5–5.2)
PROTEIN UA: NEGATIVE
RBC # BLD: 4.15 MILL/MM3 (ref 4.2–5.4)
RBC URINE: ABNORMAL /HPF
RENAL EPITHELIAL, UA: ABNORMAL
SEG NEUTROPHILS: 87.4 %
SEGMENTED NEUTROPHILS ABSOLUTE COUNT: 11.6 THOU/MM3 (ref 1.8–7.7)
SODIUM BLD-SCNC: 138 MEQ/L (ref 135–145)
SPECIFIC GRAVITY, URINE: 1.01 (ref 1–1.03)
UROBILINOGEN, URINE: 0.2 EU/DL (ref 0–1)
WBC # BLD: 13.3 THOU/MM3 (ref 4.8–10.8)
WBC UA: ABNORMAL /HPF
YEAST: ABNORMAL

## 2021-04-02 PROCEDURE — 81001 URINALYSIS AUTO W/SCOPE: CPT

## 2021-04-02 PROCEDURE — 36415 COLL VENOUS BLD VENIPUNCTURE: CPT

## 2021-04-02 PROCEDURE — 96372 THER/PROPH/DIAG INJ SC/IM: CPT

## 2021-04-02 PROCEDURE — 99283 EMERGENCY DEPT VISIT LOW MDM: CPT

## 2021-04-02 PROCEDURE — 85025 COMPLETE CBC W/AUTO DIFF WBC: CPT

## 2021-04-02 PROCEDURE — 6360000002 HC RX W HCPCS: Performed by: STUDENT IN AN ORGANIZED HEALTH CARE EDUCATION/TRAINING PROGRAM

## 2021-04-02 PROCEDURE — 80048 BASIC METABOLIC PNL TOTAL CA: CPT

## 2021-04-02 RX ORDER — KETOROLAC TROMETHAMINE 30 MG/ML
15 INJECTION, SOLUTION INTRAMUSCULAR; INTRAVENOUS ONCE
Status: COMPLETED | OUTPATIENT
Start: 2021-04-02 | End: 2021-04-02

## 2021-04-02 RX ORDER — AMOXICILLIN AND CLAVULANATE POTASSIUM 875; 125 MG/1; MG/1
1 TABLET, FILM COATED ORAL ONCE
Status: COMPLETED | OUTPATIENT
Start: 2021-04-03 | End: 2021-04-03

## 2021-04-02 RX ADMIN — KETOROLAC TROMETHAMINE 15 MG: 30 INJECTION, SOLUTION INTRAMUSCULAR; INTRAVENOUS at 23:12

## 2021-04-02 ASSESSMENT — ENCOUNTER SYMPTOMS
SHORTNESS OF BREATH: 0
CONSTIPATION: 0
BLOOD IN STOOL: 0
ABDOMINAL PAIN: 1
NAUSEA: 0
VOMITING: 0
DIARRHEA: 0
COUGH: 0

## 2021-04-02 ASSESSMENT — PAIN DESCRIPTION - PAIN TYPE: TYPE: ACUTE PAIN

## 2021-04-02 ASSESSMENT — PAIN SCALES - GENERAL: PAINLEVEL_OUTOF10: 7

## 2021-04-03 PROCEDURE — 6370000000 HC RX 637 (ALT 250 FOR IP): Performed by: STUDENT IN AN ORGANIZED HEALTH CARE EDUCATION/TRAINING PROGRAM

## 2021-04-03 RX ORDER — AMOXICILLIN AND CLAVULANATE POTASSIUM 875; 125 MG/1; MG/1
1 TABLET, FILM COATED ORAL 2 TIMES DAILY
Qty: 20 TABLET | Refills: 0 | Status: SHIPPED | OUTPATIENT
Start: 2021-04-03 | End: 2021-04-13

## 2021-04-03 RX ORDER — IBUPROFEN 600 MG/1
600 TABLET ORAL 3 TIMES DAILY PRN
Qty: 30 TABLET | Refills: 0 | Status: SHIPPED | OUTPATIENT
Start: 2021-04-03

## 2021-04-03 RX ADMIN — AMOXICILLIN AND CLAVULANATE POTASSIUM 1 TABLET: 875; 125 TABLET, FILM COATED ORAL at 00:07

## 2021-04-03 NOTE — ED PROVIDER NOTES
Patient is discharged already. Pending transportation. I was signed out to follow patient's discharge to ensure she has an uneventful ED stay. I did not have a chance to see the patient. Patient was discharged as planned.      Kailee Sherman MD  04/03/21 [id number]

## 2021-04-03 NOTE — ED PROVIDER NOTES
[facility]  EMERGENCY MEDICINE ATTENDING ATTESTATION      Evaluation of Sera Roblero. Case discussed and care plan developed with resident physician. I agree with the resident physician documentation and plan as documented by him, except if my documentation differs. Patient seen, interviewed and examined by me. I reviewed the medical, surgical, family and social history, medications and allergies. I have reviewed the nursing documentation. I have reviewed the patient's vital signs and are abnormal from Hypertension per my interpretation. Body mass index is 19.53 kg/m². Pulsoxymetry is normal per my interpretation. Brief H&P   Patient c/o left lower quadrant abdominal pain since around 5 AM today. Patient was recently admitted to the hospital for colitis with significant inflammation on the descending colon and sigmoid. Patient is states he was discharged on antibiotics but she did not finish the Flagyl as she was having a reaction to it. Denies fever. Physical exam is notable for well appearing, mildly tender in the left lower quadrant, otherwise nonfocal exam.  Patient appears uncomfortable from pain. Medical Decision Making   MDM:   1. Persistent versus recurrent colitis  Plan:    Labs   Analgesia   May need new course of antibiotics and follow-up with GI    Please see the resident physician completed note for final disposition except as documented on this attestation. I have reviewed and interpreted all available lab, radiology and ekg results available at the moment. Diagnosis, treatment and disposition plans were discussed and agreed upon by patient. This transcription was electronically signed. It was dictated by use of voice recognition software and electronically transcribed. The transcription may contain errors not detected in proofreading.      I performed direct supervision and was present for the critical portion following procedures: None  Critical care time on this case: None    Electronically signed by Efrain Bear MD on 4/2/21 at 11:22 PM EDT       Efrain Bear MD  04/02/21 8661

## 2021-04-03 NOTE — ED PROVIDER NOTES
[place] ENCOUNTER          Pt Name: Nas Giron  MRN: [id number]  [place] 1965  Date of evaluation: 4/2/2021  Treating Resident Physician: Neal Garcia MD  Supervising Physician: Dr. Knowles       Chief Complaint   Patient presents with    Abdominal Pain     left lower      History obtained from the patient. HISTORY OF PRESENT ILLNESS    HPI  Sera Frye is a 50 y.o. female who presents to the emergency department for evaluation of left lower abdominal pain. She states that the pain started around 5 AM this morning and progressively worsened. She states that the pain worsened with each bowel movement. The pain is constant, but waxes and wanes. Worse with bowel movements. Pain is described as crampy. States that pain was so bad that she almost passed out. Denies nausea, vomiting, diarrhea, hematochezia, melena, chest pain, shortness of breath, numbness, weakness, dizziness, syncope. She is admitted to the hospital on 03/13 with similar symptoms. At that time a CT scan of the abdomen showed an abnormal density in the rectosigmoid colon consistent with inflammatory process, mucosal thickening in the right colon and hepatic flexure, abnormal density in the ascending colon which could represent inflammatory process versus mass. Her pain improved in the emergency department with Flagyl and Rocephin. She was discharged home with Cipro and Flagyl and scheduled for outpatient colonoscopy on 04/21 with Dr. Sonia Calles. She did complete the Cipro, but states that she developed a reaction to the Flagyl and had to stop taking it. She attempted to reach her GI specialist today, however was unable to do so. Was then advised to come to emergency department by her primary care doctor, Dr. Teresa Bernal. The patient has no other acute complaints at this time.         REVIEW OF SYSTEMS   Review of Systems   Constitutional: Negative for chills and fever. Respiratory: Negative for cough and shortness of breath. Cardiovascular: Negative for chest pain and palpitations. Gastrointestinal: Positive for abdominal pain. Negative for blood in stool, constipation, diarrhea, nausea and vomiting. Genitourinary: Negative for dysuria. Neurological: Negative for dizziness, weakness and headaches. All other systems reviewed and are negative.         PAST MEDICAL AND SURGICAL HISTORY     Past Medical History:   Diagnosis Date    TRINI (acute kidney injury) ([facility] [address] 12/8/2012    Arthritis     Malignant hyperthermia     daughters    RA (rheumatoid arthritis) ([facility] [address]      Past Surgical History:   Procedure Laterality Date    ARTHRODESIS Right 5/23/2019    RIGHT 1ST MPJ FUSION, METATARSAL HEAD RESECTION 2-5, TOES 2-5 ARTHROPLASTY/ARTHRODESIS & PLANTAR SKIN FLAP performed by Madhav Rodriguez DPM at [address] Left 9/5/2019    LEFT 1ST MPJ FUSION, METATARSAL HEADS 2-5 RESECTION, ARTHRODESIS 2-4 TOES, ARTHROPLASTY 5TH TOE ALL ON THE LEFT performed by Madhav Rodriguez DPM at [address] [facility] Right 12/13/2019    RT HALLUX OSTECTOMY performed by Madhav Rodriguez DPM at [address] Right 7/18/2020    right total hip arthroplasty performed by Yasir Velasco MD at [place] [address]. Right     thumb fusion    SKIN BIOPSY  05/2018    lower left leg, melanoma    TUBAL LIGATION           MEDICATIONS     Current Facility-Administered Medications:     amoxicillin-clavulanate (AUGMENTIN) 875-125 MG per tablet 1 tablet, 1 tablet, Oral, Once, Neal Garcia MD    Current Outpatient Medications:     amoxicillin-clavulanate (AUGMENTIN) 875-125 MG per tablet, Take 1 tablet by mouth 2 times daily for 10 days, Disp: 20 tablet, Rfl: 0    ibuprofen (ADVIL;MOTRIN) 600 MG tablet, Take 1 tablet by mouth 3 times daily as needed for Pain, Disp: 30 tablet, Rfl: 0    dicyclomine (BENTYL) 10 MG capsule, Take 1 capsule by mouth 2 times daily as needed (abdominal cramping and pain), Disp: 120 capsule, Rfl: 0    Rizatriptan Benzoate (MAXALT PO), Take by mouth as needed, Disp: , Rfl:     Tofacitinib Citrate (XELJANZ XR) 11 MG TB24, Take 0.5 tablets by mouth daily , Disp: , Rfl:     famciclovir (FAMVIR) 500 MG tablet, Take 500 mg by mouth 3 times daily as needed (for shingles outbreak), Disp: , Rfl:     OLIVE LEAF PO, Take by mouth daily , Disp: , Rfl:     Glucosamine-Chondroitin (JOINT SUPPORT PO), Take 1 tablet by mouth daily , Disp: , Rfl:     therapeutic multivitamin-minerals (THERAGRAN-M) tablet, Take 1 tablet by mouth daily. , Disp: , Rfl:     VITAMIN D-3 (COLECALCIFEROL) 400 UNITS TABS, Take  by mouth daily. , Disp: , Rfl:     calcium carbonate-vitamin D (CALCIUM + D) 600-200 MG-UNIT TABS, Take 1 tablet by mouth daily , Disp: , Rfl:     MAGNESIUM ACETATE, by Does not apply route For bowels, Disp: , Rfl:     Probiotic Product (PROBIOTIC FORMULA PO), Take by mouth daily , Disp: , Rfl:     Acetaminophen (TYLENOL ARTHRITIS PAIN PO), Take 650 mg by mouth every 4 hours as needed , Disp: , Rfl:     predniSONE (DELTASONE) 5 MG tablet, Take 5 mg by mouth 2 times daily , Disp: , Rfl:       SOCIAL HISTORY     Social History     Social History Narrative    Not on file     Social History     Tobacco Use    Smoking status: Never Smoker    Smokeless tobacco: Never Used   Substance Use Topics    Alcohol use: Not Currently     Comment: joya westbrook had glass of wine    Drug use: Yes     Types: Marijuana     Comment: medical marijuana since June 2020 for migraines and RA         ALLERGIES     Allergies   Allergen Reactions    Succinylcholine Other (See Comments)     Malignant hyperthermia  (Muscle rigidity, high fever and fast heart rate) in daughters    Ciprofloxacin      Chest pain, numbness in arms, significant light-headedness    Methylprednisolone Other (See Comments)     Cause heart palpitations    Tramadol Other (See Comments)     Dizzy  Unable to walk          FAMILY HISTORY     Family History   Problem Relation Age of Onset    Heart Disease Mother     Atrial Fibrillation Mother     Cancer Mother     Alcohol Abuse Father     Cancer Father     Colon Cancer Father     Arthritis Maternal Grandmother          PREVIOUS RECORDS   Previous records reviewed: Recently admitted on 03/13 with similar symptoms. Had a CT abdomen and pelvis which showed abnormal density in the rectosigmoid colon and in the ascending colon could represent inflammatory process versus mass. Patient was scheduled for colonoscopy with Dr. Eileen Calderón on 04/29. PHYSICAL EXAM     ED Triage Vitals [04/02/21 2126]   BP Temp Temp Source Pulse Resp SpO2 Height Weight   (!) 146/65 98.7 °F (37.1 °C) Oral 84 17 99 % 5' (1.524 m) 100 lb (45.4 kg)     Initial vital signs and nursing assessment reviewed and normal. Body mass index is 19.53 kg/m². Pulsoximetry is normal per my interpretation. Additional Vital Signs:  Vitals:    04/02/21 2315   BP: 132/81   Pulse: 80   Resp: 13   Temp:    SpO2: 98%       Physical Exam  Vitals signs and nursing note reviewed. Constitutional:       General: She is not in acute distress. Appearance: Normal appearance. She is not ill-appearing. HENT:      Head: Normocephalic and atraumatic. Right Ear: External ear normal.      Left Ear: External ear normal.   Eyes:      Extraocular Movements: Extraocular movements intact. Conjunctiva/sclera: Conjunctivae normal.      Pupils: Pupils are equal, round, and reactive to light. Neck:      Musculoskeletal: Normal range of motion and neck supple. No muscular tenderness. Cardiovascular:      Rate and Rhythm: Normal rate and regular rhythm. Pulses: Normal pulses. Heart sounds: No murmur. Pulmonary:      Effort: Pulmonary effort is normal. No respiratory distress. Breath sounds: Normal breath sounds. No wheezing.    Abdominal:      General: Abdomen is flat. Bowel sounds are normal. There is no distension. Palpations: Abdomen is soft. Tenderness: There is abdominal tenderness ( Mild generalized pain. Moderate pain to palpation in the left lower quadrant. ). There is no guarding or rebound. Hernia: No hernia is present. Musculoskeletal: Normal range of motion. Right lower leg: No edema. Left lower leg: No edema. Skin:     General: Skin is warm and dry. Capillary Refill: Capillary refill takes less than 2 seconds. Findings: No rash. Neurological:      General: No focal deficit present. Mental Status: She is alert and oriented to person, place, and time. Sensory: No sensory deficit. Gait: Gait normal.   Psychiatric:         Mood and Affect: Mood normal.         Behavior: Behavior normal.             MEDICAL DECISION MAKING   Initial Assessment:   1. Abdominal pain  2. Colitis vs diverticultis vs mass    Plan:    Labs   Morphine for pain   May need to be started on antibiotics and probiotics    Did recently have a CT ABD.  No need for re-imaging at this time        ED RESULTS   Laboratory results:  Labs Reviewed   BASIC METABOLIC PANEL W/ REFLEX TO MG FOR LOW K - Abnormal; Notable for the following components:       Result Value    Glucose 112 (*)     All other components within normal limits   CBC WITH AUTO DIFFERENTIAL - Abnormal; Notable for the following components:    WBC 13.3 (*)     RBC 4.15 (*)     .4 (*)     MCHC 31.5 (*)     RDW-SD 49.3 (*)     Segs Absolute 11.6 (*)     Lymphocytes Absolute 0.7 (*)     All other components within normal limits   URINE WITH REFLEXED MICRO - Abnormal; Notable for the following components:    Leukocyte Esterase, Urine TRACE (*)     All other components within normal limits   ANION GAP   GLOMERULAR FILTRATION RATE, ESTIMATED   OSMOLALITY       Radiologic studies results:  No orders to display       ED Medications administered this visit:   Medications amoxicillin-clavulanate (AUGMENTIN) 875-125 MG per tablet 1 tablet (has no administration in time range)   ketorolac (TORADOL) injection 15 mg (15 mg Intramuscular Given 4/2/21 2312)         ED COURSE     ED Course as of Apr 03 0005 Fri Apr 02, 2021   2346 Patient with mild luekocytosis at 13. 3. no left shift. Rest of labs within normal limits. Will give a dose of augmentin in the ED. [YEIMI]   Sat Apr 03, 2021 0005 Patient feeling much improved after Toradol IM. Will give a dose of Augmentin here in the ED. Will discharge home in stable condition with prescription for Augmentin and ibuprofen. Patient already has probiotics at home. Advised patient to continue new them. [YEIMI]      ED Course User Index  [YEIMI] Neal Garcia MD     Strict return precautions and follow up instructions were discussed with the patient prior to discharge, with which the patient agrees. MEDICATION CHANGES     New Prescriptions    AMOXICILLIN-CLAVULANATE (AUGMENTIN) 875-125 MG PER TABLET    Take 1 tablet by mouth 2 times daily for 10 days    IBUPROFEN (ADVIL;MOTRIN) 600 MG TABLET    Take 1 tablet by mouth 3 times daily as needed for Pain         FINAL DISPOSITION     Final diagnoses:   Colitis   Left lower quadrant abdominal pain     Condition: condition: stable  Dispo: Discharge to home      This transcription was electronically signed. Parts of this transcriptions may have been dictated by use of voice recognition software and electronically transcribed, and parts may have been transcribed with the assistance of an ED scribe. The transcription may contain errors not detected in proofreading. Please refer to my supervising physician's documentation if my documentation differs.     Electronically Signed: Neal Garcia, 04/03/21, 12:05 AM       Neal Garcia MD  Resident  04/03/21 2974

## 2021-04-15 ENCOUNTER — HOSPITAL ENCOUNTER (OUTPATIENT)
Dept: CT IMAGING | Age: 56
Discharge: HOME | End: 2021-04-15
Payer: COMMERCIAL

## 2021-04-15 DIAGNOSIS — R10.30 LOWER ABDOMINAL PAIN: ICD-10-CM

## 2021-04-15 DIAGNOSIS — R93.3 ABNORMAL FINDINGS ON EXAMINATION OF GASTROINTESTINAL TRACT: ICD-10-CM

## 2021-04-15 PROCEDURE — 6360000004 HC RX CONTRAST MEDICATION: Performed by: NURSE PRACTITIONER

## 2021-04-15 PROCEDURE — 74177 CT ABD & PELVIS W/CONTRAST: CPT

## 2021-04-15 RX ADMIN — IOHEXOL 50 ML: 240 INJECTION, SOLUTION INTRATHECAL; INTRAVASCULAR; INTRAVENOUS; ORAL at 10:23

## 2021-04-15 RX ADMIN — IOPAMIDOL 85 ML: 755 INJECTION, SOLUTION INTRAVENOUS at 10:23

## 2021-04-19 ENCOUNTER — HOSPITAL ENCOUNTER (EMERGENCY)
Age: 56
Discharge: HOME | End: 2021-04-19
Attending: FAMILY MEDICINE
Payer: COMMERCIAL

## 2021-04-19 VITALS
TEMPERATURE: 98.5 F | SYSTOLIC BLOOD PRESSURE: 119 MMHG | HEIGHT: 60 IN | RESPIRATION RATE: 18 BRPM | OXYGEN SATURATION: 96 % | WEIGHT: 100 LBS | DIASTOLIC BLOOD PRESSURE: 64 MMHG | HEART RATE: 88 BPM | BODY MASS INDEX: 19.63 KG/M2

## 2021-04-19 DIAGNOSIS — R11.2 NON-INTRACTABLE VOMITING WITH NAUSEA, UNSPECIFIED VOMITING TYPE: Primary | ICD-10-CM

## 2021-04-19 DIAGNOSIS — R19.7 DIARRHEA, UNSPECIFIED TYPE: ICD-10-CM

## 2021-04-19 DIAGNOSIS — E86.0 DEHYDRATION: ICD-10-CM

## 2021-04-19 LAB
ALBUMIN SERPL-MCNC: 3.6 G/DL (ref 3.5–5.1)
ALP BLD-CCNC: 59 U/L (ref 38–126)
ALT SERPL-CCNC: 11 U/L (ref 11–66)
ANION GAP SERPL CALCULATED.3IONS-SCNC: 22 MEQ/L (ref 8–16)
AST SERPL-CCNC: 22 U/L (ref 5–40)
BACTERIA: ABNORMAL
BASOPHILS # BLD: 0.1 %
BASOPHILS ABSOLUTE: 0 THOU/MM3 (ref 0–0.1)
BILIRUB SERPL-MCNC: 0.4 MG/DL (ref 0.3–1.2)
BILIRUBIN URINE: NEGATIVE
BLOOD, URINE: ABNORMAL
BUN BLDV-MCNC: 10 MG/DL (ref 7–22)
CALCIUM SERPL-MCNC: 8.7 MG/DL (ref 8.5–10.5)
CASTS: ABNORMAL /LPF
CASTS: ABNORMAL /LPF
CHARACTER, URINE: CLEAR
CHLORIDE BLD-SCNC: 93 MEQ/L (ref 98–111)
CO2: 15 MEQ/L (ref 23–33)
COLOR: YELLOW
CREAT SERPL-MCNC: 0.5 MG/DL (ref 0.4–1.2)
CRYSTALS: ABNORMAL
EOSINOPHIL # BLD: 0 %
EOSINOPHILS ABSOLUTE: 0 THOU/MM3 (ref 0–0.4)
EPITHELIAL CELLS, UA: ABNORMAL /HPF
ERYTHROCYTE [DISTWIDTH] IN BLOOD BY AUTOMATED COUNT: 13 % (ref 11.5–14.5)
ERYTHROCYTE [DISTWIDTH] IN BLOOD BY AUTOMATED COUNT: 46.8 FL (ref 35–45)
GFR SERPL CREATININE-BSD FRML MDRD: > 90 ML/MIN/1.73M2
GLUCOSE BLD-MCNC: 69 MG/DL (ref 70–108)
GLUCOSE, URINE: NEGATIVE MG/DL
HCT VFR BLD CALC: 42.5 % (ref 37–47)
HEMOGLOBIN: 14.2 GM/DL (ref 12–16)
IMMATURE GRANS (ABS): 0.03 THOU/MM3 (ref 0–0.07)
IMMATURE GRANULOCYTES: 0.4 %
KETONES, URINE: >= 160
LEUKOCYTE EST, POC: ABNORMAL
LIPASE: 21.6 U/L (ref 5.6–51.3)
LYMPHOCYTES # BLD: 7.4 %
LYMPHOCYTES ABSOLUTE: 0.5 THOU/MM3 (ref 1–4.8)
MCH RBC QN AUTO: 32.5 PG (ref 26–33)
MCHC RBC AUTO-ENTMCNC: 33.4 GM/DL (ref 32.2–35.5)
MCV RBC AUTO: 97.3 FL (ref 81–99)
MISCELLANEOUS LAB TEST RESULT: ABNORMAL
MONOCYTES # BLD: 3.8 %
MONOCYTES ABSOLUTE: 0.3 THOU/MM3 (ref 0.4–1.3)
NITRITE, URINE: NEGATIVE
NUCLEATED RED BLOOD CELLS: 0 /100 WBC
OSMOLALITY CALCULATION: 258.2 MOSMOL/KG (ref 275–300)
PH UA: 5.5 (ref 5–9)
PLATELET # BLD: 251 THOU/MM3 (ref 130–400)
PMV BLD AUTO: 10.3 FL (ref 9.4–12.4)
POTASSIUM REFLEX MAGNESIUM: 3.7 MEQ/L (ref 3.5–5.2)
PROTEIN UA: ABNORMAL MG/DL
RBC # BLD: 4.37 MILL/MM3 (ref 4.2–5.4)
RBC URINE: ABNORMAL /HPF
RENAL EPITHELIAL, UA: ABNORMAL
SEG NEUTROPHILS: 88.3 %
SEGMENTED NEUTROPHILS ABSOLUTE COUNT: 6.3 THOU/MM3 (ref 1.8–7.7)
SODIUM BLD-SCNC: 130 MEQ/L (ref 135–145)
SPECIFIC GRAVITY UA: 1.02 (ref 1–1.03)
TOTAL PROTEIN: 6.6 G/DL (ref 6.1–8)
UROBILINOGEN, URINE: 0.2 EU/DL (ref 0–1)
WBC # BLD: 7.1 THOU/MM3 (ref 4.8–10.8)
WBC UA: ABNORMAL /HPF
YEAST: ABNORMAL

## 2021-04-19 PROCEDURE — 99283 EMERGENCY DEPT VISIT LOW MDM: CPT

## 2021-04-19 PROCEDURE — 2580000003 HC RX 258: Performed by: FAMILY MEDICINE

## 2021-04-19 PROCEDURE — 96361 HYDRATE IV INFUSION ADD-ON: CPT

## 2021-04-19 PROCEDURE — 6370000000 HC RX 637 (ALT 250 FOR IP): Performed by: FAMILY MEDICINE

## 2021-04-19 PROCEDURE — 81001 URINALYSIS AUTO W/SCOPE: CPT

## 2021-04-19 PROCEDURE — 80053 COMPREHEN METABOLIC PANEL: CPT

## 2021-04-19 PROCEDURE — 6360000002 HC RX W HCPCS: Performed by: FAMILY MEDICINE

## 2021-04-19 PROCEDURE — 83690 ASSAY OF LIPASE: CPT

## 2021-04-19 PROCEDURE — 96374 THER/PROPH/DIAG INJ IV PUSH: CPT

## 2021-04-19 PROCEDURE — 36415 COLL VENOUS BLD VENIPUNCTURE: CPT

## 2021-04-19 PROCEDURE — 85025 COMPLETE CBC W/AUTO DIFF WBC: CPT

## 2021-04-19 RX ORDER — ONDANSETRON 2 MG/ML
4 INJECTION INTRAMUSCULAR; INTRAVENOUS ONCE
Status: COMPLETED | OUTPATIENT
Start: 2021-04-19 | End: 2021-04-19

## 2021-04-19 RX ORDER — ONDANSETRON 4 MG/1
4 TABLET, ORALLY DISINTEGRATING ORAL EVERY 8 HOURS PRN
Qty: 10 TABLET | Refills: 0 | Status: SHIPPED | OUTPATIENT
Start: 2021-04-19

## 2021-04-19 RX ORDER — 0.9 % SODIUM CHLORIDE 0.9 %
1000 INTRAVENOUS SOLUTION INTRAVENOUS ONCE
Status: COMPLETED | OUTPATIENT
Start: 2021-04-19 | End: 2021-04-19

## 2021-04-19 RX ORDER — ONDANSETRON 4 MG/1
4 TABLET, ORALLY DISINTEGRATING ORAL ONCE
Status: COMPLETED | OUTPATIENT
Start: 2021-04-19 | End: 2021-04-19

## 2021-04-19 RX ADMIN — SODIUM CHLORIDE 1000 ML: 9 INJECTION, SOLUTION INTRAVENOUS at 20:10

## 2021-04-19 RX ADMIN — ONDANSETRON 4 MG: 2 INJECTION INTRAMUSCULAR; INTRAVENOUS at 20:10

## 2021-04-19 RX ADMIN — ONDANSETRON 4 MG: 4 TABLET, ORALLY DISINTEGRATING ORAL at 20:59

## 2021-04-19 RX ADMIN — SODIUM CHLORIDE 1000 ML: 9 INJECTION, SOLUTION INTRAVENOUS at 18:38

## 2021-04-19 ASSESSMENT — PAIN SCALES - GENERAL: PAINLEVEL_OUTOF10: 7

## 2021-04-19 ASSESSMENT — PAIN DESCRIPTION - LOCATION: LOCATION: ABDOMEN

## 2021-04-19 ASSESSMENT — PAIN DESCRIPTION - PAIN TYPE: TYPE: ACUTE PAIN

## 2021-04-19 NOTE — ED NOTES
Pt updated on POC. Medical student at bedside. Call light within reach. Will continue to monitor for safety and comfort.       Phoebe Salinas RN  04/19/21 8906

## 2021-04-19 NOTE — ED PROVIDER NOTES
[address] COMPLAINT   Chief Complaint   Patient presents with    Abdominal Pain    Nausea        HPI   Sera King is a 50 y.o. female who presents with lower abdominal pain, onset was earlier today with vomiting. The duration has been constant since the onset. The patient has associated worsening nausea and abdominal cramping. There are no alleviating factors. The context is that the symptoms started spontaneously, without any known precipitants. REVIEW OF SYSTEMS   GI: See history of present illness   Cardiac: No chest pain or syncope   Pulmonary: No difficulty breathing or new cough   General: No fevers   : No hematuria or dysuria   See HPI for further details. All other review of systems are reviewed and are otherwise negative.      PAST MEDICAL & SURGICAL HISTORY   Past Medical History:   Diagnosis Date    TRINI (acute kidney injury) ([facility] [address] 12/8/2012    Arthritis     Malignant hyperthermia     daughters    RA (rheumatoid arthritis) ([facility] [address]       Past Surgical History:   Procedure Laterality Date    ARTHRODESIS Right 5/23/2019    RIGHT 1ST MPJ FUSION, METATARSAL HEAD RESECTION 2-5, TOES 2-5 ARTHROPLASTY/ARTHRODESIS & PLANTAR SKIN FLAP performed by Emeka Butt DPM at [address] [facility] [address] Left 9/5/2019    LEFT 1ST MPJ FUSION, METATARSAL HEADS 2-5 RESECTION, ARTHRODESIS 2-4 TOES, ARTHROPLASTY 5TH TOE ALL ON THE LEFT performed by Emeka Butt DPM at [address] [facility] Right 12/13/2019    RT HALLUX OSTECTOMY performed by Emeka Butt DPM at [facility] Right 7/18/2020    right total hip arthroplasty performed by Lamin Rao MD at [address] Right     thumb fusion    SKIN BIOPSY  05/2018    lower left leg, melanoma    TUBAL LIGATION          CURRENT MEDICATIONS   Current Outpatient Rx   Medication Sig Dispense Refill    ondansetron (ZOFRAN ODT) 4 MG disintegrating tablet Take 1 tablet by mouth every 8 hours as needed for Nausea or Vomiting 10 tablet 0    ibuprofen (ADVIL;MOTRIN) 600 MG tablet Take 1 tablet by mouth 3 times daily as needed for Pain 30 tablet 0    dicyclomine (BENTYL) 10 MG capsule Take 1 capsule by mouth 2 times daily as needed (abdominal cramping and pain) 120 capsule 0    Rizatriptan Benzoate (MAXALT PO) Take by mouth as needed      Tofacitinib Citrate (XELJANZ XR) 11 MG TB24 Take 0.5 tablets by mouth daily       famciclovir (FAMVIR) 500 MG tablet Take 500 mg by mouth 3 times daily as needed (for shingles outbreak)      OLIVE LEAF PO Take by mouth daily       Glucosamine-Chondroitin (JOINT SUPPORT PO) Take 1 tablet by mouth daily       therapeutic multivitamin-minerals (THERAGRAN-M) tablet Take 1 tablet by mouth daily.  VITAMIN D-3 (COLECALCIFEROL) 400 UNITS TABS Take  by mouth daily.       calcium carbonate-vitamin D (CALCIUM + D) 600-200 MG-UNIT TABS Take 1 tablet by mouth daily       MAGNESIUM ACETATE by Does not apply route For bowels      Probiotic Product (PROBIOTIC FORMULA PO) Take by mouth daily       Acetaminophen (TYLENOL ARTHRITIS PAIN PO) Take 650 mg by mouth every 4 hours as needed       predniSONE (DELTASONE) 5 MG tablet Take 5 mg by mouth 2 times daily           ALLERGIES   Allergies   Allergen Reactions    Succinylcholine Other (See Comments)     Malignant hyperthermia  (Muscle rigidity, high fever and fast heart rate) in daughters    Ciprofloxacin      Chest pain, numbness in arms, significant light-headedness    Methylprednisolone Other (See Comments)     Cause heart palpitations    Tramadol Other (See Comments)     Dizzy  Unable to walk         SOCIAL AND FAMILY HISTORY   Social History     Socioeconomic History    Marital status:      Spouse name: Vincent Timmons Number of children: 3    Years of education: 12    Highest education level: None   Occupational History    None   Social Needs    Financial resource strain: None    Food insecurity Worry: None     Inability: None    Transportation needs     Medical: None     Non-medical: None   Tobacco Use    Smoking status: Never Smoker    Smokeless tobacco: Never Used   Substance and Sexual Activity    Alcohol use: Not Currently     Comment: joya westbrook had glass of wine    Drug use: Yes     Types: Marijuana     Comment: medical marijuana since June 2020 for migraines and RA    Sexual activity: Yes     Partners: Male   Lifestyle    Physical activity     Days per week: None     Minutes per session: None    Stress: None   Relationships    Social connections     Talks on phone: None     Gets together: None     Attends [employer] service: None     Active member of club or organization: None     Attends meetings of clubs or organizations: None     Relationship status: None    Intimate partner violence     Fear of current or ex partner: None     Emotionally abused: None     Physically abused: None     Forced sexual activity: None   Other Topics Concern    None   Social History Narrative    None      Family History   Problem Relation Age of Onset    Heart Disease Mother     Atrial Fibrillation Mother     Cancer Mother     Alcohol Abuse Father     Cancer Father     Colon Cancer Father     Arthritis Maternal Grandmother         PHYSICAL EXAM   VITAL SIGNS: /64   Pulse 88   Temp 98.5 °F (36.9 °C) (Oral)   Resp 18   Ht 5' (1.524 m)   Wt 100 lb (45.4 kg)   LMP 11/06/2011   SpO2 96%   BMI 19.53 kg/m²    Constitutional: Well developed, well nourished   Eyes: Sclera nonicteric, conjunctiva moist   HENT: Atraumatic, nose normal   Neck: Supple, no JVD   Respiratory: No retractions, no accessory muscle use, normal breath sounds   Cardiovascular: Normal rate, normal rhythm, no murmurs   GI: Soft, no abdominal tenderness, no guarding, bowel sounds present, no audible bruits or palpable pulsatile masses   Musculoskeletal: No edema, no deformity   Vascular: DP pulses 2+ equal bilaterally Integument: No rash, dry skin, cap refill at 3 sec   Neurologic: Alert & oriented, normal speech   Psychiatric: Cooperative, pleasant affect     RADIOLOGY/PROCEDURES   No orders to display        LABS   Labs Reviewed   CBC WITH AUTO DIFFERENTIAL - Abnormal; Notable for the following components:       Result Value    RDW-SD 46.8 (*)     Lymphocytes Absolute 0.5 (*)     Monocytes Absolute 0.3 (*)     All other components within normal limits   COMPREHENSIVE METABOLIC PANEL W/ REFLEX TO MG FOR LOW K - Abnormal; Notable for the following components:    Glucose 69 (*)     Sodium 130 (*)     Chloride 93 (*)     CO2 15 (*)     All other components within normal limits   ANION GAP - Abnormal; Notable for the following components:    Anion Gap 22.0 (*)     All other components within normal limits   OSMOLALITY - Abnormal; Notable for the following components:    Osmolality Calc 258.2 (*)     All other components within normal limits   MICROSCOPIC URINALYSIS - Abnormal; Notable for the following components:    Ketones, Urine >= 160 (*)     Blood, Urine TRACE (*)     Protein, UA TRACE (*)     Leukocytes, UA SMALL (*)     All other components within normal limits   CULTURE, STOOL   GLOMERULAR FILTRATION RATE, ESTIMATED   LIPASE   ROTAVIRUS ANTIGEN, STOOL        ED COURSE & MEDICAL DECISION MAKING   Pertinent Labs & Imaging studies reviewed and interpreted. (See chart for details)   See EMR for medications prescribed   Vitals:    04/19/21 2011   BP: 119/64   Pulse: 88   Resp: 18   Temp:    SpO2: 96%        Differential diagnosis: Pancreatitis, non-specific abdominal pain, Ischemic Bowel, Bowel Obstruction, Acute Cholecystitis, Acute Appendicitis, other     FINAL IMPRESSION   1. Non-intractable vomiting with nausea, unspecified vomiting type    2. Dehydration    3. Diarrhea, unspecified type         PLAN   MDM - pt has a benign abdominal exam but has profound dehydration, as evident by 160 ketones.  She was properly resuscitated with 2L of NS, also dc with zofran, and given anticipatory guidance.      Electronically signed by: Neal Bacon MD, 4/19/2021 8:52 PM   (This note was completed with a voice recognition program)        Danielito Reyes MD  04/19/21 2052

## 2021-04-19 NOTE — ED NOTES
Lab to add lipase orders to previously collected blood work.       Loretta Briones RN  04/19/21 3892

## 2021-04-19 NOTE — ED TRIAGE NOTES
Pt presents to the ED via ED lobby with c/o abdominal cramping and nausea and vomiting. Pt states she has not been able to keep anything down for 2 days. VSS, respirations even and unlabored.

## 2021-04-20 NOTE — ED NOTES
Discharge instructions and new medications discussed and explained with Pt. Pt. Verbalized understanding and has no further questions or needs at this time.         Darius Noel RN  04/19/21 3980

## 2021-04-20 NOTE — PROGRESS NOTES
Gastroenterology Progress Note:     Patient Name:  Kayla Sahu   MRN: [id number]  [id number]  YOB: 1965  Admit Date: 3/13/2021  7:28 AM  Primary Care Physician: Kia Velazquez   [id number]/011-A     Patient seen and examined. 24 hours events and chart reviewed. Subjective: Patient resting in bed. She has slight discomfort to the left abdomen otherwise denies pain. Denies nausea & vomiting. She is tolerating a low fiber diet. She is having diarrhea. CT scan results discussed. Objective:  BP (!) 156/78   Pulse 65   Temp 98.1 °F (36.7 °C) (Oral)   Resp 15   Ht 5' (1.524 m)   Wt 98 lb (44.5 kg)   LMP 11/06/2011   SpO2 98%   BMI 19.14 kg/m²     Physical Exam:    General:  Nourished in no distress  HEENT: Atraumatic, normocephalic. Moist oral mucous membranes. Neck: Supple without adenopathy, JVD, thyromegaly or masses. Trachea midline. CV: Heart RRR, no murmurs, rubs, gallops. Resp: Even, easy without cough or accessory use. Lungs clear to ascultation bilaterally. Abd: Round, soft, tender to left abdomen with palpation. No hepatosplenomegaly or mass present. Active bowel sounds heard. No distention noted. Ext:  Without cyanosis, clubbing, edema. Skin: Pink, warm, dry  Neuro:  Alert, oriented x 3 with no obvious deficits.        Rectal: deferred    Labs:   CBC:   Lab Results   Component Value Date    WBC 4.7 03/17/2021    HGB 12.7 03/17/2021    HCT 38.6 03/17/2021    .0 03/17/2021     03/17/2021     BMP:   Lab Results   Component Value Date     03/17/2021    K 3.8 03/17/2021     03/17/2021    CO2 23 03/17/2021    BUN 10 03/17/2021    CREATININE 0.5 03/17/2021    CALCIUM 8.7 03/17/2021     PT/INR:   Lab Results   Component Value Date    INR 1.18 07/18/2020     Lipids:   Lab Results   Component Value Date    ALKPHOS 66 03/13/2021    ALT 11 03/13/2021    AST 22 03/13/2021    BILITOT 0.6 03/13/2021    BILIDIR <0.2 03/13/2021    LABALBU 3.6 03/13/2021    AMYLASE 64 12/06/2012    LIPASE 34.3 03/13/2021     Significant Diagnostic Studies:   CT abdomen/pelvis W IV contrast 03/16/21  Impression       1. The previously described abnormality involving the ascending colon is not visualized on the current examination. There is persistent thickening in the region of the sigmoid colon with some diverticula. This could be due to incomplete distention    although mild acute diverticulitis could have a similar appearance. 2. Hypoattenuated appearance of the liver which may be due to fatty infiltration. Current Meds:  Scheduled Meds:   dicyclomine  10 mg Oral TID AC    predniSONE  5 mg Oral BID    Tofacitinib Citrate ER  0.5 tablet Oral Daily    sodium chloride flush  10 mL Intravenous 2 times per day    enoxaparin  40 mg Subcutaneous Q24H    famotidine  20 mg Oral BID    metroNIDAZOLE  500 mg Intravenous Q8H    cefTRIAXone (ROCEPHIN) IV  1,000 mg Intravenous Q24H     Continuous Infusions:   dextrose 5 % and 0.9 % NaCl 75 mL/hr at 03/16/21 0831       Assessment:  51 yo F admitted 03/13/21 for abd pain with associated diarrhea. CT A/P demonstrated abnormal density in the rectosigmoid colon consistent with inflammatory process, mucosal thickening in the right colon & hepatic flexure, abnormal density in the ascending colon could represent inflammatory process vs mass, mild diffuse fatty liver. H/O RA on Xeljanz & prednisone. Daughter with Crohn's disease. Repeat CT A/P 03/16/21 demonstrated previously described abnormality involving the ascending colon is not visualized on the current examination, persistent thickening in the region of the sigmoid colon with some diverticula, hypoattenuated appearance of the liver which may be due to fatty infiltration. 1. Lower abdominal pain- improving  2. Leukocytosis- resolved  3. N/V- resolved  4. Diarrhea- 03/16/21, suspect secondary to ATBs  5. Abnormal density in the ascending colon & rectosigmoid colon- noted on imaging  6.  Abnormal mucosal thickening in the right colon & hepatic flexure- noted on imaging  7. Father with a history of colon & liver CA  8. Daughter with Crohn's Disease  9. RA- on Xeljanz & Prednisone  10.  Hypokalemia- resolved     Plan:     Low fiber diet   ATBs   Change Bentyl to prn, script sent  [facility] surgery on board   Supportive care per primary team   Okay to discharge from GI standpoint when cleared by primary   Colonoscopy 04/29/21 at 9:00 with Dr. Francois Boyd signing off    Case reviewed and impression/plan reviewed in collaboration with Dr. Chad Hester  Electronically signed by JALEESA Torres CNP on 3/17/2021 at 9:21 AM    [facility]

## 2021-05-06 ENCOUNTER — NURSE ONLY (OUTPATIENT)
Dept: LAB | Age: 56
End: 2021-05-06

## 2021-06-30 NOTE — PROGRESS NOTES
CLINICAL PHARMACY NOTE: MEDS TO BEDS    Total # of Prescriptions Filled: 3   The following medications were delivered to the patient:  Dicyclomine 10mg  Metronidazole 500mg  Bactrim-DS    Additional Documentation:

## 2022-02-24 NOTE — ANESTHESIA PROCEDURE NOTES
Spinal Block    Patient location during procedure: OR  Reason for block: primary anesthetic  Staffing  Anesthesiologist: Eva Osler, MD  Resident/CRNA: JALEESA Laws - CRNA  Performed: resident/CRNA   Preanesthetic Checklist  Completed: patient identified, surgical consent, pre-op evaluation, timeout performed, IV checked, risks and benefits discussed, monitors and equipment checked, anesthesia consent given, oxygen available and patient being monitored  Spinal Block  Patient position: sitting  Prep: ChloraPrep  Patient monitoring: continuous pulse ox and frequent blood pressure checks  Approach: midline  Location: L3/L4  Provider prep: mask and sterile gloves  Local infiltration: lidocaine  Agent: bupivacaine  Dose: 1.8  Dose: 1.8  Needle  Needle type: Pencan   Needle gauge: 25 G  Needle length: 3.5 in  Assessment  Sensory level: T6  Swirl obtained: Yes  CSF: clear  Attempts: 1  Hemodynamics: stable Yes

## 2022-05-17 ENCOUNTER — HOSPITAL ENCOUNTER (OUTPATIENT)
Dept: MAMMOGRAPHY | Age: 57
Discharge: HOME | End: 2022-05-17
Payer: COMMERCIAL

## 2022-05-17 DIAGNOSIS — Z12.31 ENCOUNTER FOR SCREENING MAMMOGRAM FOR BREAST CANCER: ICD-10-CM

## 2022-05-17 PROCEDURE — 77063 BREAST TOMOSYNTHESIS BI: CPT

## 2023-05-22 ENCOUNTER — APPOINTMENT (OUTPATIENT)
Dept: GENERAL RADIOLOGY | Age: 58
End: 2023-05-22
Payer: COMMERCIAL

## 2023-05-22 ENCOUNTER — APPOINTMENT (OUTPATIENT)
Dept: CT IMAGING | Age: 58
End: 2023-05-22
Payer: COMMERCIAL

## 2023-05-22 ENCOUNTER — HOSPITAL ENCOUNTER (EMERGENCY)
Age: 58
Discharge: HOME | End: 2023-05-22
Payer: COMMERCIAL

## 2023-05-22 VITALS
WEIGHT: 92 LBS | HEIGHT: 60 IN | SYSTOLIC BLOOD PRESSURE: 148 MMHG | OXYGEN SATURATION: 96 % | DIASTOLIC BLOOD PRESSURE: 67 MMHG | BODY MASS INDEX: 18.06 KG/M2 | HEART RATE: 80 BPM | RESPIRATION RATE: 16 BRPM | TEMPERATURE: 99.1 F
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
## 2023-05-22 DIAGNOSIS — M54.6 ACUTE LEFT-SIDED THORACIC BACK PAIN: Primary | ICD-10-CM

## 2023-05-22 LAB
ALBUMIN SERPL BCG-MCNC: 3.5 G/DL (ref 3.5–5.1)
ALP SERPL-CCNC: 100 U/L (ref 38–126)
ALT SERPL W/O P-5'-P-CCNC: 16 U/L (ref 11–66)
ANION GAP SERPL CALC-SCNC: 11 MEQ/L (ref 8–16)
AST SERPL-CCNC: 26 U/L (ref 5–40)
BASOPHILS ABSOLUTE: 0.1 THOU/MM3 (ref 0–0.1)
BASOPHILS NFR BLD AUTO: 1 %
BILIRUB SERPL-MCNC: 0.2 MG/DL (ref 0.3–1.2)
BUN SERPL-MCNC: 17 MG/DL (ref 7–22)
CALCIUM SERPL-MCNC: 8.4 MG/DL (ref 8.5–10.5)
CHLORIDE SERPL-SCNC: 113 MEQ/L (ref 98–111)
CO2 SERPL-SCNC: 21 MEQ/L (ref 23–33)
CREAT SERPL-MCNC: 0.5 MG/DL (ref 0.4–1.2)
DEPRECATED RDW RBC AUTO: 45.9 FL (ref 35–45)
EKG ATRIAL RATE: 77 BPM
EKG P AXIS: 52 DEGREES
EKG P-R INTERVAL: 140 MS
EKG Q-T INTERVAL: 392 MS
EKG QRS DURATION: 86 MS
EKG QTC CALCULATION (BAZETT): 443 MS
EKG R AXIS: -41 DEGREES
EKG T AXIS: 40 DEGREES
EKG VENTRICULAR RATE: 77 BPM
EOSINOPHIL NFR BLD AUTO: 0.8 %
EOSINOPHILS ABSOLUTE: 0.1 THOU/MM3 (ref 0–0.4)
ERYTHROCYTE [DISTWIDTH] IN BLOOD BY AUTOMATED COUNT: 12.7 % (ref 11.5–14.5)
GFR SERPL CREATININE-BSD FRML MDRD: > 60 ML/MIN/1.73M2
GLUCOSE SERPL-MCNC: 91 MG/DL (ref 70–108)
HCT VFR BLD AUTO: 42.1 % (ref 37–47)
HGB BLD-MCNC: 13.7 GM/DL (ref 12–16)
IMM GRANULOCYTES # BLD AUTO: 0.02 THOU/MM3 (ref 0–0.07)
IMM GRANULOCYTES NFR BLD AUTO: 0.3 %
LYMPHOCYTES ABSOLUTE: 1.4 THOU/MM3 (ref 1–4.8)
LYMPHOCYTES NFR BLD AUTO: 20.2 %
MCH RBC QN AUTO: 31.8 PG (ref 26–33)
MCHC RBC AUTO-ENTMCNC: 32.5 GM/DL (ref 32.2–35.5)
MCV RBC AUTO: 97.7 FL (ref 81–99)
MONOCYTES ABSOLUTE: 0.6 THOU/MM3 (ref 0.4–1.3)
MONOCYTES NFR BLD AUTO: 8.4 %
NEUTROPHILS NFR BLD AUTO: 69.3 %
NRBC BLD AUTO-RTO: 0 /100 WBC
OSMOLALITY SERPL CALC.SUM OF ELEC: 289.8 MOSMOL/KG (ref 275–300)
PLATELET # BLD AUTO: 271 THOU/MM3 (ref 130–400)
PMV BLD AUTO: 11.3 FL (ref 9.4–12.4)
POTASSIUM SERPL-SCNC: 3.5 MEQ/L (ref 3.5–5.2)
PROT SERPL-MCNC: 5.8 G/DL (ref 6.1–8)
RBC # BLD AUTO: 4.31 MILL/MM3 (ref 4.2–5.4)
SEGMENTED NEUTROPHILS ABSOLUTE COUNT: 4.9 THOU/MM3 (ref 1.8–7.7)
SODIUM SERPL-SCNC: 145 MEQ/L (ref 135–145)
TROPONIN T: < 0.01 NG/ML
TROPONIN T: < 0.01 NG/ML
WBC # BLD AUTO: 7.1 THOU/MM3 (ref 4.8–10.8)

## 2023-05-22 PROCEDURE — 80053 COMPREHEN METABOLIC PANEL: CPT

## 2023-05-22 PROCEDURE — 72072 X-RAY EXAM THORAC SPINE 3VWS: CPT

## 2023-05-22 PROCEDURE — 6360000004 HC RX CONTRAST MEDICATION: Performed by: NURSE PRACTITIONER

## 2023-05-22 PROCEDURE — 36415 COLL VENOUS BLD VENIPUNCTURE: CPT

## 2023-05-22 PROCEDURE — 71275 CT ANGIOGRAPHY CHEST: CPT

## 2023-05-22 PROCEDURE — 2580000003 HC RX 258: Performed by: PHYSICIAN ASSISTANT

## 2023-05-22 PROCEDURE — 85025 COMPLETE CBC W/AUTO DIFF WBC: CPT

## 2023-05-22 PROCEDURE — 76376 3D RENDER W/INTRP POSTPROCES: CPT

## 2023-05-22 PROCEDURE — 99285 EMERGENCY DEPT VISIT HI MDM: CPT

## 2023-05-22 PROCEDURE — 71101 X-RAY EXAM UNILAT RIBS/CHEST: CPT

## 2023-05-22 PROCEDURE — 93010 ELECTROCARDIOGRAM REPORT: CPT | Performed by: INTERNAL MEDICINE

## 2023-05-22 PROCEDURE — 93005 ELECTROCARDIOGRAM TRACING: CPT | Performed by: PHYSICIAN ASSISTANT

## 2023-05-22 PROCEDURE — 84484 ASSAY OF TROPONIN QUANT: CPT

## 2023-05-22 RX ORDER — 0.9 % SODIUM CHLORIDE 0.9 %
1000 INTRAVENOUS SOLUTION INTRAVENOUS ONCE
Status: COMPLETED | OUTPATIENT
Start: 2023-05-22 | End: 2023-05-22

## 2023-05-22 RX ORDER — LIDOCAINE 4 G/G
1 PATCH TOPICAL EVERY 24 HOURS
Qty: 30 PATCH | Refills: 0 | Status: SHIPPED | OUTPATIENT
Start: 2023-05-22 | End: 2023-06-21

## 2023-05-22 RX ADMIN — SODIUM CHLORIDE 1000 ML: 9 INJECTION, SOLUTION INTRAVENOUS at 04:40

## 2023-05-22 RX ADMIN — IOPAMIDOL 80 ML: 755 INJECTION, SOLUTION INTRAVENOUS at 08:37

## 2023-05-22 ASSESSMENT — PAIN - FUNCTIONAL ASSESSMENT
PAIN_FUNCTIONAL_ASSESSMENT: 0-10
PAIN_FUNCTIONAL_ASSESSMENT: NONE - DENIES PAIN
PAIN_FUNCTIONAL_ASSESSMENT: NONE - DENIES PAIN
PAIN_FUNCTIONAL_ASSESSMENT: 0-10

## 2023-05-22 ASSESSMENT — ENCOUNTER SYMPTOMS
FACIAL SWELLING: 0
ABDOMINAL PAIN: 0
BACK PAIN: 1
VOMITING: 0
SHORTNESS OF BREATH: 1
PHOTOPHOBIA: 0
RHINORRHEA: 0
NAUSEA: 1

## 2023-05-22 ASSESSMENT — PAIN SCALES - GENERAL
PAINLEVEL_OUTOF10: 5
PAINLEVEL_OUTOF10: 5

## 2023-05-22 ASSESSMENT — HEART SCORE: ECG: 0

## 2023-05-22 NOTE — DISCHARGE INSTRUCTIONS
Rest, Stay well-hydrated. Continue home medications as previously prescribed. Over-the-counter Tylenol and/or Motrin as needed for pain. Lidocaine patches as prescribed. Follow-up with primary care provider within the next 5 to 7 days for reevaluation. Any worsening or concerns return to the ER immediately.

## 2023-05-22 NOTE — ED PROVIDER NOTES
[facility] [address] [address] EMERGENCY DEPT      Pt Name: Dwain Cadena  MRN: [id number]  [place] 1965  Date of evaluation: 5/22/2023  Provider: Nav Tao PA-C    CHIEF COMPLAINT       Chief Complaint   Patient presents with    Fall     Mechanical        Nurses Notes reviewed and I agree except as noted in the HPI. HISTORY OF PRESENT ILLNESS    Sera Gunter is a 56 y.o. female who presents from home with spouse for back injury. Patient reports she got up to go to the bathroom tonight. When she went to sit on the toilet she missed causing her to fall. She hit her upper back on the toilet. Spouse heard the fall and came to help the patient up. He sat her on the toilet and she passed out for a second. He describes this as her leaning forward. He sat her back up and she was alert. Patient affirms that she has felt faint since the fall with nausea prompting her to come to the ER for evaluation. She reports intermittent heaviness in her chest with shortness of breath and just not feeling right since she laid down after the fall. Patient reports previously having a vasovagal episode after a fall resulting in a hip fracture. Patient reports her upper back pain is worse with movement and resolves if she lays still. The patient denies vision changes, vomiting, abdominal pain, neck pain, or other complaints. The patient does not use blood thinners. Patient has a history of rheumatoid arthritis and 2 excisions of metastatic melanoma for which she finished immunotherapy in September 2022. I appreciate nurses triage note however in a detailed HPI story differed somewhat from that note. REVIEW OF SYSTEMS     Review of Systems   Constitutional:  Negative for diaphoresis and fever. HENT:  Negative for facial swelling and rhinorrhea. Eyes:  Negative for photophobia and visual disturbance. Respiratory:  Positive for shortness of breath. Cardiovascular:  Positive for chest pain.    Gastrointestinal:

## 2023-05-22 NOTE — ED PROVIDER NOTES
[address]     Pt Name: Mallory Holbrook  MRN: [id number]  [place] 1965  Date of evaluation: 5/22/23        Mid-level provider Note:    I have personally performed and/or participated in the history, exam and medical decision making and agree with all pertinent clinical information as noted by the previous provider. I have also reviewed and agree with the past medical, family and social history unless otherwise noted. I have personally performed a face to face diagnostic evaluation on this patient. I have reviewed the previous provider's findings and agree. Evaluation:     Patient report received from [place], [place]    Patient reports a fall at home while getting up using the restroom, patient states she fell into the toilet. Patient reports hitting her head and having loss of consciousness for approximately a few seconds. Since falling patient reports intermittent heaviness in her chest with shortness of breath. Patient denies any blood thinners. Patient does report nausea and chest tightness. History of rheumatoid arthritis and metastatic melanoma    EKG shows normal sinus rhythm rate of 77, left axis deviation, septal infarct seen on prior EKGs, no ST elevation noted. Lab work and imaging pending. CT thoracic reconstruction without postop process as read by radiologist shows no acute fracture of the thoracic spine, mild thoracic spondylosis, bilateral thyroid nodules. CTA chest with and without contrast as read by radiologist shows no evidence of pulmonary embolism, bilateral thyroid nodules. She reports she is seeing oncology for thyroid nodules, evaluated periodically with imaging. Spoke with patient regarding results. Patient to conference relaxer at this time but states she would utilize lidocaine patches. Patient will be discharged home to care of of .   Plan of care discussed with patient and  who are in

## 2023-05-22 NOTE — ED NOTES
Upon first contact with patient this RN receives bedside shift report from [facility]. Patient alert and oriented. Respirations easy and unlabored.       Angie Healy RN  05/22/23 [id number]

## 2023-05-22 NOTE — ED NOTES
Patient resting in bed. Respirations easy and unlabored. No distress noted. Call light within reach. Updated on POC. Will monitor.       Farhana Wu RN  05/22/23 3669

## 2023-05-22 NOTE — ED TRIAGE NOTES
Patient presents to ED following a fall at home. Patient states she was trying to use the restroom in the middle of the night with the lights off. Patient states she ran into the toilet and fell. Patient states she hit her head and lost LOC for few minutes. Patient is not on thinners. Patient reports feeing nauseous. Patient alert and oriented x4.

## 2023-05-26 NOTE — PROGRESS NOTES
1607-Patient to PACU report received from [address] [facility] [employer]. Patient responds to voice. Denies pain or discomfort. IV infusing no redness or swelling at site noted. VSS  1619-Patient awake c/o pain in knee and elbow page Dr Nilson Bergman for orders. 1625-SW Dr Ernestine Heart for orders on patient. Patient resting quietly in bed. C/o pain in knee and elbow. 1631-Patient c/o pain fentanyl given as ordered. 1640-Patient resting quietly in bed. States that pain is improving. VSS  1644-Patient resting quietly in bed. Denies pain or discomfort. Water offered. VSS.   1650-Patient meets DC criteria. Resting quietly in bed VSS. To [facility] report given to RN and family notified.
[facility] AND ORIENTED TO UNIT. FALL AND ALLERGY BANDS ON.
Discharge criteria was met. Patient stated understanding discharge instructions. prescriptions given to patient. Left via Wheelchair.  Assessment of Surgical site no drainage from incision
In preparation for their surgical procedure above patient was screened for Obstructive Sleep Apnea (KULDIP) using the STOP-Bang Questionnaire by the Pre-Admission Testing department. This is a pre-surgical screening tool for patient safety and serves as a recommendation, this WILL NOT cause cancellation of surgery. STOP-Bang Questionnaire  * Do you currently see a pulmonologist?  No     If yes STOP, do not complete. :    1.  Do you snore loudly (able to be heard in the next room)? No    2. Do you often feel tired or sleepy during the daytime? No       3. Has anyone ever told you that you stop breathing during your sleep? No    4. Do you have or are you being treated for high blood pressure? No      5. BMI more than 35? BMI (Calculated): 19.7        No    6. Age over 48 years? 48 y.o. Yes    7. Neck Circumference greater than 17 inches for male or 16 inches for female? Measured           (visits only)            Not Applicable    8. Gender Male? No      TOTAL SCORE: 1    KULDIP - Low Risk : Yes to 0 - 2 questions  KULDIP - Intermediate Risk : Yes to 3 - 4 questions  KULDIP - High Risk : Yes to 5 - 8 questions    Adapted from:   STOP Questionnaire: A Tool to Screen Patients for Obstructive Sleep Apnea   SORAYA BaumannC.P.C., Hair Lombardo, M.B.B.S., Haresh Pringle M.D., Konstantin Weller. Jennifer Abdi, Ph.D., [facility], M.B.B.S., Yennifer Saunders, M.Sc., Lidia Severe, M.D., Lizabeth Abdi. ALIX Oseguera.P.C.    Anesthesiology 2008; [phone] Copyright 2008, the [address] of Anesthesiologists, [facility].   ----------------------------------------------------------------------------------------------------------------
NPO after midnight  [employer] and drivers license  Wear comfortable clean clothing  Do not bring jewelry   Shower night before and morning of surgery with a liquid antibacterial soap  Bring medications in original bottles  Follow all instructions given by your physician   needed at discharge  Call [phone] for any questions
Pt up to bathroom. Assisted to wheelchair pivot and turn to toilet and bed. No drainage noted.
Yes

## 2023-05-29 ENCOUNTER — APPOINTMENT (OUTPATIENT)
Dept: CT IMAGING | Age: 58
End: 2023-05-29
Payer: COMMERCIAL

## 2023-05-29 ENCOUNTER — HOSPITAL ENCOUNTER (OUTPATIENT)
Age: 58
Setting detail: OBSERVATION
Discharge: HOME | End: 2023-05-30
Attending: FAMILY MEDICINE
Payer: COMMERCIAL

## 2023-05-29 DIAGNOSIS — K57.32 DIVERTICULITIS OF COLON: Primary | ICD-10-CM

## 2023-05-29 PROBLEM — K57.92 ACUTE DIVERTICULITIS: Status: ACTIVE | Noted: 2023-05-29

## 2023-05-29 LAB
ALBUMIN SERPL BCG-MCNC: 3.7 G/DL (ref 3.5–5.1)
ALP SERPL-CCNC: 88 U/L (ref 38–126)
ALT SERPL W/O P-5'-P-CCNC: 13 U/L (ref 11–66)
ANION GAP SERPL CALC-SCNC: 12 MEQ/L (ref 8–16)
AST SERPL-CCNC: 17 U/L (ref 5–40)
B-HCG SERPL QL: NEGATIVE
BACTERIA: ABNORMAL
BASOPHILS ABSOLUTE: 0 THOU/MM3 (ref 0–0.1)
BASOPHILS NFR BLD AUTO: 0.5 %
BILIRUB CONJ SERPL-MCNC: < 0.2 MG/DL (ref 0–0.3)
BILIRUB SERPL-MCNC: 0.3 MG/DL (ref 0.3–1.2)
BILIRUB UR QL STRIP: NEGATIVE
BUN SERPL-MCNC: 12 MG/DL (ref 7–22)
CALCIUM SERPL-MCNC: 8.8 MG/DL (ref 8.5–10.5)
CASTS #/AREA URNS LPF: ABNORMAL /LPF
CASTS #/AREA URNS LPF: ABNORMAL /LPF
CHARACTER UR: CLEAR
CHARCOAL URNS QL MICRO: ABNORMAL
CHLORIDE SERPL-SCNC: 103 MEQ/L (ref 98–111)
CO2 SERPL-SCNC: 23 MEQ/L (ref 23–33)
COLOR UR: YELLOW
CREAT SERPL-MCNC: 0.5 MG/DL (ref 0.4–1.2)
CRYSTALS URNS QL MICRO: ABNORMAL
DEPRECATED RDW RBC AUTO: 45.4 FL (ref 35–45)
EOSINOPHIL NFR BLD AUTO: 0.5 %
EOSINOPHILS ABSOLUTE: 0 THOU/MM3 (ref 0–0.4)
EPITHELIAL CELLS, UA: ABNORMAL /HPF
ERYTHROCYTE [DISTWIDTH] IN BLOOD BY AUTOMATED COUNT: 12.7 % (ref 11.5–14.5)
GFR SERPL CREATININE-BSD FRML MDRD: > 60 ML/MIN/1.73M2
GLUCOSE SERPL-MCNC: 107 MG/DL (ref 70–108)
GLUCOSE UR QL STRIP.AUTO: NEGATIVE MG/DL
HCT VFR BLD AUTO: 42.9 % (ref 37–47)
HGB BLD-MCNC: 14 GM/DL (ref 12–16)
HGB UR QL STRIP.AUTO: NEGATIVE
IMM GRANULOCYTES # BLD AUTO: 0.02 THOU/MM3 (ref 0–0.07)
IMM GRANULOCYTES NFR BLD AUTO: 0.2 %
INR PPP: 1.07 (ref 0.85–1.13)
KETONES UR QL STRIP.AUTO: NEGATIVE
LACTIC ACID, SEPSIS: 1.6 MMOL/L (ref 0.5–1.9)
LEUKOCYTE ESTERASE UR QL STRIP.AUTO: ABNORMAL
LIPASE SERPL-CCNC: 44.6 U/L (ref 5.6–51.3)
LYMPHOCYTES ABSOLUTE: 1 THOU/MM3 (ref 1–4.8)
LYMPHOCYTES NFR BLD AUTO: 10.1 %
MCH RBC QN AUTO: 31.9 PG (ref 26–33)
MCHC RBC AUTO-ENTMCNC: 32.6 GM/DL (ref 32.2–35.5)
MCV RBC AUTO: 97.7 FL (ref 81–99)
MONOCYTES ABSOLUTE: 0.8 THOU/MM3 (ref 0.4–1.3)
MONOCYTES NFR BLD AUTO: 8 %
NEUTROPHILS NFR BLD AUTO: 80.7 %
NITRITE UR QL STRIP.AUTO: NEGATIVE
NRBC BLD AUTO-RTO: 0 /100 WBC
OSMOLALITY SERPL CALC.SUM OF ELEC: 275.9 MOSMOL/KG (ref 275–300)
PH UR STRIP.AUTO: 7.5 [PH] (ref 5–9)
PLATELET # BLD AUTO: 304 THOU/MM3 (ref 130–400)
PMV BLD AUTO: 11.5 FL (ref 9.4–12.4)
POTASSIUM SERPL-SCNC: 4.2 MEQ/L (ref 3.5–5.2)
PROT SERPL-MCNC: 6.7 G/DL (ref 6.1–8)
PROT UR STRIP.AUTO-MCNC: NEGATIVE MG/DL
RBC # BLD AUTO: 4.39 MILL/MM3 (ref 4.2–5.4)
RBC #/AREA URNS HPF: ABNORMAL /HPF
RENAL EPI CELLS #/AREA URNS HPF: ABNORMAL /[HPF]
SEGMENTED NEUTROPHILS ABSOLUTE COUNT: 8 THOU/MM3 (ref 1.8–7.7)
SODIUM SERPL-SCNC: 138 MEQ/L (ref 135–145)
SP GR UR REFRACT.AUTO: < 1.005 (ref 1–1.03)
UROBILINOGEN UR QL STRIP.AUTO: 0.2 EU/DL (ref 0–1)
WBC # BLD AUTO: 9.9 THOU/MM3 (ref 4.8–10.8)
WBC #/AREA URNS HPF: ABNORMAL /HPF
YEAST LIKE FUNGI URNS QL MICRO: ABNORMAL

## 2023-05-29 PROCEDURE — 84703 CHORIONIC GONADOTROPIN ASSAY: CPT

## 2023-05-29 PROCEDURE — 96375 TX/PRO/DX INJ NEW DRUG ADDON: CPT

## 2023-05-29 PROCEDURE — 2580000003 HC RX 258

## 2023-05-29 PROCEDURE — 80053 COMPREHEN METABOLIC PANEL: CPT

## 2023-05-29 PROCEDURE — 99285 EMERGENCY DEPT VISIT HI MDM: CPT

## 2023-05-29 PROCEDURE — 83605 ASSAY OF LACTIC ACID: CPT

## 2023-05-29 PROCEDURE — G0378 HOSPITAL OBSERVATION PER HR: HCPCS

## 2023-05-29 PROCEDURE — 96374 THER/PROPH/DIAG INJ IV PUSH: CPT

## 2023-05-29 PROCEDURE — 74177 CT ABD & PELVIS W/CONTRAST: CPT

## 2023-05-29 PROCEDURE — 85610 PROTHROMBIN TIME: CPT

## 2023-05-29 PROCEDURE — 87040 BLOOD CULTURE FOR BACTERIA: CPT

## 2023-05-29 PROCEDURE — 99223 1ST HOSP IP/OBS HIGH 75: CPT

## 2023-05-29 PROCEDURE — 6360000004 HC RX CONTRAST MEDICATION: Performed by: FAMILY MEDICINE

## 2023-05-29 PROCEDURE — 6360000002 HC RX W HCPCS

## 2023-05-29 PROCEDURE — 6360000002 HC RX W HCPCS: Performed by: STUDENT IN AN ORGANIZED HEALTH CARE EDUCATION/TRAINING PROGRAM

## 2023-05-29 PROCEDURE — 6370000000 HC RX 637 (ALT 250 FOR IP)

## 2023-05-29 PROCEDURE — 36415 COLL VENOUS BLD VENIPUNCTURE: CPT

## 2023-05-29 PROCEDURE — 85025 COMPLETE CBC W/AUTO DIFF WBC: CPT

## 2023-05-29 PROCEDURE — 82248 BILIRUBIN DIRECT: CPT

## 2023-05-29 PROCEDURE — 83690 ASSAY OF LIPASE: CPT

## 2023-05-29 PROCEDURE — 96376 TX/PRO/DX INJ SAME DRUG ADON: CPT

## 2023-05-29 PROCEDURE — 81001 URINALYSIS AUTO W/SCOPE: CPT

## 2023-05-29 RX ORDER — MAGNESIUM SULFATE IN WATER 40 MG/ML
2000 INJECTION, SOLUTION INTRAVENOUS PRN
Status: DISCONTINUED | OUTPATIENT
Start: 2023-05-29 | End: 2023-05-31 | Stop reason: HOSPADM

## 2023-05-29 RX ORDER — SODIUM CHLORIDE 9 MG/ML
INJECTION, SOLUTION INTRAVENOUS PRN
Status: DISCONTINUED | OUTPATIENT
Start: 2023-05-29 | End: 2023-05-31 | Stop reason: HOSPADM

## 2023-05-29 RX ORDER — FENTANYL CITRATE 50 UG/ML
50 INJECTION, SOLUTION INTRAMUSCULAR; INTRAVENOUS ONCE
Status: COMPLETED | OUTPATIENT
Start: 2023-05-29 | End: 2023-05-29

## 2023-05-29 RX ORDER — ONDANSETRON 2 MG/ML
4 INJECTION INTRAMUSCULAR; INTRAVENOUS ONCE
Status: COMPLETED | OUTPATIENT
Start: 2023-05-29 | End: 2023-05-29

## 2023-05-29 RX ORDER — SODIUM CHLORIDE 0.9 % (FLUSH) 0.9 %
10 SYRINGE (ML) INJECTION EVERY 12 HOURS SCHEDULED
Status: DISCONTINUED | OUTPATIENT
Start: 2023-05-29 | End: 2023-05-31 | Stop reason: HOSPADM

## 2023-05-29 RX ORDER — POLYETHYLENE GLYCOL 3350 17 G/17G
17 POWDER, FOR SOLUTION ORAL DAILY PRN
Status: DISCONTINUED | OUTPATIENT
Start: 2023-05-29 | End: 2023-05-31 | Stop reason: HOSPADM

## 2023-05-29 RX ORDER — SODIUM CHLORIDE 0.9 % (FLUSH) 0.9 %
10 SYRINGE (ML) INJECTION PRN
Status: DISCONTINUED | OUTPATIENT
Start: 2023-05-29 | End: 2023-05-31 | Stop reason: HOSPADM

## 2023-05-29 RX ORDER — CIPROFLOXACIN 2 MG/ML
400 INJECTION, SOLUTION INTRAVENOUS ONCE
Status: DISCONTINUED | OUTPATIENT
Start: 2023-05-29 | End: 2023-05-29

## 2023-05-29 RX ORDER — POTASSIUM CHLORIDE 20 MEQ/1
40 TABLET, EXTENDED RELEASE ORAL PRN
Status: DISCONTINUED | OUTPATIENT
Start: 2023-05-29 | End: 2023-05-31 | Stop reason: HOSPADM

## 2023-05-29 RX ORDER — PREDNISONE 1 MG/1
1 TABLET ORAL DAILY
COMMUNITY

## 2023-05-29 RX ORDER — METRONIDAZOLE 500 MG/100ML
500 INJECTION, SOLUTION INTRAVENOUS ONCE
Status: DISCONTINUED | OUTPATIENT
Start: 2023-05-29 | End: 2023-05-29

## 2023-05-29 RX ORDER — PREDNISONE 1 MG/1
1 TABLET ORAL 2 TIMES DAILY
Status: DISCONTINUED | OUTPATIENT
Start: 2023-05-29 | End: 2023-05-31 | Stop reason: HOSPADM

## 2023-05-29 RX ORDER — ACETAMINOPHEN 325 MG/1
650 TABLET ORAL EVERY 6 HOURS PRN
Status: DISCONTINUED | OUTPATIENT
Start: 2023-05-29 | End: 2023-05-31 | Stop reason: HOSPADM

## 2023-05-29 RX ORDER — ENOXAPARIN SODIUM 100 MG/ML
30 INJECTION SUBCUTANEOUS DAILY
Status: DISCONTINUED | OUTPATIENT
Start: 2023-05-30 | End: 2023-05-31 | Stop reason: HOSPADM

## 2023-05-29 RX ORDER — SODIUM CHLORIDE, SODIUM LACTATE, POTASSIUM CHLORIDE, CALCIUM CHLORIDE 600; 310; 30; 20 MG/100ML; MG/100ML; MG/100ML; MG/100ML
INJECTION, SOLUTION INTRAVENOUS CONTINUOUS
Status: DISCONTINUED | OUTPATIENT
Start: 2023-05-29 | End: 2023-05-30 | Stop reason: HOSPADM

## 2023-05-29 RX ORDER — MORPHINE SULFATE 2 MG/ML
2 INJECTION, SOLUTION INTRAMUSCULAR; INTRAVENOUS
Status: DISCONTINUED | OUTPATIENT
Start: 2023-05-29 | End: 2023-05-31 | Stop reason: HOSPADM

## 2023-05-29 RX ORDER — MORPHINE SULFATE 4 MG/ML
4 INJECTION, SOLUTION INTRAMUSCULAR; INTRAVENOUS
Status: DISCONTINUED | OUTPATIENT
Start: 2023-05-29 | End: 2023-05-31 | Stop reason: HOSPADM

## 2023-05-29 RX ORDER — ONDANSETRON 4 MG/1
4 TABLET, ORALLY DISINTEGRATING ORAL EVERY 8 HOURS PRN
Status: DISCONTINUED | OUTPATIENT
Start: 2023-05-29 | End: 2023-05-31 | Stop reason: HOSPADM

## 2023-05-29 RX ORDER — POTASSIUM CHLORIDE 7.45 MG/ML
10 INJECTION INTRAVENOUS PRN
Status: DISCONTINUED | OUTPATIENT
Start: 2023-05-29 | End: 2023-05-31 | Stop reason: HOSPADM

## 2023-05-29 RX ORDER — ACETAMINOPHEN 650 MG/1
650 SUPPOSITORY RECTAL EVERY 6 HOURS PRN
Status: DISCONTINUED | OUTPATIENT
Start: 2023-05-29 | End: 2023-05-31 | Stop reason: HOSPADM

## 2023-05-29 RX ORDER — ONDANSETRON 2 MG/ML
4 INJECTION INTRAMUSCULAR; INTRAVENOUS EVERY 6 HOURS PRN
Status: DISCONTINUED | OUTPATIENT
Start: 2023-05-29 | End: 2023-05-31 | Stop reason: HOSPADM

## 2023-05-29 RX ORDER — PREDNISONE 1 MG/1
1 TABLET ORAL DAILY
Status: DISCONTINUED | OUTPATIENT
Start: 2023-05-30 | End: 2023-05-29

## 2023-05-29 RX ORDER — KETOROLAC TROMETHAMINE 30 MG/ML
30 INJECTION, SOLUTION INTRAMUSCULAR; INTRAVENOUS ONCE
Status: COMPLETED | OUTPATIENT
Start: 2023-05-29 | End: 2023-05-29

## 2023-05-29 RX ADMIN — IOPAMIDOL 80 ML: 755 INJECTION, SOLUTION INTRAVENOUS at 15:03

## 2023-05-29 RX ADMIN — FENTANYL CITRATE 50 MCG: 50 INJECTION, SOLUTION INTRAMUSCULAR; INTRAVENOUS at 14:33

## 2023-05-29 RX ADMIN — PIPERACILLIN AND TAZOBACTAM 3375 MG: 3; .375 INJECTION, POWDER, LYOPHILIZED, FOR SOLUTION INTRAVENOUS at 21:52

## 2023-05-29 RX ADMIN — FENTANYL CITRATE 50 MCG: 50 INJECTION, SOLUTION INTRAMUSCULAR; INTRAVENOUS at 16:40

## 2023-05-29 RX ADMIN — KETOROLAC TROMETHAMINE 30 MG: 30 INJECTION, SOLUTION INTRAMUSCULAR; INTRAVENOUS at 21:13

## 2023-05-29 RX ADMIN — ONDANSETRON 4 MG: 2 INJECTION INTRAMUSCULAR; INTRAVENOUS at 14:33

## 2023-05-29 RX ADMIN — SODIUM CHLORIDE, PRESERVATIVE FREE 10 ML: 5 INJECTION INTRAVENOUS at 21:16

## 2023-05-29 RX ADMIN — PREDNISONE 1 MG: 1 TABLET ORAL at 21:13

## 2023-05-29 RX ADMIN — SODIUM CHLORIDE, POTASSIUM CHLORIDE, SODIUM LACTATE AND CALCIUM CHLORIDE: 600; 310; 30; 20 INJECTION, SOLUTION INTRAVENOUS at 22:21

## 2023-05-29 ASSESSMENT — PAIN DESCRIPTION - ONSET: ONSET: ON-GOING

## 2023-05-29 ASSESSMENT — ENCOUNTER SYMPTOMS
ABDOMINAL DISTENTION: 0
ABDOMINAL PAIN: 1
VOMITING: 0
CHEST TIGHTNESS: 0
BLOOD IN STOOL: 0
RHINORRHEA: 0
CONSTIPATION: 0
NAUSEA: 1
SHORTNESS OF BREATH: 0
SORE THROAT: 0
DIARRHEA: 0
COLOR CHANGE: 0

## 2023-05-29 ASSESSMENT — PAIN DESCRIPTION - LOCATION
LOCATION: ABDOMEN

## 2023-05-29 ASSESSMENT — PAIN DESCRIPTION - DESCRIPTORS
DESCRIPTORS: DULL
DESCRIPTORS: ACHING;CRAMPING

## 2023-05-29 ASSESSMENT — PAIN SCALES - GENERAL
PAINLEVEL_OUTOF10: 10
PAINLEVEL_OUTOF10: 2
PAINLEVEL_OUTOF10: 10
PAINLEVEL_OUTOF10: 6
PAINLEVEL_OUTOF10: 0
PAINLEVEL_OUTOF10: 10

## 2023-05-29 ASSESSMENT — PAIN DESCRIPTION - ORIENTATION: ORIENTATION: LOWER

## 2023-05-29 ASSESSMENT — PAIN DESCRIPTION - FREQUENCY: FREQUENCY: CONTINUOUS

## 2023-05-29 ASSESSMENT — PAIN - FUNCTIONAL ASSESSMENT
PAIN_FUNCTIONAL_ASSESSMENT: 0-10
PAIN_FUNCTIONAL_ASSESSMENT: ACTIVITIES ARE NOT PREVENTED

## 2023-05-29 ASSESSMENT — PAIN DESCRIPTION - PAIN TYPE: TYPE: CHRONIC PAIN

## 2023-05-29 NOTE — ED NOTES
Pt resting quietly on cot in stable condition. Denies any needs at this time. Call light in reach.       Treasure Gillespie RN  05/29/23 [id number]

## 2023-05-29 NOTE — ED NOTES
Pt expressed concerns about ordered atbs. This RN notified provider. Dr. Barrett Prudent to discuss POC with pt. Pt remains in stable condition.       Munir Madera RN  05/29/23 [id number]

## 2023-05-29 NOTE — FLOWSHEET NOTE
05/29/23 1830   Safe Environment   Safety Measures Other (comment)  (vn completed admission with pt at this time)     VN completes admission required documents with pt at this time , fall packet prevention reviewed , virtual services explained and accepted , call light within reach , no further questions at this time .

## 2023-05-29 NOTE — ED NOTES
ED to inpatient nurses report    Chief Complaint   Patient presents with    Diverticulitis     Known flair up - atb not helping      Present to ED from home  LOC: alert and orientated to name, place, date  Vital signs   Vitals:    05/29/23 1445 05/29/23 1550 05/29/23 1604 05/29/23 1644   BP: 132/78      Pulse: 70 69 72 67   Resp: 12 15 14 13   Temp:       TempSrc:       SpO2: 98% 98% 97% 99%   Weight:          Oxygen Baseline none    Current needs required none Bipap/Cpap No  LDAs:   Peripheral IV 05/29/23 Left Forearm (Active)   Site Assessment Clean, dry & intact 05/29/23 1354   Phlebitis Assessment No symptoms 05/29/23 1354   Infiltration Assessment 0 05/29/23 1354   Dressing Status New dressing applied 05/29/23 1354     Mobility: Requires assistance * 1  Pending ED orders: zosyn, has not arrived from pharmacy  Present condition: stable    C-SSRS Risk of Suicide: No Risk  Swallow Screening    Preferred Language: [place]     Electronically signed by Antonette Sebastian RN on 5/29/2023 at 4:46 PM      Antonette Sebastian RN  05/29/23 9255

## 2023-05-29 NOTE — ED NOTES
Pt transported to [facility] via wheelchair in stable condition. Floor called prior to transport, spoke with Ceci Muse.      Patrica Dinero  05/29/23 4512

## 2023-05-29 NOTE — H&P
Hospitalist - History & Physical      Patient: Maryjane Abreu    Unit/Bed:22/022A  YOB: 1965  MRN: [id number]   Acct: [de-identified]   PCP: JALEESA Bedoya CNP    Date of Service: Pt seen/examined on 05/29/23  and Admitted to Observation with expected LOS less than two midnights due to medical therapy. Chief Complaint:   \"diverticulitis pain\"    Assessment and Plan:  Acute diverticulitis, with intractable pain:    Pt endorsing worsening and persistent LLQ pain. Pt is afebrile, hemodynamically stable, but frail appearing. Labs largely unremarkable today- no acute leukocytosis. CT abdomen pelvis today shows sigmoid colon diverticulosis with adjacent inflammatory stranding consistent with diverticulitis; no abscess or perforation noted. Started Augmentin outpatient 3 days ago, however symptoms worsening. Due patient's cipro allergy/sensitivity and recent outpatient abx use, Zosyn started in ED- will continue upon admission for now. Clear liquid diet, PRN analgesics, IVF, zosyn. Repeat CBC, CMP in the AM.     Hx of RA: On prednisone BID. Continue upon admission. Hx of melanoma:    Noted per patient and chart review. Follows with oncology through [facility]. Stable. 4. Low BMI:     Noted. BMI 17.09 today. Pt is frail appearing. Encouraged PO intake when tolerable. History Of Present Illness:    Sera is a 55 y/o  female with a PMHx of RA, melanoma who presents to [facility] ED today for the evaluation of \"diverticulitis pain. \" Pt states she has crampy, intense abdominal pain mostly in the lower left part of her abdomen, with radiation of pain to the right side of her abdomen when she has bowel movements. Pt states the pain is waxing and waning in a cramping-like fashion. She reports associated decreased appetite, intermittent nausea, semi-formed stool. The pain started 4 days ago, and persisted into the next day.  She saw the PCP 3 days ago-  she was prescribed Augmentin for which she

## 2023-05-29 NOTE — ED NOTES
Pt arrived to ED via personal transport with complaints of continued abd pain after tx for diverticulitis. States she was told by pcp to come to ED for IV atb and pain meds on Friday but pt wanted to try PO atbs over the weekend. Pt states pain is increased and she can no longer tolerate. IV access obtained. Telemetry applied. Pt appears to be in stable condition with respirations even and unlabored. Pts call light in reach.        Sherryle Patch, RN  05/29/23 4512

## 2023-05-29 NOTE — ED NOTES
Pt ambulated to bathroom without difficulty. Remains in stable condition with call light in reach.       Janice Espinoza RN  05/29/23 9181

## 2023-05-30 VITALS
WEIGHT: 87.52 LBS | HEART RATE: 70 BPM | HEIGHT: 60 IN | SYSTOLIC BLOOD PRESSURE: 127 MMHG | TEMPERATURE: 98.3 F | RESPIRATION RATE: 16 BRPM | DIASTOLIC BLOOD PRESSURE: 72 MMHG | BODY MASS INDEX: 17.18 KG/M2 | OXYGEN SATURATION: 100 %

## 2023-05-30 LAB
ALBUMIN SERPL BCG-MCNC: 3.3 G/DL (ref 3.5–5.1)
ALP SERPL-CCNC: 78 U/L (ref 38–126)
ALT SERPL W/O P-5'-P-CCNC: 10 U/L (ref 11–66)
ANION GAP SERPL CALC-SCNC: 11 MEQ/L (ref 8–16)
AST SERPL-CCNC: 13 U/L (ref 5–40)
BASOPHILS ABSOLUTE: 0.1 THOU/MM3 (ref 0–0.1)
BASOPHILS NFR BLD AUTO: 1.1 %
BILIRUB SERPL-MCNC: 0.5 MG/DL (ref 0.3–1.2)
BUN SERPL-MCNC: 9 MG/DL (ref 7–22)
CALCIUM SERPL-MCNC: 8.8 MG/DL (ref 8.5–10.5)
CHLORIDE SERPL-SCNC: 103 MEQ/L (ref 98–111)
CO2 SERPL-SCNC: 23 MEQ/L (ref 23–33)
CREAT SERPL-MCNC: 0.5 MG/DL (ref 0.4–1.2)
DEPRECATED RDW RBC AUTO: 46.9 FL (ref 35–45)
EOSINOPHIL NFR BLD AUTO: 1.7 %
EOSINOPHILS ABSOLUTE: 0.1 THOU/MM3 (ref 0–0.4)
ERYTHROCYTE [DISTWIDTH] IN BLOOD BY AUTOMATED COUNT: 12.8 % (ref 11.5–14.5)
GFR SERPL CREATININE-BSD FRML MDRD: > 60 ML/MIN/1.73M2
GLUCOSE SERPL-MCNC: 88 MG/DL (ref 70–108)
HCT VFR BLD AUTO: 37.8 % (ref 37–47)
HGB BLD-MCNC: 12.3 GM/DL (ref 12–16)
IMM GRANULOCYTES # BLD AUTO: 0.02 THOU/MM3 (ref 0–0.07)
IMM GRANULOCYTES NFR BLD AUTO: 0.3 %
LYMPHOCYTES ABSOLUTE: 1.2 THOU/MM3 (ref 1–4.8)
LYMPHOCYTES NFR BLD AUTO: 17.7 %
MCH RBC QN AUTO: 32.5 PG (ref 26–33)
MCHC RBC AUTO-ENTMCNC: 32.5 GM/DL (ref 32.2–35.5)
MCV RBC AUTO: 99.7 FL (ref 81–99)
MONOCYTES ABSOLUTE: 0.7 THOU/MM3 (ref 0.4–1.3)
MONOCYTES NFR BLD AUTO: 10.1 %
NEUTROPHILS NFR BLD AUTO: 69.1 %
NRBC BLD AUTO-RTO: 0 /100 WBC
PLATELET # BLD AUTO: 258 THOU/MM3 (ref 130–400)
PMV BLD AUTO: 11.1 FL (ref 9.4–12.4)
POTASSIUM SERPL-SCNC: 3.8 MEQ/L (ref 3.5–5.2)
PROT SERPL-MCNC: 5.8 G/DL (ref 6.1–8)
RBC # BLD AUTO: 3.79 MILL/MM3 (ref 4.2–5.4)
SEGMENTED NEUTROPHILS ABSOLUTE COUNT: 4.6 THOU/MM3 (ref 1.8–7.7)
SODIUM SERPL-SCNC: 137 MEQ/L (ref 135–145)
WBC # BLD AUTO: 6.6 THOU/MM3 (ref 4.8–10.8)

## 2023-05-30 PROCEDURE — G0378 HOSPITAL OBSERVATION PER HR: HCPCS

## 2023-05-30 PROCEDURE — 36415 COLL VENOUS BLD VENIPUNCTURE: CPT

## 2023-05-30 PROCEDURE — 85025 COMPLETE CBC W/AUTO DIFF WBC: CPT

## 2023-05-30 PROCEDURE — 2580000003 HC RX 258

## 2023-05-30 PROCEDURE — 6360000002 HC RX W HCPCS

## 2023-05-30 PROCEDURE — 6370000000 HC RX 637 (ALT 250 FOR IP)

## 2023-05-30 PROCEDURE — 80053 COMPREHEN METABOLIC PANEL: CPT

## 2023-05-30 RX ORDER — AMOXICILLIN AND CLAVULANATE POTASSIUM 875; 125 MG/1; MG/1
1 TABLET, FILM COATED ORAL 2 TIMES DAILY
Qty: 14 TABLET | Refills: 0 | Status: SHIPPED | OUTPATIENT
Start: 2023-05-30 | End: 2023-06-06

## 2023-05-30 RX ORDER — ONDANSETRON 4 MG/1
4 TABLET, ORALLY DISINTEGRATING ORAL EVERY 8 HOURS PRN
Qty: 12 TABLET | Refills: 0 | Status: SHIPPED | OUTPATIENT
Start: 2023-05-30

## 2023-05-30 RX ADMIN — PREDNISONE 1 MG: 1 TABLET ORAL at 08:18

## 2023-05-30 RX ADMIN — ENOXAPARIN SODIUM 30 MG: 100 INJECTION SUBCUTANEOUS at 08:24

## 2023-05-30 RX ADMIN — MORPHINE SULFATE 4 MG: 4 INJECTION, SOLUTION INTRAMUSCULAR; INTRAVENOUS at 05:52

## 2023-05-30 RX ADMIN — PIPERACILLIN AND TAZOBACTAM 3375 MG: 3; .375 INJECTION, POWDER, LYOPHILIZED, FOR SOLUTION INTRAVENOUS at 14:00

## 2023-05-30 RX ADMIN — MORPHINE SULFATE 4 MG: 4 INJECTION, SOLUTION INTRAMUSCULAR; INTRAVENOUS at 08:24

## 2023-05-30 RX ADMIN — ONDANSETRON 4 MG: 2 INJECTION INTRAMUSCULAR; INTRAVENOUS at 08:24

## 2023-05-30 RX ADMIN — PIPERACILLIN AND TAZOBACTAM 3375 MG: 3; .375 INJECTION, POWDER, LYOPHILIZED, FOR SOLUTION INTRAVENOUS at 04:54

## 2023-05-30 RX ADMIN — MORPHINE SULFATE 2 MG: 2 INJECTION, SOLUTION INTRAMUSCULAR; INTRAVENOUS at 03:11

## 2023-05-30 RX ADMIN — POLYETHYLENE GLYCOL 3350 17 G: 17 POWDER, FOR SOLUTION ORAL at 08:24

## 2023-05-30 ASSESSMENT — PAIN DESCRIPTION - DESCRIPTORS
DESCRIPTORS: ACHING;CRAMPING;DISCOMFORT
DESCRIPTORS: ACHING
DESCRIPTORS: ACHING;CRUSHING

## 2023-05-30 ASSESSMENT — PAIN SCALES - GENERAL
PAINLEVEL_OUTOF10: 5
PAINLEVEL_OUTOF10: 7
PAINLEVEL_OUTOF10: 7

## 2023-05-30 ASSESSMENT — PAIN DESCRIPTION - PAIN TYPE: TYPE: ACUTE PAIN

## 2023-05-30 ASSESSMENT — PAIN - FUNCTIONAL ASSESSMENT: PAIN_FUNCTIONAL_ASSESSMENT: ACTIVITIES ARE NOT PREVENTED

## 2023-05-30 ASSESSMENT — PAIN DESCRIPTION - LOCATION
LOCATION: ABDOMEN

## 2023-05-30 ASSESSMENT — PAIN DESCRIPTION - ONSET: ONSET: ON-GOING

## 2023-05-30 ASSESSMENT — PAIN DESCRIPTION - FREQUENCY: FREQUENCY: CONTINUOUS

## 2023-05-30 NOTE — PLAN OF CARE
Problem: Discharge Planning  Goal: Discharge to home or other facility with appropriate resources  Outcome: Progressing  Flowsheets (Taken 5/30/2023 1441)  Discharge to home or other facility with appropriate resources: Identify barriers to discharge with patient and caregiver     Problem: Pain  Goal: Verbalizes/displays adequate comfort level or baseline comfort level  Outcome: Progressing  Flowsheets (Taken 5/30/2023 1441)  Verbalizes/displays adequate comfort level or baseline comfort level:   Encourage patient to monitor pain and request assistance   Assess pain using appropriate pain scale     Problem: Safety - Adult  Goal: Free from fall injury  Outcome: Progressing  Flowsheets (Taken 5/30/2023 1441)  Free From Fall Injury: Instruct family/caregiver on patient safety     Problem: ABCDS Injury Assessment  Goal: Absence of physical injury  Outcome: Progressing  Flowsheets (Taken 5/30/2023 1441)  Absence of Physical Injury: Implement safety measures based on patient assessment     Problem: Cardiovascular - Adult  Goal: Absence of cardiac dysrhythmias or at baseline  Outcome: Progressing  Flowsheets (Taken 5/30/2023 1441)  Absence of cardiac dysrhythmias or at baseline: Monitor cardiac rate and rhythm     Problem: Skin/Tissue Integrity - Adult  Goal: Oral mucous membranes remain intact  Outcome: Progressing  Flowsheets (Taken 5/30/2023 1441)  Oral Mucous Membranes Remain Intact: Assess oral mucosa and hygiene practices     Problem: Musculoskeletal - Adult  Goal: Return mobility to safest level of function  Outcome: Progressing  Flowsheets (Taken 5/30/2023 1441)  Return Mobility to Safest Level of Function: Assess patient stability and activity tolerance for standing, transferring and ambulating with or without assistive devices     Problem: Gastrointestinal - Adult  Goal: Minimal or absence of nausea and vomiting  Outcome: Progressing  Flowsheets (Taken 5/30/2023 1441)  Minimal or absence of nausea and vomiting:

## 2023-05-30 NOTE — ED PROVIDER NOTES
[facility]      EMERGENCY MEDICINE     Pt Name: Bebe Higgins  MRN: [id number]  [place] 1965  Date of evaluation: 5/29/2023  Provider: Abel Cline MD  Supervising Physician: Dr Monalisa Oliveira   Patient presents with    Diverticulitis     Known flair up - atb not helping     HISTORY OF PRESENT ILLNESS   Sera Avalos is a 56 y.o. female who presents to the emergency department for left lower quadrant abdominal pain over the last couple of days. She seen outpatient by her primary care physician's office who prescribed Augmentin for which she thought was that of the flareup of her diverticulitis. She has some trans improvement however her nausea and pain severely worsened today and brought in for further evaluation. She has been on Augmentin over the last 3 days.       PASTMEDICAL HISTORY     Past Medical History:   Diagnosis Date    TRINI (acute kidney injury) ([address] 12/8/2012    Arthritis     Hx of skin cancer, basal cell     Malignant hyperthermia     daughters    Melanoma ([address] 2018, 2021    left shin 2018, left lower back 2021 with mets to left axillary lymph node    RA (rheumatoid arthritis) ([address]        Patient Active Problem List   Diagnosis Code    TRINI (acute kidney injury) ([address] N17.9    Hip fx, left, sequela S72.002S    Diarrhea R19.7    Acute diverticulitis K57.92     SURGICAL HISTORY       Past Surgical History:   Procedure Laterality Date    ARTHRODESIS Right 5/23/2019    RIGHT 1ST MPJ FUSION, METATARSAL HEAD RESECTION 2-5, TOES 2-5 ARTHROPLASTY/ARTHRODESIS & PLANTAR SKIN FLAP performed by Boaz Guardado DPM at [facility] Left 9/5/2019    LEFT 1ST MPJ FUSION, METATARSAL HEADS 2-5 RESECTION, ARTHRODESIS 2-4 TOES, ARTHROPLASTY 5TH TOE ALL ON THE LEFT performed by Boaz Guardado DPM at [address] [place] [address] Right 12/13/2019    RT HALLUX OSTECTOMY performed by Boaz Guardado DPM at [facility]

## 2023-05-30 NOTE — DISCHARGE SUMMARY
NUTRITION SUPPLEMENT; Breakfast, Lunch, Dinner; Clear Liquid Oral Supplement      Follow-up visits:   No follow-up provider specified. Discharge Medications:        Medication List        ASK your doctor about these medications      calcium carbonate-vitamin D 600-200 MG-UNIT Tabs     CALCIUM-MAG-VIT C-VIT D PO     dicyclomine 10 MG capsule  Commonly known as: BENTYL  Take 1 capsule by mouth 2 times daily as needed (abdominal cramping and pain)     famciclovir 500 MG tablet  Commonly known as: FAMVIR     ibuprofen 600 MG tablet  Commonly known as: ADVIL;MOTRIN  Take 1 tablet by mouth 3 times daily as needed for Pain     JOINT SUPPORT PO     lidocaine 4 % external patch  Place 1 patch onto the skin every 24 hours Place 1 patch onto the skin daily 12 hours on, 12 hours off. MAGNESIUM ACETATE     MAXALT PO     OLIVE LEAF PO     ondansetron 4 MG disintegrating tablet  Commonly known as: Zofran ODT  Take 1 tablet by mouth every 8 hours as needed for Nausea or Vomiting     * predniSONE 1 MG tablet  Commonly known as: DELTASONE     * predniSONE 5 MG tablet  Commonly known as: DELTASONE     PROBIOTIC FORMULA PO     therapeutic multivitamin-minerals tablet     Tofacitinib Citrate ER 11 MG Tb24     TYLENOL ARTHRITIS PAIN PO     vitamin D3 10 MCG (400 UNIT) Tabs tablet  Commonly known as: CHOLECALCIFEROL           * This list has 2 medication(s) that are the same as other medications prescribed for you. Read the directions carefully, and ask your doctor or other care provider to review them with you. Time Spent on discharge is <30 minutes. Thank you JALEESA Warren CNP for the opportunity to be involved in this patient's care.       Signed:    Electronically signed by Ford Long PA-C on 5/30/23 at 3:57 PM EDT

## 2023-05-30 NOTE — PLAN OF CARE
Problem: Discharge Planning  Goal: Discharge to home or other facility with appropriate resources  Outcome: Progressing     Problem: Pain  Goal: Verbalizes/displays adequate comfort level or baseline comfort level  Outcome: Progressing     Problem: Safety - Adult  Goal: Free from fall injury  Outcome: Progressing     Problem: ABCDS Injury Assessment  Goal: Absence of physical injury  Outcome: Progressing     Problem: Cardiovascular - Adult  Goal: Absence of cardiac dysrhythmias or at baseline  Outcome: Progressing     Problem: Skin/Tissue Integrity - Adult  Goal: Oral mucous membranes remain intact  Outcome: Progressing     Problem: Musculoskeletal - Adult  Goal: Return mobility to safest level of function  Outcome: Progressing     Problem: Gastrointestinal - Adult  Goal: Minimal or absence of nausea and vomiting  Outcome: Progressing     Problem: Gastrointestinal - Adult  Goal: Maintains or returns to baseline bowel function  Outcome: Progressing     Problem: Gastrointestinal - Adult  Goal: Maintains adequate nutritional intake  Outcome: Progressing     Problem: Genitourinary - Adult  Goal: Absence of urinary retention  Outcome: Progressing     Problem: Metabolic/Fluid and Electrolytes - Adult  Goal: Hemodynamic stability and optimal renal function maintained  Outcome: Progressing     Problem: Hematologic - Adult  Goal: Maintains hematologic stability  Outcome: Progressing

## 2023-05-31 NOTE — PROGRESS NOTES
Pt was discharged on previous shift and was not taken out of the system. Will discharge/remove from unit at this time, it appears she was taken downstairs to the main lobby about 1800.

## 2023-06-03 LAB
BACTERIA BLD AEROBE CULT: NORMAL
BACTERIA BLD AEROBE CULT: NORMAL

## 2023-07-05 ENCOUNTER — APPOINTMENT (OUTPATIENT)
Dept: GENERAL RADIOLOGY | Age: 58
End: 2023-07-05
Payer: COMMERCIAL

## 2023-07-05 ENCOUNTER — HOSPITAL ENCOUNTER (EMERGENCY)
Age: 58
Discharge: HOME | End: 2023-07-05
Payer: COMMERCIAL

## 2023-07-05 VITALS
HEART RATE: 69 BPM | RESPIRATION RATE: 16 BRPM | OXYGEN SATURATION: 100 % | SYSTOLIC BLOOD PRESSURE: 145 MMHG | HEIGHT: 60 IN | TEMPERATURE: 98.5 F | BODY MASS INDEX: 18.06 KG/M2 | WEIGHT: 92 LBS | DIASTOLIC BLOOD PRESSURE: 91 MMHG

## 2023-07-05 DIAGNOSIS — M06.9 RHEUMATOID ARTHRITIS INVOLVING RIGHT KNEE, UNSPECIFIED WHETHER RHEUMATOID FACTOR PRESENT (HCC): ICD-10-CM

## 2023-07-05 DIAGNOSIS — M25.561 PAIN AND SWELLING OF RIGHT KNEE: Primary | ICD-10-CM

## 2023-07-05 DIAGNOSIS — M25.461 PAIN AND SWELLING OF RIGHT KNEE: Primary | ICD-10-CM

## 2023-07-05 PROCEDURE — 73560 X-RAY EXAM OF KNEE 1 OR 2: CPT

## 2023-07-05 PROCEDURE — 99283 EMERGENCY DEPT VISIT LOW MDM: CPT

## 2023-07-05 PROCEDURE — 20610 DRAIN/INJ JOINT/BURSA W/O US: CPT

## 2023-07-05 PROCEDURE — 6370000000 HC RX 637 (ALT 250 FOR IP): Performed by: PHYSICIAN ASSISTANT

## 2023-07-05 RX ORDER — PREDNISONE 10 MG/1
TABLET ORAL
Qty: 20 TABLET | Refills: 0 | Status: SHIPPED | OUTPATIENT
Start: 2023-07-05 | End: 2023-07-15

## 2023-07-05 RX ORDER — OXYCODONE HYDROCHLORIDE AND ACETAMINOPHEN 5; 325 MG/1; MG/1
1 TABLET ORAL EVERY 6 HOURS PRN
Qty: 12 TABLET | Refills: 0 | Status: SHIPPED | OUTPATIENT
Start: 2023-07-05 | End: 2023-07-08

## 2023-07-05 RX ORDER — OXYCODONE HYDROCHLORIDE AND ACETAMINOPHEN 5; 325 MG/1; MG/1
1 TABLET ORAL ONCE
Status: DISCONTINUED | OUTPATIENT
Start: 2023-07-05 | End: 2023-07-05

## 2023-07-05 RX ORDER — OXYCODONE HYDROCHLORIDE AND ACETAMINOPHEN 5; 325 MG/1; MG/1
1 TABLET ORAL ONCE
Status: COMPLETED | OUTPATIENT
Start: 2023-07-05 | End: 2023-07-05

## 2023-07-05 RX ADMIN — OXYCODONE HYDROCHLORIDE AND ACETAMINOPHEN 1 TABLET: 5; 325 TABLET ORAL at 05:01

## 2023-07-05 ASSESSMENT — PAIN SCALES - GENERAL
PAINLEVEL_OUTOF10: 6
PAINLEVEL_OUTOF10: 6

## 2023-07-05 ASSESSMENT — PAIN DESCRIPTION - ORIENTATION
ORIENTATION: RIGHT
ORIENTATION: RIGHT

## 2023-07-05 ASSESSMENT — PAIN - FUNCTIONAL ASSESSMENT: PAIN_FUNCTIONAL_ASSESSMENT: 0-10

## 2023-07-05 ASSESSMENT — PAIN DESCRIPTION - LOCATION
LOCATION: KNEE
LOCATION: KNEE

## 2023-07-05 NOTE — ED TRIAGE NOTES
Pt presents to ED with chief complaint of right knee pain. Pt states she went to bed fine and woke up with swelling and pain to right knee. Reports hx of rheumatoid arthritis. States she took 2 advil prior to arrival. Significant swelling noted to right knee.

## 2023-07-16 ASSESSMENT — ENCOUNTER SYMPTOMS
NAUSEA: 0
ABDOMINAL PAIN: 0
RHINORRHEA: 0
VOMITING: 0
COLOR CHANGE: 0
SHORTNESS OF BREATH: 0

## 2023-08-11 ENCOUNTER — OFFICE VISIT (OUTPATIENT)
Dept: CARDIOLOGY CLINIC | Age: 58
End: 2023-08-11
Payer: COMMERCIAL

## 2023-08-11 VITALS
HEART RATE: 80 BPM | BODY MASS INDEX: 17.47 KG/M2 | WEIGHT: 89 LBS | SYSTOLIC BLOOD PRESSURE: 150 MMHG | HEIGHT: 60 IN | DIASTOLIC BLOOD PRESSURE: 93 MMHG

## 2023-08-11 DIAGNOSIS — R94.31 ABNORMAL ECG: ICD-10-CM

## 2023-08-11 DIAGNOSIS — Z01.818 PRE-OP EVALUATION: Primary | ICD-10-CM

## 2023-08-11 PROCEDURE — 99204 OFFICE O/P NEW MOD 45 MIN: CPT | Performed by: NUCLEAR MEDICINE

## 2023-08-11 RX ORDER — VIT C/B6/B5/MAGNESIUM/HERB 173 50-5-6-5MG
CAPSULE ORAL DAILY
COMMUNITY

## 2023-08-11 RX ORDER — LECITHIN/GINKGO/S.GINSENG/GOTU 50-150-250
TABLET ORAL
COMMUNITY

## 2023-08-11 RX ORDER — BIOTIN 5 MG
2000 TABLET ORAL
COMMUNITY

## 2023-08-11 ASSESSMENT — ENCOUNTER SYMPTOMS
COLOR CHANGE: 0
SHORTNESS OF BREATH: 1
BLOOD IN STOOL: 0
NAUSEA: 0
ANAL BLEEDING: 0
CHEST TIGHTNESS: 0
ABDOMINAL DISTENTION: 0
DIARRHEA: 0
VOMITING: 0
RECTAL PAIN: 0
BACK PAIN: 0
ABDOMINAL PAIN: 0
PHOTOPHOBIA: 0
CONSTIPATION: 0

## 2023-08-11 NOTE — PROGRESS NOTES
[address] [facility] [address]  [facility] [address]  Dept: [phone]  Dept Fax: [phone]  Loc: [phone]    Visit Date: 8/11/2023    Sera Bourgeois is a 66 y.o. female who presents todayfor:  Chief Complaint   Patient presents with    New Patient    Cardiac Clearance     Right Total Knee with Dr. Arash Kilpatrick scheduled 8-24-23    Shortness of Breath   Here for the first time  Abnormal ECG   Going for knee surgery   Does have rheumatoid arthritis  No known CAD  Does have some aches and pains  Some baseline dyspnea  No known HTN   No known Dm   No known hyperlipidemia  No smoking   Family history of CAD       HPI:  Shortness of Breath  Pertinent negatives include no abdominal pain, chest pain, leg swelling, neck pain, rash or vomiting.    Past Medical History:   Diagnosis Date    TRINI (acute kidney injury) ([address]) 12/8/2012    Arthritis     Hx of skin cancer, basal cell     Malignant hyperthermia     daughters    Melanoma ([address]) 2018, 2021    left shin 2018, left lower back 2021 with mets to left axillary lymph node    RA (rheumatoid arthritis) ([address])       Past Surgical History:   Procedure Laterality Date    ARTHRODESIS Right 5/23/2019    RIGHT 1ST MPJ FUSION, METATARSAL HEAD RESECTION 2-5, TOES 2-5 ARTHROPLASTY/ARTHRODESIS & PLANTAR SKIN FLAP performed by Randy Evangelista DPM at [address] [place] [address] Left 9/5/2019    LEFT 1ST MPJ FUSION, METATARSAL HEADS 2-5 RESECTION, ARTHRODESIS 2-4 TOES, ARTHROPLASTY 5TH TOE ALL ON THE LEFT performed by Randy Evangelista DPM at [facility] Right 12/13/2019    RT HALLUX OSTECTOMY performed by Randy Evangelista DPM at [facility] Right 7/18/2020    right total hip arthroplasty performed by Yudi Dangelo MD at [address] Right     thumb fusion    SKIN BIOPSY  05/2018    lower left leg, melanoma    TUBAL LIGATION       Family History   Problem Relation Age of

## 2023-08-17 ENCOUNTER — TELEPHONE (OUTPATIENT)
Dept: CARDIOLOGY CLINIC | Age: 58
End: 2023-08-17

## 2023-08-17 NOTE — TELEPHONE ENCOUNTER
Melanie Gonzales is calling from the patient's PCP office regarding the cardiac clearance they requested. She said they are waiting on this in order to complete theirs. Pt was seen 08/11/2023. Please fax clearance forms to:     Fax # [phone]   Office # [phone]

## 2023-08-24 ENCOUNTER — ANESTHESIA EVENT (OUTPATIENT)
Dept: OPERATING ROOM | Age: 58
DRG: 470 | End: 2023-08-24
Payer: COMMERCIAL

## 2023-08-24 ENCOUNTER — HOSPITAL ENCOUNTER (INPATIENT)
Age: 58
LOS: 1 days | Discharge: HOME HEALTH | DRG: 470 | End: 2023-08-25
Attending: ORTHOPAEDIC SURGERY | Admitting: ORTHOPAEDIC SURGERY
Payer: COMMERCIAL

## 2023-08-24 ENCOUNTER — ANESTHESIA (OUTPATIENT)
Dept: OPERATING ROOM | Age: 58
DRG: 470 | End: 2023-08-24
Payer: COMMERCIAL

## 2023-08-24 ENCOUNTER — APPOINTMENT (OUTPATIENT)
Dept: GENERAL RADIOLOGY | Age: 58
DRG: 470 | End: 2023-08-24
Attending: ORTHOPAEDIC SURGERY
Payer: COMMERCIAL

## 2023-08-24 DIAGNOSIS — G89.18 POST-OP PAIN: Primary | ICD-10-CM

## 2023-08-24 DIAGNOSIS — M17.11 LOCALIZED OSTEOARTHRITIS OF RIGHT KNEE: ICD-10-CM

## 2023-08-24 DIAGNOSIS — S89.91XA ACUTE INJURY OF RIGHT KNEE CARTILAGE, INITIAL ENCOUNTER: ICD-10-CM

## 2023-08-24 PROCEDURE — 6360000002 HC RX W HCPCS: Performed by: ORTHOPAEDIC SURGERY

## 2023-08-24 PROCEDURE — 3600000014 HC SURGERY LEVEL 4 ADDTL 15MIN: Performed by: ORTHOPAEDIC SURGERY

## 2023-08-24 PROCEDURE — 1200000000 HC SEMI PRIVATE

## 2023-08-24 PROCEDURE — 3700000001 HC ADD 15 MINUTES (ANESTHESIA): Performed by: ORTHOPAEDIC SURGERY

## 2023-08-24 PROCEDURE — 2720000010 HC SURG SUPPLY STERILE: Performed by: ORTHOPAEDIC SURGERY

## 2023-08-24 PROCEDURE — 7100000000 HC PACU RECOVERY - FIRST 15 MIN: Performed by: ORTHOPAEDIC SURGERY

## 2023-08-24 PROCEDURE — A4216 STERILE WATER/SALINE, 10 ML: HCPCS | Performed by: ORTHOPAEDIC SURGERY

## 2023-08-24 PROCEDURE — 0SRC0J9 REPLACEMENT OF RIGHT KNEE JOINT WITH SYNTHETIC SUBSTITUTE, CEMENTED, OPEN APPROACH: ICD-10-PCS | Performed by: ORTHOPAEDIC SURGERY

## 2023-08-24 PROCEDURE — 6360000002 HC RX W HCPCS: Performed by: REGISTERED NURSE

## 2023-08-24 PROCEDURE — C1776 JOINT DEVICE (IMPLANTABLE): HCPCS | Performed by: ORTHOPAEDIC SURGERY

## 2023-08-24 PROCEDURE — 6360000002 HC RX W HCPCS

## 2023-08-24 PROCEDURE — 6370000000 HC RX 637 (ALT 250 FOR IP): Performed by: PHYSICIAN ASSISTANT

## 2023-08-24 PROCEDURE — 73560 X-RAY EXAM OF KNEE 1 OR 2: CPT

## 2023-08-24 PROCEDURE — 2500000003 HC RX 250 WO HCPCS: Performed by: REGISTERED NURSE

## 2023-08-24 PROCEDURE — 2580000003 HC RX 258: Performed by: NURSE PRACTITIONER

## 2023-08-24 PROCEDURE — 6370000000 HC RX 637 (ALT 250 FOR IP): Performed by: NURSE PRACTITIONER

## 2023-08-24 PROCEDURE — 2709999900 HC NON-CHARGEABLE SUPPLY: Performed by: ORTHOPAEDIC SURGERY

## 2023-08-24 PROCEDURE — 7100000001 HC PACU RECOVERY - ADDTL 15 MIN: Performed by: ORTHOPAEDIC SURGERY

## 2023-08-24 PROCEDURE — 6360000002 HC RX W HCPCS: Performed by: NURSE PRACTITIONER

## 2023-08-24 PROCEDURE — 3700000000 HC ANESTHESIA ATTENDED CARE: Performed by: ORTHOPAEDIC SURGERY

## 2023-08-24 PROCEDURE — C1713 ANCHOR/SCREW BN/BN,TIS/BN: HCPCS | Performed by: ORTHOPAEDIC SURGERY

## 2023-08-24 PROCEDURE — 2500000003 HC RX 250 WO HCPCS: Performed by: ORTHOPAEDIC SURGERY

## 2023-08-24 PROCEDURE — 2580000003 HC RX 258: Performed by: ORTHOPAEDIC SURGERY

## 2023-08-24 PROCEDURE — 3600000004 HC SURGERY LEVEL 4 BASE: Performed by: ORTHOPAEDIC SURGERY

## 2023-08-24 PROCEDURE — 6360000002 HC RX W HCPCS: Performed by: ANESTHESIOLOGY

## 2023-08-24 DEVICE — IMPLANTABLE DEVICE: Type: IMPLANTABLE DEVICE | Site: KNEE | Status: FUNCTIONAL

## 2023-08-24 DEVICE — COMPONENT PAT 29MM DIA 6MM THCK 3 PEG 3 RND PRI STD CEM NP: Type: IMPLANTABLE DEVICE | Site: KNEE | Status: FUNCTIONAL

## 2023-08-24 DEVICE — RALLY MV AB BONE CEMENT 40 GRAMS
Type: IMPLANTABLE DEVICE | Site: KNEE | Status: FUNCTIONAL
Brand: RALLY

## 2023-08-24 DEVICE — COMPONENT TOT KNEE CAPPED K1 STD HEMI CEM: Type: IMPLANTABLE DEVICE | Site: KNEE | Status: FUNCTIONAL

## 2023-08-24 RX ORDER — ONDANSETRON 2 MG/ML
4 INJECTION INTRAMUSCULAR; INTRAVENOUS
Status: DISCONTINUED | OUTPATIENT
Start: 2023-08-24 | End: 2023-08-24 | Stop reason: HOSPADM

## 2023-08-24 RX ORDER — FENTANYL CITRATE 50 UG/ML
INJECTION, SOLUTION INTRAMUSCULAR; INTRAVENOUS PRN
Status: DISCONTINUED | OUTPATIENT
Start: 2023-08-24 | End: 2023-08-24 | Stop reason: SDUPTHER

## 2023-08-24 RX ORDER — HYDROCODONE BITARTRATE AND ACETAMINOPHEN 5; 325 MG/1; MG/1
2 TABLET ORAL EVERY 4 HOURS PRN
Status: DISCONTINUED | OUTPATIENT
Start: 2023-08-24 | End: 2023-08-24

## 2023-08-24 RX ORDER — MIDAZOLAM HYDROCHLORIDE 1 MG/ML
INJECTION INTRAMUSCULAR; INTRAVENOUS PRN
Status: DISCONTINUED | OUTPATIENT
Start: 2023-08-24 | End: 2023-08-24 | Stop reason: SDUPTHER

## 2023-08-24 RX ORDER — ONDANSETRON 2 MG/ML
4 INJECTION INTRAMUSCULAR; INTRAVENOUS EVERY 6 HOURS PRN
Status: DISCONTINUED | OUTPATIENT
Start: 2023-08-24 | End: 2023-08-25 | Stop reason: HOSPADM

## 2023-08-24 RX ORDER — TRANEXAMIC ACID 100 MG/ML
INJECTION, SOLUTION INTRAVENOUS PRN
Status: DISCONTINUED | OUTPATIENT
Start: 2023-08-24 | End: 2023-08-24 | Stop reason: ALTCHOICE

## 2023-08-24 RX ORDER — DEXAMETHASONE SODIUM PHOSPHATE 10 MG/ML
INJECTION, EMULSION INTRAMUSCULAR; INTRAVENOUS PRN
Status: DISCONTINUED | OUTPATIENT
Start: 2023-08-24 | End: 2023-08-24 | Stop reason: SDUPTHER

## 2023-08-24 RX ORDER — ACETAMINOPHEN 325 MG/1
650 TABLET ORAL EVERY 4 HOURS PRN
Status: DISCONTINUED | OUTPATIENT
Start: 2023-08-24 | End: 2023-08-25 | Stop reason: HOSPADM

## 2023-08-24 RX ORDER — MORPHINE SULFATE 2 MG/ML
2 INJECTION, SOLUTION INTRAMUSCULAR; INTRAVENOUS
Status: DISCONTINUED | OUTPATIENT
Start: 2023-08-24 | End: 2023-08-25 | Stop reason: HOSPADM

## 2023-08-24 RX ORDER — SODIUM CHLORIDE 9 MG/ML
INJECTION, SOLUTION INTRAVENOUS PRN
Status: DISCONTINUED | OUTPATIENT
Start: 2023-08-24 | End: 2023-08-24 | Stop reason: HOSPADM

## 2023-08-24 RX ORDER — DOCUSATE SODIUM 100 MG/1
100 CAPSULE, LIQUID FILLED ORAL DAILY
Status: DISCONTINUED | OUTPATIENT
Start: 2023-08-24 | End: 2023-08-25

## 2023-08-24 RX ORDER — ASPIRIN 325 MG
325 TABLET ORAL DAILY
Status: DISCONTINUED | OUTPATIENT
Start: 2023-08-25 | End: 2023-08-25 | Stop reason: HOSPADM

## 2023-08-24 RX ORDER — BUPIVACAINE HYDROCHLORIDE 7.5 MG/ML
INJECTION, SOLUTION INTRASPINAL
Status: COMPLETED | OUTPATIENT
Start: 2023-08-24 | End: 2023-08-24

## 2023-08-24 RX ORDER — MORPHINE SULFATE 4 MG/ML
4 INJECTION, SOLUTION INTRAMUSCULAR; INTRAVENOUS
Status: DISCONTINUED | OUTPATIENT
Start: 2023-08-24 | End: 2023-08-25 | Stop reason: HOSPADM

## 2023-08-24 RX ORDER — SODIUM CHLORIDE 0.9 % (FLUSH) 0.9 %
5-40 SYRINGE (ML) INJECTION EVERY 12 HOURS SCHEDULED
Status: DISCONTINUED | OUTPATIENT
Start: 2023-08-24 | End: 2023-08-24 | Stop reason: HOSPADM

## 2023-08-24 RX ORDER — SODIUM CHLORIDE 9 MG/ML
INJECTION, SOLUTION INTRAVENOUS CONTINUOUS
Status: DISCONTINUED | OUTPATIENT
Start: 2023-08-24 | End: 2023-08-25 | Stop reason: HOSPADM

## 2023-08-24 RX ORDER — OXYCODONE HYDROCHLORIDE AND ACETAMINOPHEN 5; 325 MG/1; MG/1
1 TABLET ORAL EVERY 4 HOURS PRN
Status: DISCONTINUED | OUTPATIENT
Start: 2023-08-24 | End: 2023-08-25 | Stop reason: HOSPADM

## 2023-08-24 RX ORDER — SODIUM CHLORIDE 0.9 % (FLUSH) 0.9 %
5-40 SYRINGE (ML) INJECTION PRN
Status: DISCONTINUED | OUTPATIENT
Start: 2023-08-24 | End: 2023-08-24 | Stop reason: HOSPADM

## 2023-08-24 RX ORDER — LABETALOL HYDROCHLORIDE 5 MG/ML
10 INJECTION INTRAVENOUS
Status: DISCONTINUED | OUTPATIENT
Start: 2023-08-24 | End: 2023-08-24 | Stop reason: HOSPADM

## 2023-08-24 RX ORDER — EPHEDRINE SULFATE/0.9% NACL/PF 50 MG/5 ML
SYRINGE (ML) INTRAVENOUS PRN
Status: DISCONTINUED | OUTPATIENT
Start: 2023-08-24 | End: 2023-08-24 | Stop reason: SDUPTHER

## 2023-08-24 RX ORDER — TRANEXAMIC ACID 100 MG/ML
INJECTION, SOLUTION INTRAVENOUS PRN
Status: DISCONTINUED | OUTPATIENT
Start: 2023-08-24 | End: 2023-08-24 | Stop reason: SDUPTHER

## 2023-08-24 RX ORDER — CYCLOBENZAPRINE HCL 10 MG
10 TABLET ORAL 3 TIMES DAILY PRN
Status: DISCONTINUED | OUTPATIENT
Start: 2023-08-24 | End: 2023-08-25 | Stop reason: HOSPADM

## 2023-08-24 RX ORDER — LIDOCAINE HYDROCHLORIDE 20 MG/ML
INJECTION, SOLUTION INTRAVENOUS PRN
Status: DISCONTINUED | OUTPATIENT
Start: 2023-08-24 | End: 2023-08-24 | Stop reason: SDUPTHER

## 2023-08-24 RX ORDER — HYDROCODONE BITARTRATE AND ACETAMINOPHEN 5; 325 MG/1; MG/1
1 TABLET ORAL EVERY 4 HOURS PRN
Status: DISCONTINUED | OUTPATIENT
Start: 2023-08-24 | End: 2023-08-24

## 2023-08-24 RX ORDER — SODIUM CHLORIDE 9 MG/ML
INJECTION, SOLUTION INTRAVENOUS CONTINUOUS
Status: DISCONTINUED | OUTPATIENT
Start: 2023-08-24 | End: 2023-08-24 | Stop reason: HOSPADM

## 2023-08-24 RX ORDER — OXYCODONE HYDROCHLORIDE AND ACETAMINOPHEN 5; 325 MG/1; MG/1
2 TABLET ORAL EVERY 4 HOURS PRN
Status: DISCONTINUED | OUTPATIENT
Start: 2023-08-24 | End: 2023-08-25 | Stop reason: HOSPADM

## 2023-08-24 RX ORDER — HYDRALAZINE HYDROCHLORIDE 20 MG/ML
10 INJECTION INTRAMUSCULAR; INTRAVENOUS
Status: DISCONTINUED | OUTPATIENT
Start: 2023-08-24 | End: 2023-08-24 | Stop reason: HOSPADM

## 2023-08-24 RX ORDER — KETOROLAC TROMETHAMINE 30 MG/ML
15 INJECTION, SOLUTION INTRAMUSCULAR; INTRAVENOUS EVERY 6 HOURS
Status: DISCONTINUED | OUTPATIENT
Start: 2023-08-24 | End: 2023-08-25 | Stop reason: HOSPADM

## 2023-08-24 RX ORDER — VANCOMYCIN HYDROCHLORIDE 1 G/20ML
INJECTION, POWDER, LYOPHILIZED, FOR SOLUTION INTRAVENOUS PRN
Status: DISCONTINUED | OUTPATIENT
Start: 2023-08-24 | End: 2023-08-24 | Stop reason: ALTCHOICE

## 2023-08-24 RX ORDER — PROPOFOL 10 MG/ML
INJECTION, EMULSION INTRAVENOUS CONTINUOUS PRN
Status: DISCONTINUED | OUTPATIENT
Start: 2023-08-24 | End: 2023-08-24 | Stop reason: SDUPTHER

## 2023-08-24 RX ORDER — ONDANSETRON 2 MG/ML
INJECTION INTRAMUSCULAR; INTRAVENOUS PRN
Status: DISCONTINUED | OUTPATIENT
Start: 2023-08-24 | End: 2023-08-24 | Stop reason: SDUPTHER

## 2023-08-24 RX ADMIN — MIDAZOLAM 1 MG: 1 INJECTION INTRAMUSCULAR; INTRAVENOUS at 07:42

## 2023-08-24 RX ADMIN — HYDROCODONE BITARTRATE AND ACETAMINOPHEN 1 TABLET: 5; 325 TABLET ORAL at 12:40

## 2023-08-24 RX ADMIN — CYCLOBENZAPRINE 10 MG: 10 TABLET, FILM COATED ORAL at 20:18

## 2023-08-24 RX ADMIN — HYDROMORPHONE HYDROCHLORIDE 0.5 MG: 1 INJECTION, SOLUTION INTRAMUSCULAR; INTRAVENOUS; SUBCUTANEOUS at 09:05

## 2023-08-24 RX ADMIN — Medication 2000 MG: at 15:00

## 2023-08-24 RX ADMIN — TRANEXAMIC ACID 1000 MG: 100 INJECTION, SOLUTION INTRAVENOUS at 08:03

## 2023-08-24 RX ADMIN — SODIUM CHLORIDE: 9 INJECTION, SOLUTION INTRAVENOUS at 08:15

## 2023-08-24 RX ADMIN — Medication 2000 MG: at 23:01

## 2023-08-24 RX ADMIN — PROPOFOL 80 MCG/KG/MIN: 10 INJECTION, EMULSION INTRAVENOUS at 07:47

## 2023-08-24 RX ADMIN — KETOROLAC TROMETHAMINE 15 MG: 30 INJECTION, SOLUTION INTRAMUSCULAR; INTRAVENOUS at 10:56

## 2023-08-24 RX ADMIN — FENTANYL CITRATE 50 MCG: 50 INJECTION, SOLUTION INTRAMUSCULAR; INTRAVENOUS at 07:42

## 2023-08-24 RX ADMIN — HYDROCODONE BITARTRATE AND ACETAMINOPHEN 2 TABLET: 5; 325 TABLET ORAL at 17:45

## 2023-08-24 RX ADMIN — ONDANSETRON 4 MG: 2 INJECTION INTRAMUSCULAR; INTRAVENOUS at 07:47

## 2023-08-24 RX ADMIN — OXYCODONE AND ACETAMINOPHEN 1 TABLET: 5; 325 TABLET ORAL at 21:05

## 2023-08-24 RX ADMIN — DEXAMETHASONE SODIUM PHOSPHATE 10 MG: 10 INJECTION, EMULSION INTRAMUSCULAR; INTRAVENOUS at 07:47

## 2023-08-24 RX ADMIN — FENTANYL CITRATE 50 MCG: 50 INJECTION, SOLUTION INTRAMUSCULAR; INTRAVENOUS at 07:52

## 2023-08-24 RX ADMIN — BUPIVACAINE HYDROCHLORIDE 10.5 MG: 7.5 INJECTION, SOLUTION SUBARACHNOID at 07:42

## 2023-08-24 RX ADMIN — HYDROMORPHONE HYDROCHLORIDE 0.25 MG: 1 INJECTION, SOLUTION INTRAMUSCULAR; INTRAVENOUS; SUBCUTANEOUS at 09:10

## 2023-08-24 RX ADMIN — Medication 2000 MG: at 07:42

## 2023-08-24 RX ADMIN — Medication 0.5 MG: at 09:05

## 2023-08-24 RX ADMIN — ONDANSETRON 4 MG: 2 INJECTION INTRAMUSCULAR; INTRAVENOUS at 14:54

## 2023-08-24 RX ADMIN — HYDROMORPHONE HYDROCHLORIDE 0.25 MG: 1 INJECTION, SOLUTION INTRAMUSCULAR; INTRAVENOUS; SUBCUTANEOUS at 09:15

## 2023-08-24 RX ADMIN — LIDOCAINE HYDROCHLORIDE 100 MG: 20 INJECTION, SOLUTION INTRAVENOUS at 07:47

## 2023-08-24 RX ADMIN — MIDAZOLAM 1 MG: 1 INJECTION INTRAMUSCULAR; INTRAVENOUS at 07:52

## 2023-08-24 RX ADMIN — Medication 25 MG: at 08:07

## 2023-08-24 RX ADMIN — Medication 25 MG: at 08:39

## 2023-08-24 RX ADMIN — KETOROLAC TROMETHAMINE 15 MG: 30 INJECTION, SOLUTION INTRAMUSCULAR; INTRAVENOUS at 22:30

## 2023-08-24 RX ADMIN — SODIUM CHLORIDE: 9 INJECTION, SOLUTION INTRAVENOUS at 22:57

## 2023-08-24 RX ADMIN — SODIUM CHLORIDE: 9 INJECTION, SOLUTION INTRAVENOUS at 07:24

## 2023-08-24 RX ADMIN — KETOROLAC TROMETHAMINE 15 MG: 30 INJECTION, SOLUTION INTRAMUSCULAR; INTRAVENOUS at 16:23

## 2023-08-24 ASSESSMENT — PAIN DESCRIPTION - DESCRIPTORS
DESCRIPTORS: ACHING

## 2023-08-24 ASSESSMENT — PAIN - FUNCTIONAL ASSESSMENT
PAIN_FUNCTIONAL_ASSESSMENT: ACTIVITIES ARE NOT PREVENTED
PAIN_FUNCTIONAL_ASSESSMENT: 0-10
PAIN_FUNCTIONAL_ASSESSMENT: ACTIVITIES ARE NOT PREVENTED
PAIN_FUNCTIONAL_ASSESSMENT: ACTIVITIES ARE NOT PREVENTED

## 2023-08-24 ASSESSMENT — PAIN DESCRIPTION - ORIENTATION
ORIENTATION: RIGHT

## 2023-08-24 ASSESSMENT — PAIN DESCRIPTION - FREQUENCY
FREQUENCY: CONTINUOUS

## 2023-08-24 ASSESSMENT — PAIN DESCRIPTION - LOCATION
LOCATION: KNEE;OTHER (COMMENT)
LOCATION: KNEE

## 2023-08-24 ASSESSMENT — PAIN SCALES - GENERAL
PAINLEVEL_OUTOF10: 5
PAINLEVEL_OUTOF10: 3
PAINLEVEL_OUTOF10: 6
PAINLEVEL_OUTOF10: 6
PAINLEVEL_OUTOF10: 7
PAINLEVEL_OUTOF10: 7
PAINLEVEL_OUTOF10: 6
PAINLEVEL_OUTOF10: 5
PAINLEVEL_OUTOF10: 5
PAINLEVEL_OUTOF10: 4
PAINLEVEL_OUTOF10: 7

## 2023-08-24 ASSESSMENT — PAIN DESCRIPTION - PAIN TYPE
TYPE: SURGICAL PAIN

## 2023-08-24 ASSESSMENT — PAIN DESCRIPTION - DIRECTION: RADIATING_TOWARDS: UP THIGH

## 2023-08-24 ASSESSMENT — PAIN DESCRIPTION - ONSET
ONSET: ON-GOING

## 2023-08-24 NOTE — DISCHARGE INSTRUCTIONS
Aracelis Springer MD       ACTIVITY / WEIGHTBEARING  INSTRUCTIONS:  Weightbearing as tolerated surgical extremity. PT/OT     DIET:  Increase Fluid/Water intake, eat foods high in fiber, fruits and vegetables to help to prevent constipation. WOUND/DRESSING INSTRUCTIONS:  Always ensure you wash your hands before and after caring for your wound/dressing. Keep your dressing clean and dry. Change dressing as needed. Can wash around incision. Do not place antibiotic ointment, lotion, creams on surgical incision. Smoking impairs adequate wound healing. If smoking, consider quitting. May apply ice to surgical incision to help to prevent swelling. MEDICATION INSTRUCTIONS:  Take pain medication as prescribed. See orders regarding resuming your home medications and any new medications. Continue blood thinner if ordered by your physician. FOLLOW-UP CARE:  If a follow-up appointment was not made for you at discharge, call [phone] [facility] - INPATIENT office) or [phone] [facility] office) to schedule an appointment for 3 weeks. Call Dr Rebeca Kruse office with any concerns. Signs of infection such as fever, chills, redness, pus, or bad smelling discharge from incision site. Excessive bleeding, swelling, increasing pain, or discharge around incision site. The stitches or staples come apart at the incision site. Cough shortness of breath, or chest pain. Severe nausea or vomiting. Pain that you cannot control with the medications you have been given or  pain becomes worse. Numbness, tingling, or loss of feeling in your leg, knee, or foot. Pain, burning, urgency, or frequency of urination. If a medical emergerncy, please call 911 or go to your local emergency room.

## 2023-08-24 NOTE — FLOWSHEET NOTE
08/24/23 1335   Vital Signs   BP (!) 68/45   MAP (Calculated) 53   BP Location Right upper arm   BP Method Automatic   Patient Position Sitting     Patient assisted to BS commode, patient stated she felt like she was going to pass out, BP checked, 68/45. This RN called tech to come help get patient back in bed, pt assisted to laying flat in bed, bp recheck was 114/61. Jeniffer Farooq notified.

## 2023-08-24 NOTE — ANESTHESIA POSTPROCEDURE EVALUATION
Department of Anesthesiology  Postprocedure Note    Patient: Sandra Smith  MRN: [id number]  YOB: 1965  Date of evaluation: 8/24/2023      Procedure Summary     Date: 08/24/23 Room / Location: [address] Shari Howard    Anesthesia Start: 0742 Anesthesia Stop: 2547    Procedure: Right Total Knee Replacement (Right: Knee) Diagnosis:       Acute injury of right knee cartilage, initial encounter      (Acute injury of right knee cartilage, initial encounter Lauren Cao)    Surgeons: Lori Tapia MD Responsible Provider: Smita Cano MD    Anesthesia Type: spinal ASA Status: 3          Anesthesia Type: No value filed.     José Phase I: José Score: 9    José Phase II:        Anesthesia Post Evaluation    Patient location during evaluation: PACU  Patient participation: complete - patient participated  Level of consciousness: awake and alert  Airway patency: patent  Nausea & Vomiting: no nausea  Complications: no  Cardiovascular status: blood pressure returned to baseline and hemodynamically stable  Respiratory status: acceptable and spontaneous ventilation  Hydration status: euvolemic  Pain management: adequate

## 2023-08-24 NOTE — ANESTHESIA PROCEDURE NOTES
Spinal Block    End time: 8/24/2023 7:47 AM  Reason for block: primary anesthetic  Staffing  Performed: resident/CRNA   Resident/CRNA: Jeffry Trejo APRN - CRNA  Spinal Block  Patient position: sitting  Prep: ChloraPrep  Patient monitoring: continuous pulse ox and frequent blood pressure checks  Approach: midline  Location: L3/L4  Guidance: paresthesia technique  Provider prep: mask and sterile gloves  Local infiltration: lidocaine  Needle  Needle type: pencil-tip   Needle gauge: 25 G  Needle length: 3.5 in  Assessment  Swirl obtained: Yes  CSF: clear  Attempts: 1  Hemodynamics: stable  Preanesthetic Checklist  Completed: patient identified, IV checked, site marked, risks and benefits discussed, surgical/procedural consents, equipment checked, pre-op evaluation, timeout performed, anesthesia consent given, oxygen available, monitors applied/VS acknowledged, fire risk safety assessment completed and verbalized and blood product R/B/A discussed and consented

## 2023-08-24 NOTE — OP NOTE
Operative Note      Patient: Balbina Graff  YOB: 1965  MRN: [id number]    Date of Procedure: 8/24/2023    Pre-Op diagnosis: Arthritis right knee    Post-Op Diagnosis: Same       Procedure(s):  Right Total Knee Replacement    Surgeon(s):  Eleazar Aviles MD    Assistant:   Physician Assistant: Becky De Oliveira    The physician assistant was present for the entirety of the procedure and vital for the performance of the procedure. He assisted with positioning, prepping, draping of the patient before the procedure and instrument manipulation, extremity repositioning  as well as wound closure, dressing application after the procedure. Please note that no intern, resident, or other hospital staff was available to assist during the surgery     Anesthesia: General    Estimated Blood Loss (mL): 257     Complications: None    Specimens:   * No specimens in log *    Implants:  Implant Name Type Inv. Item Serial No.  Lot No. LRB No. Used Action   INSERT TIB SZ 35F 25T ILTA FIT TRULIANT - [id number]  INSERT TIB SZ 35F 25T LITA FIT TRULIANT [id number] [employer]  Right 1 Implanted   CEMENT BONE 40GM Jordis Dubs RALLY - [id number]  CEMENT BONE 40GM M VISC AB [facility] ORTHOPAEDICS- [id number] Right 1 Implanted   COMPONENT FEM SZ 3.5 RT CRUC RET LITA TRULIANT - [id number]  COMPONENT FEM SZ 3.5 RT CRUC RET LITA TRULIANT [id number] [employer]  Right 1 Implanted   INSERT TIB CR 3.5 9 MM TRULIANT - [id number]  INSERT TIB CR 3.5 9 MM TRULIANT  [employer] [id number] Right 1 Implanted   COMPONENT PAT 29MM PIERRE 6MM THCK 3 PEG 3 RND GENNY STD LITA NP - [id number]  COMPONENT PAT 29MM PIERRE 6MM THCK 3 PEG 3 RND GENNY STD LITA NP [id number] [employer]  Right 1 Implanted         Drains: * No LDAs found *    Findings: confirmed        Detailed Description of Procedure:        INDICATIONS: The patient is a 66y.o.-year-old with Right  knee pain for quite sometime. It affects the patient's day-to-day activities.

## 2023-08-24 NOTE — H&P
History and Physical Update    Pt Name: Kamille Dong  MRN: [id number]  YOB: 1965  Date of evaluation: 8/24/2023    [x] I have examined the patient and reviewed the H&P/Consult and there are no changes to the patient or plans.     [] I have examined the patient and reviewed the H&P/Consult and have noted the following changes:        Yudi Dangelo MD   Electronically signed 8/24/2023 at 11:18 AM

## 2023-08-25 VITALS
DIASTOLIC BLOOD PRESSURE: 77 MMHG | OXYGEN SATURATION: 98 % | BODY MASS INDEX: 17.08 KG/M2 | SYSTOLIC BLOOD PRESSURE: 145 MMHG | HEART RATE: 78 BPM | RESPIRATION RATE: 16 BRPM | HEIGHT: 60 IN | WEIGHT: 87 LBS | TEMPERATURE: 98.6 F

## 2023-08-25 LAB
ANION GAP SERPL CALC-SCNC: 10 MEQ/L (ref 8–16)
BASOPHILS ABSOLUTE: 0 THOU/MM3 (ref 0–0.1)
BASOPHILS NFR BLD AUTO: 0.4 %
BUN SERPL-MCNC: 11 MG/DL (ref 7–22)
CALCIUM SERPL-MCNC: 8.4 MG/DL (ref 8.5–10.5)
CHLORIDE SERPL-SCNC: 111 MEQ/L (ref 98–111)
CO2 SERPL-SCNC: 21 MEQ/L (ref 23–33)
CREAT SERPL-MCNC: 0.4 MG/DL (ref 0.4–1.2)
DEPRECATED RDW RBC AUTO: 46.6 FL (ref 35–45)
EOSINOPHIL NFR BLD AUTO: 0.3 %
EOSINOPHILS ABSOLUTE: 0 THOU/MM3 (ref 0–0.4)
ERYTHROCYTE [DISTWIDTH] IN BLOOD BY AUTOMATED COUNT: 13.1 % (ref 11.5–14.5)
GFR SERPL CREATININE-BSD FRML MDRD: > 60 ML/MIN/1.73M2
GLUCOSE SERPL-MCNC: 99 MG/DL (ref 70–108)
HCT VFR BLD AUTO: 34.2 % (ref 37–47)
HGB BLD-MCNC: 11 GM/DL (ref 12–16)
IMM GRANULOCYTES # BLD AUTO: 0.02 THOU/MM3 (ref 0–0.07)
IMM GRANULOCYTES NFR BLD AUTO: 0.3 %
LYMPHOCYTES ABSOLUTE: 1.3 THOU/MM3 (ref 1–4.8)
LYMPHOCYTES NFR BLD AUTO: 18.8 %
MCH RBC QN AUTO: 31.4 PG (ref 26–33)
MCHC RBC AUTO-ENTMCNC: 32.2 GM/DL (ref 32.2–35.5)
MCV RBC AUTO: 97.7 FL (ref 81–99)
MONOCYTES ABSOLUTE: 0.9 THOU/MM3 (ref 0.4–1.3)
MONOCYTES NFR BLD AUTO: 12.9 %
NEUTROPHILS NFR BLD AUTO: 67.3 %
NRBC BLD AUTO-RTO: 0 /100 WBC
PLATELET # BLD AUTO: 210 THOU/MM3 (ref 130–400)
PMV BLD AUTO: 10.9 FL (ref 9.4–12.4)
POTASSIUM SERPL-SCNC: 4 MEQ/L (ref 3.5–5.2)
RBC # BLD AUTO: 3.5 MILL/MM3 (ref 4.2–5.4)
SEGMENTED NEUTROPHILS ABSOLUTE COUNT: 4.7 THOU/MM3 (ref 1.8–7.7)
SODIUM SERPL-SCNC: 142 MEQ/L (ref 135–145)
WBC # BLD AUTO: 7 THOU/MM3 (ref 4.8–10.8)

## 2023-08-25 PROCEDURE — 97116 GAIT TRAINING THERAPY: CPT

## 2023-08-25 PROCEDURE — 97535 SELF CARE MNGMENT TRAINING: CPT

## 2023-08-25 PROCEDURE — 6370000000 HC RX 637 (ALT 250 FOR IP): Performed by: PHYSICIAN ASSISTANT

## 2023-08-25 PROCEDURE — 85025 COMPLETE CBC W/AUTO DIFF WBC: CPT

## 2023-08-25 PROCEDURE — 80048 BASIC METABOLIC PNL TOTAL CA: CPT

## 2023-08-25 PROCEDURE — 97530 THERAPEUTIC ACTIVITIES: CPT

## 2023-08-25 PROCEDURE — 97166 OT EVAL MOD COMPLEX 45 MIN: CPT

## 2023-08-25 PROCEDURE — 97110 THERAPEUTIC EXERCISES: CPT

## 2023-08-25 PROCEDURE — 6370000000 HC RX 637 (ALT 250 FOR IP): Performed by: NURSE PRACTITIONER

## 2023-08-25 PROCEDURE — 36415 COLL VENOUS BLD VENIPUNCTURE: CPT

## 2023-08-25 PROCEDURE — 97162 PT EVAL MOD COMPLEX 30 MIN: CPT

## 2023-08-25 PROCEDURE — 6360000002 HC RX W HCPCS: Performed by: NURSE PRACTITIONER

## 2023-08-25 RX ORDER — OXYCODONE HYDROCHLORIDE AND ACETAMINOPHEN 5; 325 MG/1; MG/1
1 TABLET ORAL EVERY 6 HOURS PRN
Qty: 28 TABLET | Refills: 0 | Status: SHIPPED | OUTPATIENT
Start: 2023-08-25 | End: 2023-08-25 | Stop reason: SDUPTHER

## 2023-08-25 RX ORDER — ASPIRIN 325 MG
325 TABLET ORAL DAILY
Qty: 29 TABLET | Refills: 0 | Status: SHIPPED | OUTPATIENT
Start: 2023-08-26 | End: 2023-09-24

## 2023-08-25 RX ORDER — POLYETHYLENE GLYCOL 3350 17 G/17G
17 POWDER, FOR SOLUTION ORAL DAILY
Status: DISCONTINUED | OUTPATIENT
Start: 2023-08-26 | End: 2023-08-25

## 2023-08-25 RX ORDER — POLYETHYLENE GLYCOL 3350 17 G/17G
17 POWDER, FOR SOLUTION ORAL DAILY
Status: DISCONTINUED | OUTPATIENT
Start: 2023-08-25 | End: 2023-08-25 | Stop reason: HOSPADM

## 2023-08-25 RX ORDER — OXYCODONE HYDROCHLORIDE AND ACETAMINOPHEN 5; 325 MG/1; MG/1
1 TABLET ORAL EVERY 6 HOURS PRN
Qty: 28 TABLET | Refills: 0 | Status: SHIPPED | OUTPATIENT
Start: 2023-08-25 | End: 2023-09-01

## 2023-08-25 RX ORDER — CYCLOBENZAPRINE HCL 10 MG
10 TABLET ORAL 3 TIMES DAILY PRN
Qty: 30 TABLET | Refills: 0 | Status: SHIPPED | OUTPATIENT
Start: 2023-08-25 | End: 2023-09-04

## 2023-08-25 RX ADMIN — ASPIRIN 325 MG: 325 TABLET ORAL at 08:08

## 2023-08-25 RX ADMIN — CYCLOBENZAPRINE 10 MG: 10 TABLET, FILM COATED ORAL at 10:30

## 2023-08-25 RX ADMIN — KETOROLAC TROMETHAMINE 15 MG: 30 INJECTION, SOLUTION INTRAMUSCULAR; INTRAVENOUS at 10:32

## 2023-08-25 RX ADMIN — OXYCODONE AND ACETAMINOPHEN 2 TABLET: 5; 325 TABLET ORAL at 08:08

## 2023-08-25 RX ADMIN — POLYETHYLENE GLYCOL 3350 17 G: 17 POWDER, FOR SOLUTION ORAL at 08:08

## 2023-08-25 RX ADMIN — KETOROLAC TROMETHAMINE 15 MG: 30 INJECTION, SOLUTION INTRAMUSCULAR; INTRAVENOUS at 04:01

## 2023-08-25 RX ADMIN — OXYCODONE AND ACETAMINOPHEN 2 TABLET: 5; 325 TABLET ORAL at 13:35

## 2023-08-25 RX ADMIN — OXYCODONE AND ACETAMINOPHEN 2 TABLET: 5; 325 TABLET ORAL at 02:34

## 2023-08-25 ASSESSMENT — PAIN DESCRIPTION - ONSET
ONSET: ON-GOING
ONSET: ON-GOING

## 2023-08-25 ASSESSMENT — PAIN DESCRIPTION - LOCATION
LOCATION: LEG;KNEE
LOCATION: KNEE

## 2023-08-25 ASSESSMENT — PAIN DESCRIPTION - FREQUENCY
FREQUENCY: CONTINUOUS
FREQUENCY: CONTINUOUS

## 2023-08-25 ASSESSMENT — PAIN DESCRIPTION - ORIENTATION
ORIENTATION: RIGHT

## 2023-08-25 ASSESSMENT — PAIN DESCRIPTION - DESCRIPTORS
DESCRIPTORS: ACHING

## 2023-08-25 ASSESSMENT — PAIN SCALES - GENERAL
PAINLEVEL_OUTOF10: 7
PAINLEVEL_OUTOF10: 3
PAINLEVEL_OUTOF10: 0
PAINLEVEL_OUTOF10: 3
PAINLEVEL_OUTOF10: 7
PAINLEVEL_OUTOF10: 7

## 2023-08-25 ASSESSMENT — PAIN DESCRIPTION - PAIN TYPE
TYPE: SURGICAL PAIN
TYPE: SURGICAL PAIN

## 2023-08-25 ASSESSMENT — PAIN - FUNCTIONAL ASSESSMENT
PAIN_FUNCTIONAL_ASSESSMENT: ACTIVITIES ARE NOT PREVENTED
PAIN_FUNCTIONAL_ASSESSMENT: ACTIVITIES ARE NOT PREVENTED

## 2023-08-25 NOTE — CARE COORDINATION
DISCHARGE PLANNING EVALUATION  8/25/23, 11:44 AM EDT    Reason for Referral: discharge needs   Mental Status:  alert, oriented   Decision Making:  makes own decisions   Family/Social/Home Environment:  Sera lives at home with her  who can assist with her care. She and her  share cooking, housekeeping,  can provide transportation  Current Services including food security, transportation and housekeeping: no current services, no concerns prior to admission  Current Equipment: walker   Payment Source:Blue Cross Blue Shield  Concerns or Barriers to Discharge: requesting [address] [facility]) provider list was provided to patient. Patient was informed of their freedom to choose [facility] provider. Discussed and offered to show the patient the relevant [facility] Providers quality and resource use measures on Medicare Compare web site via computer based on patient's goals of care and treatment preferences. Questions regarding selection process were answered. List declined, prefers [facility]    Teach Back Method used with patient regarding care plan and discharge plan  Patient  verbalized understanding of the plan of care and contributed to goal setting. Patient goals, treatment preferences and discharge plan:  spoke with patient about discharge plan.   She is planning home with , requests [facility] [address] [place] [address],[address]    Electronically signed by VANESSA Jimenez on 8/25/2023 at 11:44 AM

## 2023-08-25 NOTE — CARE COORDINATION
8/25/23, 7:34 AM EDT      DISCHARGE PLANNING EVALUATION    Sera Hooker  Admitted: 8/24/2023  Hospital Day: 1    Location: [facility]11/011-A Reason for admit: Acute injury of right knee cartilage, initial encounter [S89.91XA]  Localized osteoarthritis of right knee [M17.11]    Past Medical History:   Diagnosis Date    TRINI (acute kidney injury) ([address]) 12/08/2012    Arthritis     Rheumatoid    Hx of skin cancer, basal cell     Malignant hyperthermia     daughters    Melanoma ([address]) 2018, 2021    left shin 2018, left lower back 2021 with mets to left axillary lymph node    RA (rheumatoid arthritis) ([address])        Procedure: 8/24 Right Total Knee Replacement  Barriers to Discharge: POD 1,  begin PT/OT, pain control. PCP: JALEESA Stark CNP    Readmission Risk Low 0-14, Mod 15-19), High > 20: Readmission Risk Score: 10.3      Advance Directives:      Code Status: Full Code   Patient's Primary Decision Maker is: Legal Next of Kin   Rosy Gonzalez    Primary Decision Maker: Bubbanick Galen Spouse - [phone]    Patient Goals/Plan/Treatment Preferences: Met with Sera; she resides home with her . She was independent pta, has PCP, and needed DME (see list in assessment). [facility]. SW follows as pt requests [facility]. Plans F/U [facility] Dr Elisa White. SW follows. Transportation/Food Security/Housekeeping Addressed: No issues identified.

## 2023-08-25 NOTE — PLAN OF CARE
Problem: Discharge Planning  Goal: Discharge to home or other facility with appropriate resources  Outcome: Progressing  Flowsheets (Taken 8/24/2023 2041)  Discharge to home or other facility with appropriate resources:   Arrange for needed discharge resources and transportation as appropriate   Identify barriers to discharge with patient and caregiver   Identify discharge learning needs (meds, wound care, etc)   Refer to discharge planning if patient needs post-hospital services based on physician order or complex needs related to functional status, cognitive ability or social support system     Problem: Safety - Adult  Goal: Free from fall injury  Outcome: Progressing  Flowsheets (Taken 8/25/2023 0014)  Free From Fall Injury:   Instruct family/caregiver on patient safety   Based on caregiver fall risk screen, instruct family/caregiver to ask for assistance with transferring infant if caregiver noted to have fall risk factors     Problem: Pain  Goal: Verbalizes/displays adequate comfort level or baseline comfort level  Outcome: Progressing  Flowsheets (Taken 8/24/2023 2000)  Verbalizes/displays adequate comfort level or baseline comfort level:   Assess pain using appropriate pain scale   Encourage patient to monitor pain and request assistance   Administer analgesics based on type and severity of pain and evaluate response   Implement non-pharmacological measures as appropriate and evaluate response   Consider cultural and social influences on pain and pain management     Problem: ABCDS Injury Assessment  Goal: Absence of physical injury  Outcome: Progressing  Flowsheets (Taken 8/25/2023 0017)  Absence of Physical Injury: Implement safety measures based on patient assessment      Patient participated in plan of care and contributed to goal setting.

## 2023-08-25 NOTE — DISCHARGE SUMMARY
Physician Discharge Summary     Patient ID:  Rigo Kimball  [id number]  33 y.o.  1965    Admit date: 8/24/2023    Discharge date and time: 8/25/23    Admitting Physician: Foreign Guzman MD     Discharge Physician: Foreign Guzman MD     Admission Diagnoses: Acute injury of right knee cartilage, initial encounter [S89.91XA]  Localized osteoarthritis of right knee [M17.11]    Discharge Diagnoses: Acute injury of right knee cartilage, initial encounter [S89.91XA]  Localized osteoarthritis of right knee [M17.11]    Admission Condition: good    Discharged Condition: good    Indication for Admission: pain, mobilization    Hospital Course: Patient is now s/p R TKA on 8/24/23 by Dr. Polina Tadeo. Failed conservative therapies for degenerative joint disease of her right knee. On the day of surgery, patient was identified in the pre-operative holding area and agreeable to proceed with surgery. Written consent was obtained. Please see operative note for further details of this procedure. Patient received art-operative antibiotics. Patient recovered in PACU before transfer to a regular nursing floor. Patient was started on tylenol and norco for pain control and ASA for dvt prophylaxis for 3 weeks. On the day of discharge, patient was afebrile with stable vital signs, stable upon physical exam. Patient was discharged with prescriptions for pain and dvt ppx. Patient was deemed stable for discharge to home with [address] as recommended by PT/OT. Patient will follow up with Dr Polina Tadeo in 8  weeks for post-operative visit. Discharge Exam:  Please see final progress note for appropriate discharge exam    Disposition: home    In process/preliminary results:  Outstanding Order Results       No orders found for last 30 day(s).             Patient Instructions:   Current Discharge Medication List        START taking these medications    Details   aspirin 325 MG tablet Take 1 tablet by mouth daily for 29 doses  Qty: 29 tablet, Refills: 0

## 2023-08-25 NOTE — FLOWSHEET NOTE
08/25/23 0058   Treatment Team Notification   Reason for Communication Patient/Family request   Name of Team Member Notified [facility]   Treatment Team Role Physician Assistant   Method of Communication Secure Message   Response See orders   Notification Time [phone]       Notified of patient's request for miralax. Order received for miralax daily.

## 2023-08-25 NOTE — FLOWSHEET NOTE
08/24/23 2140   Treatment Team Notification   Reason for Communication Patient/Family request   Name of Team Member Notified [facility]   Treatment Team Role Physician Assistant   Method of Communication Secure Message   Response See orders   Notification Time 2010       Notified of patient request to change norco to percocet. Patient states norco is not helping her pain, but making her extremely nauseated and light headed. Patient states she has taken percocet in the past and it works better than Jeraline BASSAM [place] gave order for percocet 5-325mg 1 tablet PO for pain (4-6) and 2 tablets for pain (7-10) Q4H PRN for pain.  Order to d/c norco.

## 2023-08-25 NOTE — CARE COORDINATION
8/25/23, 1:42 PM EDT  DISCHARGE PLANNING EVALUATION    SW made referral to [facility]. They reported that they dfo not have staffing and will not be able to see patient until next Thursday. SW spoke with patient and spouse and they reported that they felt that is too long to wait for [facility]. Patient asked for referral to [place]. SW [place] and made referral. Confirmed [place] will see patient by Monday. SW notified them of discharge today. 8/25/23, 1:45 PM EDT    Patient goals/plan/ treatment preferences discussed by  and . Patient goals/plan/ treatment preferences reviewed with patient/ family. Patient/ family verbalize understanding of discharge plan and are in agreement with goal/plan/treatment preferences. Understanding was demonstrated using the teach back method. AVS provided by RN at time of discharge, which includes all necessary medical information pertaining to the patients current course of illness, treatment, post-discharge goals of care, and treatment preferences. Services At/After Discharge: [facility]    Patient is planning discharge home with spouse and [facility]. Patient aware and agreeable to discharge plan.

## 2023-09-21 RX ORDER — OXYCODONE HYDROCHLORIDE AND ACETAMINOPHEN 5; 325 MG/1; MG/1
1 TABLET ORAL EVERY 4 HOURS PRN
Status: ON HOLD | COMMUNITY
End: 2023-09-29 | Stop reason: HOSPADM

## 2023-09-21 NOTE — PROGRESS NOTES
Follow all instructions given by your physician  NPO after midnight  Sips of water am of surgery with allowed medications  Bring insurance info and 's license  Wear clean comfortable , loose-fitting clothing  No jewelry or contact lenses to be worn day of surgery  No glue on dentures morning of surgery; you will be asked to remove them for surgery. Case for glasses. Shower the night before and the morning of surgery with a liquid antibacterial soap, dry with new fresh clean towel after each shower, no lotions, creams or powder. Clean sheets and pillowcase on bed night before surgery  Bring medications in original bottles  Bring CPAP/BIPAP machine if you have one ( you may be charged if one is needed in recovery room )    Our pharmacy has a Meds to [facility] program where they will deliver any new prescriptions you may have to your room before you leave. Our Pharmacy will clear it through your insurance; for example (same co pay). This enables you to take your new RX as soon as you need when you get home and avoids stop/wait delays on the way home. Please have a form of payment with you and have someone designated as your Pharmacy contact with their phone # as you may not feel well or still be under the influence of anesthesia. Please refer to the SSI-Surgical Site Infection Flyer you hopefully received in the mail-together we can prevent infections; signs and symptoms reviewed. When discharged be sure you understand how to care for your wound and that you have the Dr./office phone # to call if you have any concerns or questions about your wound.      needed at discharge and someone over 18 to stay with you for 24 hours overnight (surgery may be cancelled if you don't have this)  Report to [facility] on 2nd floor  If you would become ill prior to surgery, please call the surgeon  May have a visitor with you, we request that you limit to 2 visitors in pre-op area  Masks are recommended but not required, new masks at entrance desk  Call [phone] for any questions

## 2023-09-21 NOTE — PROGRESS NOTES
Preliminary Discharge Planning Questionnaire  Date of Surgery 9/28   Surgeon Deng      Having the proper help and care after surgery is very important to your recovery. Who will be able to help you at home when you are discharged from the hospital?    spouse    Name(s)  and daughter  [address]    How many steps to enter your home? 5    Bathroom on first floor? Yes    Bedroom on the first floor? Yes    Do you have an elevated toilet seat to use at home? Yes    Do you have a walker to use at home? Total Joints - with wheels Yes   Spine - with wheels  N/A     Have you been doing home exercises? yes    *You will go home with some outpatient physical therapy, where do you prefer to go? [facility]    *If needed, what home health agency would you like to use?  DOESN'T FEEL THIS WILL BE NECESSARY

## 2023-09-21 NOTE — PROGRESS NOTES
PAT Call Date: 9/21   Surgery Date: 9/28    Surgeon: Nikole Ga   Surgery: left knee replacement    Is patient from a nursing home? No   Any Isolation Precautions? No   Any Pacemaker or ICD? No If YES, has it been checked recently and where? Has the rep been notified? No     On Snapboard?  No  8/24/23 rt knee replacement   Hard Copy on Chart  In EPIC Pending/Notes   Consent -   Within 30 days; signed, dated & timed by patient and physician     [] On Arrival     [] Blood    Additional Consent Needs:     H&P - Within 30 days    [] Physician To Do     [] H&P Update - If H&P is older then 24 hours    Clearance -  Medical, Cardiac, Pulmonary, etc.   8/14 Michael Oconnell sent to [address] [place] [address] and Dated    Copy Sent to Pharm []    [] Physician To Do    Labs - Within 3 months   8/25  [x] CBC -ok   [x] BMP-ok   [x] GFR-ok   [] INR    [] PTT    [] Urine    [] Liver Enzymes    [] Kidney Function    [x] MRSA Nasal (-)   [x] MSSA (-)      Others:    Radiology Studies-   Within 1 year      5/22  [] Chest X-Ray   [] MRI    [x] CT chest-ok   EKG -   Within 1 year, unless hx of HTN  5/22 Abn but cleared   Cardiac Workup -   Stress Test, Echo, Cath within 18 months    [] Cath                                [] Stress Test                      [] Echo    [] Holter Monitor    [] GINO     HIGH ALLERGY TO SUCCINYLCHOLINE

## 2023-09-28 ENCOUNTER — ANESTHESIA (OUTPATIENT)
Dept: OPERATING ROOM | Age: 58
DRG: 470 | End: 2023-09-28
Payer: COMMERCIAL

## 2023-09-28 ENCOUNTER — HOSPITAL ENCOUNTER (INPATIENT)
Age: 58
LOS: 1 days | Discharge: HOME HEALTH | DRG: 470 | End: 2023-09-29
Attending: ORTHOPAEDIC SURGERY | Admitting: ORTHOPAEDIC SURGERY
Payer: COMMERCIAL

## 2023-09-28 ENCOUNTER — APPOINTMENT (OUTPATIENT)
Dept: GENERAL RADIOLOGY | Age: 58
DRG: 470 | End: 2023-09-28
Attending: ORTHOPAEDIC SURGERY
Payer: COMMERCIAL

## 2023-09-28 ENCOUNTER — ANESTHESIA EVENT (OUTPATIENT)
Dept: OPERATING ROOM | Age: 58
DRG: 470 | End: 2023-09-28
Payer: COMMERCIAL

## 2023-09-28 DIAGNOSIS — Z96.652 H/O TOTAL KNEE REPLACEMENT, LEFT: Primary | ICD-10-CM

## 2023-09-28 DIAGNOSIS — G89.18 POST-OPERATIVE PAIN: ICD-10-CM

## 2023-09-28 PROBLEM — M17.12 OSTEOARTHRITIS OF LEFT KNEE, UNSPECIFIED OSTEOARTHRITIS TYPE: Status: ACTIVE | Noted: 2023-09-28

## 2023-09-28 PROCEDURE — 73560 X-RAY EXAM OF KNEE 1 OR 2: CPT

## 2023-09-28 PROCEDURE — C1713 ANCHOR/SCREW BN/BN,TIS/BN: HCPCS | Performed by: ORTHOPAEDIC SURGERY

## 2023-09-28 PROCEDURE — 2580000003 HC RX 258: Performed by: NURSE ANESTHETIST, CERTIFIED REGISTERED

## 2023-09-28 PROCEDURE — 0SRD0J9 REPLACEMENT OF LEFT KNEE JOINT WITH SYNTHETIC SUBSTITUTE, CEMENTED, OPEN APPROACH: ICD-10-PCS | Performed by: ORTHOPAEDIC SURGERY

## 2023-09-28 PROCEDURE — 2580000003 HC RX 258: Performed by: ORTHOPAEDIC SURGERY

## 2023-09-28 PROCEDURE — 6360000002 HC RX W HCPCS: Performed by: NURSE PRACTITIONER

## 2023-09-28 PROCEDURE — 3600000004 HC SURGERY LEVEL 4 BASE: Performed by: ORTHOPAEDIC SURGERY

## 2023-09-28 PROCEDURE — 7100000001 HC PACU RECOVERY - ADDTL 15 MIN: Performed by: ORTHOPAEDIC SURGERY

## 2023-09-28 PROCEDURE — C1776 JOINT DEVICE (IMPLANTABLE): HCPCS | Performed by: ORTHOPAEDIC SURGERY

## 2023-09-28 PROCEDURE — 6360000002 HC RX W HCPCS

## 2023-09-28 PROCEDURE — 6360000002 HC RX W HCPCS: Performed by: NURSE ANESTHETIST, CERTIFIED REGISTERED

## 2023-09-28 PROCEDURE — 2500000003 HC RX 250 WO HCPCS: Performed by: ORTHOPAEDIC SURGERY

## 2023-09-28 PROCEDURE — 3600000014 HC SURGERY LEVEL 4 ADDTL 15MIN: Performed by: ORTHOPAEDIC SURGERY

## 2023-09-28 PROCEDURE — 6360000002 HC RX W HCPCS: Performed by: STUDENT IN AN ORGANIZED HEALTH CARE EDUCATION/TRAINING PROGRAM

## 2023-09-28 PROCEDURE — 2580000003 HC RX 258: Performed by: NURSE PRACTITIONER

## 2023-09-28 PROCEDURE — 6370000000 HC RX 637 (ALT 250 FOR IP): Performed by: NURSE PRACTITIONER

## 2023-09-28 PROCEDURE — 2720000010 HC SURG SUPPLY STERILE: Performed by: ORTHOPAEDIC SURGERY

## 2023-09-28 PROCEDURE — 2500000003 HC RX 250 WO HCPCS: Performed by: NURSE ANESTHETIST, CERTIFIED REGISTERED

## 2023-09-28 PROCEDURE — 6360000002 HC RX W HCPCS: Performed by: ORTHOPAEDIC SURGERY

## 2023-09-28 PROCEDURE — 7100000011 HC PHASE II RECOVERY - ADDTL 15 MIN: Performed by: ORTHOPAEDIC SURGERY

## 2023-09-28 PROCEDURE — 1200000000 HC SEMI PRIVATE

## 2023-09-28 PROCEDURE — 7100000010 HC PHASE II RECOVERY - FIRST 15 MIN: Performed by: ORTHOPAEDIC SURGERY

## 2023-09-28 PROCEDURE — 3700000000 HC ANESTHESIA ATTENDED CARE: Performed by: ORTHOPAEDIC SURGERY

## 2023-09-28 PROCEDURE — 2709999900 HC NON-CHARGEABLE SUPPLY: Performed by: ORTHOPAEDIC SURGERY

## 2023-09-28 PROCEDURE — 3700000001 HC ADD 15 MINUTES (ANESTHESIA): Performed by: ORTHOPAEDIC SURGERY

## 2023-09-28 PROCEDURE — 7100000000 HC PACU RECOVERY - FIRST 15 MIN: Performed by: ORTHOPAEDIC SURGERY

## 2023-09-28 DEVICE — IMPLANTABLE DEVICE: Type: IMPLANTABLE DEVICE | Site: KNEE | Status: FUNCTIONAL

## 2023-09-28 DEVICE — COMPONENT TOT KNEE CAPPED K1 STD HEMI CEM: Type: IMPLANTABLE DEVICE | Site: KNEE | Status: FUNCTIONAL

## 2023-09-28 DEVICE — RALLY MV AB BONE CEMENT 40 GRAMS
Type: IMPLANTABLE DEVICE | Site: KNEE | Status: FUNCTIONAL
Brand: RALLY

## 2023-09-28 DEVICE — COMPONENT PAT 3 PEG RND PRI STD CEM N POR W/O XR WIRE 26MM: Type: IMPLANTABLE DEVICE | Site: KNEE | Status: FUNCTIONAL

## 2023-09-28 RX ORDER — ASPIRIN 325 MG
325 TABLET ORAL DAILY
Qty: 30 TABLET | Refills: 0 | Status: SHIPPED | OUTPATIENT
Start: 2023-09-28 | End: 2023-09-29 | Stop reason: SDUPTHER

## 2023-09-28 RX ORDER — SODIUM CHLORIDE 9 MG/ML
INJECTION, SOLUTION INTRAVENOUS CONTINUOUS PRN
Status: DISCONTINUED | OUTPATIENT
Start: 2023-09-28 | End: 2023-09-28 | Stop reason: SDUPTHER

## 2023-09-28 RX ORDER — SODIUM CHLORIDE 9 MG/ML
INJECTION, SOLUTION INTRAVENOUS CONTINUOUS
Status: DISCONTINUED | OUTPATIENT
Start: 2023-09-28 | End: 2023-09-29 | Stop reason: HOSPADM

## 2023-09-28 RX ORDER — CYCLOBENZAPRINE HCL 10 MG
10 TABLET ORAL 3 TIMES DAILY PRN
Status: DISCONTINUED | OUTPATIENT
Start: 2023-09-28 | End: 2023-09-29 | Stop reason: HOSPADM

## 2023-09-28 RX ORDER — MORPHINE SULFATE 4 MG/ML
4 INJECTION, SOLUTION INTRAMUSCULAR; INTRAVENOUS
Status: DISCONTINUED | OUTPATIENT
Start: 2023-09-28 | End: 2023-09-29 | Stop reason: HOSPADM

## 2023-09-28 RX ORDER — PHENYLEPHRINE HCL IN 0.9% NACL 1 MG/10 ML
SYRINGE (ML) INTRAVENOUS PRN
Status: DISCONTINUED | OUTPATIENT
Start: 2023-09-28 | End: 2023-09-28 | Stop reason: SDUPTHER

## 2023-09-28 RX ORDER — LABETALOL HYDROCHLORIDE 5 MG/ML
INJECTION INTRAVENOUS PRN
Status: DISCONTINUED | OUTPATIENT
Start: 2023-09-28 | End: 2023-09-28 | Stop reason: SDUPTHER

## 2023-09-28 RX ORDER — ASPIRIN 325 MG
325 TABLET ORAL DAILY
Status: DISCONTINUED | OUTPATIENT
Start: 2023-09-29 | End: 2023-09-29 | Stop reason: HOSPADM

## 2023-09-28 RX ORDER — ACETAMINOPHEN 325 MG/1
650 TABLET ORAL EVERY 4 HOURS PRN
Status: DISCONTINUED | OUTPATIENT
Start: 2023-09-28 | End: 2023-09-29 | Stop reason: HOSPADM

## 2023-09-28 RX ORDER — ONDANSETRON 2 MG/ML
INJECTION INTRAMUSCULAR; INTRAVENOUS PRN
Status: DISCONTINUED | OUTPATIENT
Start: 2023-09-28 | End: 2023-09-28 | Stop reason: SDUPTHER

## 2023-09-28 RX ORDER — TRANEXAMIC ACID 100 MG/ML
INJECTION, SOLUTION INTRAVENOUS PRN
Status: DISCONTINUED | OUTPATIENT
Start: 2023-09-28 | End: 2023-09-28 | Stop reason: ALTCHOICE

## 2023-09-28 RX ORDER — OXYCODONE HYDROCHLORIDE AND ACETAMINOPHEN 5; 325 MG/1; MG/1
1 TABLET ORAL EVERY 6 HOURS PRN
Qty: 28 TABLET | Refills: 0 | Status: SHIPPED | OUTPATIENT
Start: 2023-09-28 | End: 2023-09-29 | Stop reason: SDUPTHER

## 2023-09-28 RX ORDER — MORPHINE SULFATE 2 MG/ML
2 INJECTION, SOLUTION INTRAMUSCULAR; INTRAVENOUS
Status: DISCONTINUED | OUTPATIENT
Start: 2023-09-28 | End: 2023-09-29 | Stop reason: HOSPADM

## 2023-09-28 RX ORDER — LIDOCAINE HYDROCHLORIDE 20 MG/ML
INJECTION, SOLUTION INTRAVENOUS PRN
Status: DISCONTINUED | OUTPATIENT
Start: 2023-09-28 | End: 2023-09-28 | Stop reason: SDUPTHER

## 2023-09-28 RX ORDER — TRANEXAMIC ACID 100 MG/ML
INJECTION, SOLUTION INTRAVENOUS PRN
Status: DISCONTINUED | OUTPATIENT
Start: 2023-09-28 | End: 2023-09-28 | Stop reason: SDUPTHER

## 2023-09-28 RX ORDER — SODIUM CHLORIDE 0.9 % (FLUSH) 0.9 %
5-40 SYRINGE (ML) INJECTION PRN
Status: DISCONTINUED | OUTPATIENT
Start: 2023-09-28 | End: 2023-09-28 | Stop reason: HOSPADM

## 2023-09-28 RX ORDER — FENTANYL CITRATE 50 UG/ML
INJECTION, SOLUTION INTRAMUSCULAR; INTRAVENOUS
Status: COMPLETED
Start: 2023-09-28 | End: 2023-09-28

## 2023-09-28 RX ORDER — CYCLOBENZAPRINE HCL 10 MG
10 TABLET ORAL 3 TIMES DAILY PRN
Qty: 30 TABLET | Refills: 0 | Status: SHIPPED | OUTPATIENT
Start: 2023-09-28 | End: 2023-09-29 | Stop reason: SDUPTHER

## 2023-09-28 RX ORDER — OXYCODONE HYDROCHLORIDE AND ACETAMINOPHEN 5; 325 MG/1; MG/1
1 TABLET ORAL EVERY 4 HOURS PRN
Status: DISCONTINUED | OUTPATIENT
Start: 2023-09-28 | End: 2023-09-29 | Stop reason: HOSPADM

## 2023-09-28 RX ORDER — ONDANSETRON 2 MG/ML
4 INJECTION INTRAMUSCULAR; INTRAVENOUS EVERY 6 HOURS PRN
Status: DISCONTINUED | OUTPATIENT
Start: 2023-09-28 | End: 2023-09-29 | Stop reason: HOSPADM

## 2023-09-28 RX ORDER — DOCUSATE SODIUM 100 MG/1
100 CAPSULE, LIQUID FILLED ORAL DAILY
Status: DISCONTINUED | OUTPATIENT
Start: 2023-09-28 | End: 2023-09-29 | Stop reason: HOSPADM

## 2023-09-28 RX ORDER — PROPOFOL 10 MG/ML
INJECTION, EMULSION INTRAVENOUS PRN
Status: DISCONTINUED | OUTPATIENT
Start: 2023-09-28 | End: 2023-09-28 | Stop reason: SDUPTHER

## 2023-09-28 RX ORDER — OXYCODONE HYDROCHLORIDE AND ACETAMINOPHEN 5; 325 MG/1; MG/1
2 TABLET ORAL EVERY 4 HOURS PRN
Status: DISCONTINUED | OUTPATIENT
Start: 2023-09-28 | End: 2023-09-29 | Stop reason: HOSPADM

## 2023-09-28 RX ORDER — FENTANYL CITRATE 50 UG/ML
50 INJECTION, SOLUTION INTRAMUSCULAR; INTRAVENOUS
Status: DISCONTINUED | OUTPATIENT
Start: 2023-09-28 | End: 2023-09-28

## 2023-09-28 RX ORDER — PROMETHAZINE HYDROCHLORIDE 25 MG/ML
25 INJECTION, SOLUTION INTRAMUSCULAR; INTRAVENOUS EVERY 12 HOURS PRN
Status: DISCONTINUED | OUTPATIENT
Start: 2023-09-28 | End: 2023-09-29 | Stop reason: HOSPADM

## 2023-09-28 RX ORDER — FENTANYL CITRATE 50 UG/ML
INJECTION, SOLUTION INTRAMUSCULAR; INTRAVENOUS PRN
Status: DISCONTINUED | OUTPATIENT
Start: 2023-09-28 | End: 2023-09-28 | Stop reason: SDUPTHER

## 2023-09-28 RX ORDER — KETOROLAC TROMETHAMINE 30 MG/ML
30 INJECTION, SOLUTION INTRAMUSCULAR; INTRAVENOUS EVERY 6 HOURS PRN
Status: COMPLETED | OUTPATIENT
Start: 2023-09-28 | End: 2023-09-29

## 2023-09-28 RX ORDER — FENTANYL CITRATE 50 UG/ML
50 INJECTION, SOLUTION INTRAMUSCULAR; INTRAVENOUS EVERY 5 MIN PRN
Status: DISCONTINUED | OUTPATIENT
Start: 2023-09-28 | End: 2023-09-28 | Stop reason: HOSPADM

## 2023-09-28 RX ORDER — ROCURONIUM BROMIDE 10 MG/ML
INJECTION, SOLUTION INTRAVENOUS PRN
Status: DISCONTINUED | OUTPATIENT
Start: 2023-09-28 | End: 2023-09-28 | Stop reason: SDUPTHER

## 2023-09-28 RX ORDER — VANCOMYCIN HYDROCHLORIDE 1 G/20ML
INJECTION, POWDER, LYOPHILIZED, FOR SOLUTION INTRAVENOUS PRN
Status: DISCONTINUED | OUTPATIENT
Start: 2023-09-28 | End: 2023-09-28 | Stop reason: ALTCHOICE

## 2023-09-28 RX ORDER — MIDAZOLAM HYDROCHLORIDE 1 MG/ML
INJECTION INTRAMUSCULAR; INTRAVENOUS PRN
Status: DISCONTINUED | OUTPATIENT
Start: 2023-09-28 | End: 2023-09-28 | Stop reason: SDUPTHER

## 2023-09-28 RX ORDER — SODIUM CHLORIDE 9 MG/ML
INJECTION, SOLUTION INTRAVENOUS CONTINUOUS
Status: DISCONTINUED | OUTPATIENT
Start: 2023-09-28 | End: 2023-09-28 | Stop reason: HOSPADM

## 2023-09-28 RX ADMIN — ONDANSETRON 4 MG: 2 INJECTION INTRAMUSCULAR; INTRAVENOUS at 16:32

## 2023-09-28 RX ADMIN — ROCURONIUM BROMIDE 50 MG: 10 INJECTION INTRAVENOUS at 07:31

## 2023-09-28 RX ADMIN — FENTANYL CITRATE 50 MCG: 50 INJECTION, SOLUTION INTRAMUSCULAR; INTRAVENOUS at 09:09

## 2023-09-28 RX ADMIN — KETOROLAC TROMETHAMINE 30 MG: 30 INJECTION, SOLUTION INTRAMUSCULAR at 20:29

## 2023-09-28 RX ADMIN — Medication 200 MCG: at 08:32

## 2023-09-28 RX ADMIN — FENTANYL CITRATE 50 MCG: 50 INJECTION, SOLUTION INTRAMUSCULAR; INTRAVENOUS at 07:34

## 2023-09-28 RX ADMIN — PROPOFOL 50 MG: 10 INJECTION, EMULSION INTRAVENOUS at 07:34

## 2023-09-28 RX ADMIN — ROCURONIUM BROMIDE 10 MG: 10 INJECTION INTRAVENOUS at 07:57

## 2023-09-28 RX ADMIN — Medication 100 MCG: at 08:24

## 2023-09-28 RX ADMIN — Medication 100 MCG: at 08:19

## 2023-09-28 RX ADMIN — FENTANYL CITRATE 50 MCG: 50 INJECTION, SOLUTION INTRAMUSCULAR; INTRAVENOUS at 09:04

## 2023-09-28 RX ADMIN — OXYCODONE AND ACETAMINOPHEN 2 TABLET: 5; 325 TABLET ORAL at 23:23

## 2023-09-28 RX ADMIN — PROPOFOL 50 MG: 10 INJECTION, EMULSION INTRAVENOUS at 07:54

## 2023-09-28 RX ADMIN — CYCLOBENZAPRINE 10 MG: 10 TABLET, FILM COATED ORAL at 20:29

## 2023-09-28 RX ADMIN — ONDANSETRON 4 MG: 2 INJECTION INTRAMUSCULAR; INTRAVENOUS at 23:03

## 2023-09-28 RX ADMIN — HYDROMORPHONE HYDROCHLORIDE 0.5 MG: 1 INJECTION, SOLUTION INTRAMUSCULAR; INTRAVENOUS; SUBCUTANEOUS at 09:05

## 2023-09-28 RX ADMIN — SODIUM CHLORIDE: 9 INJECTION, SOLUTION INTRAVENOUS at 07:24

## 2023-09-28 RX ADMIN — LABETALOL HYDROCHLORIDE 5 MG: 5 INJECTION INTRAVENOUS at 07:52

## 2023-09-28 RX ADMIN — Medication 100 MCG: at 08:21

## 2023-09-28 RX ADMIN — Medication 2000 MG: at 07:25

## 2023-09-28 RX ADMIN — Medication 0.5 MG: at 09:05

## 2023-09-28 RX ADMIN — MIDAZOLAM 2 MG: 1 INJECTION INTRAMUSCULAR; INTRAVENOUS at 07:25

## 2023-09-28 RX ADMIN — PROPOFOL 150 MCG/KG/MIN: 10 INJECTION, EMULSION INTRAVENOUS at 07:36

## 2023-09-28 RX ADMIN — LIDOCAINE HYDROCHLORIDE 100 MG: 20 INJECTION, SOLUTION INTRAVENOUS at 07:31

## 2023-09-28 RX ADMIN — FENTANYL CITRATE 100 MCG: 50 INJECTION, SOLUTION INTRAMUSCULAR; INTRAVENOUS at 07:50

## 2023-09-28 RX ADMIN — ONDANSETRON 4 MG: 2 INJECTION INTRAMUSCULAR; INTRAVENOUS at 08:21

## 2023-09-28 RX ADMIN — LABETALOL HYDROCHLORIDE 5 MG: 5 INJECTION INTRAVENOUS at 07:46

## 2023-09-28 RX ADMIN — SODIUM CHLORIDE: 9 INJECTION, SOLUTION INTRAVENOUS at 06:57

## 2023-09-28 RX ADMIN — PROPOFOL 150 MG: 10 INJECTION, EMULSION INTRAVENOUS at 07:31

## 2023-09-28 RX ADMIN — SUGAMMADEX 200 MG: 100 INJECTION, SOLUTION INTRAVENOUS at 08:38

## 2023-09-28 RX ADMIN — OXYCODONE AND ACETAMINOPHEN 2 TABLET: 5; 325 TABLET ORAL at 15:05

## 2023-09-28 RX ADMIN — TRANEXAMIC ACID 1000 MG: 100 INJECTION, SOLUTION INTRAVENOUS at 07:36

## 2023-09-28 RX ADMIN — SODIUM CHLORIDE: 9 INJECTION, SOLUTION INTRAVENOUS at 15:02

## 2023-09-28 RX ADMIN — CEFAZOLIN 2000 MG: 10 INJECTION, POWDER, FOR SOLUTION INTRAVENOUS at 17:39

## 2023-09-28 RX ADMIN — KETOROLAC TROMETHAMINE 30 MG: 30 INJECTION, SOLUTION INTRAMUSCULAR at 12:52

## 2023-09-28 RX ADMIN — FENTANYL CITRATE 50 MCG: 50 INJECTION, SOLUTION INTRAMUSCULAR; INTRAVENOUS at 07:25

## 2023-09-28 RX ADMIN — OXYCODONE AND ACETAMINOPHEN 1 TABLET: 5; 325 TABLET ORAL at 19:20

## 2023-09-28 RX ADMIN — OXYCODONE AND ACETAMINOPHEN 1 TABLET: 5; 325 TABLET ORAL at 10:57

## 2023-09-28 ASSESSMENT — PAIN DESCRIPTION - LOCATION
LOCATION: KNEE

## 2023-09-28 ASSESSMENT — PAIN SCALES - GENERAL
PAINLEVEL_OUTOF10: 7
PAINLEVEL_OUTOF10: 5
PAINLEVEL_OUTOF10: 8
PAINLEVEL_OUTOF10: 3
PAINLEVEL_OUTOF10: 3
PAINLEVEL_OUTOF10: 7
PAINLEVEL_OUTOF10: 4
PAINLEVEL_OUTOF10: 6
PAINLEVEL_OUTOF10: 3
PAINLEVEL_OUTOF10: 8
PAINLEVEL_OUTOF10: 4
PAINLEVEL_OUTOF10: 8
PAINLEVEL_OUTOF10: 6
PAINLEVEL_OUTOF10: 5
PAINLEVEL_OUTOF10: 8

## 2023-09-28 ASSESSMENT — PAIN DESCRIPTION - DESCRIPTORS
DESCRIPTORS: ACHING
DESCRIPTORS: ACHING
DESCRIPTORS: ACHING;THROBBING
DESCRIPTORS: SORE
DESCRIPTORS: ACHING
DESCRIPTORS: ACHING;DISCOMFORT
DESCRIPTORS: ACHING;THROBBING
DESCRIPTORS: ACHING
DESCRIPTORS: ACHING

## 2023-09-28 ASSESSMENT — PAIN DESCRIPTION - ORIENTATION
ORIENTATION: LEFT

## 2023-09-28 ASSESSMENT — PAIN DESCRIPTION - PAIN TYPE
TYPE: SURGICAL PAIN

## 2023-09-28 ASSESSMENT — PAIN DESCRIPTION - FREQUENCY
FREQUENCY: CONTINUOUS
FREQUENCY: CONTINUOUS

## 2023-09-28 ASSESSMENT — PAIN - FUNCTIONAL ASSESSMENT
PAIN_FUNCTIONAL_ASSESSMENT: PREVENTS OR INTERFERES SOME ACTIVE ACTIVITIES AND ADLS
PAIN_FUNCTIONAL_ASSESSMENT: PREVENTS OR INTERFERES SOME ACTIVE ACTIVITIES AND ADLS
PAIN_FUNCTIONAL_ASSESSMENT: 0-10

## 2023-09-28 ASSESSMENT — PAIN DESCRIPTION - ONSET: ONSET: ON-GOING

## 2023-09-28 ASSESSMENT — LIFESTYLE VARIABLES: SMOKING_STATUS: 0

## 2023-09-28 NOTE — PROGRESS NOTES
Patient oriented to Same Day department and admitted to Same Day Surgery room 5. Patient verbalized approval for first name, last initial with physician name on unit whiteboard. Plan of care reviewed with patient. Patient room whiteboard filled out and discussed with patient and responsible adult. Patient and responsible adult offered Same Day Welcome Packet to review. Call light in reach. Bed in lowest position, locked, with one bed rail up. SCDs and warming blanket in place. Appropriate arm bands on patient. Bathroom offered. All questions and concerns of patient addressed. Meds to Beds:   Patient informed of [facility]'s Meds to [facility] program during admission.  Patient is agreeable to program.   Contact information for the pharmacy and the Meds to Beds program:   Name: Diana Tejada    Relationship to patient:spouse/significant other   Phone number: [phone]

## 2023-09-28 NOTE — PROGRESS NOTES
200- pt to pacu. Oral airway in place, non responsive. Vss. Appears in ni acute distress. 8582- pt remains non responsive. 0900- pt pushed out oral airway, responds appropriately, reports pain, denies nausea. 2169- pt resting in bed eyes closed, vss, continues to report intolerable pain, continue to medicate per orders. 3154- pt resting in bed, eyes closed, wakes to answer questions, drifts back to sleep quickly. 0027- pt meets criteria for discharge from pacu,     0934-returned to [facility] awaiting IP bed.  Report given to

## 2023-09-28 NOTE — PROGRESS NOTES
Pt returned to [facility] room 5. Vitals and assessment as charted. 0.9 infusing, @500ml to count from PACU. Pt has applesauce and water. Pt verbalized understanding of discharge criteria and call light use. Call light in reach.

## 2023-09-28 NOTE — ANESTHESIA PRE PROCEDURE
Department of Anesthesiology  Preprocedure Note       Name:  Faina Pierce   Age:  66 y.o.  :  1965                                          MRN:  [id number]         Date:  2023      Surgeon: Char Nagy):  Manjit Stovall MD    Procedure: Procedure(s):  Left Total Knee Replacement    Medications prior to admission:   Prior to Admission medications    Medication Sig Start Date End Date Taking? Authorizing Provider   oxyCODONE-acetaminophen (PERCOCET) 5-325 MG per tablet Take 1 tablet by mouth every 4 hours as needed for Pain. Yes Historical Provider, MD   aspirin 325 MG tablet Take 1 tablet by mouth daily for 29 doses 23  Shana Howard. ION Odell   medical marijuana Take 1 each by mouth as needed (sleep and pain).     Historical Provider, MD   turmeric 500 MG CAPS Take by mouth daily    Historical Provider, MD   Boswellia Martha (BOSWELLIA PO) Take 500 mg by mouth    Historical Provider, MD   Calcium-Magnesium 500-250 MG TABS Take by mouth    Historical Provider, MD   Potassium 99 MG TABS Take by mouth    Historical Provider, MD   Robbie Belmont Oil 1000 MG CAPS Take 2,000 mg by mouth    Historical Provider, MD   Aloe Vera 25 MG CAPS Take by mouth    Historical Provider, MD   predniSONE (DELTASONE) 1 MG tablet Take 1 tablet by mouth daily Takes 2 tabs in am and 3 tabs in pm    Historical Provider, MD   ibuprofen (ADVIL;MOTRIN) 600 MG tablet Take 1 tablet by mouth 3 times daily as needed for Pain 4/3/21   Patsy Moctezuma MD   rizatriptan (MAXALT) 10 MG tablet Take 1 tablet by mouth as needed    Historical Provider, MD   famciclovir (FAMVIR) 500 MG tablet Take 1 tablet by mouth 3 times daily as needed (for shingles outbreak)    Historical Provider, MD   OLIVE LEAF PO Take by mouth daily     Historical Provider, MD   Glucosamine-Chondroitin (JOINT SUPPORT PO) Take 1 tablet by mouth daily     Historical Provider, MD   therapeutic multivitamin-minerals (THERAGRAN-M) tablet Take 1 tablet by mouth daily

## 2023-09-28 NOTE — OP NOTE
Operative Note      Patient: Major Ards  YOB: 1965  MRN: [id number]    Date of Procedure: 9/28/2023    Pre-Op Diagnosis Codes:     * Osteoarthritis of left knee, unspecified osteoarthritis type [M17.12] rheumatoid arthritis    Post-Op Diagnosis: Same       Procedure(s):  Left Total Knee Replacement    Surgeon(s):  Sanju Wolfe MD    Assistant:   Physician Assistant: Reina Gaston PA-C; BASSAM Gonzalez    The physician assistant was present for the entirety of the procedure and vital for the performance of the procedure. He assisted with positioning, prepping, draping of the patient before the procedure and instrument manipulation, extremity repositioning  as well as wound closure, dressing application after the procedure. Please note that no intern, resident, or other hospital staff was available to assist during the surgery     Anesthesia: Spinal    Estimated Blood Loss (mL): 806     Complications: None    Specimens:   * No specimens in log *    Implants:  Implant Name Type Inv.  Item Serial No.  Lot No. LRB No. Used Action   PIN FIX L3IN DIA1/8IN S STL TRCR CNTR PNT COMPASS - [id number]  PIN FIX L3IN DIA1/8IN S STL TRCR CNTR PNT COMPASS [id number] [employer]-  Left 1 Implanted   CEMENT BONE 40GM Marcine Lucille AB NINFA - [id number]  CEMENT BONE 40GM M VISC AB [facility] [employer]TH[employer]- [id number] Left 1 Implanted   COMPONENT PAT 3 PEG RND GENNY STD LITA N POR W/O XR WIRE 26MM - [id number]  COMPONENT PAT 3 PEG RND GENNY STD LITA N POR W/O XR WIRE 26MM [id number] [employer]-  Left 1 Implanted   COMPONENT FEM SZ 3.5 LT CRUC RET LITA TRULIANT - [id number]  COMPONENT FEM SZ 3.5 LT CRUC RET LITA TRULIANT [id number] [employer]-  Left 1 Implanted   INSERT TIB CR 3.5 11 MM TRULIANT - [id number]  INSERT TIB CR 3.5 11 MM TRULIANT [id number] [employer] [facility] [employer]-  Left 1 Implanted   INSERT TIB SZ 35F 25T LITA FIT TRULIANT - [id number]  INSERT TIB SZ 35F 25T LITA FIT TRULIANT [id number] [employer]

## 2023-09-28 NOTE — DISCHARGE INSTRUCTIONS
Lionel Perry MD       ACTIVITY / WEIGHTBEARING  INSTRUCTIONS:  Weightbearing as tolerated surgical extremity. PT/OT     DIET:  Increase Fluid/Water intake, eat foods high in fiber, fruits and vegetables to help to prevent constipation. WOUND/DRESSING INSTRUCTIONS:  Always ensure you wash your hands before and after caring for your wound/dressing. Keep your dressing clean and dry. Change dressing as needed. Can wash around incision. Do not place antibiotic ointment, lotion, creams on surgical incision. Smoking impairs adequate wound healing. If smoking, consider quitting. May apply ice to surgical incision to help to prevent swelling. MEDICATION INSTRUCTIONS:  Take pain medication as prescribed. See orders regarding resuming your home medications and any new medications. Continue blood thinner if ordered by your physician. FOLLOW-UP CARE:  If a follow-up appointment was not made for you at discharge, call [phone] [facility] office) or [phone] [facility] office) to schedule an appointment for 3 weeks. Call Dr Sandra Bruno office with any concerns. Signs of infection such as fever, chills, redness, pus, or bad smelling discharge from incision site. Excessive bleeding, swelling, increasing pain, or discharge around incision site. The stitches or staples come apart at the incision site. Cough shortness of breath, or chest pain. Severe nausea or vomiting. Pain that you cannot control with the medications you have been given or  pain becomes worse. Numbness, tingling, or loss of feeling in your leg, knee, or foot. Pain, burning, urgency, or frequency of urination. If a medical emergerncy, please call [phone] or go to your local emergency room.

## 2023-09-28 NOTE — H&P
History and Physical Update    Pt Name: Laurita Dey  MRN: [id number]  YOB: 1965  Date of evaluation: 9/28/2023    [x] I have examined the patient and reviewed the H&P/Consult and there are no changes to the patient or plans.     [] I have examined the patient and reviewed the H&P/Consult and have noted the following changes:        Rosanna Giron MD   Electronically signed 9/28/2023 at 7:11 AM

## 2023-09-29 VITALS
HEIGHT: 60 IN | BODY MASS INDEX: 17.24 KG/M2 | OXYGEN SATURATION: 100 % | RESPIRATION RATE: 16 BRPM | HEART RATE: 78 BPM | TEMPERATURE: 98.4 F | WEIGHT: 87.8 LBS | SYSTOLIC BLOOD PRESSURE: 126 MMHG | DIASTOLIC BLOOD PRESSURE: 74 MMHG

## 2023-09-29 LAB
ANION GAP SERPL CALC-SCNC: 9 MEQ/L (ref 8–16)
BASOPHILS ABSOLUTE: 0.1 THOU/MM3 (ref 0–0.1)
BASOPHILS NFR BLD AUTO: 1.5 %
BUN SERPL-MCNC: 6 MG/DL (ref 7–22)
CALCIUM SERPL-MCNC: 8.4 MG/DL (ref 8.5–10.5)
CHLORIDE SERPL-SCNC: 105 MEQ/L (ref 98–111)
CO2 SERPL-SCNC: 22 MEQ/L (ref 23–33)
CREAT SERPL-MCNC: 0.5 MG/DL (ref 0.4–1.2)
DEPRECATED RDW RBC AUTO: 47.6 FL (ref 35–45)
EOSINOPHIL NFR BLD AUTO: 3.9 %
EOSINOPHILS ABSOLUTE: 0.1 THOU/MM3 (ref 0–0.4)
ERYTHROCYTE [DISTWIDTH] IN BLOOD BY AUTOMATED COUNT: 12.5 % (ref 11.5–14.5)
GFR SERPL CREATININE-BSD FRML MDRD: > 60 ML/MIN/1.73M2
GLUCOSE SERPL-MCNC: 108 MG/DL (ref 70–108)
HCT VFR BLD AUTO: 33.6 % (ref 37–47)
HGB BLD-MCNC: 10.6 GM/DL (ref 12–16)
IMM GRANULOCYTES # BLD AUTO: 0.02 THOU/MM3 (ref 0–0.07)
IMM GRANULOCYTES NFR BLD AUTO: 0.6 %
LYMPHOCYTES ABSOLUTE: 0.8 THOU/MM3 (ref 1–4.8)
LYMPHOCYTES NFR BLD AUTO: 24.3 %
MCH RBC QN AUTO: 32.3 PG (ref 26–33)
MCHC RBC AUTO-ENTMCNC: 31.5 GM/DL (ref 32.2–35.5)
MCV RBC AUTO: 102.4 FL (ref 81–99)
MONOCYTES ABSOLUTE: 0.5 THOU/MM3 (ref 0.4–1.3)
MONOCYTES NFR BLD AUTO: 14.2 %
NEUTROPHILS NFR BLD AUTO: 55.5 %
NRBC BLD AUTO-RTO: 0 /100 WBC
PLATELET # BLD AUTO: 226 THOU/MM3 (ref 130–400)
PMV BLD AUTO: 11.1 FL (ref 9.4–12.4)
POTASSIUM SERPL-SCNC: 3.7 MEQ/L (ref 3.5–5.2)
RBC # BLD AUTO: 3.28 MILL/MM3 (ref 4.2–5.4)
SEGMENTED NEUTROPHILS ABSOLUTE COUNT: 1.9 THOU/MM3 (ref 1.8–7.7)
SODIUM SERPL-SCNC: 136 MEQ/L (ref 135–145)
WBC # BLD AUTO: 3.4 THOU/MM3 (ref 4.8–10.8)

## 2023-09-29 PROCEDURE — 80048 BASIC METABOLIC PNL TOTAL CA: CPT

## 2023-09-29 PROCEDURE — 97116 GAIT TRAINING THERAPY: CPT

## 2023-09-29 PROCEDURE — 85025 COMPLETE CBC W/AUTO DIFF WBC: CPT

## 2023-09-29 PROCEDURE — 36415 COLL VENOUS BLD VENIPUNCTURE: CPT

## 2023-09-29 PROCEDURE — 97530 THERAPEUTIC ACTIVITIES: CPT

## 2023-09-29 PROCEDURE — 2580000003 HC RX 258: Performed by: NURSE PRACTITIONER

## 2023-09-29 PROCEDURE — 6360000002 HC RX W HCPCS: Performed by: NURSE PRACTITIONER

## 2023-09-29 PROCEDURE — 97535 SELF CARE MNGMENT TRAINING: CPT

## 2023-09-29 PROCEDURE — 97166 OT EVAL MOD COMPLEX 45 MIN: CPT

## 2023-09-29 PROCEDURE — 6370000000 HC RX 637 (ALT 250 FOR IP): Performed by: NURSE PRACTITIONER

## 2023-09-29 PROCEDURE — 97162 PT EVAL MOD COMPLEX 30 MIN: CPT

## 2023-09-29 RX ORDER — CYCLOBENZAPRINE HCL 10 MG
10 TABLET ORAL 3 TIMES DAILY PRN
Qty: 30 TABLET | Refills: 0 | Status: SHIPPED | OUTPATIENT
Start: 2023-09-29 | End: 2023-10-09

## 2023-09-29 RX ORDER — OXYCODONE HYDROCHLORIDE AND ACETAMINOPHEN 5; 325 MG/1; MG/1
1 TABLET ORAL EVERY 6 HOURS PRN
Qty: 28 TABLET | Refills: 0 | Status: SHIPPED | OUTPATIENT
Start: 2023-09-29 | End: 2023-10-06

## 2023-09-29 RX ORDER — ASPIRIN 325 MG
325 TABLET ORAL DAILY
Qty: 30 TABLET | Refills: 0 | Status: SHIPPED | OUTPATIENT
Start: 2023-09-29 | End: 2023-10-29

## 2023-09-29 RX ADMIN — OXYCODONE AND ACETAMINOPHEN 1 TABLET: 5; 325 TABLET ORAL at 05:51

## 2023-09-29 RX ADMIN — ASPIRIN 325 MG: 325 TABLET ORAL at 08:26

## 2023-09-29 RX ADMIN — OXYCODONE AND ACETAMINOPHEN 2 TABLET: 5; 325 TABLET ORAL at 14:08

## 2023-09-29 RX ADMIN — MORPHINE SULFATE 4 MG: 4 INJECTION, SOLUTION INTRAMUSCULAR; INTRAVENOUS at 00:01

## 2023-09-29 RX ADMIN — CEFAZOLIN 2000 MG: 10 INJECTION, POWDER, FOR SOLUTION INTRAVENOUS at 00:00

## 2023-09-29 RX ADMIN — OXYCODONE AND ACETAMINOPHEN 2 TABLET: 5; 325 TABLET ORAL at 10:08

## 2023-09-29 RX ADMIN — CYCLOBENZAPRINE 10 MG: 10 TABLET, FILM COATED ORAL at 08:26

## 2023-09-29 RX ADMIN — KETOROLAC TROMETHAMINE 30 MG: 30 INJECTION, SOLUTION INTRAMUSCULAR at 03:24

## 2023-09-29 ASSESSMENT — PAIN DESCRIPTION - LOCATION
LOCATION: LEG
LOCATION: KNEE

## 2023-09-29 ASSESSMENT — PAIN - FUNCTIONAL ASSESSMENT
PAIN_FUNCTIONAL_ASSESSMENT: ACTIVITIES ARE NOT PREVENTED
PAIN_FUNCTIONAL_ASSESSMENT: ACTIVITIES ARE NOT PREVENTED
PAIN_FUNCTIONAL_ASSESSMENT: PREVENTS OR INTERFERES SOME ACTIVE ACTIVITIES AND ADLS
PAIN_FUNCTIONAL_ASSESSMENT: ACTIVITIES ARE NOT PREVENTED

## 2023-09-29 ASSESSMENT — PAIN DESCRIPTION - DESCRIPTORS
DESCRIPTORS: ACHING
DESCRIPTORS: ACHING
DESCRIPTORS: THROBBING
DESCRIPTORS: ACHING
DESCRIPTORS: ACHING

## 2023-09-29 ASSESSMENT — PAIN DESCRIPTION - ORIENTATION
ORIENTATION: LEFT

## 2023-09-29 ASSESSMENT — PAIN SCALES - GENERAL
PAINLEVEL_OUTOF10: 6
PAINLEVEL_OUTOF10: 6
PAINLEVEL_OUTOF10: 0
PAINLEVEL_OUTOF10: 5
PAINLEVEL_OUTOF10: 10
PAINLEVEL_OUTOF10: 10
PAINLEVEL_OUTOF10: 7
PAINLEVEL_OUTOF10: 7
PAINLEVEL_OUTOF10: 5

## 2023-09-29 ASSESSMENT — PAIN DESCRIPTION - ONSET: ONSET: ON-GOING

## 2023-09-29 ASSESSMENT — PAIN DESCRIPTION - PAIN TYPE: TYPE: SURGICAL PAIN

## 2023-09-29 ASSESSMENT — PAIN DESCRIPTION - FREQUENCY: FREQUENCY: INTERMITTENT

## 2023-09-29 NOTE — DISCHARGE SUMMARY
Physician Discharge Summary     Patient ID:  Sierra Barclay  [id number]  56 y.o.  1965    Admit date: 9/28/2023    Discharge date and time: 9/29/23    Admitting Physician: Diana Rodriges MD     Discharge Physician: Diana Rodriges MD     Admission Diagnoses: Osteoarthritis of left knee, unspecified osteoarthritis type [M17.12]  H/O total knee replacement, left [Z96.652]    Discharge Diagnoses: Osteoarthritis of left knee, unspecified osteoarthritis type [M17.12]  H/O total knee replacement, left [Z96.652]    Admission Condition: good    Discharged Condition: good    Indication for Admission: pain control mobilization    Hospital Course: Patient is now s/p L TKA on 9/28/23 by Dr. Faizan Parker. On the day of surgery, patient was identified in the pre-operative holding area and agreeable to proceed with surgery. Perioperative risk assessment was obtained pre operatively. Written consent was obtained. Please see operative note for further details of this procedure. Patient received art-operative antibiotics. Patient recovered in PACU before transfer to a regular nursing floor. Some nausea post operatively but this resolved and her diet was advanced. Patient was started on tylenol and percocet for pain control and ASA for dvt prophylaxis for 3 weeks. On the day of discharge, patient was afebrile with stable vital signs, stable upon physical exam. Patient was discharged with prescriptions for pain and dvt ppx. Patient was deemed stable for discharge to PT OT. Patient will follow up with Dr Faizan Parker in 3 weeks for post-operative visit. Discharge Exam:  Please see final progress note for appropriate discharge exam    Disposition: home    In process/preliminary results:  Outstanding Order Results       No orders found for last 30 day(s).             Patient Instructions:   Current Discharge Medication List        START taking these medications    Details   !! oxyCODONE-acetaminophen (PERCOCET) 5-325 MG per tablet Take 1 tablet by mouth every 6 hours as needed for Pain for up to 7 days. Max Daily Amount: 4 tablets  Qty: 28 tablet, Refills: 0    Comments: Reduce doses taken as pain becomes manageable  Associated Diagnoses: H/O total knee replacement, left      cyclobenzaprine (FLEXERIL) 10 MG tablet Take 1 tablet by mouth 3 times daily as needed for Muscle spasms  Qty: 30 tablet, Refills: 0       !! - Potential duplicate medications found. Please discuss with provider. CONTINUE these medications which have CHANGED    Details   aspirin (LILY ASPIRIN) 325 MG tablet Take 1 tablet by mouth daily  Qty: 30 tablet, Refills: 0           CONTINUE these medications which have NOT CHANGED    Details   !! oxyCODONE-acetaminophen (PERCOCET) 5-325 MG per tablet Take 1 tablet by mouth every 4 hours as needed for Pain.      medical marijuana Take 1 each by mouth as needed (sleep and pain).       turmeric 500 MG CAPS Take by mouth daily      Boswellia Martha (BOSWELLIA PO) Take 500 mg by mouth      Calcium-Magnesium 500-250 MG TABS Take by mouth      Potassium 99 MG TABS Take by mouth      Krill Oil 1000 MG CAPS Take 2,000 mg by mouth      Aloe Vera 25 MG CAPS Take by mouth      predniSONE (DELTASONE) 1 MG tablet Take 1 tablet by mouth daily Takes 2 tabs in am and 3 tabs in pm      ibuprofen (ADVIL;MOTRIN) 600 MG tablet Take 1 tablet by mouth 3 times daily as needed for Pain  Qty: 30 tablet, Refills: 0      rizatriptan (MAXALT) 10 MG tablet Take 1 tablet by mouth as needed      famciclovir (FAMVIR) 500 MG tablet Take 1 tablet by mouth 3 times daily as needed (for shingles outbreak)      OLIVE LEAF PO Take by mouth daily       Glucosamine-Chondroitin (JOINT SUPPORT PO) Take 1 tablet by mouth daily       therapeutic multivitamin-minerals (THERAGRAN-M) tablet Take 1 tablet by mouth daily      calcium carbonate-vitamin D 600-200 MG-UNIT TABS Take 1 tablet by mouth daily       MAGNESIUM ACETATE Take 400 mg by mouth nightly For bowels

## 2023-09-29 NOTE — PROGRESS NOTES
tablet 10 mg, 10 mg, Oral, TID PRN  ondansetron (ZOFRAN) injection 4 mg, 4 mg, IntraVENous, Q6H PRN  docusate sodium (COLACE) capsule 100 mg, 100 mg, Oral, Daily  aspirin tablet 325 mg, 325 mg, Oral, Daily  oxyCODONE-acetaminophen (PERCOCET) 5-325 MG per tablet 1 tablet, 1 tablet, Oral, Q4H PRN **OR** oxyCODONE-acetaminophen (PERCOCET) 5-325 MG per tablet 2 tablet, 2 tablet, Oral, Q4H PRN  acetaminophen (TYLENOL) tablet 650 mg, 650 mg, Oral, Q4H PRN  promethazine (PHENERGAN) injection 25 mg, 25 mg, IntraMUSCular, Q12H PRN        PLAN    Ms. Sophie Manriquez is post op day # 1 L TKA    Doing well  Continue to ADAT  Post operative imaging reviewed, stable  Repeat hcg/hct tomorrow, no indication for transfusion at this time  WBAT LLE  Continue PT/OT  Dvt ppx- ASA  Dispo-dc today after OT PT

## 2023-09-29 NOTE — PLAN OF CARE
Problem: Discharge Planning  Goal: Discharge to home or other facility with appropriate resources  Outcome: Progressing  Flowsheets (Taken 9/28/2023 2201)  Discharge to home or other facility with appropriate resources:   Identify barriers to discharge with patient and caregiver   Arrange for needed discharge resources and transportation as appropriate   Identify discharge learning needs (meds, wound care, etc)     Problem: Pain  Goal: Verbalizes/displays adequate comfort level or baseline comfort level  Outcome: Progressing  Flowsheets (Taken 9/28/2023 2201)  Verbalizes/displays adequate comfort level or baseline comfort level:   Encourage patient to monitor pain and request assistance   Assess pain using appropriate pain scale   Administer analgesics based on type and severity of pain and evaluate response   Implement non-pharmacological measures as appropriate and evaluate response     Problem: Safety - Adult  Goal: Free from fall injury  Outcome: Progressing  Flowsheets (Taken 9/28/2023 2201)  Free From Fall Injury: Instruct family/caregiver on patient safety     Problem: ABCDS Injury Assessment  Goal: Absence of physical injury  Outcome: Progressing  Flowsheets (Taken 9/28/2023 2201)  Absence of Physical Injury: Implement safety measures based on patient assessment     Problem: Musculoskeletal - Adult  Goal: Return mobility to safest level of function  Outcome: Progressing  Flowsheets (Taken 9/28/2023 2201)  Return Mobility to Safest Level of Function:   Assess patient stability and activity tolerance for standing, transferring and ambulating with or without assistive devices   Assist with transfers and ambulation using safe patient handling equipment as needed   Ensure adequate protection for wounds/incisions during mobilization     Problem: Musculoskeletal - Adult  Goal: Return ADL status to a safe level of function  Outcome: Progressing  Flowsheets (Taken 9/28/2023 2201)  Return ADL Status to a Safe Level of Function: Assess activities of daily living deficits and provide assistive devices as needed     Problem: Infection - Adult  Goal: Absence of infection during hospitalization  Outcome: Progressing  Flowsheets (Taken 9/28/2023 2201)  Absence of infection during hospitalization:   Assess and monitor for signs and symptoms of infection   Monitor lab/diagnostic results   Monitor all insertion sites i.e., indwelling lines, tubes and drains     Problem: Skin/Tissue Integrity  Goal: Absence of new skin breakdown  Description: 1. Monitor for areas of redness and/or skin breakdown  2. Assess vascular access sites hourly  3. Every 4-6 hours minimum:  Change oxygen saturation probe site  4. Every 4-6 hours:  If on nasal continuous positive airway pressure, respiratory therapy assess nares and determine need for appliance change or resting period. Outcome: Progressing     Care plan reviewed with patient. Patient verbalized understanding of the plan of care and contribute to goal setting.

## 2023-09-29 NOTE — PLAN OF CARE
Problem: Discharge Planning  Goal: Discharge to home or other facility with appropriate resources  Outcome: Completed     Problem: Pain  Goal: Verbalizes/displays adequate comfort level or baseline comfort level  Outcome: Completed     Problem: Safety - Adult  Goal: Free from fall injury  Outcome: Completed     Problem: ABCDS Injury Assessment  Goal: Absence of physical injury  Outcome: Completed     Problem: Musculoskeletal - Adult  Goal: Return mobility to safest level of function  Outcome: Completed     Problem: Musculoskeletal - Adult  Goal: Return ADL status to a safe level of function  Outcome: Completed     Problem: Infection - Adult  Goal: Absence of infection during hospitalization  Outcome: Completed     Problem: Skin/Tissue Integrity  Goal: Absence of new skin breakdown  Description: 1. Monitor for areas of redness and/or skin breakdown  2. Assess vascular access sites hourly  3. Every 4-6 hours minimum:  Change oxygen saturation probe site  4. Every 4-6 hours:  If on nasal continuous positive airway pressure, respiratory therapy assess nares and determine need for appliance change or resting period. Outcome: Completed   Care plan reviewed with patient. Patient verbalized understanding of the plan of care and contribute to goal setting.

## 2023-09-29 NOTE — CARE COORDINATION
Case Management Assessment  Initial Evaluation    Date/Time of Evaluation: 9/29/2023 12:13 PM  Assessment Completed by: Harjinder Weller RN    If patient is discharged prior to next notation, then this note serves as note for discharge by case management. Patient Name: Vamsi Ureña                   YOB: 1965  Diagnosis: Osteoarthritis of left knee, unspecified osteoarthritis type [M17.12]  H/O total knee replacement, left [Z96.652]                   Date / Time: 9/28/2023  5:28 AM  Location: [facility]03/003     Patient Admission Status: Inpatient   Readmission Risk Low 0-14, Mod 15-19), High > 20: Readmission Risk Score: 11.6    Current PCP: JALEESA Zimmerman CNP  PCP verified by CM? Yes    Chart Reviewed: Yes      History Provided by: Patient  Patient Orientation: Alert and Oriented    Patient Cognition: Alert    Hospitalization in the last 30 days (Readmission):  No    If yes, Readmission Assessment in CM Navigator will be completed. Advance Directives:      Code Status: Full Code   Patient's Primary Decision Maker is: Legal Next of Kin    Primary Decision Maker: Yosvany Crabtree Spouse - [phone]    Discharge Planning:    Patient lives with: Spouse/Significant Other   Type of Home: House  Primary Care Giver: Self  Patient Support Systems include: Spouse/Significant Other, Children, Friends/Neighbors, [employer] [employer]   Current Financial resources: Other (Comment) (BCBS comm)  Current community resources: None  Current services prior to admission: Durable Medical Equipment            Current DME: Maxene Mater, Bedside Commode, Wheelchair, [employer]            Type of Home Care services:  PT, OT    ADLS  Prior functional level: Independent in ADLs/IADLs  Current functional level: Assistance with the following:, Mobility, Dressing, Cooking, Housework, Shopping    Family can provide assistance at DC:  Yes  Would you like Case Management to discuss the discharge plan with any other family members/significant others, and if so, who? No  Plans to Return to Present Housing: Yes  Other Identified Issues/Barriers to RETURNING to current housing: unsure  Potential Assistance needed at discharge: Home Care            Potential DME:    Patient expects to discharge to: [address] [place] [address] for transportation at discharge: Family    Financial    Payor: [employer] Beaulieuor / Plan: [place] PPO / Product Type: *No Product type* /     Does insurance require precert for SNF: Yes    Potential assistance Purchasing Medications: No  Meds-to-Beds request: Yes      [facility], [address] [facility] [phone] - F [phone]  [address] [facility] [address] [facility]  Phone: [phone] Fax: [phone]    [address] [facility], [address] [facility] - F [phone]  [address] [facility]  Phone: [phone] Fax: [phone]      Notes:    Factors facilitating achievement of predicted outcomes: Family support, Caregiver support, Cooperative, Pleasant, Good insight into deficits, and Has needed Durable Medical Equipment at home    Barriers to discharge: Pain, Decreased endurance, and Medical complications    Additional Case Management Notes: planned surgery by Dr Aisha Steven    Procedure: 9/28 Left Total Knee Replacement    The Plan for Transition of Care is related to the following treatment goals of Osteoarthritis of left knee, unspecified osteoarthritis type [M17.12]  H/O total knee replacement, left [Z96.652]    Patient Goals/Plan/Treatment Preferences: Sera is from home with her  Tank Martinez; she had previous knee surgery in August here. She has insurance, PCP, and has needed DME. She plans [facility] and then eventually transition to OP PT. Transportation/Food Security/Housekeeping Addressed: No issues identified.      Maribel Rushing RN  Case Management Department         9/29/23, 12:14 PM EDT    Patient goals/plan/ treatment

## 2023-09-29 NOTE — ANESTHESIA POSTPROCEDURE EVALUATION
Department of Anesthesiology  Postprocedure Note    Patient: Cortes Reyes  MRN: [id number]  YOB: 1965  Date of evaluation: 9/28/2023      Procedure Summary     Date: 09/28/23 Room / Location: [address]    Anesthesia Start: 0724 Anesthesia Stop: Gordy Gilmore    Procedure: Left Total Knee Replacement (Left: Knee) Diagnosis:       Osteoarthritis of left knee, unspecified osteoarthritis type      (Osteoarthritis of left knee, unspecified osteoarthritis type [M17.12])    Surgeons: Isela Braga MD Responsible Provider: Chay Win DO    Anesthesia Type: General ASA Status: 2          Anesthesia Type: General    José Phase I: José Score: 8    José Phase II: José Score: 10      Anesthesia Post Evaluation    Patient location during evaluation: PACU  Patient participation: complete - patient participated  Level of consciousness: awake and alert  Airway patency: patent  Nausea & Vomiting: no vomiting and no nausea  Complications: no  Cardiovascular status: hemodynamically stable  Respiratory status: acceptable  Hydration status: stable  Pain management: adequate

## 2023-09-29 NOTE — PROGRESS NOTES
[facility] OCCUPATIONAL THERAPY  [facility] ORTHOPEDICS 7K  EVALUATION    Time:   Time In: 845  Time Out:   Timed Code Treatment Minutes: 44 Minutes  Minutes: 50          Date: 2023  Patient Name: Julius Curran,   Gender: female      MRN: [id number]  : 1965  (66 y.o.)  Referring Practitioner: JALEESA Michaels CNP  Diagnosis: osteoarthritis of left knee  Additional Pertinent Hx: Pt underwent Left Total Knee Replacement on  with Dr. Rikki Homans. Restrictions/Precautions:  Restrictions/Precautions: Up as Tolerated, General Precautions, Weight Bearing  Left Lower Extremity Weight Bearing: Weight Bearing As Tolerated    Subjective  Chart Reviewed: Yes, Orders, Progress Notes, History and Physical  Patient assessed for rehabilitation services?: Yes    Subjective: Nurse approved OT eval. Pt in recliner on OT arrival, pleasant and cooperative adn agreeable to OT eval.    Pain: Does not numerate pain     Vitals: Vitals not assessed per clinical judgement, see nursing flowsheet    Social/Functional History:  Lives With: Spouse  Type of Home: House  Home Layout: Two level, Able to Live on Main level with bedroom/bathroom  Home Access: Stairs to enter with rails  Entrance Stairs - Number of Steps: 5 RESHMA, only able to reach 1 side at a time  Entrance Stairs - Rails: Both  Home Equipment: Mervat Trever, rolling, Cane, [employer] (double platform walker)   Bathroom Shower/Tub: Walk-in shower, Tub/Shower unit  Bathroom Toilet: Standard  Bathroom Equipment: Shower chair       ADL Assistance: Independent  Homemaking Assistance: Independent  Ambulation Assistance: Independent  Transfer Assistance: Independent    Active : Yes     Additional Comments:  works, daughter plans to stay with pt to assist as needed.  IND and active prior, no use of an AD    VISION:WFL    HEARING:  WFL    COGNITION: WNL    RANGE OF MOTION:  Bilateral Upper Extremity:  WFL, Pt has severe RA with Decreased high-level IADLs  Prognosis: Good  REQUIRES OT FOLLOW-UP: Yes  Decision Making: Medium Complexity    Treatment Initiated: Treatment and education initiated within context of evaluation. Evaluation time included review of current medical information, gathering information related to past medical, social and functional history, completion of standardized testing, formal and informal observation of tasks, assessment of data and development of plan of care and goals. Treatment time included skilled education and facilitation of tasks to increase safety and independence with ADL's for improved functional independence and quality of life. Discharge Recommendations:  Home with assist PRN, Home with Home health OT    Patient Education:     Patient Education  Education Given To: Patient, Family  Education Provided: Role of Therapy, Plan of Care, ADL Adaptive Strategies  Education Method: Demonstration, Verbal  Barriers to Learning: None  Education Outcome: Verbalized understanding, Demonstrated understanding    Equipment Recommendations:  none       Plan:  Times Per Week: 6x  Times Per Day: Once a day  Current Treatment Recommendations: Strengthening, Balance training, Functional mobility training, Endurance training, Neuromuscular re-education, Safety education & training, Return to work related activity, Equipment evaluation, education, & procurement. See long-term goal time frame for expected duration of plan of care. If no long-term goals established, a short length of stay is anticipated. Goals:  Patient goals : To return home with   Short Term Goals  Time Frame for Short Term Goals: until discharge  Short Term Goal 1: Pt will navigate to/from bathroom and HH distances with MI to improve safe access to ADLs. Short Term Goal 2: Pt will perform UB/LB dressing/bathing with MI utilizing LHAE as needed to improve indep with self care routines.   Short Term Goal 3: Pt will complete item retrieval

## 2023-09-29 NOTE — PROGRESS NOTES
Patient transported via wheelchair to private vehicle with all personal belongings including but not limited to filled rxs for percocet, flexeril, and asa.

## 2024-06-20 ENCOUNTER — HOSPITAL ENCOUNTER (EMERGENCY)
Age: 59
Discharge: HOME | End: 2024-06-20
Payer: COMMERCIAL

## 2024-06-20 ENCOUNTER — APPOINTMENT (OUTPATIENT)
Dept: CT IMAGING | Age: 59
End: 2024-06-20
Payer: COMMERCIAL

## 2024-06-20 VITALS
TEMPERATURE: 98.2 F | HEIGHT: 60 IN | BODY MASS INDEX: 17.67 KG/M2 | DIASTOLIC BLOOD PRESSURE: 90 MMHG | RESPIRATION RATE: 18 BRPM | OXYGEN SATURATION: 100 % | SYSTOLIC BLOOD PRESSURE: 133 MMHG | HEART RATE: 83 BPM | WEIGHT: 90 LBS

## 2024-06-20 DIAGNOSIS — K57.92 DIVERTICULITIS: Primary | ICD-10-CM

## 2024-06-20 LAB
ALBUMIN SERPL BCG-MCNC: 4 G/DL (ref 3.5–5.1)
ALP SERPL-CCNC: 88 U/L (ref 38–126)
ALT SERPL W/O P-5'-P-CCNC: 12 U/L (ref 11–66)
ANION GAP SERPL CALC-SCNC: 12 MEQ/L (ref 8–16)
AST SERPL-CCNC: 17 U/L (ref 5–40)
BASOPHILS ABSOLUTE: 0 THOU/MM3 (ref 0–0.1)
BASOPHILS NFR BLD AUTO: 0.4 %
BILIRUB CONJ SERPL-MCNC: 0.2 MG/DL (ref 0.1–13.8)
BILIRUB SERPL-MCNC: 0.4 MG/DL (ref 0.3–1.2)
BILIRUB UR QL STRIP.AUTO: NEGATIVE
BUN SERPL-MCNC: 9 MG/DL (ref 7–22)
CALCIUM SERPL-MCNC: 9.6 MG/DL (ref 8.5–10.5)
CHARACTER UR: CLEAR
CHLORIDE SERPL-SCNC: 101 MEQ/L (ref 98–111)
CO2 SERPL-SCNC: 24 MEQ/L (ref 23–33)
COLOR: YELLOW
CREAT SERPL-MCNC: 0.4 MG/DL (ref 0.4–1.2)
DEPRECATED RDW RBC AUTO: 45 FL (ref 35–45)
EOSINOPHIL NFR BLD AUTO: 1 %
EOSINOPHILS ABSOLUTE: 0.1 THOU/MM3 (ref 0–0.4)
ERYTHROCYTE [DISTWIDTH] IN BLOOD BY AUTOMATED COUNT: 12.4 % (ref 11.5–14.5)
GFR SERPL CREATININE-BSD FRML MDRD: > 90 ML/MIN/1.73M2
GLUCOSE SERPL-MCNC: 116 MG/DL (ref 70–108)
GLUCOSE UR QL STRIP.AUTO: NEGATIVE MG/DL
HCT VFR BLD AUTO: 47.3 % (ref 37–47)
HGB BLD-MCNC: 15.6 GM/DL (ref 12–16)
HGB UR QL STRIP.AUTO: NEGATIVE
IMM GRANULOCYTES # BLD AUTO: 0.03 THOU/MM3 (ref 0–0.07)
IMM GRANULOCYTES NFR BLD AUTO: 0.3 %
KETONES UR QL STRIP.AUTO: NEGATIVE
LIPASE SERPL-CCNC: 65.2 U/L (ref 5.6–51.3)
LYMPHOCYTES ABSOLUTE: 1.7 THOU/MM3 (ref 1–4.8)
LYMPHOCYTES NFR BLD AUTO: 15 %
MAGNESIUM SERPL-MCNC: 2.1 MG/DL (ref 1.6–2.4)
MCH RBC QN AUTO: 32.6 PG (ref 26–33)
MCHC RBC AUTO-ENTMCNC: 33 GM/DL (ref 32.2–35.5)
MCV RBC AUTO: 98.7 FL (ref 81–99)
MONOCYTES ABSOLUTE: 0.8 THOU/MM3 (ref 0.4–1.3)
MONOCYTES NFR BLD AUTO: 6.7 %
NEUTROPHILS ABSOLUTE: 8.7 THOU/MM3 (ref 1.8–7.7)
NEUTROPHILS NFR BLD AUTO: 76.6 %
NITRITE UR QL STRIP: NEGATIVE
NRBC BLD AUTO-RTO: 0 /100 WBC
OSMOLALITY SERPL CALC.SUM OF ELEC: 273.5 MOSMOL/KG (ref 275–300)
PH UR STRIP.AUTO: 7 [PH] (ref 5–9)
PLATELET # BLD AUTO: 301 THOU/MM3 (ref 130–400)
PMV BLD AUTO: 10.5 FL (ref 9.4–12.4)
POTASSIUM SERPL-SCNC: 3.5 MEQ/L (ref 3.5–5.2)
PROT SERPL-MCNC: 7 G/DL (ref 6.1–8)
PROT UR STRIP.AUTO-MCNC: NEGATIVE MG/DL
RBC # BLD AUTO: 4.79 MILL/MM3 (ref 4.2–5.4)
SODIUM SERPL-SCNC: 137 MEQ/L (ref 135–145)
SP GR UR REFRACT.AUTO: < 1.005 (ref 1–1.03)
UROBILINOGEN, URINE: 0.2 EU/DL (ref 0–1)
WBC # BLD AUTO: 11.4 THOU/MM3 (ref 4.8–10.8)
WBC #/AREA URNS HPF: NEGATIVE /[HPF]

## 2024-06-20 PROCEDURE — 85025 COMPLETE CBC W/AUTO DIFF WBC: CPT

## 2024-06-20 PROCEDURE — 96375 TX/PRO/DX INJ NEW DRUG ADDON: CPT

## 2024-06-20 PROCEDURE — 74177 CT ABD & PELVIS W/CONTRAST: CPT

## 2024-06-20 PROCEDURE — 82248 BILIRUBIN DIRECT: CPT

## 2024-06-20 PROCEDURE — 2580000003 HC RX 258: Performed by: PHYSICIAN ASSISTANT

## 2024-06-20 PROCEDURE — 81003 URINALYSIS AUTO W/O SCOPE: CPT

## 2024-06-20 PROCEDURE — 99285 EMERGENCY DEPT VISIT HI MDM: CPT

## 2024-06-20 PROCEDURE — 80053 COMPREHEN METABOLIC PANEL: CPT

## 2024-06-20 PROCEDURE — 83735 ASSAY OF MAGNESIUM: CPT

## 2024-06-20 PROCEDURE — 6360000004 HC RX CONTRAST MEDICATION: Performed by: PHYSICIAN ASSISTANT

## 2024-06-20 PROCEDURE — 83690 ASSAY OF LIPASE: CPT

## 2024-06-20 PROCEDURE — 6360000002 HC RX W HCPCS: Performed by: PHYSICIAN ASSISTANT

## 2024-06-20 PROCEDURE — 6370000000 HC RX 637 (ALT 250 FOR IP): Performed by: EMERGENCY MEDICINE

## 2024-06-20 PROCEDURE — 36415 COLL VENOUS BLD VENIPUNCTURE: CPT

## 2024-06-20 PROCEDURE — 96365 THER/PROPH/DIAG IV INF INIT: CPT

## 2024-06-20 RX ORDER — OXYCODONE HYDROCHLORIDE AND ACETAMINOPHEN 5; 325 MG/1; MG/1
1 TABLET ORAL EVERY 6 HOURS PRN
Qty: 12 TABLET | Refills: 0 | Status: SHIPPED | OUTPATIENT
Start: 2024-06-20 | End: 2024-06-23

## 2024-06-20 RX ORDER — MORPHINE SULFATE 4 MG/ML
4 INJECTION, SOLUTION INTRAMUSCULAR; INTRAVENOUS ONCE
Status: COMPLETED | OUTPATIENT
Start: 2024-06-20 | End: 2024-06-20

## 2024-06-20 RX ORDER — ONDANSETRON 2 MG/ML
4 INJECTION INTRAMUSCULAR; INTRAVENOUS ONCE
Status: COMPLETED | OUTPATIENT
Start: 2024-06-20 | End: 2024-06-20

## 2024-06-20 RX ORDER — OXYCODONE HYDROCHLORIDE AND ACETAMINOPHEN 5; 325 MG/1; MG/1
1 TABLET ORAL ONCE
Status: COMPLETED | OUTPATIENT
Start: 2024-06-20 | End: 2024-06-20

## 2024-06-20 RX ORDER — 0.9 % SODIUM CHLORIDE 0.9 %
1000 INTRAVENOUS SOLUTION INTRAVENOUS ONCE
Status: COMPLETED | OUTPATIENT
Start: 2024-06-20 | End: 2024-06-20

## 2024-06-20 RX ORDER — AMOXICILLIN AND CLAVULANATE POTASSIUM 875; 125 MG/1; MG/1
1 TABLET, FILM COATED ORAL 2 TIMES DAILY
Qty: 20 TABLET | Refills: 0 | Status: SHIPPED | OUTPATIENT
Start: 2024-06-20 | End: 2024-06-30

## 2024-06-20 RX ADMIN — IOPAMIDOL 80 ML: 755 INJECTION, SOLUTION INTRAVENOUS at 20:08

## 2024-06-20 RX ADMIN — SODIUM CHLORIDE 3000 MG: 900 INJECTION INTRAVENOUS at 20:54

## 2024-06-20 RX ADMIN — MORPHINE SULFATE 4 MG: 4 INJECTION, SOLUTION INTRAMUSCULAR; INTRAVENOUS at 18:51

## 2024-06-20 RX ADMIN — ONDANSETRON 4 MG: 2 INJECTION INTRAMUSCULAR; INTRAVENOUS at 18:51

## 2024-06-20 RX ADMIN — SODIUM CHLORIDE 1000 ML: 9 INJECTION, SOLUTION INTRAVENOUS at 18:50

## 2024-06-20 RX ADMIN — OXYCODONE HYDROCHLORIDE AND ACETAMINOPHEN 1 TABLET: 5; 325 TABLET ORAL at 21:52

## 2024-06-20 ASSESSMENT — PAIN DESCRIPTION - ORIENTATION
ORIENTATION: LEFT;LOWER
ORIENTATION: LEFT;LOWER
ORIENTATION: LOWER;LEFT
ORIENTATION: LEFT;LOWER

## 2024-06-20 ASSESSMENT — PAIN - FUNCTIONAL ASSESSMENT
PAIN_FUNCTIONAL_ASSESSMENT: 0-10

## 2024-06-20 ASSESSMENT — PAIN DESCRIPTION - LOCATION
LOCATION: ABDOMEN

## 2024-06-20 ASSESSMENT — PAIN SCALES - GENERAL
PAINLEVEL_OUTOF10: 3
PAINLEVEL_OUTOF10: 7
PAINLEVEL_OUTOF10: 6
PAINLEVEL_OUTOF10: 5

## 2024-06-20 NOTE — ED PROVIDER NOTES
[facility] EMERGENCY DEPT      EMERGENCY MEDICINE     Pt Name: Sera Breaux  MRN: [id number]  Birthdate 1965  Date of evaluation: 6/20/2024  Provider: BASSAM Barboza    CHIEF COMPLAINT       Chief Complaint   Patient presents with    Diverticulitis     HISTORY OF PRESENT ILLNESS   Sera Breaux is a pleasant 59 y.o. female who presents to the emergency department from from home, by private vehicle for evaluation of complains of left lower quadrant pain that has been going on since 2 days ago.  She recently had a flare up when she seen her oncologist was put on Augmentin and finished it on Tuesday.  She has a history diverticulitis and this feels similar to her she has had no fevers or chills no nausea or vomiting.  Her pain is worse with having bowel movements..        PASTMEDICAL HISTORY     Past Medical History:   Diagnosis Date    TRINI (acute kidney injury) ([facility]) 12/08/2012    Arthritis     Rheumatoid    Hx of skin cancer, basal cell     Malignant hyperthermia     daughters    Melanoma ([facility]) 2018, 2021    left shin 2018, left lower back 2021 with mets to left axillary lymph node    RA (rheumatoid arthritis) ([facility])        Patient Active Problem List   Diagnosis Code    TRINI (acute kidney injury) ([facility]) N17.9    Hip fx, left, sequela S72.002S    Diarrhea R19.7    Acute diverticulitis K57.92    Acute injury of right knee cartilage, initial encounter S89.91XA    Localized osteoarthritis of right knee M17.11    H/O total knee replacement, left Z96.652    Osteoarthritis of left knee, unspecified osteoarthritis type M17.12     SURGICAL HISTORY       Past Surgical History:   Procedure Laterality Date    ARTHRODESIS Right 05/23/2019    RIGHT 1ST MPJ FUSION, METATARSAL HEAD RESECTION 2-5, TOES 2-5 ARTHROPLASTY/ARTHRODESIS & PLANTAR SKIN FLAP performed by Adal Wagner DPM at [facility] OR    ARTHRODESIS Left 09/05/2019    LEFT 1ST MPJ FUSION, METATARSAL HEADS 2-5 RESECTION, ARTHRODESIS 2-4 TOES, ARTHROPLASTY 5TH TOE ALL ON

## 2024-06-20 NOTE — ED TRIAGE NOTES
Pt presents to the ER with c/o diverticulitis flare. Pt states it started a few weeks ago and she was given Augmentin by her oncologist. She finished the abx and states it started to flare up again last night. Pt denies emesis or fevers. Pt is alert and oriented, respirations even and unlabored. VSS

## 2024-06-20 NOTE — ED NOTES
Pt is resting in bed with no requests at this time, family at bedside. Pt is breathing regular and unlabored on room air. Pt has no signs of distress to note other than pain. Call light within reach.

## 2024-06-21 ENCOUNTER — HOSPITAL ENCOUNTER (EMERGENCY)
Age: 59
Discharge: HOME | End: 2024-06-22
Attending: EMERGENCY MEDICINE
Payer: COMMERCIAL

## 2024-06-21 ENCOUNTER — APPOINTMENT (OUTPATIENT)
Dept: CT IMAGING | Age: 59
End: 2024-06-21
Payer: COMMERCIAL

## 2024-06-21 VITALS
HEART RATE: 91 BPM | DIASTOLIC BLOOD PRESSURE: 78 MMHG | SYSTOLIC BLOOD PRESSURE: 164 MMHG | TEMPERATURE: 98.7 F | OXYGEN SATURATION: 99 % | RESPIRATION RATE: 22 BRPM

## 2024-06-21 DIAGNOSIS — K57.32 DIVERTICULITIS OF COLON: Primary | ICD-10-CM

## 2024-06-21 LAB
ALBUMIN SERPL BCG-MCNC: 3.7 G/DL (ref 3.5–5.1)
ALP SERPL-CCNC: 78 U/L (ref 38–126)
ALT SERPL W/O P-5'-P-CCNC: 10 U/L (ref 11–66)
ANION GAP SERPL CALC-SCNC: 14 MEQ/L (ref 8–16)
AST SERPL-CCNC: 16 U/L (ref 5–40)
BASOPHILS ABSOLUTE: 0 THOU/MM3 (ref 0–0.1)
BASOPHILS NFR BLD AUTO: 0.4 %
BILIRUB CONJ SERPL-MCNC: 0.2 MG/DL (ref 0.1–13.8)
BILIRUB SERPL-MCNC: 0.5 MG/DL (ref 0.3–1.2)
BUN SERPL-MCNC: 9 MG/DL (ref 7–22)
CALCIUM SERPL-MCNC: 9.4 MG/DL (ref 8.5–10.5)
CHLORIDE SERPL-SCNC: 103 MEQ/L (ref 98–111)
CO2 SERPL-SCNC: 23 MEQ/L (ref 23–33)
CREAT SERPL-MCNC: 0.3 MG/DL (ref 0.4–1.2)
DEPRECATED RDW RBC AUTO: 44.6 FL (ref 35–45)
EOSINOPHIL NFR BLD AUTO: 0.9 %
EOSINOPHILS ABSOLUTE: 0.1 THOU/MM3 (ref 0–0.4)
ERYTHROCYTE [DISTWIDTH] IN BLOOD BY AUTOMATED COUNT: 12.4 % (ref 11.5–14.5)
GFR SERPL CREATININE-BSD FRML MDRD: > 90 ML/MIN/1.73M2
GLUCOSE SERPL-MCNC: 91 MG/DL (ref 70–108)
HCT VFR BLD AUTO: 44 % (ref 37–47)
HGB BLD-MCNC: 14.8 GM/DL (ref 12–16)
IMM GRANULOCYTES # BLD AUTO: 0.04 THOU/MM3 (ref 0–0.07)
IMM GRANULOCYTES NFR BLD AUTO: 0.3 %
LACTATE SERPL-SCNC: 0.8 MMOL/L (ref 0.5–2)
LIPASE SERPL-CCNC: 35.6 U/L (ref 5.6–51.3)
LYMPHOCYTES ABSOLUTE: 1.4 THOU/MM3 (ref 1–4.8)
LYMPHOCYTES NFR BLD AUTO: 11.8 %
MCH RBC QN AUTO: 33.2 PG (ref 26–33)
MCHC RBC AUTO-ENTMCNC: 33.6 GM/DL (ref 32.2–35.5)
MCV RBC AUTO: 98.7 FL (ref 81–99)
MONOCYTES ABSOLUTE: 0.9 THOU/MM3 (ref 0.4–1.3)
MONOCYTES NFR BLD AUTO: 7.5 %
NEUTROPHILS ABSOLUTE: 9.3 THOU/MM3 (ref 1.8–7.7)
NEUTROPHILS NFR BLD AUTO: 79.1 %
NRBC BLD AUTO-RTO: 0 /100 WBC
OSMOLALITY SERPL CALC.SUM OF ELEC: 277.7 MOSMOL/KG (ref 275–300)
PLATELET # BLD AUTO: 295 THOU/MM3 (ref 130–400)
PMV BLD AUTO: 10.8 FL (ref 9.4–12.4)
POTASSIUM SERPL-SCNC: 3.7 MEQ/L (ref 3.5–5.2)
PROT SERPL-MCNC: 6.8 G/DL (ref 6.1–8)
RBC # BLD AUTO: 4.46 MILL/MM3 (ref 4.2–5.4)
SODIUM SERPL-SCNC: 140 MEQ/L (ref 135–145)
WBC # BLD AUTO: 11.7 THOU/MM3 (ref 4.8–10.8)

## 2024-06-21 PROCEDURE — 74177 CT ABD & PELVIS W/CONTRAST: CPT

## 2024-06-21 PROCEDURE — 85025 COMPLETE CBC W/AUTO DIFF WBC: CPT

## 2024-06-21 PROCEDURE — 93005 ELECTROCARDIOGRAM TRACING: CPT | Performed by: EMERGENCY MEDICINE

## 2024-06-21 PROCEDURE — 81003 URINALYSIS AUTO W/O SCOPE: CPT

## 2024-06-21 PROCEDURE — 6360000002 HC RX W HCPCS

## 2024-06-21 PROCEDURE — 82248 BILIRUBIN DIRECT: CPT

## 2024-06-21 PROCEDURE — 83690 ASSAY OF LIPASE: CPT

## 2024-06-21 PROCEDURE — 96375 TX/PRO/DX INJ NEW DRUG ADDON: CPT

## 2024-06-21 PROCEDURE — 6360000004 HC RX CONTRAST MEDICATION: Performed by: EMERGENCY MEDICINE

## 2024-06-21 PROCEDURE — 36415 COLL VENOUS BLD VENIPUNCTURE: CPT

## 2024-06-21 PROCEDURE — 96376 TX/PRO/DX INJ SAME DRUG ADON: CPT

## 2024-06-21 PROCEDURE — 96374 THER/PROPH/DIAG INJ IV PUSH: CPT

## 2024-06-21 PROCEDURE — 83605 ASSAY OF LACTIC ACID: CPT

## 2024-06-21 PROCEDURE — 80053 COMPREHEN METABOLIC PANEL: CPT

## 2024-06-21 PROCEDURE — 99285 EMERGENCY DEPT VISIT HI MDM: CPT

## 2024-06-21 PROCEDURE — 2580000003 HC RX 258

## 2024-06-21 RX ORDER — ONDANSETRON 2 MG/ML
4 INJECTION INTRAMUSCULAR; INTRAVENOUS ONCE
Status: COMPLETED | OUTPATIENT
Start: 2024-06-21 | End: 2024-06-21

## 2024-06-21 RX ORDER — 0.9 % SODIUM CHLORIDE 0.9 %
1000 INTRAVENOUS SOLUTION INTRAVENOUS ONCE
Status: COMPLETED | OUTPATIENT
Start: 2024-06-21 | End: 2024-06-22

## 2024-06-21 RX ADMIN — SODIUM CHLORIDE 1000 ML: 9 INJECTION, SOLUTION INTRAVENOUS at 21:40

## 2024-06-21 RX ADMIN — ONDANSETRON 4 MG: 2 INJECTION INTRAMUSCULAR; INTRAVENOUS at 21:38

## 2024-06-21 RX ADMIN — IOPAMIDOL 80 ML: 755 INJECTION, SOLUTION INTRAVENOUS at 22:16

## 2024-06-22 LAB
BILIRUB UR QL STRIP.AUTO: NEGATIVE
CHARACTER UR: CLEAR
COLOR: YELLOW
EKG ATRIAL RATE: 75 BPM
EKG P AXIS: 73 DEGREES
EKG P-R INTERVAL: 152 MS
EKG Q-T INTERVAL: 376 MS
EKG QRS DURATION: 88 MS
EKG QTC CALCULATION (BAZETT): 419 MS
EKG R AXIS: -41 DEGREES
EKG T AXIS: 61 DEGREES
EKG VENTRICULAR RATE: 75 BPM
GLUCOSE UR QL STRIP.AUTO: NEGATIVE MG/DL
HGB UR QL STRIP.AUTO: NEGATIVE
KETONES UR QL STRIP.AUTO: 40
NITRITE UR QL STRIP: NEGATIVE
PH UR STRIP.AUTO: 7 [PH] (ref 5–9)
PROT UR STRIP.AUTO-MCNC: NEGATIVE MG/DL
SP GR UR REFRACT.AUTO: > 1.03 (ref 1–1.03)
UROBILINOGEN, URINE: 0.2 EU/DL (ref 0–1)
WBC #/AREA URNS HPF: NEGATIVE /[HPF]

## 2024-06-22 PROCEDURE — 6360000002 HC RX W HCPCS

## 2024-06-22 PROCEDURE — 93010 ELECTROCARDIOGRAM REPORT: CPT | Performed by: INTERNAL MEDICINE

## 2024-06-22 PROCEDURE — 96376 TX/PRO/DX INJ SAME DRUG ADON: CPT

## 2024-06-22 PROCEDURE — 96375 TX/PRO/DX INJ NEW DRUG ADDON: CPT

## 2024-06-22 PROCEDURE — 6360000002 HC RX W HCPCS: Performed by: EMERGENCY MEDICINE

## 2024-06-22 RX ORDER — KETOROLAC TROMETHAMINE 30 MG/ML
15 INJECTION, SOLUTION INTRAMUSCULAR; INTRAVENOUS ONCE
Status: COMPLETED | OUTPATIENT
Start: 2024-06-22 | End: 2024-06-22

## 2024-06-22 RX ORDER — ONDANSETRON 2 MG/ML
4 INJECTION INTRAMUSCULAR; INTRAVENOUS ONCE
Status: COMPLETED | OUTPATIENT
Start: 2024-06-22 | End: 2024-06-22

## 2024-06-22 RX ADMIN — ONDANSETRON 4 MG: 2 INJECTION INTRAMUSCULAR; INTRAVENOUS at 01:06

## 2024-06-22 RX ADMIN — KETOROLAC TROMETHAMINE 15 MG: 30 INJECTION, SOLUTION INTRAMUSCULAR at 00:39

## 2024-06-22 ASSESSMENT — PAIN SCALES - GENERAL: PAINLEVEL_OUTOF10: 7

## 2024-06-22 NOTE — ED TRIAGE NOTES
Patient presents to the Emergency Department via self w/ significant other. Patient presents with a complaint of abd pain w/ nausea and headache. Patient states that she has had a hard time keeping anything down w/ emesis noted today. IV inserted. Patient is alert and oriented x4, patient does not appear in acute distress upon triage. Patient respirations are regular and unlabored with even rise and fall of chest. Patient family at the bedside. Call light within reach.

## 2024-06-22 NOTE — ED PROVIDER NOTES
[facility] EMERGENCY DEPT  EMERGENCY DEPARTMENT ENCOUNTER          Pt Name: Sera Breaux  MRN: [id number]  Birthdate 1965  Date of evaluation: 6/21/2024  Physician: Erasto Church MD  Supervising Attending Physician: Sonia Still MD       CHIEF COMPLAINT       Chief Complaint   Patient presents with    Abdominal Pain    Nausea    Headache         HISTORY OF PRESENT ILLNESS    HPI  Sera Breaux is a 59 y.o. female who presents to the emergency department from home, as a walk in to the ED lobby for evaluation of abdominal pain.  Patient reports that a couple weeks ago she began to have left lower quadrant abdominal pain.  She reports that she does have history of diverticulitis.  She reports that she was started on Augmentin by her oncologist.  Patient reports that she then began having the pain yesterday afternoon.  Patient reports that she came to the ED at that time was given some pain medication and started on Augmentin.  Patient reports that the antibiotics usually help with her pain however she is continue pain.  She also reports that she began to get nauseous and has vomited today.  Patient reports that she has not been able to keep much down.  She denies any fever or chills.  She reports that she has not yet had a bowel movement today.  She denies any chest pain shortness of breath.  The patient has no other acute complaints at this time.            PAST MEDICAL AND SURGICAL HISTORY     Past Medical History:   Diagnosis Date    TRINI (acute kidney injury) ([facility]) 12/08/2012    Arthritis     Rheumatoid    Hx of skin cancer, basal cell     Malignant hyperthermia     daughters    Melanoma ([facility]) 2018, 2021    left shin 2018, left lower back 2021 with mets to left axillary lymph node    RA (rheumatoid arthritis) ([facility])      Past Surgical History:   Procedure Laterality Date    ARTHRODESIS Right 05/23/2019    RIGHT 1ST MPJ FUSION, METATARSAL HEAD RESECTION 2-5, TOES 2-5

## 2024-07-08 ENCOUNTER — HOSPITAL ENCOUNTER (INPATIENT)
Age: 59
DRG: 329 | End: 2024-07-08
Attending: STUDENT IN AN ORGANIZED HEALTH CARE EDUCATION/TRAINING PROGRAM
Payer: COMMERCIAL

## 2024-07-08 ENCOUNTER — APPOINTMENT (OUTPATIENT)
Dept: CT IMAGING | Age: 59
DRG: 329 | End: 2024-07-08
Payer: COMMERCIAL

## 2024-07-08 DIAGNOSIS — K57.92 DIVERTICULITIS: ICD-10-CM

## 2024-07-08 DIAGNOSIS — K57.32 DIVERTICULITIS OF COLON: Primary | ICD-10-CM

## 2024-07-08 LAB
ALBUMIN SERPL BCG-MCNC: 3.9 G/DL (ref 3.5–5.1)
ALP SERPL-CCNC: 87 U/L (ref 38–126)
ALT SERPL W/O P-5'-P-CCNC: 9 U/L (ref 11–66)
ANION GAP SERPL CALC-SCNC: 11 MEQ/L (ref 8–16)
AST SERPL-CCNC: 15 U/L (ref 5–40)
BASOPHILS ABSOLUTE: 0.1 THOU/MM3 (ref 0–0.1)
BASOPHILS NFR BLD AUTO: 0.6 %
BILIRUB CONJ SERPL-MCNC: < 0.1 MG/DL (ref 0.1–13.8)
BILIRUB SERPL-MCNC: 0.3 MG/DL (ref 0.3–1.2)
BILIRUB UR QL STRIP.AUTO: NEGATIVE
BUN SERPL-MCNC: 18 MG/DL (ref 7–22)
CALCIUM SERPL-MCNC: 8.9 MG/DL (ref 8.5–10.5)
CHARACTER UR: CLEAR
CHLORIDE SERPL-SCNC: 101 MEQ/L (ref 98–111)
CO2 SERPL-SCNC: 25 MEQ/L (ref 23–33)
COLOR, UA: YELLOW
CREAT SERPL-MCNC: 0.4 MG/DL (ref 0.4–1.2)
DEPRECATED RDW RBC AUTO: 46.6 FL (ref 35–45)
EOSINOPHIL NFR BLD AUTO: 0.3 %
EOSINOPHILS ABSOLUTE: 0 THOU/MM3 (ref 0–0.4)
ERYTHROCYTE [DISTWIDTH] IN BLOOD BY AUTOMATED COUNT: 12.8 % (ref 11.5–14.5)
GFR SERPL CREATININE-BSD FRML MDRD: > 90 ML/MIN/1.73M2
GLUCOSE SERPL-MCNC: 139 MG/DL (ref 70–108)
GLUCOSE UR QL STRIP.AUTO: NEGATIVE MG/DL
HCT VFR BLD AUTO: 42.7 % (ref 37–47)
HGB BLD-MCNC: 14.2 GM/DL (ref 12–16)
HGB UR QL STRIP.AUTO: NEGATIVE
IMM GRANULOCYTES # BLD AUTO: 0.04 THOU/MM3 (ref 0–0.07)
IMM GRANULOCYTES NFR BLD AUTO: 0.3 %
KETONES UR QL STRIP.AUTO: NEGATIVE
LIPASE SERPL-CCNC: 41 U/L (ref 5.6–51.3)
LYMPHOCYTES ABSOLUTE: 1.3 THOU/MM3 (ref 1–4.8)
LYMPHOCYTES NFR BLD AUTO: 10.7 %
MCH RBC QN AUTO: 33 PG (ref 26–33)
MCHC RBC AUTO-ENTMCNC: 33.3 GM/DL (ref 32.2–35.5)
MCV RBC AUTO: 99.3 FL (ref 81–99)
MONOCYTES ABSOLUTE: 0.9 THOU/MM3 (ref 0.4–1.3)
MONOCYTES NFR BLD AUTO: 7.7 %
NEUTROPHILS ABSOLUTE: 9.6 THOU/MM3 (ref 1.8–7.7)
NEUTROPHILS NFR BLD AUTO: 80.4 %
NITRITE UR QL STRIP: NEGATIVE
NRBC BLD AUTO-RTO: 0 /100 WBC
OSMOLALITY SERPL CALC.SUM OF ELEC: 278 MOSMOL/KG (ref 275–300)
PH UR STRIP.AUTO: 7 [PH] (ref 5–9)
PLATELET # BLD AUTO: 329 THOU/MM3 (ref 130–400)
PMV BLD AUTO: 10.8 FL (ref 9.4–12.4)
POTASSIUM SERPL-SCNC: 3.8 MEQ/L (ref 3.5–5.2)
PROT SERPL-MCNC: 6.7 G/DL (ref 6.1–8)
PROT UR STRIP.AUTO-MCNC: NEGATIVE MG/DL
RBC # BLD AUTO: 4.3 MILL/MM3 (ref 4.2–5.4)
SODIUM SERPL-SCNC: 137 MEQ/L (ref 135–145)
SP GR UR REFRACT.AUTO: < 1.005 (ref 1–1.03)
UROBILINOGEN, URINE: 0.2 EU/DL (ref 0–1)
WBC # BLD AUTO: 12 THOU/MM3 (ref 4.8–10.8)
WBC #/AREA URNS HPF: NEGATIVE /[HPF]

## 2024-07-08 PROCEDURE — 74177 CT ABD & PELVIS W/CONTRAST: CPT

## 2024-07-08 PROCEDURE — 96365 THER/PROPH/DIAG IV INF INIT: CPT

## 2024-07-08 PROCEDURE — 80053 COMPREHEN METABOLIC PANEL: CPT

## 2024-07-08 PROCEDURE — 1200000000 HC SEMI PRIVATE

## 2024-07-08 PROCEDURE — 99285 EMERGENCY DEPT VISIT HI MDM: CPT

## 2024-07-08 PROCEDURE — 96375 TX/PRO/DX INJ NEW DRUG ADDON: CPT

## 2024-07-08 PROCEDURE — 83690 ASSAY OF LIPASE: CPT

## 2024-07-08 PROCEDURE — 85025 COMPLETE CBC W/AUTO DIFF WBC: CPT

## 2024-07-08 PROCEDURE — 6360000004 HC RX CONTRAST MEDICATION: Performed by: NURSE PRACTITIONER

## 2024-07-08 PROCEDURE — 2580000003 HC RX 258: Performed by: NURSE PRACTITIONER

## 2024-07-08 PROCEDURE — 81003 URINALYSIS AUTO W/O SCOPE: CPT

## 2024-07-08 PROCEDURE — 99223 1ST HOSP IP/OBS HIGH 75: CPT | Performed by: PHYSICIAN ASSISTANT

## 2024-07-08 PROCEDURE — 6360000002 HC RX W HCPCS: Performed by: NURSE PRACTITIONER

## 2024-07-08 PROCEDURE — 82248 BILIRUBIN DIRECT: CPT

## 2024-07-08 PROCEDURE — 36415 COLL VENOUS BLD VENIPUNCTURE: CPT

## 2024-07-08 RX ORDER — ONDANSETRON 2 MG/ML
4 INJECTION INTRAMUSCULAR; INTRAVENOUS ONCE
Status: COMPLETED | OUTPATIENT
Start: 2024-07-08 | End: 2024-07-08

## 2024-07-08 RX ORDER — KETOROLAC TROMETHAMINE 30 MG/ML
15 INJECTION, SOLUTION INTRAMUSCULAR; INTRAVENOUS ONCE
Status: COMPLETED | OUTPATIENT
Start: 2024-07-08 | End: 2024-07-08

## 2024-07-08 RX ADMIN — KETOROLAC TROMETHAMINE 15 MG: 30 INJECTION, SOLUTION INTRAMUSCULAR at 22:34

## 2024-07-08 RX ADMIN — MEROPENEM 1000 MG: 1 INJECTION INTRAVENOUS at 23:58

## 2024-07-08 RX ADMIN — ONDANSETRON 4 MG: 2 INJECTION INTRAMUSCULAR; INTRAVENOUS at 22:34

## 2024-07-08 RX ADMIN — IOPAMIDOL 80 ML: 755 INJECTION, SOLUTION INTRAVENOUS at 21:58

## 2024-07-08 ASSESSMENT — PAIN DESCRIPTION - PAIN TYPE: TYPE: ACUTE PAIN

## 2024-07-08 ASSESSMENT — PAIN SCALES - GENERAL: PAINLEVEL_OUTOF10: 7

## 2024-07-08 ASSESSMENT — PAIN - FUNCTIONAL ASSESSMENT: PAIN_FUNCTIONAL_ASSESSMENT: 0-10

## 2024-07-08 ASSESSMENT — PAIN DESCRIPTION - DESCRIPTORS: DESCRIPTORS: PRESSURE

## 2024-07-08 ASSESSMENT — PAIN DESCRIPTION - FREQUENCY: FREQUENCY: CONTINUOUS

## 2024-07-08 ASSESSMENT — PAIN DESCRIPTION - LOCATION: LOCATION: ABDOMEN

## 2024-07-09 PROBLEM — K57.32 DIVERTICULITIS OF COLON: Status: ACTIVE | Noted: 2024-07-09

## 2024-07-09 LAB
ANION GAP SERPL CALC-SCNC: 9 MEQ/L (ref 8–16)
BASOPHILS ABSOLUTE: 0.1 THOU/MM3 (ref 0–0.1)
BASOPHILS NFR BLD AUTO: 0.6 %
BUN SERPL-MCNC: 12 MG/DL (ref 7–22)
CALCIUM SERPL-MCNC: 8.6 MG/DL (ref 8.5–10.5)
CHLORIDE SERPL-SCNC: 107 MEQ/L (ref 98–111)
CO2 SERPL-SCNC: 24 MEQ/L (ref 23–33)
CREAT SERPL-MCNC: 0.4 MG/DL (ref 0.4–1.2)
DEPRECATED RDW RBC AUTO: 46.4 FL (ref 35–45)
EOSINOPHIL NFR BLD AUTO: 0.9 %
EOSINOPHILS ABSOLUTE: 0.1 THOU/MM3 (ref 0–0.4)
ERYTHROCYTE [DISTWIDTH] IN BLOOD BY AUTOMATED COUNT: 12.8 % (ref 11.5–14.5)
GFR SERPL CREATININE-BSD FRML MDRD: > 90 ML/MIN/1.73M2
GLUCOSE SERPL-MCNC: 90 MG/DL (ref 70–108)
HCT VFR BLD AUTO: 38.5 % (ref 37–47)
HGB BLD-MCNC: 12.7 GM/DL (ref 12–16)
IMM GRANULOCYTES # BLD AUTO: 0.03 THOU/MM3 (ref 0–0.07)
IMM GRANULOCYTES NFR BLD AUTO: 0.3 %
LYMPHOCYTES ABSOLUTE: 1.8 THOU/MM3 (ref 1–4.8)
LYMPHOCYTES NFR BLD AUTO: 19 %
MCH RBC QN AUTO: 32.8 PG (ref 26–33)
MCHC RBC AUTO-ENTMCNC: 33 GM/DL (ref 32.2–35.5)
MCV RBC AUTO: 99.5 FL (ref 81–99)
MONOCYTES ABSOLUTE: 0.9 THOU/MM3 (ref 0.4–1.3)
MONOCYTES NFR BLD AUTO: 8.9 %
NEUTROPHILS ABSOLUTE: 6.7 THOU/MM3 (ref 1.8–7.7)
NEUTROPHILS NFR BLD AUTO: 70.3 %
NRBC BLD AUTO-RTO: 0 /100 WBC
OSMOLALITY SERPL CALC.SUM OF ELEC: 278.7 MOSMOL/KG (ref 275–300)
PLATELET # BLD AUTO: 281 THOU/MM3 (ref 130–400)
PMV BLD AUTO: 10.3 FL (ref 9.4–12.4)
POTASSIUM SERPL-SCNC: 4.3 MEQ/L (ref 3.5–5.2)
RBC # BLD AUTO: 3.87 MILL/MM3 (ref 4.2–5.4)
SODIUM SERPL-SCNC: 140 MEQ/L (ref 135–145)
WBC # BLD AUTO: 9.6 THOU/MM3 (ref 4.8–10.8)

## 2024-07-09 PROCEDURE — 2580000003 HC RX 258: Performed by: PHYSICIAN ASSISTANT

## 2024-07-09 PROCEDURE — 6370000000 HC RX 637 (ALT 250 FOR IP): Performed by: PHYSICIAN ASSISTANT

## 2024-07-09 PROCEDURE — 99223 1ST HOSP IP/OBS HIGH 75: CPT | Performed by: SURGERY

## 2024-07-09 PROCEDURE — 1200000000 HC SEMI PRIVATE

## 2024-07-09 PROCEDURE — 6360000002 HC RX W HCPCS: Performed by: PHYSICIAN ASSISTANT

## 2024-07-09 PROCEDURE — 85025 COMPLETE CBC W/AUTO DIFF WBC: CPT

## 2024-07-09 PROCEDURE — 6370000000 HC RX 637 (ALT 250 FOR IP)

## 2024-07-09 PROCEDURE — 80048 BASIC METABOLIC PNL TOTAL CA: CPT

## 2024-07-09 PROCEDURE — 36415 COLL VENOUS BLD VENIPUNCTURE: CPT

## 2024-07-09 PROCEDURE — 6360000002 HC RX W HCPCS

## 2024-07-09 PROCEDURE — 99232 SBSQ HOSP IP/OBS MODERATE 35: CPT

## 2024-07-09 RX ORDER — POTASSIUM CHLORIDE 20 MEQ/1
40 TABLET, EXTENDED RELEASE ORAL PRN
Status: ACTIVE | OUTPATIENT
Start: 2024-07-09

## 2024-07-09 RX ORDER — ONDANSETRON 4 MG/1
4 TABLET, ORALLY DISINTEGRATING ORAL EVERY 8 HOURS PRN
Status: ACTIVE | OUTPATIENT
Start: 2024-07-09

## 2024-07-09 RX ORDER — PREDNISONE 1 MG/1
2 TABLET ORAL EVERY MORNING
Status: DISPENSED | OUTPATIENT
Start: 2024-07-10

## 2024-07-09 RX ORDER — PREDNISONE 1 MG/1
3 TABLET ORAL EVERY EVENING
Status: DISPENSED | OUTPATIENT
Start: 2024-07-09

## 2024-07-09 RX ORDER — KETOROLAC TROMETHAMINE 30 MG/ML
15 INJECTION, SOLUTION INTRAMUSCULAR; INTRAVENOUS EVERY 6 HOURS PRN
Status: DISPENSED | OUTPATIENT
Start: 2024-07-09 | End: 2024-07-11

## 2024-07-09 RX ORDER — MAGNESIUM SULFATE IN WATER 40 MG/ML
2000 INJECTION, SOLUTION INTRAVENOUS PRN
Status: ACTIVE | OUTPATIENT
Start: 2024-07-09

## 2024-07-09 RX ORDER — ENOXAPARIN SODIUM 100 MG/ML
30 INJECTION SUBCUTANEOUS DAILY
Status: DISPENSED | OUTPATIENT
Start: 2024-07-09

## 2024-07-09 RX ORDER — ONDANSETRON 2 MG/ML
4 INJECTION INTRAMUSCULAR; INTRAVENOUS EVERY 6 HOURS PRN
Status: DISPENSED | OUTPATIENT
Start: 2024-07-09

## 2024-07-09 RX ORDER — SODIUM CHLORIDE 9 MG/ML
INJECTION, SOLUTION INTRAVENOUS CONTINUOUS
Status: ACTIVE | OUTPATIENT
Start: 2024-07-09 | End: 2024-07-11

## 2024-07-09 RX ORDER — SODIUM CHLORIDE 9 MG/ML
INJECTION, SOLUTION INTRAVENOUS PRN
Status: ACTIVE | OUTPATIENT
Start: 2024-07-09

## 2024-07-09 RX ORDER — LANOLIN ALCOHOL/MO/W.PET/CERES
3 CREAM (GRAM) TOPICAL NIGHTLY
Status: DISPENSED | OUTPATIENT
Start: 2024-07-09

## 2024-07-09 RX ORDER — PREDNISONE 1 MG/1
1 TABLET ORAL DAILY
Status: DISCONTINUED | OUTPATIENT
Start: 2024-07-09 | End: 2024-07-09 | Stop reason: DRUGHIGH

## 2024-07-09 RX ORDER — POLYETHYLENE GLYCOL 3350 17 G/17G
17 POWDER, FOR SOLUTION ORAL DAILY PRN
Status: DISPENSED | OUTPATIENT
Start: 2024-07-09

## 2024-07-09 RX ORDER — SODIUM CHLORIDE 0.9 % (FLUSH) 0.9 %
5-40 SYRINGE (ML) INJECTION EVERY 12 HOURS SCHEDULED
Status: ACTIVE | OUTPATIENT
Start: 2024-07-09

## 2024-07-09 RX ORDER — ACETAMINOPHEN 325 MG/1
650 TABLET ORAL EVERY 6 HOURS PRN
Status: DISPENSED | OUTPATIENT
Start: 2024-07-09

## 2024-07-09 RX ORDER — ACETAMINOPHEN 650 MG/1
650 SUPPOSITORY RECTAL EVERY 6 HOURS PRN
Status: ACTIVE | OUTPATIENT
Start: 2024-07-09

## 2024-07-09 RX ORDER — SODIUM CHLORIDE 0.9 % (FLUSH) 0.9 %
5-40 SYRINGE (ML) INJECTION PRN
Status: ACTIVE | OUTPATIENT
Start: 2024-07-09

## 2024-07-09 RX ORDER — POTASSIUM CHLORIDE 7.45 MG/ML
10 INJECTION INTRAVENOUS PRN
Status: ACTIVE | OUTPATIENT
Start: 2024-07-09

## 2024-07-09 RX ORDER — KETOROLAC TROMETHAMINE 30 MG/ML
15 INJECTION, SOLUTION INTRAMUSCULAR; INTRAVENOUS ONCE
Status: COMPLETED | OUTPATIENT
Start: 2024-07-09 | End: 2024-07-09

## 2024-07-09 RX ADMIN — SODIUM CHLORIDE, PRESERVATIVE FREE 10 ML: 5 INJECTION INTRAVENOUS at 14:45

## 2024-07-09 RX ADMIN — MEROPENEM 1000 MG: 1 INJECTION INTRAVENOUS at 23:49

## 2024-07-09 RX ADMIN — PREDNISONE 3 MG: 1 TABLET ORAL at 17:57

## 2024-07-09 RX ADMIN — KETOROLAC TROMETHAMINE 15 MG: 30 INJECTION, SOLUTION INTRAMUSCULAR at 05:25

## 2024-07-09 RX ADMIN — KETOROLAC TROMETHAMINE 15 MG: 30 INJECTION, SOLUTION INTRAMUSCULAR at 14:14

## 2024-07-09 RX ADMIN — POLYETHYLENE GLYCOL 3350 17 G: 17 POWDER, FOR SOLUTION ORAL at 14:48

## 2024-07-09 RX ADMIN — ACETAMINOPHEN 650 MG: 325 TABLET ORAL at 23:50

## 2024-07-09 RX ADMIN — SODIUM CHLORIDE: 9 INJECTION, SOLUTION INTRAVENOUS at 14:45

## 2024-07-09 RX ADMIN — SODIUM CHLORIDE: 9 INJECTION, SOLUTION INTRAVENOUS at 04:37

## 2024-07-09 RX ADMIN — Medication 3 MG: at 22:25

## 2024-07-09 RX ADMIN — MEROPENEM 1000 MG: 1 INJECTION INTRAVENOUS at 08:02

## 2024-07-09 RX ADMIN — MEROPENEM 1000 MG: 1 INJECTION INTRAVENOUS at 16:02

## 2024-07-09 ASSESSMENT — PAIN SCALES - GENERAL
PAINLEVEL_OUTOF10: 4
PAINLEVEL_OUTOF10: 7
PAINLEVEL_OUTOF10: 4

## 2024-07-09 ASSESSMENT — PAIN DESCRIPTION - ORIENTATION: ORIENTATION: LOWER

## 2024-07-09 ASSESSMENT — PAIN DESCRIPTION - LOCATION: LOCATION: ABDOMEN

## 2024-07-09 ASSESSMENT — LIFESTYLE VARIABLES: HOW OFTEN DO YOU HAVE A DRINK CONTAINING ALCOHOL: NEVER

## 2024-07-09 ASSESSMENT — ENCOUNTER SYMPTOMS
EYES NEGATIVE: 1
ALLERGIC/IMMUNOLOGIC NEGATIVE: 1
ABDOMINAL PAIN: 1
RESPIRATORY NEGATIVE: 1

## 2024-07-09 ASSESSMENT — PAIN DESCRIPTION - PAIN TYPE: TYPE: ACUTE PAIN

## 2024-07-09 ASSESSMENT — PAIN DESCRIPTION - FREQUENCY: FREQUENCY: CONTINUOUS

## 2024-07-09 ASSESSMENT — PAIN DESCRIPTION - DESCRIPTORS: DESCRIPTORS: PRESSURE

## 2024-07-09 NOTE — PLAN OF CARE
Problem: Pain  Goal: Verbalizes/displays adequate comfort level or baseline comfort level  Outcome: Progressing  Flowsheets (Taken 7/9/2024 1738)  Verbalizes/displays adequate comfort level or baseline comfort level:   Encourage patient to monitor pain and request assistance   Assess pain using appropriate pain scale   Administer analgesics based on type and severity of pain and evaluate response   Implement non-pharmacological measures as appropriate and evaluate response     Problem: Skin/Tissue Integrity - Adult  Goal: Skin integrity remains intact  Outcome: Progressing  Flowsheets (Taken 7/9/2024 1738)  Skin Integrity Remains Intact: Monitor for areas of redness and/or skin breakdown     Problem: Gastrointestinal - Adult  Goal: Minimal or absence of nausea and vomiting  Outcome: Progressing  Flowsheets (Taken 7/9/2024 1738)  Minimal or absence of nausea and vomiting: Administer IV fluids as ordered to ensure adequate hydration     Problem: Gastrointestinal - Adult  Goal: Maintains or returns to baseline bowel function  Outcome: Progressing  Flowsheets (Taken 7/9/2024 1738)  Maintains or returns to baseline bowel function:   Assess bowel function   Encourage oral fluids to ensure adequate hydration   Administer ordered medications as needed   Encourage mobilization and activity   Administer IV fluids as ordered to ensure adequate hydration     Problem: Gastrointestinal - Adult  Goal: Maintains adequate nutritional intake  Outcome: Progressing  Flowsheets (Taken 7/9/2024 1738)  Maintains adequate nutritional intake:   Monitor percentage of each meal consumed   Identify factors contributing to decreased intake, treat as appropriate     Problem: Discharge Planning  Goal: Discharge to home or other facility with appropriate resources  Outcome: Progressing  Flowsheets (Taken 7/9/2024 1738)  Discharge to home or other facility with appropriate resources:   Identify barriers to discharge with patient and caregiver    Arrange for needed discharge resources and transportation as appropriate   Identify discharge learning needs (meds, wound care, etc)     Problem: Safety - Adult  Goal: Free from fall injury  Outcome: Progressing  Flowsheets (Taken 7/9/2024 0548)  Free From Fall Injury: Instruct family/caregiver on patient safety   Care plan reviewed with patient.  Patient verbalizes understanding of the plan of care and contribute to goal setting.

## 2024-07-09 NOTE — ED TRIAGE NOTES
Patient ambulatory through the lobby with her  with complaint of diverticulitis. Patient reports abdominal pain and loose mucus like stools. Patient reports recent antibiotics due to diverticulitis, states she finished Augmentin on 6/30.  VSS

## 2024-07-09 NOTE — ED NOTES
Patient medicated per MAR. Patient resting in bed. Respirations easy and unlabored. No distress noted. Call light within reach.

## 2024-07-09 NOTE — PROGRESS NOTES
Pt admitted to  5K8 via from ED   Complaints: None.    IV forearm right, condition patent and no redness. IV site free of s/s of infection or infiltration. Vital signs obtained. Assessment and data collection initiated. Two nurse skin assessment performed by Jeovanny LUCIA and Renate RN. Oriented to room. Policies and procedures for  explained. All questions answered with no further questions at this time. Fall prevention and safety brochure discussed with patient. Call light in reach.Oriented to room.   GABBY OCASIO RN, RN 7/9/2024   Explained patients right to have family, representative or physician notified of their admission.  Patient has Declined for physician to be notified.  Patient has Declined for family/representative to be notified.

## 2024-07-09 NOTE — PROGRESS NOTES
Hospitalist Progress Note      Patient:  Sera Breaux 59 y.o. female       : 1965  Unit/Bed:33/033A    Date of Admission: 2024      ASSESSMENT AND PLAN    Active Problems  Complicated Diverticulitis: CT abdomen pelvis demonstrated uncomplicated, recurrent/progressive sigmoid diverticulitis  Per patient, has been on Augmentin for the past month with no improvement.  Initiated on Merrem for broad-spectrum coverage  General surgery consulted-okay for clear liquid diet, Dr. Giordano to assume care tomorrow as he is seen patient with past  Migraines  Takes triptan as needed    Resolved Problems      Chronic Conditions (reviewed, stable, and home medications resumed, unless otherwise stated)  Malignant Melanoma of Skin of L Leg: completed adjuvant Opdivo, follows with oncology for surveillance  RA: Takes prednisone daily-2 mg in the a.m. and 3 mg in the p.m.      LDA: []CVC / []PICC / []Midline / []Ga / []Drains / []Mediport / [x]None  Antibiotics: Merrem  Steroids: None  Labs (still needed?): [x]Yes / []No  IVF (still needed?): [x]Yes / []No    Level of care: []Step Down / [x]Med-Surg  Bed Status: [x]Inpatient / []Observation  Telemetry: []Yes / [x]No  PT/OT: []Yes / [x]No    DVT Prophylaxis: [x] Lovenox / [] Heparin / [] SCDs / [] Already on Systemic Anticoagulation / [] None     Expected discharge date:  pending clinical course  Disposition: home  Code status: Full Code     ===================================================================    Chief Complaint: LLQ pain  Subjective (past 24 hours):   : Patient has persistent abdominal pain for several weeks, intermittent in nature with associated nausea.  Reports weight loss secondary to reduced appetite.  Denies any chest pain, shortness of breath, fevers or chills.      Initial HPI 2024 per chart review:  Patient presents to the ED with LLQ pain for the past 6 weeks.  Patient has been treated with oral antibiotics for the past one month for

## 2024-07-09 NOTE — H&P
07/08/24  8:40 PM   Result Value Ref Range    WBC 12.0 (H) 4.8 - 10.8 thou/mm3    RBC 4.30 4.20 - 5.40 mill/mm3    Hemoglobin 14.2 12.0 - 16.0 gm/dl    Hematocrit 42.7 37.0 - 47.0 %    MCV 99.3 (H) 81.0 - 99.0 fL    MCH 33.0 26.0 - 33.0 pg    MCHC 33.3 32.2 - 35.5 gm/dl    RDW-CV 12.8 11.5 - 14.5 %    RDW-SD 46.6 (H) 35.0 - 45.0 fL    Platelets 329 130 - 400 thou/mm3    MPV 10.8 9.4 - 12.4 fL    Seg Neutrophils 80.4 %    Lymphocytes 10.7 %    Monocytes % 7.7 %    Eosinophils 0.3 %    Basophils 0.6 %    Immature Granulocytes % 0.3 %    Neutrophils Absolute 9.6 (H) 1.8 - 7.7 thou/mm3    Lymphocytes Absolute 1.3 1.0 - 4.8 thou/mm3    Monocytes Absolute 0.9 0.4 - 1.3 thou/mm3    Eosinophils Absolute 0.0 0.0 - 0.4 thou/mm3    Basophils Absolute 0.1 0.0 - 0.1 thou/mm3    Immature Grans (Abs) 0.04 0.00 - 0.07 thou/mm3    nRBC 0 /100 wbc   Basic Metabolic Panel    Collection Time: 07/08/24  8:40 PM   Result Value Ref Range    Sodium 137 135 - 145 meq/L    Potassium 3.8 3.5 - 5.2 meq/L    Chloride 101 98 - 111 meq/L    CO2 25 23 - 33 meq/L    Glucose 139 (H) 70 - 108 mg/dL    BUN 18 7 - 22 mg/dL    Creatinine 0.4 0.4 - 1.2 mg/dL    Calcium 8.9 8.5 - 10.5 mg/dL   Lipase    Collection Time: 07/08/24  8:40 PM   Result Value Ref Range    Lipase 41.0 5.6 - 51.3 U/L   Hepatic Function Panel    Collection Time: 07/08/24  8:40 PM   Result Value Ref Range    Albumin 3.9 3.5 - 5.1 g/dL    Total Bilirubin 0.3 0.3 - 1.2 mg/dL    Bilirubin, Direct <0.1 0.1 - 13.8 mg/dL    Alkaline Phosphatase 87 38 - 126 U/L    AST 15 5 - 40 U/L    ALT 9 (L) 11 - 66 U/L    Total Protein 6.7 6.1 - 8.0 g/dL   Urinalysis with Reflex to Culture    Collection Time: 07/08/24  8:40 PM    Specimen: Urine, clean catch   Result Value Ref Range    Glucose, Ur NEGATIVE NEGATIVE mg/dl    Bilirubin, Urine NEGATIVE NEGATIVE    Ketones, Urine NEGATIVE NEGATIVE    Specific Gravity, UA <1.005 1.002 - 1.030    Blood, Urine NEGATIVE NEGATIVE    pH, Urine 7.0 5.0 - 9.0     Protein, UA NEGATIVE NEGATIVE    Urobilinogen, Urine 0.2 0.0 - 1.0 eu/dl    Nitrite, Urine NEGATIVE NEGATIVE    Leukocyte Esterase, Urine NEGATIVE NEGATIVE    Color, UA YELLOW STRAW-YELLOW    Character, Urine CLEAR CLEAR-SL CLOUD   Anion Gap    Collection Time: 07/08/24  8:40 PM   Result Value Ref Range    Anion Gap 11.0 8.0 - 16.0 meq/L   Osmolality    Collection Time: 07/08/24  8:40 PM   Result Value Ref Range    Osmolality Calc 278.0 275.0 - 300.0 mOsmol/kg   Glomerular Filtration Rate, Estimated    Collection Time: 07/08/24  8:40 PM   Result Value Ref Range    Est, Glom Filt Rate > 90 >60 ml/min/1.73m2         Vital Signs: T: 98.6F P: 86 RR: 16 B/P: 150/81: FiO2: RA: O2 Sat:99%: I/O: No intake or output data in the 24 hours ending 07/08/24 2323      General:   moderate distress due to pain  HEENT:  normocephalic and atraumatic.  No scleral icterus. PEARLA, mucous membranes dry  Neck: supple.  Trachea midline. No JVD. Full ROM, no meningismus.  Lungs: clear to auscultation.  No retractions, no accessory muscle use.  Cardiac: RRR, no murmur, 2+ pulses  Abdomen: soft.  Tenderness with guarding in the LLQ. Bowel sounds actve  Extremities:  No clubbing, cyanosis x 4, no edema    Vasculature: capillary refill < 3 seconds.  Skin:  warm and dry. no visible rashes  Psych:  Alert and oriented x3.  Affect appropriate  Lymph:  No supraclavicular adenopathy.  Neurologic:  CN II-XII grossly intact. No focal deficit.    Data: (All radiographs, tracings, PFTs, and imaging are personally viewed and interpreted unless otherwise noted).   EKG:  none this encounter        Electronically signed by  Francesca Mayer PA-C

## 2024-07-09 NOTE — CONSULTS
electronically signed by: Willy Pena MD on    07/08/2024 10:53 PM      All CTs at this facility use dose modulation techniques and iterative    reconstructions, and/or weight-based dosing   when appropriate to reduce radiation to a low as reasonably achievable.      Additional Contrast? None  CT ABDOMEN PELVIS W IV CONTRAST Additional Contrast? None  Order: [id number]  Status: Final result       Visible to patient: Yes (not seen)       Next appt: None    0 Result Notes  Details    Reading Physician Reading Date Result Priority   Willy Pena MD  [phone] 7/8/2024      Narrative & Impression  EXAM: CT Abdomen and Pelvis With Intravenous Contrast     COMPARISON: CT abdomen pelvis 06/21/2024     FINDINGS:  LUNG BASES: Atelectasis.     ABDOMEN:  LIVER: Scattered subcentimeter low-density lesions throughout the liver,   likely cysts or hemangiomas, too small to characterize.  GALLBLADDER AND BILE DUCTS: Unremarkable. No calcified stones. No ductal   dilation.  PANCREAS: Tiny scattered subcentimeter cystic foci throughout the pancreas   may reflect cysts, cystic neoplasm such as IPMN, nonspecific. Findings are   unchanged from prior. This may be further evaluated with dynamic pancreas   MRI protocol. No ductal dilation.  SPLEEN: Unremarkable. No splenomegaly.  ADRENALS: Unremarkable. No mass.  KIDNEYS AND URETERS: Unremarkable. No solid mass. No hydronephrosis.  STOMACH AND BOWEL: Recurrent/progressive sigmoid diverticulitis with   increasing, diffuse inflammatory changes and fluid stranding in the   presacral region. No obvious discrete peridiverticular collections or free   air. Large stool burden proximally. No obstruction.     PELVIS:  APPENDIX: No findings to suggest acute appendicitis.  BLADDER: Unremarkable. No mass.  REPRODUCTIVE: Unremarkable as visualized.     ABDOMEN and PELVIS:  INTRAPERITONEAL SPACE: See above.  BONES/JOINTS: Right total hip arthroplasty. Tiny bone island in the left   femoral  neck. Right L5 fidelina sacralization. Osteopenia. No acute fracture.   No dislocation.  SOFT TISSUES: Unremarkable.  VASCULATURE: Unremarkable. No abdominal aortic aneurysm.  LYMPH NODES: Unremarkable. No enlarged lymph nodes.     IMPRESSION:  Uncomplicated, recurrent/progressive sigmoid diverticulitis as described.     This document has been electronically signed by: Willy Pena MD on   07/08/2024 10:53 PM     All CTs at this facility use dose modulation techniques and iterative   reconstructions, and/or weight-based dosing  when appropriate to reduce radiation to a low as reasonably achievable.           Specimen Collected: 07/08/24 21:53 EDT Last Resulted: 07/08/24 22:53 EDT         Imaging independently reviewed      Electronically signed by Jina Man MD on 7/9/2024 at 8:14 AM

## 2024-07-09 NOTE — PROGRESS NOTES
Pharmacy Note - Extended Infusion Beta-Lactam Dose Adjustment    Meropenem 1000 mg q8h intermittent infusion for treatment of Intra-abdominal Infection. Per [facility] Extended Infusion Beta-Lactam Policy, meropenem will be changed to 1000mg q8h extended infusion      Estimated Creatinine Clearance: Estimated Creatinine Clearance: 100 mL/min (based on SCr of 0.4 mg/dL).    Dialysis Status, TRINI, CKD: Normal    BMI: Body mass index is 17.58 kg/m².    Rationale for Adjustment: Dose adjusted per [facility] Extended Infusion Policy based on renal function and indication. The above medication is renally eliminated and demonstrates time-dependent effects on bacterial eradication. Extended-infusion dosing strategy aims to enhance microbiologic and clinical efficacy.     Pharmacy will monitor renal function daily and adjust dose as necessary.      Please call with any questions.    Thank you,  Chelsy Gray, PharmD, BCPS 7/9/2024 7:17 AM

## 2024-07-10 LAB
ANION GAP SERPL CALC-SCNC: 13 MEQ/L (ref 8–16)
BUN SERPL-MCNC: 8 MG/DL (ref 7–22)
CALCIUM SERPL-MCNC: 8.6 MG/DL (ref 8.5–10.5)
CHLORIDE SERPL-SCNC: 102 MEQ/L (ref 98–111)
CO2 SERPL-SCNC: 22 MEQ/L (ref 23–33)
CREAT SERPL-MCNC: 0.4 MG/DL (ref 0.4–1.2)
DEPRECATED RDW RBC AUTO: 47.1 FL (ref 35–45)
ERYTHROCYTE [DISTWIDTH] IN BLOOD BY AUTOMATED COUNT: 12.8 % (ref 11.5–14.5)
GFR SERPL CREATININE-BSD FRML MDRD: > 90 ML/MIN/1.73M2
GLUCOSE SERPL-MCNC: 64 MG/DL (ref 70–108)
HCT VFR BLD AUTO: 38 % (ref 37–47)
HGB BLD-MCNC: 12.6 GM/DL (ref 12–16)
MCH RBC QN AUTO: 33.3 PG (ref 26–33)
MCHC RBC AUTO-ENTMCNC: 33.2 GM/DL (ref 32.2–35.5)
MCV RBC AUTO: 100.5 FL (ref 81–99)
PLATELET # BLD AUTO: 284 THOU/MM3 (ref 130–400)
PMV BLD AUTO: 10.8 FL (ref 9.4–12.4)
POTASSIUM SERPL-SCNC: 3.8 MEQ/L (ref 3.5–5.2)
RBC # BLD AUTO: 3.78 MILL/MM3 (ref 4.2–5.4)
SODIUM SERPL-SCNC: 137 MEQ/L (ref 135–145)
WBC # BLD AUTO: 8.1 THOU/MM3 (ref 4.8–10.8)

## 2024-07-10 PROCEDURE — 6370000000 HC RX 637 (ALT 250 FOR IP): Performed by: PHYSICIAN ASSISTANT

## 2024-07-10 PROCEDURE — 6370000000 HC RX 637 (ALT 250 FOR IP)

## 2024-07-10 PROCEDURE — 2580000003 HC RX 258: Performed by: PHYSICIAN ASSISTANT

## 2024-07-10 PROCEDURE — 1200000000 HC SEMI PRIVATE

## 2024-07-10 PROCEDURE — 6360000002 HC RX W HCPCS: Performed by: PHYSICIAN ASSISTANT

## 2024-07-10 PROCEDURE — 99232 SBSQ HOSP IP/OBS MODERATE 35: CPT

## 2024-07-10 PROCEDURE — 85027 COMPLETE CBC AUTOMATED: CPT

## 2024-07-10 PROCEDURE — 36415 COLL VENOUS BLD VENIPUNCTURE: CPT

## 2024-07-10 PROCEDURE — 80048 BASIC METABOLIC PNL TOTAL CA: CPT

## 2024-07-10 RX ORDER — SUMATRIPTAN 50 MG/1
100 TABLET, FILM COATED ORAL ONCE
Status: COMPLETED | OUTPATIENT
Start: 2024-07-10 | End: 2024-07-10

## 2024-07-10 RX ADMIN — PREDNISONE 3 MG: 1 TABLET ORAL at 19:48

## 2024-07-10 RX ADMIN — MEROPENEM 1000 MG: 1 INJECTION INTRAVENOUS at 16:25

## 2024-07-10 RX ADMIN — MEROPENEM 1000 MG: 1 INJECTION INTRAVENOUS at 08:29

## 2024-07-10 RX ADMIN — Medication 3 MG: at 21:19

## 2024-07-10 RX ADMIN — ONDANSETRON 4 MG: 2 INJECTION INTRAMUSCULAR; INTRAVENOUS at 04:00

## 2024-07-10 RX ADMIN — POLYETHYLENE GLYCOL 3350 17 G: 17 POWDER, FOR SOLUTION ORAL at 15:01

## 2024-07-10 RX ADMIN — PREDNISONE 2 MG: 1 TABLET ORAL at 09:24

## 2024-07-10 RX ADMIN — SUMATRIPTAN SUCCINATE 100 MG: 50 TABLET ORAL at 04:00

## 2024-07-10 NOTE — PLAN OF CARE
Problem: Pain  Goal: Verbalizes/displays adequate comfort level or baseline comfort level  7/10/2024 1005 by Deepthi Lewis RN  Outcome: Progressing  Flowsheets (Taken 7/9/2024 2310 by Trish Aguiar RN)  Verbalizes/displays adequate comfort level or baseline comfort level:   Encourage patient to monitor pain and request assistance   Assess pain using appropriate pain scale   Administer analgesics based on type and severity of pain and evaluate response     Problem: Skin/Tissue Integrity - Adult  Goal: Skin integrity remains intact  7/10/2024 1005 by Deepthi Lewis RN  Outcome: Progressing  Flowsheets (Taken 7/9/2024 2310 by Trish Aguiar RN)  Skin Integrity Remains Intact: Monitor for areas of redness and/or skin breakdown     Problem: Gastrointestinal - Adult  Goal: Minimal or absence of nausea and vomiting  7/10/2024 1005 by Deepthi Lewis RN  Outcome: Progressing  Flowsheets (Taken 7/9/2024 2310 by Trish Aguiar RN)  Minimal or absence of nausea and vomiting:   Administer IV fluids as ordered to ensure adequate hydration   Maintain NPO status until nausea and vomiting are resolved     Problem: Gastrointestinal - Adult  Goal: Maintains or returns to baseline bowel function  7/10/2024 1005 by Deepthi Lewis RN  Outcome: Progressing  Flowsheets (Taken 7/9/2024 2310 by Trish Aguiar RN)  Maintains or returns to baseline bowel function:   Assess bowel function   Encourage oral fluids to ensure adequate hydration     Problem: Gastrointestinal - Adult  Goal: Maintains adequate nutritional intake  7/10/2024 1005 by Deepthi Lewis RN  Outcome: Progressing  Flowsheets (Taken 7/9/2024 2310 by Trish Aguiar RN)  Maintains adequate nutritional intake: Monitor percentage of each meal consumed     Problem: Discharge Planning  Goal: Discharge to home or other facility with appropriate resources  7/10/2024 1005 by Deepthi Lewis RN  Outcome: Progressing  Flowsheets (Taken

## 2024-07-10 NOTE — CARE COORDINATION
Case Management Assessment Initial Evaluation    Date/Time of Evaluation: 7/10/2024 8:55 AM  Assessment Completed by: Noemi Theodore RN    If patient is discharged prior to next notation, then this note serves as note for discharge by case management.    Patient Name: Sera Breaux                   YOB: 1965  Diagnosis: Diverticulitis [K57.92]  Diverticulitis of colon [K57.32]                   Date / Time: 2024  8:27 PM  Location: [address] [place] [address]     Patient Admission Status: Inpatient   Readmission Risk Low 0-14, Mod 15-19), High > 20: Readmission Risk Score: 15.2    Current PCP: Bernadette Oconnell APRN - CNP    Additional Case Management Notes: To ED w/ LLQ pain x6 weeks. Patient has been on oral Augmentin x1 month; continues to have pain and anorexia. Hospitalist and GS following. Dr. Giordano to assume care for GS. Clear liquid diet. IVF. IV toradol prn. IV merrem q8h. IV zofran prn. Prednisone.     Procedures: none     Imagin/8 CT A/P: Uncomplicated, recurrent/progressive sigmoid diverticulitis as described     Patient Goals/Plan/Treatment Preferences: Met w/ Sera. Verified insurance and PCP. She is independent and actively drives. Plans to return home w/ . Denies needs for DME or  services.     07/10/24 1151   Service Assessment   Patient Orientation Alert and Oriented   Cognition Alert   History Provided By Patient   Primary Caregiver Self   Accompanied By/Relationship unaccompanied   Support Systems Spouse/Significant Other;Family Members   Patient's Healthcare Decision Maker is: Patient Declined (Legal Next of Kin Remains as Decision Maker)   PCP Verified by CM Yes   Last Visit to PCP Within last 3 months   Prior Functional Level Independent in ADLs/IADLs   Current Functional Level Independent in ADLs/IADLs   Can patient return to prior living arrangement Yes   Ability to make needs known: Good   Family able to assist with home care needs: Yes   Would you like for me to discuss the

## 2024-07-10 NOTE — PROGRESS NOTES
Hospitalist Progress Note      Patient:  Sera Breaux 59 y.o. female       : 1965  Unit/Bed:[facility]28/028-    Date of Admission: 2024      ASSESSMENT AND PLAN    Active Problems  Complicated Diverticulitis: CT abdomen pelvis demonstrated uncomplicated, recurrent/progressive sigmoid diverticulitis  Per patient, has been on Augmentin for the past month with no improvement.  Initiated on Merrem for broad-spectrum coverage  General surgery consulted-okay for clear liquid diet, Dr. Giordano to assume care today as he is seen patient with past  Migraines  Takes Maxalt as needed, has a poor reaction to Imitrex. Can take home maxalt once every 24 hrs for headaches as needed, limited to less than 10 doses per month recommended. No maxalt on formulary- pt can take home medication in substitution for imitrex.     Resolved Problems      Chronic Conditions (reviewed, stable, and home medications resumed, unless otherwise stated)  Malignant Melanoma of Skin of L Leg: completed adjuvant Opdivo, follows with oncology for surveillance  RA: Takes prednisone daily-2 mg in the a.m. and 3 mg in the p.m.      LDA: []CVC / []PICC / []Midline / []Ga / []Drains / []Mediport / [x]None  Antibiotics: Merrem  Steroids: None  Labs (still needed?): [x]Yes / []No  IVF (still needed?): [x]Yes / []No    Level of care: []Step Down / [x]Med-Surg  Bed Status: [x]Inpatient / []Observation  Telemetry: []Yes / [x]No  PT/OT: []Yes / [x]No    DVT Prophylaxis: [x] Lovenox / [] Heparin / [] SCDs / [] Already on Systemic Anticoagulation / [] None     Expected discharge date:  pending clinical course  Disposition: home  Code status: Full Code     ===================================================================    Chief Complaint: LLQ pain  Subjective (past 24 hours):   7/10: Patient is feeling lightheaded, dizzy and nauseated after taking Imitrex.  Has had this reaction Imitrex in the past and this is what she takes Maxalt.  States that the  Soft, tender to right and left lower quadrants, non-distended with normal bowel sounds.  Musculoskeletal: No joint swelling or tenderness. Normal tone. No abnormal movements.   Skin: Warm and dry. No rashes or lesions.  Neurologic:  No focal sensory/motor deficits in the upper or lower extremities. Cranial nerves:  grossly non-focal 2-12.     Psychiatric: Alert and oriented, normal insight and thought content.       Labs/Radiology: See chart or assessment above.     Electronically signed by Veronica Chowdhury PA-C on 7/10/2024 at 8:02 AM

## 2024-07-10 NOTE — PROGRESS NOTES
[facility] Dr[facility] Man in consultation yesterday 59-year-old female who has a history of metastatic malignant melanoma in the past has been on immunotherapy all being cared for per a oncologist in [place] who was seen in 2021 with an episode of colitis/diverticulitis at that time she had not had a colonoscopy in 10 years and since then has had a colonoscopy by Dr. Adams about 2 years ago that apparently was normal except for diverticular disease she had presented to our emergency room on June 20 after having had left lower quadrant pain had been on oral antibiotics at that time but they were unsuccessful patient had a CT scan showing diverticulitis was sent home was given some IV antibiotics prior to discharge from the ER and was on again an oral antibiotic regimen over the little over 10 days she however presented with increasing left lower quadrant pain and a CT scan that showed \"progressive diverticulitis\" but no abscess she has been admitted to the medicine service patient had requested I resume care she has a history of rheumatoid arthritis is currently on prednisone 5 mg a day and has been on long-term steroids secondary to the rheumatoid patient's has mild left lower quadrant pain but fairly minimal discussed with patient that given almost 3 weeks of I have antibiotic treatment and the fact she has \"progressive diverticulitis she likely needs to consider sigmoid colon resection she does state that her father had had a perforated diverticulitis and had a colostomy bag in the past patient is improved she is on clear liquids we will monitor hopefully to be able to advance diet likely would discharge the patient but plan sigmoid colon resection in the near future we will follow while in the hospital

## 2024-07-10 NOTE — PROGRESS NOTES
Pt was in bed and alone as she was dealing with diverticulitis. She was sounding so spiritual as she accepted her affliction offering her pains to God. She was encouraged   07/10/24 2144   Encounter Summary   Encounter Overview/Reason Initial Encounter   Service Provided For Patient   Referral/Consult From [facility]   Support System Family members   Last Encounter  07/10/24  (Anointed.)   Complexity of Encounter Low   Begin Time 1145   End Time  1153   Total Time Calculated 8 min   Spiritual/Emotional needs   Type Spiritual Support   Rituals, Rites and Sacraments   Type Anointing   Assessment/Intervention/Outcome   Assessment Calm;Hopeful   Intervention Empowerment   Outcome Encouraged;Engaged in conversation      and anointed and she was elated with it.

## 2024-07-10 NOTE — PROGRESS NOTES
Upon assessment this AM patient voicing that she does not feel well. Patient had migraine over night and was given imitrex even though patient states that she told the night nurse it makes her nauseous and not feel well if she takes it. Patient then became nauseous last night / early morning after taking medication. Patient stating she feels horrible this morning, dizzy, and light headed. Patient also stated she felt like she was going to pass out when she was up to the bathroom. Patient states she thinks it is from the imitrex. Vitals are stable, attending physician Luciana LEMON notified via perfect serve.     Bed alarm on and patient educated to call staff when she needs to get up. Call light in reach.

## 2024-07-10 NOTE — PLAN OF CARE
caregiver   Identify discharge learning needs (meds, wound care, etc)     Problem: Safety - Adult  Goal: Free from fall injury  7/9/2024 2310 by Trish Aguiar RN  Outcome: Progressing  Flowsheets (Taken 7/9/2024 2310)  Free From Fall Injury: Instruct family/caregiver on patient safety  Note: Patient is able to ambulate independently at this time.     Care plan reviewed with patient.  Patient verbalizes understanding of the plan of care and contributes to goal setting.

## 2024-07-11 PROBLEM — E43 SEVERE MALNUTRITION (HCC): Status: ACTIVE | Noted: 2024-07-11

## 2024-07-11 LAB
ANION GAP SERPL CALC-SCNC: 10 MEQ/L (ref 8–16)
BUN SERPL-MCNC: 6 MG/DL (ref 7–22)
CA-I BLD ISE-SCNC: 1.13 MMOL/L (ref 1.12–1.32)
CALCIUM SERPL-MCNC: 8.7 MG/DL (ref 8.5–10.5)
CHLORIDE SERPL-SCNC: 106 MEQ/L (ref 98–111)
CO2 SERPL-SCNC: 24 MEQ/L (ref 23–33)
CREAT SERPL-MCNC: 0.4 MG/DL (ref 0.4–1.2)
DEPRECATED RDW RBC AUTO: 45.3 FL (ref 35–45)
ERYTHROCYTE [DISTWIDTH] IN BLOOD BY AUTOMATED COUNT: 12.3 % (ref 11.5–14.5)
GFR SERPL CREATININE-BSD FRML MDRD: > 90 ML/MIN/1.73M2
GLUCOSE SERPL-MCNC: 87 MG/DL (ref 70–108)
HCT VFR BLD AUTO: 38.1 % (ref 37–47)
HGB BLD-MCNC: 12.3 GM/DL (ref 12–16)
MAGNESIUM SERPL-MCNC: 1.8 MG/DL (ref 1.6–2.4)
MCH RBC QN AUTO: 32.3 PG (ref 26–33)
MCHC RBC AUTO-ENTMCNC: 32.3 GM/DL (ref 32.2–35.5)
MCV RBC AUTO: 100 FL (ref 81–99)
PHOSPHATE SERPL-MCNC: 1.6 MG/DL (ref 2.4–4.7)
PLATELET # BLD AUTO: 291 THOU/MM3 (ref 130–400)
PMV BLD AUTO: 10.5 FL (ref 9.4–12.4)
POTASSIUM SERPL-SCNC: 4.1 MEQ/L (ref 3.5–5.2)
RBC # BLD AUTO: 3.81 MILL/MM3 (ref 4.2–5.4)
SODIUM SERPL-SCNC: 140 MEQ/L (ref 135–145)
WBC # BLD AUTO: 4.8 THOU/MM3 (ref 4.8–10.8)

## 2024-07-11 PROCEDURE — 85027 COMPLETE CBC AUTOMATED: CPT

## 2024-07-11 PROCEDURE — 80048 BASIC METABOLIC PNL TOTAL CA: CPT

## 2024-07-11 PROCEDURE — 2580000003 HC RX 258: Performed by: SURGERY

## 2024-07-11 PROCEDURE — 6360000002 HC RX W HCPCS: Performed by: PHYSICIAN ASSISTANT

## 2024-07-11 PROCEDURE — 99232 SBSQ HOSP IP/OBS MODERATE 35: CPT

## 2024-07-11 PROCEDURE — 82330 ASSAY OF CALCIUM: CPT

## 2024-07-11 PROCEDURE — 6370000000 HC RX 637 (ALT 250 FOR IP): Performed by: PHYSICIAN ASSISTANT

## 2024-07-11 PROCEDURE — 36415 COLL VENOUS BLD VENIPUNCTURE: CPT

## 2024-07-11 PROCEDURE — 84100 ASSAY OF PHOSPHORUS: CPT

## 2024-07-11 PROCEDURE — 05H933Z INSERTION OF INFUSION DEVICE INTO RIGHT BRACHIAL VEIN, PERCUTANEOUS APPROACH: ICD-10-PCS | Performed by: SURGERY

## 2024-07-11 PROCEDURE — 6370000000 HC RX 637 (ALT 250 FOR IP)

## 2024-07-11 PROCEDURE — 36569 INSJ PICC 5 YR+ W/O IMAGING: CPT

## 2024-07-11 PROCEDURE — 1200000000 HC SEMI PRIVATE

## 2024-07-11 PROCEDURE — 76937 US GUIDE VASCULAR ACCESS: CPT

## 2024-07-11 PROCEDURE — 2500000003 HC RX 250 WO HCPCS: Performed by: SURGERY

## 2024-07-11 PROCEDURE — C1751 CATH, INF, PER/CENT/MIDLINE: HCPCS

## 2024-07-11 PROCEDURE — 2580000003 HC RX 258: Performed by: PHYSICIAN ASSISTANT

## 2024-07-11 PROCEDURE — 3E0436Z INTRODUCTION OF NUTRITIONAL SUBSTANCE INTO CENTRAL VEIN, PERCUTANEOUS APPROACH: ICD-10-PCS | Performed by: SURGERY

## 2024-07-11 PROCEDURE — 83735 ASSAY OF MAGNESIUM: CPT

## 2024-07-11 RX ORDER — SODIUM CHLORIDE 0.9 % (FLUSH) 0.9 %
5-40 SYRINGE (ML) INJECTION PRN
Status: ACTIVE | OUTPATIENT
Start: 2024-07-11

## 2024-07-11 RX ORDER — SODIUM CHLORIDE 0.9 % (FLUSH) 0.9 %
5-40 SYRINGE (ML) INJECTION EVERY 12 HOURS SCHEDULED
Status: ACTIVE | OUTPATIENT
Start: 2024-07-11

## 2024-07-11 RX ORDER — SODIUM CHLORIDE 9 MG/ML
INJECTION, SOLUTION INTRAVENOUS PRN
Status: ACTIVE | OUTPATIENT
Start: 2024-07-11

## 2024-07-11 RX ADMIN — PREDNISONE 2 MG: 1 TABLET ORAL at 08:05

## 2024-07-11 RX ADMIN — MEROPENEM 1000 MG: 1 INJECTION INTRAVENOUS at 00:06

## 2024-07-11 RX ADMIN — SODIUM CHLORIDE: 9 INJECTION, SOLUTION INTRAVENOUS at 15:35

## 2024-07-11 RX ADMIN — MEROPENEM 1000 MG: 1 INJECTION INTRAVENOUS at 08:22

## 2024-07-11 RX ADMIN — Medication 3 MG: at 23:21

## 2024-07-11 RX ADMIN — SODIUM PHOSPHATE, MONOBASIC, MONOHYDRATE AND SODIUM PHOSPHATE, DIBASIC, ANHYDROUS 15 MMOL: 142; 276 INJECTION, SOLUTION INTRAVENOUS at 18:49

## 2024-07-11 RX ADMIN — SODIUM CHLORIDE: 9 INJECTION, SOLUTION INTRAVENOUS at 08:21

## 2024-07-11 RX ADMIN — MEROPENEM 1000 MG: 1 INJECTION INTRAVENOUS at 15:38

## 2024-07-11 RX ADMIN — PREDNISONE 3 MG: 1 TABLET ORAL at 18:50

## 2024-07-11 NOTE — PROGRESS NOTES
Comprehensive Nutrition Assessment    Type and Reason for Visit:  Initial, Consult (TPN Start)    Nutrition Recommendations/Plan:   Pending Mg/Phos levels and replacements if needed - initiate TPN: Clinimix 5/15 at 30 mL/hr (~12.5 kcals/kg) - spoke with Pharmacy   Dosing weight: 41 kg  Pt getting ~650 kcals/day from CHO/sugar for 3 days with clear liquid diet therefore comfortable with TPN start rate  Continue clear liquid diet per surgery - diet advancements per surgery as GI function allows  Planning OP sigmoid colon resection 7/13     Malnutrition Assessment:  Malnutrition Status:  Severe malnutrition (07/11/24 1501)    Context:  Acute Illness     Findings of the 6 clinical characteristics of malnutrition:  Energy Intake:   (Clear liquids 3 days; slight decrease in PO intake on and off this past month)  Weight Loss:  Unable to assess (chronically underweight; only stated weight this admit so far)     Body Fat Loss:  Moderate body fat loss Buccal region, Fat Overlying Ribs, Triceps   Muscle Mass Loss:  Moderate muscle mass loss Temples (temporalis), Scapula (trapezius), Clavicles (pectoralis & deltoids), Thigh (quadriceps), Calf (gastrocnemius)  Fluid Accumulation:  No significant fluid accumulation     Strength:  Not Performed    Nutrition Assessment:     Pt. severely malnourished AEB criteria listed above.  At risk for further nutritional compromise r/t admit with complicated diverticulitis - progressive and recurrent, migraines, PICC placed 7/11 and initiation of TPN, and underlying medical condition (PMHx: TRINI, RA, melanoma 2018 and 2021, medical marijuana use since 2020).       Nutrition Related Findings:    Pt. Report/Treatments/Miscellaneous: Pt seen, endorses that her appetite has been good recently with some slight decrease r/t recent illness/abdominal pain. She reports that she presented to the ED a couple of different times towards the end of June in which she was having emesis and not able to eat

## 2024-07-11 NOTE — PROGRESS NOTES
Pharmacy Consult for TPN/PPN Initiation    Assessment:   Indication for TPN: Diverticulitis/Sigmoid Colon resection  IV access: central  Dosing weight: 41 kg    Plan:  -Phosphorus is currently 1.6, cannot start TPN/Clinimix until > 2  -Will replace phosphorus now and recheck level after infusion tonight @ 2100 (~1 hour after replacement)  -Once phosphorus is > 2, can initiate Clinimix 5/15 central formulation at a rate of 30 mL/hr to provide 511 kcal per dietary recommendations (~12.5 kcal/kg)  -Will plan to convert to 3-in-1 TPN on 7/12/24 as appropriate.    Electrolyte Replacement:   Sodium phosphate: 15 mmol    BMP, Mag, iCal, & Phos ordered for tomorrow with AM labs.    Pharmacy will continue to follow. Thank you for the consult.    Demond Guzmán, PharmD, BCPS  7/11/2024  4:03 PM

## 2024-07-11 NOTE — ED PROVIDER NOTES
HALLUX OSTECTOMY performed by Adal Wagner DPM at [facility]    HIP SURGERY Right 07/18/2020    right total hip arthroplasty performed by Chris Triplett MD at [facility]    OTHER SURGICAL HISTORY Right 11/2019    thumb fusion    SKIN BIOPSY  05/2018    lower left leg, melanoma    TOTAL KNEE ARTHROPLASTY Right 8/24/2023    Right Total Knee Replacement performed by Chris Triplett MD at [facility]    TOTAL KNEE ARTHROPLASTY Left 9/28/2023    Left Total Knee Replacement performed by Chris Triplett MD at [facility]    TUBAL LIGATION         CURRENT MEDICATIONS       Current Discharge Medication List        CONTINUE these medications which have NOT CHANGED    Details   medical marijuana Take 1 each by mouth as needed (sleep and pain).      turmeric 500 MG CAPS Take by mouth daily      Boswellia Martha (BOSWELLIA PO) Take 500 mg by mouth      Calcium-Magnesium 500-250 MG TABS Take by mouth      Potassium 99 MG TABS Take by mouth      Krill Oil 1000 MG CAPS Take 2,000 mg by mouth      Aloe Vera 25 MG CAPS Take by mouth      predniSONE (DELTASONE) 1 MG tablet Take 1 tablet by mouth daily Takes 2 tabs in am and 3 tabs in pm      ibuprofen (ADVIL;MOTRIN) 600 MG tablet Take 1 tablet by mouth 3 times daily as needed for Pain  Qty: 30 tablet, Refills: 0      rizatriptan (MAXALT) 10 MG tablet Take 1 tablet by mouth as needed      famciclovir (FAMVIR) 500 MG tablet Take 1 tablet by mouth 3 times daily as needed (for shingles outbreak)      OLIVE LEAF PO Take by mouth daily       Glucosamine-Chondroitin (JOINT SUPPORT PO) Take 1 tablet by mouth daily       therapeutic multivitamin-minerals (THERAGRAN-M) tablet Take 1 tablet by mouth daily      calcium carbonate-vitamin D 600-200 MG-UNIT TABS Take 1 tablet by mouth daily       MAGNESIUM ACETATE Take 400 mg by mouth nightly For bowels      Probiotic Product (PROBIOTIC FORMULA PO) Take by mouth daily       Acetaminophen (TYLENOL ARTHRITIS PAIN PO) Take 1,300 mg by mouth daily as needed        instructions and appropriate follow-up provided to  the patient.   Patient was DISCHARGED from the hospital. Based on the reassuring ED workup and patient's stable vital signs, I feel the patient may be safely discharged home. At this point in time, I believe the patient has the mental capacity to make medical decisions.      No notes of EC Admission Criteria type on file.        Patient was seen independently by myself. The patient's final impression and disposition and plan was determined by myself.     Strict return precautions and follow up instructions were discussed with the patient prior to discharge, with which the patient agrees.    Physical assessment findings, diagnostic testing(s) if applicable, and vital signs reviewed with patient/patient representative.  Questions answered.   Medications asdirected, including OTC medications for supportive care.   Education provided on medications.  Differential diagnosis(s) discussed with patient/patient representative.  Home care/self care instructions reviewed withpatient/patient representative.  Patient is to follow-up with family care provider in 2-3 days if no improvement.  Patient is to go to the emergency department if symptoms worsen.  Patient/patient representative isaware of care plan, questions answered, verbalizes understanding and is in agreement.     ED Medications administered this visit:  (None if blank)  Medications   sodium chloride flush 0.9 % injection 5-40 mL ( IntraVENous Not Given 7/10/24 1945)   sodium chloride flush 0.9 % injection 5-40 mL (has no administration in time range)   0.9 % sodium chloride infusion (has no administration in time range)   potassium chloride (KLOR-CON M) extended release tablet 40 mEq (has no administration in time range)     Or   potassium bicarb-citric acid (EFFER-K) effervescent tablet 40 mEq (has no administration in time range)     Or   potassium chloride 10 mEq/100 mL IVPB (Peripheral Line) (has no

## 2024-07-11 NOTE — PLAN OF CARE
Problem: Pain  Goal: Verbalizes/displays adequate comfort level or baseline comfort level  Outcome: Progressing   Pain Assessment: None - Denies Pain  Pain Level: 4   Patient's Stated Pain Goal: 3   Is pain goal met at this time?  Yes          Problem: Skin/Tissue Integrity - Adult  Goal: Skin integrity remains intact  Outcome: Progressing   Skin assessment completed.  Patient turned every 2 hours and as needed.  No skin breakdown this shift.      Problem: Gastrointestinal - Adult  Goal: Minimal or absence of nausea and vomiting  Outcome: Progressing     Problem: Gastrointestinal - Adult  Goal: Maintains or returns to baseline bowel function  Outcome: Progressing     Problem: Gastrointestinal - Adult  Goal: Maintains adequate nutritional intake  Outcome: Progressing     Problem: Discharge Planning  Goal: Discharge to home or other facility with appropriate resources  Outcome: Progressing     Problem: Safety - Adult  Goal: Free from fall injury  Outcome: Progressing   All fall precautions in place. Bed in low position, alarm activated and appropriate use of call light.       Care plan reviewed with patient. Patient verbalizes understanding with the plan of care.

## 2024-07-11 NOTE — FLOWSHEET NOTE
07/11/24 1315   Treatment Team Notification   Reason for Communication Review case   Name of Team Member Notified IV Therapy   Treatment Team Role Nurse/Charge Nurse   Method of Communication Secure Message   Response Waiting for response   Notification Time 1315     Notified patient has order in for PICC line.

## 2024-07-11 NOTE — PLAN OF CARE
Problem: Pain  Goal: Verbalizes/displays adequate comfort level or baseline comfort level  7/11/2024 0913 by Bhavna Guzmán RN  Outcome: Progressing  Flowsheets (Taken 7/9/2024 2310 by Trish Aguiar RN)  Verbalizes/displays adequate comfort level or baseline comfort level:   Encourage patient to monitor pain and request assistance   Assess pain using appropriate pain scale   Administer analgesics based on type and severity of pain and evaluate response  7/11/2024 0044 by Kacey Owen RN  Outcome: Progressing     Problem: Skin/Tissue Integrity - Adult  Goal: Skin integrity remains intact  7/11/2024 0913 by Bhavna Guzmán RN  Outcome: Progressing  Flowsheets (Taken 7/9/2024 2310 by Trish Aguiar RN)  Skin Integrity Remains Intact: Monitor for areas of redness and/or skin breakdown  7/11/2024 0044 by Kacey Owen RN  Outcome: Progressing     Problem: Gastrointestinal - Adult  Goal: Minimal or absence of nausea and vomiting  7/11/2024 0913 by Bhavna Guzmán RN  Outcome: Progressing  Flowsheets (Taken 7/9/2024 2310 by Trish Aguiar RN)  Minimal or absence of nausea and vomiting:   Administer IV fluids as ordered to ensure adequate hydration   Maintain NPO status until nausea and vomiting are resolved  7/11/2024 0044 by Kacey Owen RN  Outcome: Progressing  Goal: Maintains or returns to baseline bowel function  7/11/2024 0913 by Bhavna Guzmán RN  Outcome: Progressing  Flowsheets (Taken 7/9/2024 2310 by Trish Aguiar RN)  Maintains or returns to baseline bowel function:   Assess bowel function   Encourage oral fluids to ensure adequate hydration  7/11/2024 0044 by Kacey Owen RN  Outcome: Progressing  Goal: Maintains adequate nutritional intake  7/11/2024 0913 by Bhavna Guzmán RN  Outcome: Progressing  Flowsheets (Taken 7/9/2024 2310 by Trish Aguiar RN)  Maintains adequate nutritional intake: Monitor percentage of each meal consumed  7/11/2024 0044 by Kacey Owen

## 2024-07-11 NOTE — CARE COORDINATION
7/11/24, 7:15 AM EDT    DISCHARGE ON GOING EVALUATION    Sera CHERRY [facility] day: 3  Location: [facility]28/028-A Reason for admit: Diverticulitis [K57.92]  Diverticulitis of colon [K57.32]     Procedures: none    Imaging since last note: no new    Barriers to Discharge: Hospitalist and general surgery following. Clear liquid diet. IV Merrem. PO prednisone. Pain control.     PCP: Bernadette Oconnell APRN - CNP  Readmission Risk Score: 15.2    Patient Goals/Plan/Treatment Preferences: Plan to return home with .

## 2024-07-11 NOTE — PROGRESS NOTES
Hospitalist Progress Note      Patient:  Sera Breaux 59 y.o. female       : 1965  Unit/Bed:[facility]28/028-A    Date of Admission: 2024      ASSESSMENT AND PLAN    Active Problems  Complicated Diverticulitis, progressive and recurrent: CT abdomen pelvis demonstrated uncomplicated, recurrent/progressive sigmoid diverticulitis  Per patient, has been on Augmentin for the past month with no improvement. Initiated on Merrem for broad-spectrum coverage  General surgery consulted-Dr. Giordano-plan for sigmoid colon resection  with TPN to be initiated. PICC line placed .  Currently on clear liquids, diet per general surgery  Migraines  Takes Maxalt as needed, has a poor reaction to Imitrex. Can take home maxalt once every 24 hrs for headaches as needed, limited to less than 10 doses per month recommended. No maxalt on formulary- pt can take home medication in substitution for imitrex.     Resolved Problems      Chronic Conditions (reviewed, stable, and home medications resumed, unless otherwise stated)  Malignant Melanoma of Skin of L Leg: completed adjuvant Opdivo, follows with oncology for surveillance  RA: Takes prednisone daily-2 mg in the a.m. and 3 mg in the p.m.      LDA: []CVC / [x]PICC / []Midline / []Ga / []Drains / []Mediport / []None  Antibiotics: Merrem  Steroids: None  Labs (still needed?): [x]Yes / []No  IVF (still needed?): [x]Yes / []No    Level of care: []Step Down / [x]Med-Surg  Bed Status: [x]Inpatient / []Observation  Telemetry: []Yes / [x]No  PT/OT: []Yes / [x]No    DVT Prophylaxis: [x] Lovenox / [] Heparin / [] SCDs / [] Already on Systemic Anticoagulation / [] None     Expected discharge date:  pending clinical course  Disposition: home  Code status: Full Code     ===================================================================    Chief Complaint: LLQ pain  Subjective (past 24 hours):   : Patient reports she is feeling better today, had a bowel movement.  Notes that she  left lower quadrants, non-distended with normal bowel sounds.  Musculoskeletal: No joint swelling or tenderness. Normal tone. No abnormal movements.   Skin: Warm and dry. No rashes or lesions.  Neurologic:  No focal sensory/motor deficits in the upper or lower extremities. Cranial nerves:  grossly non-focal 2-12.     Psychiatric: Alert and oriented, normal insight and thought content.       Labs/Radiology: See chart or assessment above.     Electronically signed by Veronica Chowdhury PA-C on 7/11/2024 at 7:51 AM

## 2024-07-11 NOTE — PROGRESS NOTES
[facility]   Dr. Kb Giordano MD  Daily Progress Note  Pt Name: Sera Breaux  Medical Record Number: [id number]  Date of Birth 1965   Today's Date: 7/11/2024    HD#2    CHIEF COMPLAINTOngoing diverticulitis    SUBJECTIVE  Patient feels Had bowel movement this morning but does admit to persistent left lower quadrant pain    OBJECTIVE  CURRENT VITALS BP (!) 142/67   Pulse 71   Temp 98.4 °F (36.9 °C) (Oral)   Resp 18   Ht 1.524 m (5')   Wt 40.8 kg (90 lb)   LMP 11/06/2011   SpO2 99%   BMI 17.58 kg/m²   LUNGS: Lungs clear   ABDOMEN: Soft nondistended but persistent left lower quadrant pain  WOUNDS: Not applicable  24 HR INTAKE/OUTPUT :   Intake/Output Summary (Last 24 hours) at 7/11/2024 1532  Last data filed at 7/11/2024 1034  Gross per 24 hour   Intake 610 ml   Output --   Net 610 ml     DRAIN/TUBE OUTPUT :      LABS  CBC :   Lab Results   Component Value Date/Time    WBC 4.8 07/11/2024 04:28 AM    HGB 12.3 07/11/2024 04:28 AM    HCT 38.1 07/11/2024 04:28 AM     07/11/2024 04:28 AM     BMP:   Lab Results   Component Value Date/Time     07/11/2024 04:28 AM    K 4.1 07/11/2024 04:28 AM     07/11/2024 04:28 AM    CO2 24 07/11/2024 04:28 AM    BUN 6 07/11/2024 04:28 AM    CREATININE 0.4 07/11/2024 04:28 AM    MG 1.8 07/11/2024 03:00 PM    PHOS 1.6 07/11/2024 03:00 PM       ASSESSMENT  1. Patient with ongoing diverticulitis and basically has been on antibiotic therapy either orally or IV over the last 1 monthPatient has concerns that when she goes off the IV antibiotics her pain will be turned and I concur I believe she has a acute and chronic diverticulitis ongoingAs my note indicated yesterday I think she needs a sigmoid colon resection at some point in timeAfter further discussion the patient would like to go ahead and proceed with samePatient is on steroids she is only been on clear liquids plan at this time is surgical intervention on Saturday morning will begin the

## 2024-07-11 NOTE — PROGRESS NOTES
PICC Procedure Note    Sera Breaux   Admitted- 7/8/2024  8:27 PM  Admission diagnosis- Diverticulitis [K57.92]  Diverticulitis of colon [K57.32]      Attending Physician- Veronica Chowdhury PA-C  Ordering Physician- Pasquale  Indication for Insertion: TPN    Catheter Insertion Date- 7/11/2024   Lot Number- [id number]  Gauge-4  Lumen-dual    Insertion Site- MARCELO Brachial  Vein Circumference- 1.18 cm  Catheter Length- 33 cm  Internal Length- 32 cm  Exposed Catheter Length- 1cm   Upper Arm Circumference- 18cm  Tip Confirmation System Bundle met- Yes  If X-ray required, Tip Location- NA  Radiologist- NA    PICC insertion successful- Yes  Ultrasound- yes    Okay To Use PICC- Yes    Electronically signed by Tasha Borden, RN, RN on 7/11/2024 at 2:29 PM

## 2024-07-12 LAB
ANION GAP SERPL CALC-SCNC: 8 MEQ/L (ref 8–16)
BUN SERPL-MCNC: 6 MG/DL (ref 7–22)
CA-I BLD ISE-SCNC: 1.24 MMOL/L (ref 1.12–1.32)
CALCIUM SERPL-MCNC: 8.5 MG/DL (ref 8.5–10.5)
CHLORIDE SERPL-SCNC: 102 MEQ/L (ref 98–111)
CO2 SERPL-SCNC: 24 MEQ/L (ref 23–33)
CREAT SERPL-MCNC: 0.4 MG/DL (ref 0.4–1.2)
DEPRECATED RDW RBC AUTO: 46.7 FL (ref 35–45)
ERYTHROCYTE [DISTWIDTH] IN BLOOD BY AUTOMATED COUNT: 12.4 % (ref 11.5–14.5)
GFR SERPL CREATININE-BSD FRML MDRD: > 90 ML/MIN/1.73M2
GLUCOSE BLD STRIP.AUTO-MCNC: 104 MG/DL (ref 70–108)
GLUCOSE BLD STRIP.AUTO-MCNC: 123 MG/DL (ref 70–108)
GLUCOSE BLD STRIP.AUTO-MCNC: 91 MG/DL (ref 70–108)
HCT VFR BLD AUTO: 38.7 % (ref 37–47)
HGB BLD-MCNC: 12.5 GM/DL (ref 12–16)
MAGNESIUM SERPL-MCNC: 1.8 MG/DL (ref 1.6–2.4)
MCH RBC QN AUTO: 32.8 PG (ref 26–33)
MCHC RBC AUTO-ENTMCNC: 32.3 GM/DL (ref 32.2–35.5)
MCV RBC AUTO: 101.6 FL (ref 81–99)
PHOSPHATE SERPL-MCNC: 2.5 MG/DL (ref 2.4–4.7)
PHOSPHATE SERPL-MCNC: 3.3 MG/DL (ref 2.4–4.7)
PLATELET # BLD AUTO: 262 THOU/MM3 (ref 130–400)
PMV BLD AUTO: 10.6 FL (ref 9.4–12.4)
POTASSIUM SERPL-SCNC: 3.5 MEQ/L (ref 3.5–5.2)
RBC # BLD AUTO: 3.81 MILL/MM3 (ref 4.2–5.4)
SODIUM SERPL-SCNC: 134 MEQ/L (ref 135–145)
WBC # BLD AUTO: 4.5 THOU/MM3 (ref 4.8–10.8)

## 2024-07-12 PROCEDURE — 82310 ASSAY OF CALCIUM: CPT

## 2024-07-12 PROCEDURE — 6370000000 HC RX 637 (ALT 250 FOR IP)

## 2024-07-12 PROCEDURE — 6360000002 HC RX W HCPCS: Performed by: PHYSICIAN ASSISTANT

## 2024-07-12 PROCEDURE — 85027 COMPLETE CBC AUTOMATED: CPT

## 2024-07-12 PROCEDURE — 99232 SBSQ HOSP IP/OBS MODERATE 35: CPT | Performed by: NURSE PRACTITIONER

## 2024-07-12 PROCEDURE — 2500000003 HC RX 250 WO HCPCS: Performed by: SURGERY

## 2024-07-12 PROCEDURE — 6370000000 HC RX 637 (ALT 250 FOR IP): Performed by: PHYSICIAN ASSISTANT

## 2024-07-12 PROCEDURE — 82374 ASSAY BLOOD CARBON DIOXIDE: CPT

## 2024-07-12 PROCEDURE — 1200000000 HC SEMI PRIVATE

## 2024-07-12 PROCEDURE — 84520 ASSAY OF UREA NITROGEN: CPT

## 2024-07-12 PROCEDURE — 82565 ASSAY OF CREATININE: CPT

## 2024-07-12 PROCEDURE — 84295 ASSAY OF SERUM SODIUM: CPT

## 2024-07-12 PROCEDURE — 84132 ASSAY OF SERUM POTASSIUM: CPT

## 2024-07-12 PROCEDURE — 84100 ASSAY OF PHOSPHORUS: CPT

## 2024-07-12 PROCEDURE — 2580000003 HC RX 258: Performed by: PHYSICIAN ASSISTANT

## 2024-07-12 PROCEDURE — 36415 COLL VENOUS BLD VENIPUNCTURE: CPT

## 2024-07-12 PROCEDURE — 2580000003 HC RX 258: Performed by: SURGERY

## 2024-07-12 PROCEDURE — 82435 ASSAY OF BLOOD CHLORIDE: CPT

## 2024-07-12 PROCEDURE — 6360000002 HC RX W HCPCS: Performed by: SURGERY

## 2024-07-12 PROCEDURE — 82330 ASSAY OF CALCIUM: CPT

## 2024-07-12 PROCEDURE — 83735 ASSAY OF MAGNESIUM: CPT

## 2024-07-12 PROCEDURE — 82948 REAGENT STRIP/BLOOD GLUCOSE: CPT

## 2024-07-12 RX ORDER — INSULIN LISPRO 100 [IU]/ML
0-8 INJECTION, SOLUTION INTRAVENOUS; SUBCUTANEOUS EVERY 6 HOURS
Status: ACTIVE | OUTPATIENT
Start: 2024-07-12

## 2024-07-12 RX ORDER — LEUCINE, PHENYLALANINE, LYSINE, METHIONINE, ISOLEUCINE, VALINE, HISTIDINE, THREONINE, TRYPTOPHAN, ALANINE, GLYCINE, ARGININE, PROLINE, SERINE, TYROSINE, DEXTROSE 365; 280; 290; 200; 300; 290; 240; 210; 90; 1035; 515; 575; 340; 250; 20; 15 MG/100ML; MG/100ML; MG/100ML; MG/100ML; MG/100ML; MG/100ML; MG/100ML; MG/100ML; MG/100ML; MG/100ML; MG/100ML; MG/100ML; MG/100ML; MG/100ML; MG/100ML; G/100ML
2000 INJECTION INTRAVENOUS
Status: DISPENSED | OUTPATIENT
Start: 2024-07-12 | End: 2024-07-12

## 2024-07-12 RX ADMIN — PREDNISONE 2 MG: 1 TABLET ORAL at 08:44

## 2024-07-12 RX ADMIN — POLYETHYLENE GLYCOL 3350 17 G: 17 POWDER, FOR SOLUTION ORAL at 08:50

## 2024-07-12 RX ADMIN — PANTOPRAZOLE SODIUM 40 MG: 40 INJECTION, POWDER, FOR SOLUTION INTRAVENOUS at 13:33

## 2024-07-12 RX ADMIN — MEROPENEM 1000 MG: 1 INJECTION INTRAVENOUS at 01:38

## 2024-07-12 RX ADMIN — MEROPENEM 1000 MG: 1 INJECTION INTRAVENOUS at 09:06

## 2024-07-12 RX ADMIN — LEUCINE, PHENYLALANINE, LYSINE, METHIONINE, ISOLEUCINE, VALINE, HISTIDINE, THREONINE, TRYPTOPHAN, ALANINE, GLYCINE, ARGININE, PROLINE, SERINE, TYROSINE, DEXTROSE 2000 ML: 365; 280; 290; 200; 300; 290; 240; 210; 90; 1035; 515; 575; 340; 250; 20; 15 INJECTION INTRAVENOUS at 01:45

## 2024-07-12 RX ADMIN — SODIUM CHLORIDE: 234 INJECTION, SOLUTION INTRAVENOUS at 18:35

## 2024-07-12 RX ADMIN — Medication 3 MG: at 22:29

## 2024-07-12 RX ADMIN — PREDNISONE 3 MG: 1 TABLET ORAL at 18:25

## 2024-07-12 RX ADMIN — MEROPENEM 1000 MG: 1 INJECTION INTRAVENOUS at 15:48

## 2024-07-12 NOTE — CARE COORDINATION
7/12/24, 2:32 PM EDT    DISCHARGE ON GOING EVALUATION    Sera CHERRY [facility] day: 4  Location: [facility]28/028-A Reason for admit: Diverticulitis [K57.92]  Diverticulitis of colon [K57.32]     Procedures:   7/11 PICC placement   7/15 Plan for OR     Imaging since last note: none     Barriers to Discharge: Hospitalist and General Surgery following. PICC placed yesterday. Started on TPN. Remains on clear liquid diet. Initial plan for OR tomorrow, changed to Monday 7/15 as patient would benefit with TPN and antibiotics over the weekend pre-op. IV merrem q8h. Protonix. Prednisone. Bowel regimen.     PCP: Bernadette Oconnell APRN - CNP  Readmission Risk Score: 15.5    Patient Goals/Plan/Treatment Preferences: Return home w/ . Denies needs.

## 2024-07-12 NOTE — PLAN OF CARE
Problem: Pain  Goal: Verbalizes/displays adequate comfort level or baseline comfort level  Outcome: Progressing  Flowsheets (Taken 7/11/2024 2056)  Verbalizes/displays adequate comfort level or baseline comfort level:   Encourage patient to monitor pain and request assistance   Assess pain using appropriate pain scale   Administer analgesics based on type and severity of pain and evaluate response   Implement non-pharmacological measures as appropriate and evaluate response   Notify Licensed Independent Practitioner if interventions unsuccessful or patient reports new pain     Problem: Skin/Tissue Integrity - Adult  Goal: Skin integrity remains intact  Outcome: Progressing  Flowsheets (Taken 7/11/2024 2056)  Skin Integrity Remains Intact:   Monitor for areas of redness and/or skin breakdown   Assess vascular access sites hourly     Problem: Gastrointestinal - Adult  Goal: Minimal or absence of nausea and vomiting  Outcome: Progressing  Flowsheets (Taken 7/11/2024 2056)  Minimal or absence of nausea and vomiting:   Administer IV fluids as ordered to ensure adequate hydration   Administer ordered antiemetic medications as needed   Provide nonpharmacologic comfort measures as appropriate   Advance diet as tolerated, if ordered   Nutrition consult to assist patient with adequate nutrition and appropriate food choices     Problem: Gastrointestinal - Adult  Goal: Maintains or returns to baseline bowel function  Outcome: Progressing  Flowsheets (Taken 7/11/2024 2056)  Maintains or returns to baseline bowel function:   Assess bowel function   Encourage oral fluids to ensure adequate hydration   Administer IV fluids as ordered to ensure adequate hydration   Administer ordered medications as needed   Encourage mobilization and activity   Nutrition consult to assist patient with appropriate food choices     Problem: Gastrointestinal - Adult  Goal: Maintains adequate nutritional intake  Outcome: Progressing  Flowsheets (Taken

## 2024-07-12 NOTE — PROGRESS NOTES
Pharmacy Consult for TPN Initiation    Assessment:   Indication for TPN: Diverticulitis/Sigmoid Colon resection  IV access: central  Dosing weight: 41 kg    Plan:  Phosphorus resulted 3.3 after replacement.   Since phosphorus is > 2, will initiate Clinimix 5/15 central formulation at a rate of 30 mL/hr to provide 511 kcal per dietary recommendations (~12.5 kcal/kg)  Will initiate medium dose insulin sliding scale Q6H   Patient is not on an PPI or H2 blocker.   Sodium chloride infusion has been stopped.   Will plan to convert to 3-in-1 TPN on 7/12/24 as appropriate.    Electrolyte Replacement:   Sodium phosphate: 15 mmol    BMP, Mag, iCal, & Phos already ordered for tomorrow with AM labs.    Pharmacy will continue to follow. Thank you for the consult.    Tanvi Henderson, PharmD 7/12/2024 12:46 AM

## 2024-07-12 NOTE — PROGRESS NOTES
Hospitalist Progress Note    Patient:  Sera Breaux      Unit/Bed:5K-28/028-A    YOB: 1965    MRN: [id number]       Acct: [id number]     PCP: Bernadette Oconnell APRN - CNP    Date of Admission: 7/8/2024    Assessment/Plan:    Complicated Diverticulitis, progressive and recurrent: CT abdomen /pelvis  done 7/8/24 showed:Uncomplicated, recurrent/progressive sigmoid diverticulitis   It is reported patient indicated  has been on Augmentin for the past month with no improvement.  Merrem  was initiated.  Surgery team following  colon resection with anastomosis  rescheduled for Monday 7/15 Patient on TPN /clear liquid per surgery,surgery planning bowel prep on Sunday seen notes appreciate input    2.History of Migraine headache   Continue home meds    3. Rheumatoid Arthritis  Stable  On Prednisone    4.History of Malignant Melanoma of Skin of L Leg: completed adjuvant Opdivo, follows with oncology for surveillance    5.Malnutrition most likely due to recurrent progressive diverticulitis  Patient currently on TPN/clears   Dietary on consult   Patient for surgery on Monday 7/15         Anticipated Discharge in : TBD    Active Hospital Problems    Diagnosis Date Noted    Severe malnutrition (HCC) [E43] 07/11/2024    Diverticulitis of colon [K57.32] 07/09/2024    Diverticulitis [K57.92] 07/08/2024         Chief Complaint: LLQ pain     Hospital Course: Initial HPI 7/8/2024 per chart review:  Patient presents to the ED with LLQ pain for the past 6 weeks.  Patient has been treated with oral antibiotics for the past one month for persistent LLQ pain with diverticulitis noted on imaging in mid June.  Patient was treated empirically in late May when she developed symptoms of LLQ and loose stools.  Patient continues to have pain and anorexia.  She denies blood in her stool.  She has had weight loss due to reduced appetite.     Patient is admitted for IV antibiotic therapy for persistent diverticulitis.     Subjective:  Rales/Wheezes/Rhonchi.  Cardiovascular: Regular rate and rhythm with normal S1/S2 without murmurs, rubs or gallops.  Abdomen: Soft, non-tender, non-distended with normal bowel sounds.  Musculoskeletal: passive and active ROM x 4 extremities.  Skin: Skin color, texture, turgor normal.  No rashes or lesions.  Neurologic:  Neurovascularly intact without any focal sensory/motor deficits.  Psychiatric: Alert and oriented, thought content appropriate, normal insight  Capillary Refill: Brisk,< 3 seconds   Peripheral Pulses: +2 palpable, equal bilaterally       Labs:   Recent Labs     07/10/24  0436 07/11/24  0428 07/12/24  0555   WBC 8.1 4.8 4.5*   HGB 12.6 12.3 12.5   HCT 38.0 38.1 38.7    291 262     Recent Labs     07/10/24  0436 07/11/24  0428 07/11/24  1500 07/11/24  2327 07/12/24  0555    140  --   --  134*   K 3.8 4.1  --   --  3.5    106  --   --  102   CO2 22* 24  --   --  24   BUN 8 6*  --   --  6*   CREATININE 0.4 0.4  --   --  0.4   CALCIUM 8.6 8.7  --   --  8.5   PHOS  --   --  1.6* 3.3 2.5     No results for input(s): \"AST\", \"ALT\", \"BILIDIR\", \"BILITOT\", \"ALKPHOS\" in the last 72 hours.  No results for input(s): \"INR\" in the last 72 hours.  No results for input(s): \"CKTOTAL\", \"TROPONINI\" in the last 72 hours.    Urinalysis:      Lab Results   Component Value Date/Time    NITRU NEGATIVE 07/08/2024 08:40 PM    WBCUA 0-2 05/29/2023 02:33 PM    BACTERIA NONE SEEN 05/29/2023 02:33 PM    RBCUA NONE SEEN 05/29/2023 02:33 PM    BLOODU NEGATIVE 07/08/2024 08:40 PM    GLUCOSEU NEGATIVE 07/08/2024 08:40 PM       Radiology:  CT ABDOMEN PELVIS W IV CONTRAST Additional Contrast? None   Final Result   Uncomplicated, recurrent/progressive sigmoid diverticulitis as described.      This document has been electronically signed by: Willy Pena MD on    07/08/2024 10:53 PM      All CTs at this facility use dose modulation techniques and iterative    reconstructions, and/or weight-based dosing   when

## 2024-07-12 NOTE — PROGRESS NOTES
Comprehensive Nutrition Assessment    Type and Reason for Visit: Reassess    Nutrition Recommendations/Plan:   Modify TPN from Clinimix 5/15 to custom 3-in-1 TPN with a dosing weight of 41 kg; 15 kcal/kg, 1.2 g pro/kg, and 20% kcals from lipids- spoke with pharmacist.   Continue CLD per surgery- advance diet as GI function allows.   Planning sigmoid colon resection now on 7/15.      Malnutrition Assessment:  Malnutrition Status: Severe malnutrition  Context: Acute Illness       Findings of the 6 clinical characteristics of malnutrition:  Energy Intake:   (Clear liquids 3 days; slight decrease in PO intake on and off this past month)  Weight Loss:  Unable to assess (chronically underweight; only stated weight this admit so far)     Body Fat Loss:  Moderate body fat loss Buccal region, Fat Overlying Ribs, Triceps   Muscle Mass Loss:  Moderate muscle mass loss Temples (temporalis), Scapula (trapezius), Clavicles (pectoralis & deltoids), Thigh (quadriceps), Calf (gastrocnemius)  Fluid Accumulation:  No significant fluid accumulation     Strength:  Not Performed    Nutrition Assessment:    Pt improving from a nutritional standpoint AEB tolerating TPN and CLD well. Pt. severely malnourished AEB criteria listed above.  At risk for further nutritional compromise r/t admit with complicated diverticulitis - progressive and recurrent, migraines, PICC placed 7/11 and initiation of TPN, and underlying medical condition (PMHx: TRINI, RA, melanoma 2018 and 2021, medical marijuana use since 2020).     Nutrition Related Findings:    Pt. Report/Treatments/Miscellaneous:   7/12: Patient seen, sitting up in bed with breakfast tray of clear liquids in front of her. Per patient she has been doing well with the clear liquids and she has been starving. Discussed adjusting TPN today to include fats and meet nutrition needs in preparation for surgery. Patient is happy that surgery was pushed back until Monday so that she can nutritionally

## 2024-07-12 NOTE — PROGRESS NOTES
[facility]   Dr. Kb Giordano MD  Daily Progress Note  Pt Name: Sera Breaux  Medical Record Number: [id number]  Date of Birth 1965   Today's Date: 7/12/2024    HD#3    CHIEF COMPLAINT diverticulitis    SUBJECTIVE  Patient feels better abd pain better    OBJECTIVE  CURRENT VITALS BP (!) 149/83   Pulse 88   Temp 97.3 °F (36.3 °C) (Oral)   Resp 18   Ht 1.524 m (5')   Wt 40.8 kg (90 lb)   LMP 11/06/2011   SpO2 100%   BMI 17.58 kg/m²   LUNGS: Lungs clear   ABDOMEN: soft mild residual LLQ pain  WOUNDS: n/a  24 HR INTAKE/OUTPUT :   Intake/Output Summary (Last 24 hours) at 7/12/2024 0905  Last data filed at 7/12/2024 0419  Gross per 24 hour   Intake 2153.24 ml   Output --   Net 2153.24 ml     DRAIN/TUBE OUTPUT :      LABS  CBC :   Lab Results   Component Value Date/Time    WBC 4.5 07/12/2024 05:55 AM    HGB 12.5 07/12/2024 05:55 AM    HCT 38.7 07/12/2024 05:55 AM     07/12/2024 05:55 AM     BMP:   Lab Results   Component Value Date/Time     07/12/2024 05:55 AM    K 3.5 07/12/2024 05:55 AM     07/12/2024 05:55 AM    CO2 24 07/12/2024 05:55 AM    BUN 6 07/12/2024 05:55 AM    CREATININE 0.4 07/12/2024 05:55 AM    MG 1.8 07/12/2024 05:55 AM    PHOS 2.5 07/12/2024 05:55 AM       ASSESSMENT  1. Pt has TPN ongoing  initial plan was for OR tomorrow but have changed to MON  would benefit with TPN over weeked  + weekend of AB to further dec inflammation discussedwith pt she is on board with OR on Mon       PLAN  1. Cont cl liq/TPN/AB bowel prep on Sun      Kb Giordano MD  Electronically signed 7/12/2024 at 9:05 AM

## 2024-07-12 NOTE — PLAN OF CARE
Problem: Pain  Goal: Verbalizes/displays adequate comfort level or baseline comfort level  7/12/2024 1010 by Bhavna Guzmán RN  Outcome: Progressing  7/12/2024 0419 by Salma Moctezuma RN  Outcome: Progressing  Flowsheets (Taken 7/11/2024 2056)  Verbalizes/displays adequate comfort level or baseline comfort level:   Encourage patient to monitor pain and request assistance   Assess pain using appropriate pain scale   Administer analgesics based on type and severity of pain and evaluate response   Implement non-pharmacological measures as appropriate and evaluate response   Notify Licensed Independent Practitioner if interventions unsuccessful or patient reports new pain   Patient will verbalize pain. PRN medication.     Problem: Skin/Tissue Integrity - Adult  Goal: Skin integrity remains intact  7/12/2024 1010 by Bhavna Guzmán RN  Outcome: Progressing  7/12/2024 0419 by Salma Moctezuma RN  Outcome: Progressing  Flowsheets (Taken 7/11/2024 2056)  Skin Integrity Remains Intact:   Monitor for areas of redness and/or skin breakdown   Assess vascular access sites hourly   Ambulation.     Problem: Gastrointestinal - Adult  Goal: Minimal or absence of nausea and vomiting  7/12/2024 1010 by Bhavna Guzmán RN  Outcome: Progressing  7/12/2024 0419 by Salma Moctezuma RN  Outcome: Progressing  Flowsheets (Taken 7/11/2024 2056)  Minimal or absence of nausea and vomiting:   Administer IV fluids as ordered to ensure adequate hydration   Administer ordered antiemetic medications as needed   Provide nonpharmacologic comfort measures as appropriate   Advance diet as tolerated, if ordered   Nutrition consult to assist patient with adequate nutrition and appropriate food choices  Goal: Maintains or returns to baseline bowel function  7/12/2024 1010 by Bhavna Guzmán RN  Outcome: Progressing  7/12/2024 0419 by Salma Moctezuma RN  Outcome: Progressing  Flowsheets (Taken 7/11/2024 2056)  Maintains or returns to  baseline bowel function:   Assess bowel function   Encourage oral fluids to ensure adequate hydration   Administer IV fluids as ordered to ensure adequate hydration   Administer ordered medications as needed   Encourage mobilization and activity   Nutrition consult to assist patient with appropriate food choices  Goal: Maintains adequate nutritional intake  7/12/2024 1010 by Bhavna Guzmán RN  Outcome: Progressing  7/12/2024 0419 by Salma Moctezuma RN  Outcome: Progressing  Flowsheets (Taken 7/11/2024 2056)  Maintains adequate nutritional intake:   Monitor percentage of each meal consumed   Identify factors contributing to decreased intake, treat as appropriate   Assist with meals as needed   Monitor intake and output, weight and lab values   Obtain nutritional consult as needed   Bowel Movements. PRN bowel medication. TPN infusing.      Problem: Discharge Planning  Goal: Discharge to home or other facility with appropriate resources  7/12/2024 1010 by Bhavna Guzmán RN  Outcome: Progressing  7/12/2024 0419 by Salma Moctezuma RN  Outcome: Progressing  Flowsheets (Taken 7/11/2024 2056)  Discharge to home or other facility with appropriate resources:   Identify barriers to discharge with patient and caregiver   Arrange for needed discharge resources and transportation as appropriate   Identify discharge learning needs (meds, wound care, etc)   Refer to discharge planning if patient needs post-hospital services based on physician order or complex needs related to functional status, cognitive ability or social support system   Discharge home with .     Problem: Safety - Adult  Goal: Free from fall injury  7/12/2024 1010 by Bhavna Guzmán RN  Outcome: Progressing  7/12/2024 0419 by Salma Moctezuma RN  Outcome: Progressing   Patient will use call light appropriately or assistance. Patient belongings are within reach.     Problem: Nutrition Deficit:  Goal: Optimize nutritional status  7/12/2024 1010

## 2024-07-12 NOTE — PROGRESS NOTES
Pharmacy Consult for TPN/PPN Initiation    Indication for TPN: diverticulitis  Ordering Provider: Dr Giordano          IV Access: central  Dosing weight: 41 kg  Current insulin regimen: medium sliding scale  Maintenance fluid: none    Assessment/ Plan:  Begin 3-in-1 TPN today with the ingredients listed below:    Date 7/12/2024   Total kcal/kg 15   Total kcal 615   Protein g/kg 1.2   Protein g 49.2   Protein kcal (4 kcal/g) 196.8   Lipid % 20   Lipid g 12.3   Lipid kcal (10 kcal/g) 123   Dextrose g 86.8   Dextrose kcal (3.4 kcal/g) 295.2   Infusion Rate (mL/hr) 75   Na Acetate (mEq) 40   Na Chloride (mEq) 140   Na Phos (mmol)  (3 mmol = 4 mEq Na) 0   Total Na (mEq) 180   K Acetate (mEq) 30   K Chloride (mEq) 0   K Phos (mmol)  (15 mmol = 22 mEq K) 6   Total K (mEq) 38.8   Ca Gluconate (mEq)  (1 g = 4.65 mEq) 0   Mag Sulfate (mEq)  (1 g = 8.12 mEq) 8.12   Multivitamin (mL) 10   Trace Elements (mL) 1     Electrolyte Replacement:   sodium phosphate: 15 mmol 7/11 @ 1849    Monitoring:  BMP, Mag, iCal, & Phos ordered for tomorrow with AM labs.    Pharmacy will continue to follow daily. Thank you for the consult.    Carmen Singh, PharmD, BCPS  7/12/2024  12:10 PM

## 2024-07-13 LAB
ANION GAP SERPL CALC-SCNC: 11 MEQ/L (ref 8–16)
ANION GAP SERPL CALC-SCNC: 8 MEQ/L (ref 8–16)
BUN SERPL-MCNC: 7 MG/DL (ref 7–22)
BUN SERPL-MCNC: 8 MG/DL (ref 7–22)
CA-I BLD ISE-SCNC: 1.13 MMOL/L (ref 1.12–1.32)
CALCIUM SERPL-MCNC: 7.7 MG/DL (ref 8.5–10.5)
CALCIUM SERPL-MCNC: 8.6 MG/DL (ref 8.5–10.5)
CHLORIDE SERPL-SCNC: 101 MEQ/L (ref 98–111)
CHLORIDE SERPL-SCNC: 107 MEQ/L (ref 98–111)
CO2 SERPL-SCNC: 23 MEQ/L (ref 23–33)
CO2 SERPL-SCNC: 26 MEQ/L (ref 23–33)
CREAT SERPL-MCNC: 0.3 MG/DL (ref 0.4–1.2)
CREAT SERPL-MCNC: 0.3 MG/DL (ref 0.4–1.2)
GFR SERPL CREATININE-BSD FRML MDRD: > 90 ML/MIN/1.73M2
GFR SERPL CREATININE-BSD FRML MDRD: > 90 ML/MIN/1.73M2
GLUCOSE BLD STRIP.AUTO-MCNC: 100 MG/DL (ref 70–108)
GLUCOSE BLD STRIP.AUTO-MCNC: 104 MG/DL (ref 70–108)
GLUCOSE BLD STRIP.AUTO-MCNC: 98 MG/DL (ref 70–108)
GLUCOSE SERPL-MCNC: 401 MG/DL (ref 70–108)
GLUCOSE SERPL-MCNC: 83 MG/DL (ref 70–108)
MAGNESIUM SERPL-MCNC: 2.4 MG/DL (ref 1.6–2.4)
PHOSPHATE SERPL-MCNC: 2.7 MG/DL (ref 2.4–4.7)
POTASSIUM SERPL-SCNC: 4 MEQ/L (ref 3.5–5.2)
POTASSIUM SERPL-SCNC: 5.8 MEQ/L (ref 3.5–5.2)
SODIUM SERPL-SCNC: 135 MEQ/L (ref 135–145)
SODIUM SERPL-SCNC: 141 MEQ/L (ref 135–145)

## 2024-07-13 PROCEDURE — 99232 SBSQ HOSP IP/OBS MODERATE 35: CPT | Performed by: NURSE PRACTITIONER

## 2024-07-13 PROCEDURE — 2500000003 HC RX 250 WO HCPCS: Performed by: PHARMACIST

## 2024-07-13 PROCEDURE — 2580000003 HC RX 258: Performed by: SURGERY

## 2024-07-13 PROCEDURE — 83735 ASSAY OF MAGNESIUM: CPT

## 2024-07-13 PROCEDURE — 84100 ASSAY OF PHOSPHORUS: CPT

## 2024-07-13 PROCEDURE — 80048 BASIC METABOLIC PNL TOTAL CA: CPT

## 2024-07-13 PROCEDURE — 6370000000 HC RX 637 (ALT 250 FOR IP)

## 2024-07-13 PROCEDURE — 6370000000 HC RX 637 (ALT 250 FOR IP): Performed by: PHYSICIAN ASSISTANT

## 2024-07-13 PROCEDURE — 82948 REAGENT STRIP/BLOOD GLUCOSE: CPT

## 2024-07-13 PROCEDURE — 6360000002 HC RX W HCPCS: Performed by: PHARMACIST

## 2024-07-13 PROCEDURE — 6360000002 HC RX W HCPCS: Performed by: PHYSICIAN ASSISTANT

## 2024-07-13 PROCEDURE — 6360000002 HC RX W HCPCS: Performed by: SURGERY

## 2024-07-13 PROCEDURE — 2580000003 HC RX 258: Performed by: PHARMACIST

## 2024-07-13 PROCEDURE — 36415 COLL VENOUS BLD VENIPUNCTURE: CPT

## 2024-07-13 PROCEDURE — 1200000000 HC SEMI PRIVATE

## 2024-07-13 PROCEDURE — 2580000003 HC RX 258: Performed by: PHYSICIAN ASSISTANT

## 2024-07-13 PROCEDURE — 82330 ASSAY OF CALCIUM: CPT

## 2024-07-13 RX ADMIN — PREDNISONE 3 MG: 1 TABLET ORAL at 18:24

## 2024-07-13 RX ADMIN — Medication 3 MG: at 21:46

## 2024-07-13 RX ADMIN — MEROPENEM 1000 MG: 1 INJECTION INTRAVENOUS at 08:17

## 2024-07-13 RX ADMIN — SODIUM CHLORIDE: 234 INJECTION, SOLUTION INTRAVENOUS at 18:29

## 2024-07-13 RX ADMIN — MEROPENEM 1000 MG: 1 INJECTION INTRAVENOUS at 00:07

## 2024-07-13 RX ADMIN — PREDNISONE 2 MG: 1 TABLET ORAL at 08:26

## 2024-07-13 RX ADMIN — PANTOPRAZOLE SODIUM 40 MG: 40 INJECTION, POWDER, FOR SOLUTION INTRAVENOUS at 08:25

## 2024-07-13 RX ADMIN — MEROPENEM 1000 MG: 1 INJECTION INTRAVENOUS at 16:31

## 2024-07-13 NOTE — PROGRESS NOTES
Pharmacy Consult for TPN/PPN Monitoring & Adjustment  IV Access: central      Dosing weight: 41 kg  Current insulin regimen: medium sliding scale  Diet (excluding TPN): NPO  Maintenance fluid: none    Plan:  See components of tonight's TPN bag in the table below:  Date 7/12/2024 7/13/24   Total kcal/kg 15 15   Total kcal 615 615   Protein g/kg 1.2 1.2   Protein g 49.2 49.2   Protein kcal (4 kcal/g) 196.8 196.8   Lipid % 20 20   Lipid g 12.3 12.3   Lipid kcal (10 kcal/g) 123 123   Dextrose g 86.8 86.8   Dextrose kcal (3.4 kcal/g) 295.2 295.2   Infusion Rate (mL/hr) 75 75   Na Acetate (mEq) 40 40   Na Chloride (mEq) 140 140   Na Phos (mmol)  (3 mmol = 4 mEq Na) 0 0   Total Na (mEq) 180 180   K Acetate (mEq) 30 30   K Chloride (mEq) 0 0   K Phos (mmol)  (15 mmol = 22 mEq K) 6 6   Total K (mEq) 38.8 38.8   Ca Gluconate (mEq)  (1 g = 4.65 mEq) 0 0   Mag Sulfate (mEq)  (1 g = 8.12 mEq) 8.12 8.12   Multivitamin (mL) 10 10   Trace Elements (mL) 1 1     Summary of changes for tonight's TPN: No changes    Electrolyte Replacement:   None    Monitoring:  BMP, Mag, iCal, & Phos ordered for tomorrow with AM labs.    Pharmacy will continue to follow daily. Thank you for the consult.  Shirley Cooley [facility] BCPS  7/13/2024 8:35 AM

## 2024-07-13 NOTE — PLAN OF CARE
Problem: Pain  Goal: Verbalizes/displays adequate comfort level or baseline comfort level  Outcome: Progressing  Flowsheets (Taken 7/13/2024 1403)  Verbalizes/displays adequate comfort level or baseline comfort level:   Encourage patient to monitor pain and request assistance   Administer analgesics based on type and severity of pain and evaluate response   Assess pain using appropriate pain scale  Note: Patient denies pain at this time. PRN Tylenol.      Problem: Gastrointestinal - Adult  Goal: Minimal or absence of nausea and vomiting  Outcome: Progressing  Flowsheets (Taken 7/13/2024 1403)  Minimal or absence of nausea and vomiting:   Administer IV fluids as ordered to ensure adequate hydration   Maintain NPO status until nausea and vomiting are resolved  Note: Clear liquid diet     Problem: Skin/Tissue Integrity - Adult  Goal: Skin integrity remains intact  Outcome: Progressing   Patient repositions every 2 hours. Heels elevated off of bed. Skin kept clean and dry. Skin assessed with every head to toe. Gopal scale complete. Will continue to monitor.        Problem: Discharge Planning  Goal: Discharge to home or other facility with appropriate resources  Outcome: Progressing  Flowsheets (Taken 7/13/2024 1403)  Discharge to home or other facility with appropriate resources:   Identify barriers to discharge with patient and caregiver   Identify discharge learning needs (meds, wound care, etc)   Arrange for needed discharge resources and transportation as appropriate  Note: From home with . Will need return of bowel function and tolerate diet prior to going home after surgery.      Problem: Safety - Adult  Goal: Free from fall injury  Outcome: Progressing  Flowsheets (Taken 7/13/2024 1403)  Free From Fall Injury:   Instruct family/caregiver on patient safety   Based on caregiver fall risk screen, instruct family/caregiver to ask for assistance with transferring infant if caregiver noted to have fall risk

## 2024-07-13 NOTE — PROGRESS NOTES
[facility]   Dr. Kb Giordano MD  Daily Progress Note  Pt Name: Sera Breaux  Medical Record Number: [id number]  Date of Birth 1965   Today's Date: 7/13/2024    HD#4    CHIEF COMPLAINTProgressive diverticulitis    SUBJECTIVE  Patient feels Better    OBJECTIVE  CURRENT VITALS /74   Pulse 82   Temp 98.1 °F (36.7 °C) (Oral)   Resp 16   Ht 1.524 m (5')   Wt 40.8 kg (90 lb)   LMP 11/06/2011   SpO2 99%   BMI 17.58 kg/m²   LUNGS: Lungs clear   ABDOMEN: Soft bowel sounds positive  WOUNDS: Not applicable  24 HR INTAKE/OUTPUT :   Intake/Output Summary (Last 24 hours) at 7/13/2024 1625  Last data filed at 7/13/2024 0400  Gross per 24 hour   Intake 2021.91 ml   Output --   Net 2021.91 ml     DRAIN/TUBE OUTPUT :      LABS  CBC :   Lab Results   Component Value Date/Time    WBC 4.5 07/12/2024 05:55 AM    HGB 12.5 07/12/2024 05:55 AM    HCT 38.7 07/12/2024 05:55 AM     07/12/2024 05:55 AM     BMP:   Lab Results   Component Value Date/Time     07/13/2024 07:00 AM    K 4.0 07/13/2024 07:00 AM    K 3.5 07/12/2024 05:55 AM     07/13/2024 07:00 AM    CO2 26 07/13/2024 07:00 AM    BUN 8 07/13/2024 07:00 AM    CREATININE 0.3 07/13/2024 07:00 AM    MG 2.4 07/13/2024 05:45 AM    PHOS 2.7 07/13/2024 05:45 AM       ASSESSMENT  1. Chronic diverticulitis with acute component plan on surgery Monday morning sigmoid colon resection will begin bowel prep tomorrow      PLAN  1. As above      Kb Giordano MD  Electronically signed 7/13/2024 at 4:25 PM

## 2024-07-13 NOTE — PROGRESS NOTES
Hospitalist Progress Note    Patient:  Sera Breaux      Unit/Bed:5K-28/028-A    YOB: 1965    MRN: [id number]       Acct: [id number]     PCP: Bernadette Oconnell APRN - CNP    Date of Admission: 7/8/2024    Assessment/Plan:    Complicated Diverticulitis, progressive and recurrent: CT abdomen /pelvis  done 7/8/24 showed:Uncomplicated, recurrent/progressive sigmoid diverticulitis   It is reported patient indicated  has been on Augmentin for the past month with no improvement.  Merrem  was initiated.  Surgery team following  colon resection with anastomosis  rescheduled for Monday 7/15 Patient on TPN /clear liquid per surgery,surgery planning bowel prep on Sunday seen notes appreciate input    7/13  patient denies abdominal pain, but states gets mild abdominal cramp following BM.No nausea reported    2.History of Migraine headache   Continue home meds    3. Rheumatoid Arthritis  Stable  On Prednisone    4.History of Malignant Melanoma of Skin of L Leg: completed adjuvant Opdivo, follows with oncology for surveillance    5.Malnutrition most likely due to recurrent progressive diverticulitis  Patient currently on TPN/clears   Dietary on consult   Patient for surgery on Monday 7/15         Anticipated Discharge in : TBD    Active Hospital Problems    Diagnosis Date Noted    Severe malnutrition (HCC) [E43] 07/11/2024    Diverticulitis of colon [K57.32] 07/09/2024    Diverticulitis [K57.92] 07/08/2024         Chief Complaint: LLQ pain     Hospital Course: Initial HPI 7/8/2024 per chart review:  Patient presents to the ED with LLQ pain for the past 6 weeks.  Patient has been treated with oral antibiotics for the past one month for persistent LLQ pain with diverticulitis noted on imaging in mid June.  Patient was treated empirically in late May when she developed symptoms of LLQ and loose stools.  Patient continues to have pain and anorexia.  She denies blood in her stool.  She has had weight loss due to reduced    reconstructions, and/or weight-based dosing   when appropriate to reduce radiation to a low as reasonably achievable.          Diet: ADULT DIET; Clear Liquid  PN-Adult  3 IN 1 Central Line (Custom)  PN-Adult  3 IN 1 Central Line (Custom)    DVT prophylaxis: [] Lovenox                                 [x] SCDs                                 [] SQ Heparin                                 [] Encourage ambulation           [] Already on Anticoagulation     Disposition:    [] Home       [] TCU       [] Rehab       [] Psych       [] SNF       [] Long Term Care Facility       [x] Other Pending progress    Code Status: Full Code    PT/OT Eval Status:         Electronically signed by JALEESA Barth CNP on 7/13/2024 at 11:49 AM

## 2024-07-14 VITALS
SYSTOLIC BLOOD PRESSURE: 128 MMHG | BODY MASS INDEX: 17.67 KG/M2 | OXYGEN SATURATION: 98 % | TEMPERATURE: 99 F | WEIGHT: 90 LBS | HEART RATE: 77 BPM | DIASTOLIC BLOOD PRESSURE: 79 MMHG | HEIGHT: 60 IN | RESPIRATION RATE: 16 BRPM

## 2024-07-14 LAB
ANION GAP SERPL CALC-SCNC: 7 MEQ/L (ref 8–16)
BASOPHILS ABSOLUTE: 0.1 THOU/MM3 (ref 0–0.1)
BASOPHILS NFR BLD AUTO: 1.3 %
BUN SERPL-MCNC: 10 MG/DL (ref 7–22)
CA-I BLD ISE-SCNC: 1.23 MMOL/L (ref 1.12–1.32)
CALCIUM SERPL-MCNC: 8.5 MG/DL (ref 8.5–10.5)
CHLORIDE SERPL-SCNC: 104 MEQ/L (ref 98–111)
CO2 SERPL-SCNC: 27 MEQ/L (ref 23–33)
CREAT SERPL-MCNC: 0.3 MG/DL (ref 0.4–1.2)
DEPRECATED RDW RBC AUTO: 44.7 FL (ref 35–45)
EOSINOPHIL NFR BLD AUTO: 2.5 %
EOSINOPHILS ABSOLUTE: 0.1 THOU/MM3 (ref 0–0.4)
ERYTHROCYTE [DISTWIDTH] IN BLOOD BY AUTOMATED COUNT: 12.2 % (ref 11.5–14.5)
GFR SERPL CREATININE-BSD FRML MDRD: > 90 ML/MIN/1.73M2
GLUCOSE BLD STRIP.AUTO-MCNC: 111 MG/DL (ref 70–108)
GLUCOSE BLD STRIP.AUTO-MCNC: 113 MG/DL (ref 70–108)
GLUCOSE BLD STRIP.AUTO-MCNC: 138 MG/DL (ref 70–108)
GLUCOSE BLD STRIP.AUTO-MCNC: 92 MG/DL (ref 70–108)
GLUCOSE SERPL-MCNC: 80 MG/DL (ref 70–108)
HCT VFR BLD AUTO: 40.1 % (ref 37–47)
HGB BLD-MCNC: 13.4 GM/DL (ref 12–16)
IMM GRANULOCYTES # BLD AUTO: 0.01 THOU/MM3 (ref 0–0.07)
IMM GRANULOCYTES NFR BLD AUTO: 0.2 %
LYMPHOCYTES ABSOLUTE: 1.8 THOU/MM3 (ref 1–4.8)
LYMPHOCYTES NFR BLD AUTO: 37.6 %
MAGNESIUM SERPL-MCNC: 2 MG/DL (ref 1.6–2.4)
MCH RBC QN AUTO: 32.9 PG (ref 26–33)
MCHC RBC AUTO-ENTMCNC: 33.4 GM/DL (ref 32.2–35.5)
MCV RBC AUTO: 98.5 FL (ref 81–99)
MONOCYTES ABSOLUTE: 0.4 THOU/MM3 (ref 0.4–1.3)
MONOCYTES NFR BLD AUTO: 9.2 %
NEUTROPHILS ABSOLUTE: 2.4 THOU/MM3 (ref 1.8–7.7)
NEUTROPHILS NFR BLD AUTO: 49.2 %
NRBC BLD AUTO-RTO: 0 /100 WBC
PHOSPHATE SERPL-MCNC: 2.1 MG/DL (ref 2.4–4.7)
PLATELET # BLD AUTO: 289 THOU/MM3 (ref 130–400)
PMV BLD AUTO: 10.5 FL (ref 9.4–12.4)
POTASSIUM SERPL-SCNC: 3.7 MEQ/L (ref 3.5–5.2)
RBC # BLD AUTO: 4.07 MILL/MM3 (ref 4.2–5.4)
SODIUM SERPL-SCNC: 138 MEQ/L (ref 135–145)
WBC # BLD AUTO: 4.8 THOU/MM3 (ref 4.8–10.8)

## 2024-07-14 PROCEDURE — 99232 SBSQ HOSP IP/OBS MODERATE 35: CPT | Performed by: NURSE PRACTITIONER

## 2024-07-14 PROCEDURE — 2500000003 HC RX 250 WO HCPCS: Performed by: PHARMACIST

## 2024-07-14 PROCEDURE — 84100 ASSAY OF PHOSPHORUS: CPT

## 2024-07-14 PROCEDURE — 82330 ASSAY OF CALCIUM: CPT

## 2024-07-14 PROCEDURE — 6360000002 HC RX W HCPCS: Performed by: SURGERY

## 2024-07-14 PROCEDURE — 82948 REAGENT STRIP/BLOOD GLUCOSE: CPT

## 2024-07-14 PROCEDURE — 2580000003 HC RX 258: Performed by: PHYSICIAN ASSISTANT

## 2024-07-14 PROCEDURE — 36415 COLL VENOUS BLD VENIPUNCTURE: CPT

## 2024-07-14 PROCEDURE — 2580000003 HC RX 258: Performed by: SURGERY

## 2024-07-14 PROCEDURE — 1200000000 HC SEMI PRIVATE

## 2024-07-14 PROCEDURE — 6360000002 HC RX W HCPCS: Performed by: PHYSICIAN ASSISTANT

## 2024-07-14 PROCEDURE — 6370000000 HC RX 637 (ALT 250 FOR IP): Performed by: SURGERY

## 2024-07-14 PROCEDURE — 83735 ASSAY OF MAGNESIUM: CPT

## 2024-07-14 PROCEDURE — 6370000000 HC RX 637 (ALT 250 FOR IP)

## 2024-07-14 PROCEDURE — 6360000002 HC RX W HCPCS: Performed by: PHARMACIST

## 2024-07-14 PROCEDURE — 80048 BASIC METABOLIC PNL TOTAL CA: CPT

## 2024-07-14 PROCEDURE — 85025 COMPLETE CBC W/AUTO DIFF WBC: CPT

## 2024-07-14 PROCEDURE — 2580000003 HC RX 258: Performed by: PHARMACIST

## 2024-07-14 RX ORDER — DOCUSATE SODIUM 100 MG/1
400 CAPSULE, LIQUID FILLED ORAL ONCE
Status: COMPLETED | OUTPATIENT
Start: 2024-07-14 | End: 2024-07-14

## 2024-07-14 RX ORDER — POLYETHYLENE GLYCOL 3350 17 G/17G
238 POWDER, FOR SOLUTION ORAL ONCE
Status: COMPLETED | OUTPATIENT
Start: 2024-07-14 | End: 2024-07-14

## 2024-07-14 RX ORDER — POLYETHYLENE GLYCOL 3350 17 G/17G
338 POWDER, FOR SOLUTION ORAL ONCE
Status: DISCONTINUED | OUTPATIENT
Start: 2024-07-14 | End: 2024-07-14

## 2024-07-14 RX ADMIN — MEROPENEM 1000 MG: 1 INJECTION INTRAVENOUS at 00:06

## 2024-07-14 RX ADMIN — POLYETHYLENE GLYCOL 3350 238 G: 17 POWDER, FOR SOLUTION ORAL at 17:13

## 2024-07-14 RX ADMIN — POTASSIUM PHOSPHATE, MONOBASIC POTASSIUM PHOSPHATE, DIBASIC 10 MMOL: 224; 236 INJECTION, SOLUTION, CONCENTRATE INTRAVENOUS at 12:59

## 2024-07-14 RX ADMIN — DOCUSATE SODIUM 400 MG: 100 CAPSULE, LIQUID FILLED ORAL at 15:35

## 2024-07-14 RX ADMIN — PREDNISONE 2 MG: 1 TABLET ORAL at 10:15

## 2024-07-14 RX ADMIN — SODIUM CHLORIDE: 234 INJECTION, SOLUTION INTRAVENOUS at 18:02

## 2024-07-14 RX ADMIN — PREDNISONE 3 MG: 1 TABLET ORAL at 18:06

## 2024-07-14 RX ADMIN — MEROPENEM 1000 MG: 1 INJECTION INTRAVENOUS at 15:35

## 2024-07-14 RX ADMIN — PANTOPRAZOLE SODIUM 40 MG: 40 INJECTION, POWDER, FOR SOLUTION INTRAVENOUS at 07:46

## 2024-07-14 RX ADMIN — MEROPENEM 1000 MG: 1 INJECTION INTRAVENOUS at 07:44

## 2024-07-14 ASSESSMENT — PAIN SCALES - GENERAL: PAINLEVEL_OUTOF10: 0

## 2024-07-14 NOTE — PLAN OF CARE
Problem: Pain  Goal: Verbalizes/displays adequate comfort level or baseline comfort level  7/14/2024 0234 by Kathia Marques RN  Outcome: Progressing  Flowsheets (Taken 7/13/2024 1403 by Mellisa Recinos RN)  Verbalizes/displays adequate comfort level or baseline comfort level:   Encourage patient to monitor pain and request assistance   Administer analgesics based on type and severity of pain and evaluate response   Assess pain using appropriate pain scale  7/13/2024 1403 by Mellisa Recinos RN  Outcome: Progressing  Flowsheets (Taken 7/13/2024 1403)  Verbalizes/displays adequate comfort level or baseline comfort level:   Encourage patient to monitor pain and request assistance   Administer analgesics based on type and severity of pain and evaluate response   Assess pain using appropriate pain scale  Note: Patient denies pain at this time. PRN Tylenol.      Problem: Skin/Tissue Integrity - Adult  Goal: Skin integrity remains intact  7/14/2024 0234 by Kathia Marques RN  Outcome: Progressing  Flowsheets (Taken 7/12/2024 1925 by Salma Moctezuma RN)  Skin Integrity Remains Intact:   Monitor for areas of redness and/or skin breakdown   Assess vascular access sites hourly  7/13/2024 1403 by Mellisa Recinos RN  Outcome: Progressing     Problem: Gastrointestinal - Adult  Goal: Minimal or absence of nausea and vomiting  7/14/2024 0234 by Kathia Marques RN  Outcome: Progressing  Flowsheets (Taken 7/13/2024 1403 by Mellisa Recinos RN)  Minimal or absence of nausea and vomiting:   Administer IV fluids as ordered to ensure adequate hydration   Maintain NPO status until nausea and vomiting are resolved  7/13/2024 1403 by Mellisa Recinos RN  Outcome: Progressing  Flowsheets (Taken 7/13/2024 1403)  Minimal or absence of nausea and vomiting:   Administer IV fluids as ordered to ensure adequate hydration   Maintain NPO status until nausea and vomiting are resolved  Note: Clear liquid diet  Goal: Maintains or returns to

## 2024-07-14 NOTE — PROGRESS NOTES
Pharmacy Consult for TPN/PPN Monitoring & Adjustment  IV Access: central      Dosing weight: 41 kg  Current insulin regimen: medium sliding scale  Diet (excluding TPN): NPO  Maintenance fluid: none    Plan:  See components of tonight's TPN bag in the table below:  Date 7/12/2024 7/13/24 7/14/24   Total kcal/kg 15 15 15   Total kcal 615 615 615   Protein g/kg 1.2 1.2 1.2   Protein g 49.2 49.2 49.2   Protein kcal (4 kcal/g) 196.8 196.8 196.8   Lipid % 20 20 20   Lipid g 12.3 12.3 12.3   Lipid kcal (10 kcal/g) 123 123 123   Dextrose g 86.8 86.8 86.8   Dextrose kcal (3.4 kcal/g) 295.2 295.2 295.2   Infusion Rate (mL/hr) 75 75 75   Na Acetate (mEq) 40 40 40   Na Chloride (mEq) 140 140 160   Na Phos (mmol)  (3 mmol = 4 mEq Na) 0 0 0   Total Na (mEq) 180 180 200   K Acetate (mEq) 30 30 40   K Chloride (mEq) 0 0 0   K Phos (mmol)  (15 mmol = 22 mEq K) 6 6 12   Total K (mEq) 38.8 38.8 57.6   Ca Gluconate (mEq)  (1 g = 4.65 mEq) 0 0 0   Mag Sulfate (mEq)  (1 g = 8.12 mEq) 8.12 8.12 8.12   Multivitamin (mL) 10 10 10   Trace Elements (mL) 1 1 1     Summary of changes for tonight's TPN:   Increase NaCl to 160mEq  Increase KPhos to 12mmol  Increase KAcetate to 40mEq    Electrolyte Replacement:   Potassium phosphate: 10 mmol x1    Monitoring:  BMP, Mag, iCal, & Phos ordered for tomorrow with AM labs.    Pharmacy will continue to follow daily. Thank you for the consult.  Shirley Cooley [facility] BCPS  7/14/2024 7:34 AM

## 2024-07-14 NOTE — H&P
[facility]  History and Physical Update      Pt Name: Sera Breaux  MRN: [id number]  YOB: 1965  Date of evaluation: 7/14/2024    [x] I have examined the patient and reviewed the H&P/Consult and there are no changes to the patient or plans.    [] I have examined the patient and reviewed the H&P/Consult and have noted the following changes:        Kb Giordano MD  Electronically signed 7/14/2024 at 5:20 PM

## 2024-07-14 NOTE — PROGRESS NOTES
[facility]   Dr. Kb Giordano MD  Daily Progress Note  Pt Name: Sera Breaux  Medical Record Number: [id number]  Date of Birth 1965   Today's Date: 7/14/2024    HD#5    CHIEF COMPLAINTPersistent diverticulitis    SUBJECTIVE  Patient feels well.    OBJECTIVE  CURRENT VITALS /65   Pulse 81   Temp 98.3 °F (36.8 °C) (Oral)   Resp 16   Ht 1.524 m (5')   Wt 40.8 kg (90 lb)   LMP 11/06/2011   SpO2 99%   BMI 17.58 kg/m²   LUNGS: Lungs clear   ABDOMEN: Soft mild left lower quadrant pain  WOUNDS: Not applicable  24 HR INTAKE/OUTPUT :   Intake/Output Summary (Last 24 hours) at 7/14/2024 1718  Last data filed at 7/14/2024 1459  Gross per 24 hour   Intake 3778.9 ml   Output --   Net 3778.9 ml     DRAIN/TUBE OUTPUT :      LABS  CBC :   Lab Results   Component Value Date/Time    WBC 4.8 07/14/2024 10:24 AM    HGB 13.4 07/14/2024 10:24 AM    HCT 40.1 07/14/2024 10:24 AM     07/14/2024 10:24 AM     BMP:   Lab Results   Component Value Date/Time     07/14/2024 06:00 AM    K 3.7 07/14/2024 06:00 AM    K 3.5 07/12/2024 05:55 AM     07/14/2024 06:00 AM    CO2 27 07/14/2024 06:00 AM    BUN 10 07/14/2024 06:00 AM    CREATININE 0.3 07/14/2024 06:00 AM    MG 2.0 07/14/2024 06:00 AM    PHOS 2.1 07/14/2024 06:00 AM       ASSESSMENT  1. Patient with chronic with acute onset diverticulitis patient is scheduled for sigmoid colon resection tomorrow we have discussed the case with the patient extensivelyWill begin bowel prep this afternoonWe discussed the potential for anastomotic leak again due to the procedure itself and the fact she is on steroids chronicallyProceed with exploration tomorrow we also will remove the appendix discussed with the patient      PLAN  1. As above      Kb Giordano MD  Electronically signed 7/14/2024 at 5:18 PM

## 2024-07-14 NOTE — PROGRESS NOTES
Hospitalist Progress Note    Patient:  Sera Breaux      Unit/Bed:5K-28/028-A    YOB: 1965    MRN: [id number]       Acct: [id number]     PCP: Bernadette Oconnell APRN - CNP    Date of Admission: 7/8/2024    Assessment/Plan:    Complicated Diverticulitis, progressive and recurrent: CT abdomen /pelvis  done 7/8/24 showed:Uncomplicated, recurrent/progressive sigmoid diverticulitis   It is reported patient indicated  has been on Augmentin for the past month with no improvement.  Merrem  was initiated.  Surgery team following  colon resection with anastomosis  rescheduled for Monday 7/15 Patient on TPN /clear liquid per surgery,surgery planning bowel prep on Sunday seen notes appreciate input    7/13  patient denies abdominal pain, but states gets mild abdominal cramp following BM.No nausea reported    7/14 TPN in progress  patient for surgery tomorrow    2.History of Migraine headache   Continue home meds    3. Rheumatoid Arthritis  Stable  On Prednisone    4.History of Malignant Melanoma of Skin of L Leg: completed adjuvant Opdivo, follows with oncology for surveillance    5.Malnutrition most likely due to recurrent progressive diverticulitis  Patient currently on TPN/clears   Dietary on consult   Patient for surgery on Monday 7/15         Anticipated Discharge in : TBD    Active Hospital Problems    Diagnosis Date Noted    Severe malnutrition (HCC) [E43] 07/11/2024    Diverticulitis of colon [K57.32] 07/09/2024    Diverticulitis [K57.92] 07/08/2024         Chief Complaint: LLQ pain     Hospital Course: Initial HPI 7/8/2024 per chart review:  Patient presents to the ED with LLQ pain for the past 6 weeks.  Patient has been treated with oral antibiotics for the past one month for persistent LLQ pain with diverticulitis noted on imaging in mid June.  Patient was treated empirically in late May when she developed symptoms of LLQ and loose stools.  Patient continues to have pain and anorexia.  She denies blood

## 2024-07-15 ENCOUNTER — ANESTHESIA (OUTPATIENT)
Dept: OPERATING ROOM | Age: 59
End: 2024-07-15
Payer: COMMERCIAL

## 2024-07-15 ENCOUNTER — ANESTHESIA EVENT (OUTPATIENT)
Dept: OPERATING ROOM | Age: 59
End: 2024-07-15
Payer: COMMERCIAL

## 2024-07-15 LAB
ANION GAP SERPL CALC-SCNC: 10 MEQ/L (ref 8–16)
BUN SERPL-MCNC: 11 MG/DL (ref 7–22)
CA-I BLD ISE-SCNC: 1.19 MMOL/L (ref 1.12–1.32)
CALCIUM SERPL-MCNC: 9.2 MG/DL (ref 8.5–10.5)
CHLORIDE SERPL-SCNC: 104 MEQ/L (ref 98–111)
CO2 SERPL-SCNC: 26 MEQ/L (ref 23–33)
CREAT SERPL-MCNC: 0.3 MG/DL (ref 0.4–1.2)
GFR SERPL CREATININE-BSD FRML MDRD: > 90 ML/MIN/1.73M2
GLUCOSE BLD STRIP.AUTO-MCNC: 167 MG/DL (ref 70–108)
GLUCOSE BLD STRIP.AUTO-MCNC: 95 MG/DL (ref 70–108)
GLUCOSE BLD STRIP.AUTO-MCNC: 96 MG/DL (ref 70–108)
GLUCOSE SERPL-MCNC: 85 MG/DL (ref 70–108)
MAGNESIUM SERPL-MCNC: 2.1 MG/DL (ref 1.6–2.4)
PHOSPHATE SERPL-MCNC: 2.4 MG/DL (ref 2.4–4.7)
POTASSIUM SERPL-SCNC: 4.2 MEQ/L (ref 3.5–5.2)
SODIUM SERPL-SCNC: 140 MEQ/L (ref 135–145)

## 2024-07-15 PROCEDURE — 6360000002 HC RX W HCPCS: Performed by: NURSE ANESTHETIST, CERTIFIED REGISTERED

## 2024-07-15 PROCEDURE — 88304 TISSUE EXAM BY PATHOLOGIST: CPT

## 2024-07-15 PROCEDURE — 2580000003 HC RX 258: Performed by: SURGERY

## 2024-07-15 PROCEDURE — 2580000003 HC RX 258: Performed by: NURSE ANESTHETIST, CERTIFIED REGISTERED

## 2024-07-15 PROCEDURE — 6360000002 HC RX W HCPCS: Performed by: PHYSICIAN ASSISTANT

## 2024-07-15 PROCEDURE — 2500000003 HC RX 250 WO HCPCS: Performed by: NURSE ANESTHETIST, CERTIFIED REGISTERED

## 2024-07-15 PROCEDURE — 7100000000 HC PACU RECOVERY - FIRST 15 MIN: Performed by: SURGERY

## 2024-07-15 PROCEDURE — 0DTJ0ZZ RESECTION OF APPENDIX, OPEN APPROACH: ICD-10-PCS | Performed by: SURGERY

## 2024-07-15 PROCEDURE — 84100 ASSAY OF PHOSPHORUS: CPT

## 2024-07-15 PROCEDURE — 82330 ASSAY OF CALCIUM: CPT

## 2024-07-15 PROCEDURE — 0DTN0ZZ RESECTION OF SIGMOID COLON, OPEN APPROACH: ICD-10-PCS | Performed by: SURGERY

## 2024-07-15 PROCEDURE — 3700000000 HC ANESTHESIA ATTENDED CARE: Performed by: SURGERY

## 2024-07-15 PROCEDURE — 88307 TISSUE EXAM BY PATHOLOGIST: CPT

## 2024-07-15 PROCEDURE — 6360000002 HC RX W HCPCS

## 2024-07-15 PROCEDURE — 1200000000 HC SEMI PRIVATE

## 2024-07-15 PROCEDURE — 36415 COLL VENOUS BLD VENIPUNCTURE: CPT

## 2024-07-15 PROCEDURE — 6360000002 HC RX W HCPCS: Performed by: SURGERY

## 2024-07-15 PROCEDURE — 83735 ASSAY OF MAGNESIUM: CPT

## 2024-07-15 PROCEDURE — 3600000014 HC SURGERY LEVEL 4 ADDTL 15MIN: Performed by: SURGERY

## 2024-07-15 PROCEDURE — 6360000002 HC RX W HCPCS: Performed by: ANESTHESIOLOGY

## 2024-07-15 PROCEDURE — 99232 SBSQ HOSP IP/OBS MODERATE 35: CPT | Performed by: NURSE PRACTITIONER

## 2024-07-15 PROCEDURE — 3600000004 HC SURGERY LEVEL 4 BASE: Performed by: SURGERY

## 2024-07-15 PROCEDURE — 7100000001 HC PACU RECOVERY - ADDTL 15 MIN: Performed by: SURGERY

## 2024-07-15 PROCEDURE — 2580000003 HC RX 258: Performed by: PHYSICIAN ASSISTANT

## 2024-07-15 PROCEDURE — 82948 REAGENT STRIP/BLOOD GLUCOSE: CPT

## 2024-07-15 PROCEDURE — 0DTP0ZZ RESECTION OF RECTUM, OPEN APPROACH: ICD-10-PCS | Performed by: SURGERY

## 2024-07-15 PROCEDURE — 80048 BASIC METABOLIC PNL TOTAL CA: CPT

## 2024-07-15 PROCEDURE — 2720000010 HC SURG SUPPLY STERILE: Performed by: SURGERY

## 2024-07-15 PROCEDURE — 2500000003 HC RX 250 WO HCPCS: Performed by: SURGERY

## 2024-07-15 PROCEDURE — 2709999900 HC NON-CHARGEABLE SUPPLY: Performed by: SURGERY

## 2024-07-15 PROCEDURE — 3700000001 HC ADD 15 MINUTES (ANESTHESIA): Performed by: SURGERY

## 2024-07-15 RX ORDER — PROPOFOL 10 MG/ML
INJECTION, EMULSION INTRAVENOUS CONTINUOUS PRN
Status: DISCONTINUED | OUTPATIENT
Start: 2024-07-15 | End: 2024-07-15 | Stop reason: SDUPTHER

## 2024-07-15 RX ORDER — FENTANYL CITRATE 50 UG/ML
INJECTION, SOLUTION INTRAMUSCULAR; INTRAVENOUS PRN
Status: DISCONTINUED | OUTPATIENT
Start: 2024-07-15 | End: 2024-07-15 | Stop reason: SDUPTHER

## 2024-07-15 RX ORDER — SODIUM CHLORIDE 9 MG/ML
INJECTION, SOLUTION INTRAVENOUS PRN
Status: DISCONTINUED | OUTPATIENT
Start: 2024-07-15 | End: 2024-07-15 | Stop reason: HOSPADM

## 2024-07-15 RX ORDER — GLYCOPYRROLATE 0.2 MG/ML
INJECTION INTRAMUSCULAR; INTRAVENOUS PRN
Status: DISCONTINUED | OUTPATIENT
Start: 2024-07-15 | End: 2024-07-15 | Stop reason: SDUPTHER

## 2024-07-15 RX ORDER — ONDANSETRON 2 MG/ML
INJECTION INTRAMUSCULAR; INTRAVENOUS PRN
Status: DISCONTINUED | OUTPATIENT
Start: 2024-07-15 | End: 2024-07-15 | Stop reason: SDUPTHER

## 2024-07-15 RX ORDER — DROPERIDOL 2.5 MG/ML
0.62 INJECTION, SOLUTION INTRAMUSCULAR; INTRAVENOUS ONCE
Status: COMPLETED | OUTPATIENT
Start: 2024-07-15 | End: 2024-07-15

## 2024-07-15 RX ORDER — SODIUM CHLORIDE 0.9 % (FLUSH) 0.9 %
5-40 SYRINGE (ML) INJECTION EVERY 12 HOURS SCHEDULED
Status: DISCONTINUED | OUTPATIENT
Start: 2024-07-15 | End: 2024-07-15 | Stop reason: HOSPADM

## 2024-07-15 RX ORDER — NALOXONE HYDROCHLORIDE 0.4 MG/ML
INJECTION, SOLUTION INTRAMUSCULAR; INTRAVENOUS; SUBCUTANEOUS PRN
Status: DISCONTINUED | OUTPATIENT
Start: 2024-07-15 | End: 2024-07-15 | Stop reason: HOSPADM

## 2024-07-15 RX ORDER — LABETALOL HYDROCHLORIDE 5 MG/ML
10 INJECTION INTRAVENOUS
Status: DISCONTINUED | OUTPATIENT
Start: 2024-07-15 | End: 2024-07-15 | Stop reason: HOSPADM

## 2024-07-15 RX ORDER — DEXAMETHASONE SODIUM PHOSPHATE 10 MG/ML
INJECTION, EMULSION INTRAMUSCULAR; INTRAVENOUS PRN
Status: DISCONTINUED | OUTPATIENT
Start: 2024-07-15 | End: 2024-07-15 | Stop reason: SDUPTHER

## 2024-07-15 RX ORDER — KETAMINE HCL IN NACL, ISO-OSM 100MG/10ML
SYRINGE (ML) INJECTION CONTINUOUS PRN
Status: DISCONTINUED | OUTPATIENT
Start: 2024-07-15 | End: 2024-07-15 | Stop reason: SDUPTHER

## 2024-07-15 RX ORDER — SODIUM CHLORIDE 0.9 % (FLUSH) 0.9 %
5-40 SYRINGE (ML) INJECTION PRN
Status: DISCONTINUED | OUTPATIENT
Start: 2024-07-15 | End: 2024-07-15 | Stop reason: HOSPADM

## 2024-07-15 RX ORDER — FENTANYL CITRATE 50 UG/ML
INJECTION, SOLUTION INTRAMUSCULAR; INTRAVENOUS
Status: COMPLETED
Start: 2024-07-15 | End: 2024-07-15

## 2024-07-15 RX ORDER — FENTANYL CITRATE 50 UG/ML
50 INJECTION, SOLUTION INTRAMUSCULAR; INTRAVENOUS EVERY 5 MIN PRN
Status: DISCONTINUED | OUTPATIENT
Start: 2024-07-15 | End: 2024-07-15 | Stop reason: HOSPADM

## 2024-07-15 RX ORDER — SODIUM CHLORIDE, SODIUM LACTATE, POTASSIUM CHLORIDE, CALCIUM CHLORIDE 600; 310; 30; 20 MG/100ML; MG/100ML; MG/100ML; MG/100ML
INJECTION, SOLUTION INTRAVENOUS CONTINUOUS PRN
Status: DISCONTINUED | OUTPATIENT
Start: 2024-07-15 | End: 2024-07-15 | Stop reason: SDUPTHER

## 2024-07-15 RX ORDER — ROCURONIUM BROMIDE 10 MG/ML
INJECTION, SOLUTION INTRAVENOUS PRN
Status: DISCONTINUED | OUTPATIENT
Start: 2024-07-15 | End: 2024-07-15 | Stop reason: SDUPTHER

## 2024-07-15 RX ORDER — ONDANSETRON 2 MG/ML
4 INJECTION INTRAMUSCULAR; INTRAVENOUS
Status: DISCONTINUED | OUTPATIENT
Start: 2024-07-15 | End: 2024-07-15 | Stop reason: HOSPADM

## 2024-07-15 RX ORDER — HYDRALAZINE HYDROCHLORIDE 20 MG/ML
10 INJECTION INTRAMUSCULAR; INTRAVENOUS
Status: DISCONTINUED | OUTPATIENT
Start: 2024-07-15 | End: 2024-07-15 | Stop reason: HOSPADM

## 2024-07-15 RX ORDER — LIDOCAINE HCL/PF 100 MG/5ML
SYRINGE (ML) INJECTION PRN
Status: DISCONTINUED | OUTPATIENT
Start: 2024-07-15 | End: 2024-07-15 | Stop reason: SDUPTHER

## 2024-07-15 RX ORDER — MIDAZOLAM HYDROCHLORIDE 1 MG/ML
INJECTION INTRAMUSCULAR; INTRAVENOUS PRN
Status: DISCONTINUED | OUTPATIENT
Start: 2024-07-15 | End: 2024-07-15 | Stop reason: SDUPTHER

## 2024-07-15 RX ORDER — DROPERIDOL 2.5 MG/ML
INJECTION, SOLUTION INTRAMUSCULAR; INTRAVENOUS
Status: COMPLETED
Start: 2024-07-15 | End: 2024-07-15

## 2024-07-15 RX ADMIN — SODIUM CHLORIDE, PRESERVATIVE FREE 10 ML: 5 INJECTION INTRAVENOUS at 21:19

## 2024-07-15 RX ADMIN — ROCURONIUM BROMIDE 10 MG: 10 INJECTION INTRAVENOUS at 11:03

## 2024-07-15 RX ADMIN — SODIUM CHLORIDE, POTASSIUM CHLORIDE, SODIUM LACTATE AND CALCIUM CHLORIDE: 600; 310; 30; 20 INJECTION, SOLUTION INTRAVENOUS at 09:25

## 2024-07-15 RX ADMIN — MIDAZOLAM 1 MG: 1 INJECTION INTRAMUSCULAR; INTRAVENOUS at 09:25

## 2024-07-15 RX ADMIN — ROCURONIUM BROMIDE 50 MG: 10 INJECTION INTRAVENOUS at 09:29

## 2024-07-15 RX ADMIN — HYDROMORPHONE HYDROCHLORIDE 0.5 MG: 1 INJECTION, SOLUTION INTRAMUSCULAR; INTRAVENOUS; SUBCUTANEOUS at 19:06

## 2024-07-15 RX ADMIN — FENTANYL CITRATE 50 MCG: 50 INJECTION, SOLUTION INTRAMUSCULAR; INTRAVENOUS at 12:10

## 2024-07-15 RX ADMIN — PROPOFOL 80 MG: 10 INJECTION, EMULSION INTRAVENOUS at 09:29

## 2024-07-15 RX ADMIN — HYDROMORPHONE HYDROCHLORIDE 0.5 MG: 1 INJECTION, SOLUTION INTRAMUSCULAR; INTRAVENOUS; SUBCUTANEOUS at 14:58

## 2024-07-15 RX ADMIN — HYDROMORPHONE HYDROCHLORIDE 0.5 MG: 1 INJECTION, SOLUTION INTRAMUSCULAR; INTRAVENOUS; SUBCUTANEOUS at 17:09

## 2024-07-15 RX ADMIN — Medication 15 MG: at 10:45

## 2024-07-15 RX ADMIN — HYDROMORPHONE HYDROCHLORIDE 0.5 MG: 1 INJECTION, SOLUTION INTRAMUSCULAR; INTRAVENOUS; SUBCUTANEOUS at 23:14

## 2024-07-15 RX ADMIN — FENTANYL CITRATE 25 MCG: 50 INJECTION, SOLUTION INTRAMUSCULAR; INTRAVENOUS at 11:10

## 2024-07-15 RX ADMIN — FENTANYL CITRATE 25 MCG: 50 INJECTION, SOLUTION INTRAMUSCULAR; INTRAVENOUS at 11:33

## 2024-07-15 RX ADMIN — DEXAMETHASONE SODIUM PHOSPHATE 4 MG: 10 INJECTION, EMULSION INTRAMUSCULAR; INTRAVENOUS at 09:45

## 2024-07-15 RX ADMIN — MIDAZOLAM 2 MG: 1 INJECTION INTRAMUSCULAR; INTRAVENOUS at 10:37

## 2024-07-15 RX ADMIN — FENTANYL CITRATE 50 MCG: 50 INJECTION, SOLUTION INTRAMUSCULAR; INTRAVENOUS at 12:03

## 2024-07-15 RX ADMIN — Medication 20 MG: at 09:29

## 2024-07-15 RX ADMIN — Medication 15 MG: at 10:00

## 2024-07-15 RX ADMIN — HYDROMORPHONE HYDROCHLORIDE 0.5 MG: 1 INJECTION, SOLUTION INTRAMUSCULAR; INTRAVENOUS; SUBCUTANEOUS at 21:13

## 2024-07-15 RX ADMIN — ONDANSETRON 4 MG: 2 INJECTION INTRAMUSCULAR; INTRAVENOUS at 10:56

## 2024-07-15 RX ADMIN — FENTANYL CITRATE 25 MCG: 50 INJECTION, SOLUTION INTRAMUSCULAR; INTRAVENOUS at 10:32

## 2024-07-15 RX ADMIN — Medication 40 MG: at 09:29

## 2024-07-15 RX ADMIN — DROPERIDOL 0.62 MG: 2.5 INJECTION, SOLUTION INTRAMUSCULAR; INTRAVENOUS at 11:59

## 2024-07-15 RX ADMIN — HYDROMORPHONE HYDROCHLORIDE 0.5 MG: 1 INJECTION INTRAMUSCULAR; INTRAVENOUS; SUBCUTANEOUS at 11:58

## 2024-07-15 RX ADMIN — FENTANYL CITRATE 25 MCG: 50 INJECTION, SOLUTION INTRAMUSCULAR; INTRAVENOUS at 10:38

## 2024-07-15 RX ADMIN — PROPOFOL 30 MG: 10 INJECTION, EMULSION INTRAVENOUS at 10:12

## 2024-07-15 RX ADMIN — MIDAZOLAM 1 MG: 1 INJECTION INTRAMUSCULAR; INTRAVENOUS at 10:14

## 2024-07-15 RX ADMIN — ONDANSETRON 4 MG: 2 INJECTION INTRAMUSCULAR; INTRAVENOUS at 14:58

## 2024-07-15 RX ADMIN — MEROPENEM 1000 MG: 1 INJECTION INTRAVENOUS at 09:33

## 2024-07-15 RX ADMIN — FENTANYL CITRATE 50 MCG: 50 INJECTION, SOLUTION INTRAMUSCULAR; INTRAVENOUS at 10:16

## 2024-07-15 RX ADMIN — ROCURONIUM BROMIDE 20 MG: 10 INJECTION INTRAVENOUS at 10:16

## 2024-07-15 RX ADMIN — GLYCOPYRROLATE 0.2 MG: 0.2 INJECTION INTRAMUSCULAR; INTRAVENOUS at 10:01

## 2024-07-15 RX ADMIN — SODIUM CHLORIDE, PRESERVATIVE FREE 40 MG: 5 INJECTION INTRAVENOUS at 14:57

## 2024-07-15 RX ADMIN — SODIUM CHLORIDE: 234 INJECTION, SOLUTION INTRAVENOUS at 17:26

## 2024-07-15 RX ADMIN — PROPOFOL 30 MG: 10 INJECTION, EMULSION INTRAVENOUS at 10:15

## 2024-07-15 RX ADMIN — FENTANYL CITRATE 50 MCG: 50 INJECTION, SOLUTION INTRAMUSCULAR; INTRAVENOUS at 09:29

## 2024-07-15 RX ADMIN — HYDROMORPHONE HYDROCHLORIDE 0.5 MG: 1 INJECTION, SOLUTION INTRAMUSCULAR; INTRAVENOUS; SUBCUTANEOUS at 13:08

## 2024-07-15 RX ADMIN — SODIUM CHLORIDE: 9 INJECTION, SOLUTION INTRAVENOUS at 09:25

## 2024-07-15 RX ADMIN — SUGAMMADEX 100 MG: 100 INJECTION, SOLUTION INTRAVENOUS at 11:35

## 2024-07-15 RX ADMIN — MEROPENEM 1000 MG: 1 INJECTION INTRAVENOUS at 00:09

## 2024-07-15 RX ADMIN — HYDROMORPHONE HYDROCHLORIDE 0.5 MG: 1 INJECTION INTRAMUSCULAR; INTRAVENOUS; SUBCUTANEOUS at 12:15

## 2024-07-15 RX ADMIN — PROPOFOL 125 MCG/KG/MIN: 10 INJECTION, EMULSION INTRAVENOUS at 09:30

## 2024-07-15 RX ADMIN — MEROPENEM 1000 MG: 1 INJECTION INTRAVENOUS at 17:21

## 2024-07-15 NOTE — PROGRESS NOTES
Pharmacy Consult for TPN/PPN Monitoring & Adjustment  IV Access: central      Dosing weight: 41 kg  Current insulin regimen: medium sliding scale  Diet (excluding TPN): NPO  Maintenance fluid: none    Plan:  See components of tonight's TPN bag in the table below:  Date 7/12/2024 7/13/24 7/14/24 7/15/24   Total kcal/kg 15 15 15 20   Total kcal 615 615 615 820   Protein g/kg 1.2 1.2 1.2 1.2   Protein g 49.2 49.2 49.2 49.2   Protein kcal (4 kcal/g) 196.8 196.8 196.8 196.8   Lipid % 20 20 20 30   Lipid g 12.3 12.3 12.3 24.6   Lipid kcal (10 kcal/g) 123 123 123 246   Dextrose g 86.8 86.8 86.8 110.9   Dextrose kcal (3.4 kcal/g) 295.2 295.2 295.2 377.2   Infusion Rate (mL/hr) 75 75 75 75   Na Acetate (mEq) 40 40 40 40   Na Chloride (mEq) 140 140 160 160   Na Phos (mmol)  (3 mmol = 4 mEq Na) 0 0 0 0   Total Na (mEq) 180 180 200 200   K Acetate (mEq) 30 30 40 40   K Chloride (mEq) 0 0 0 0   K Phos (mmol)  (15 mmol = 22 mEq K) 6 6 12 12   Total K (mEq) 38.8 38.8 57.6 57.6   Ca Gluconate (mEq)  (1 g = 4.65 mEq) 0 0 0 0   Mag Sulfate (mEq)  (1 g = 8.12 mEq) 8.12 8.12 8.12 8.12   Multivitamin (mL) 10 10 10 10   Trace Elements (mL) 1 1 1 1     Summary of changes for tonight's TPN:   Increase to 20 kcal/kg total, 1.2 g/kg protein, and 30% lipids per dietary  No electrolyte changes tonight     Electrolyte Replacement:   None    Monitoring:  BMP, Mag, iCal, & Phos ordered for tomorrow with AM labs.    Pharmacy will continue to follow daily. Thank you for the consult.    Demond Guzmán, JamesD, BCPS  7/15/2024  11:25 AM

## 2024-07-15 NOTE — ANESTHESIA POSTPROCEDURE EVALUATION
Department of Anesthesiology  Postprocedure Note    Patient: Sera Breaux  MRN: [id number]  YOB: 1965  Date of evaluation: 7/15/2024    Procedure Summary       Date: 07/15/24 Room / Location: [facility] OR [address] [place] [address] OR    Anesthesia Start: 0925 Anesthesia Stop: 1201    Procedure: LOWER ANTERIOR RESECTION, INCIDENTIAL APPENDECTOMY Diagnosis: Diverticulitis    Surgeons: Kb Giordano MD Responsible Provider: Omar Rizo MD    Anesthesia Type: general, TIVA ASA Status: 3            Anesthesia Type: No value filed.    José Phase I: José Score: 9    José Phase II:      Anesthesia Post Evaluation    Patient location during evaluation: PACU  Patient participation: complete - patient participated  Level of consciousness: awake and alert  Airway patency: patent  Nausea & Vomiting: no nausea  Cardiovascular status: blood pressure returned to baseline and hemodynamically stable  Respiratory status: acceptable and spontaneous ventilation  Hydration status: euvolemic  Pain management: adequate    No notable events documented.

## 2024-07-15 NOTE — PROGRESS NOTES
Comprehensive Nutrition Assessment    Type and Reason for Visit:  Reassess    Nutrition Recommendations/Plan:   TPN changes for next bag - increase kcals to 20/kgm, lipids to provide 30% of total kcals; continue with 1.2 gm protein/kgm and 41 kgm dosing wt.  Diet vs NPO per surgery.  Will monitor need for additional nutrition interventions.      Malnutrition Assessment:  Malnutrition Status:  Severe malnutrition (07/11/24 1501)    Context:  Acute Illness     Findings of the 6 clinical characteristics of malnutrition:  Energy Intake:   (Clear liquids 3 days; slight decrease in PO intake on and off this past month)  Weight Loss:  Unable to assess (chronically underweight; only stated weight this admit so far)     Body Fat Loss:  Moderate body fat loss Buccal region, Fat Overlying Ribs, Triceps   Muscle Mass Loss:  Moderate muscle mass loss Temples (temporalis), Scapula (trapezius), Clavicles (pectoralis & deltoids), Thigh (quadriceps), Calf (gastrocnemius)  Fluid Accumulation:  No significant fluid accumulation     Strength:  Not Performed    Nutrition Assessment:     Pt. severely malnourished AEB criteria listed above.  At risk for further nutritional compromise r/t admit with complicated diverticulitis - progressive and recurrent, migraines, PICC placed 7/11 and initiation of TPN, and underlying medical condition (PMHx: TRINI, RA, melanoma 2018 and 2021, medical marijuana use since 2020).     Nutrition Related Findings:    Pt. Report/Treatments/Miscellaneous:   7/15: attempted to see pt - off unit in surgery (colon resection), discussed PN changes w/ pharmacy  7/12: Patient seen, sitting up in bed with breakfast tray of clear liquids in front of her. Per patient she has been doing well with the clear liquids and she has been starving. Discussed adjusting TPN today to include fats and meet nutrition needs in preparation for surgery. Patient is happy that surgery was pushed back until Monday so that she can

## 2024-07-15 NOTE — PLAN OF CARE
Problem: Pain  Goal: Verbalizes/displays adequate comfort level or baseline comfort level  Outcome: Progressing   Pain Assessment: None - Denies Pain  Pain Level: 0   Patient's Stated Pain Goal: 3   Is pain goal met at this time?  Yes          Problem: Skin/Tissue Integrity - Adult  Goal: Skin integrity remains intact  Outcome: Progressing   Skin assessment completed.  Patient turned every 2 hours and as needed.  No skin breakdown this shift.      Problem: Gastrointestinal - Adult  Goal: Minimal or absence of nausea and vomiting  Outcome: Progressing     Problem: Gastrointestinal - Adult  Goal: Maintains or returns to baseline bowel function  Outcome: Progressing     Problem: Gastrointestinal - Adult  Goal: Maintains adequate nutritional intake  Outcome: Progressing     Problem: Musculoskeletal - Adult  Goal: Return mobility to safest level of function  Outcome: Progressing     Problem: Discharge Planning  Goal: Discharge to home or other facility with appropriate resources  Outcome: Progressing     Problem: Safety - Adult  Goal: Free from fall injury  Outcome: Progressing   All fall precautions in place. Bed in low position, alarm activated and appropriate use of call light.      Problem: Nutrition Deficit:  Goal: Optimize nutritional status  Outcome: Progressing     Care plan reviewed with patient. Patient verbalizes understanding with the plan of care.

## 2024-07-15 NOTE — BRIEF OP NOTE
Brief Postoperative Note      Patient: Sera Breaux  YOB: 1965  MRN: [id number]    Date of Procedure: 7/15/2024    Pre-Op Diagnosis Codes:     * Diverticulitis [K57.92]    Post-Op Diagnosis: same       Procedure(s):  LOWER ANTERIOR RESECTION, INCIDENTIAL APPENDECTOMY    Surgeon(s):  Kb Giordano MD    Assistant:  First Assistant: Laura Ramírez    Anesthesia: General    Estimated Blood Loss (mL): 75 ml    Complications: none    Specimens:   ID Type Source Tests Collected by Time Destination   A : Appendix Tissue Appendix SURGICAL PATHOLOGY Kb Giordano MD 7/15/2024 1007    B : RECTO SIGMOID Tissue Recto sigmoid SURGICAL PATHOLOGY Kb Giordano MD 7/15/2024 1043        Implants:        Drains:   NG/OG/NJ/NE Tube Orogastric 18 fr Center mouth (Active)       Urinary Catheter 07/15/24 (Active)       Findings:  Infection Present At Time Of Surgery (PATOS) (choose all levels that have infection present):  No infection present  Other Findings: see op note  This procedure was not performed to treat colon cancer through resection    Electronically signed by Kb Giordano MD on 7/15/2024 at 11:46 AM

## 2024-07-15 NOTE — ANESTHESIA PRE PROCEDURE
Department of Anesthesiology  Preprocedure Note       Name:  Sera Breaux   Age:  59 y.o.  :  1965                                          MRN:  [id number]         Date:  7/15/2024      Surgeon: Surgeon(s):  Kb Giordano MD    Procedure: Sigmoid Colon Resection    Medications prior to admission:   Prior to Admission medications    Medication Sig Start Date End Date Taking? Authorizing Provider   medical marijuana Take 1 each by mouth as needed (sleep and pain).    Deana Beaver MD   turmeric 500 MG CAPS Take by mouth daily    Deana Beaver MD   Boswellia Martha (BOSWELLIA PO) Take 500 mg by mouth    Deana Beaver MD   Calcium-Magnesium 500-250 MG TABS Take by mouth    Deana Beaver MD   Potassium 99 MG TABS Take by mouth    Deana Beaver MD   Krill Oil 1000 MG CAPS Take 2,000 mg by mouth    Deana Beaver MD   Aloe Vera 25 MG CAPS Take by mouth    Deana Beaver MD   predniSONE (DELTASONE) 1 MG tablet Take 1 tablet by mouth daily Takes 2 tabs in am and 3 tabs in pm    Deana Beaver MD   ibuprofen (ADVIL;MOTRIN) 600 MG tablet Take 1 tablet by mouth 3 times daily as needed for Pain 4/3/21   Faraz Rider MD   rizatriptan (MAXALT) 10 MG tablet Take 1 tablet by mouth as needed    Deana Beaver MD   famciclovir (FAMVIR) 500 MG tablet Take 1 tablet by mouth 3 times daily as needed (for shingles outbreak)    Deana Beaver MD   OLIVE LEAF PO Take by mouth daily     Deana Beaver MD   Glucosamine-Chondroitin (JOINT SUPPORT PO) Take 1 tablet by mouth daily     Deana Beaver MD   therapeutic multivitamin-minerals (THERAGRAN-M) tablet Take 1 tablet by mouth daily    Deana Beaver MD   calcium carbonate-vitamin D 600-200 MG-UNIT TABS Take 1 tablet by mouth daily     Deana Beaver MD   MAGNESIUM ACETATE Take 400 mg by mouth nightly For bowels    Deana Beaver MD   Probiotic Product (PROBIOTIC

## 2024-07-15 NOTE — OP NOTE
[place] [id number]                            OPERATIVE REPORT      PATIENT NAME: JUSTIN CUNNINGHAM                  : 1965  MED REC NO: [id number]                       ROOM: [facility]                                               (General) POOL                                               RM    ACCOUNT NO: [id number]                       ADMIT DATE: 2024  PROVIDER: Kb Giordano MD      DATE OF PROCEDURE:  07/15/2024    SURGEON:  Kb Giordano MD    PREOPERATIVE DIAGNOSIS:  Persistent sigmoid diverticulitis.    POSTOPERATIVE DIAGNOSIS:  Actual upper rectal diverticulitis.    OPERATIONS:    1. Low anterior resection.  2. Incidental appendectomy.    ANESTHESIA:  General.    COMPLICATIONS:  None.    BLOOD LOSS:  75 mL.    INDICATIONS FOR PROCEDURE:  Patient is a 59-year-old female who basically has ongoing diverticulitis and has had several episodes over years.  She is here for colon resection.    FINDINGS:  The patient actually had more upper rectal diverticulitis with the upper rectum actually folded down and plastered against the back wall of the sacrum.  Dissection was somewhat difficult, but actually a low anterior resection was performed and the appendix was removed incidentally.  The uterus was small.  Ovaries were bilaterally present and atrophic.  Otherwise, normal exploration.  Descending colon was palpated, had a little bit of a looping of the transverse colon, but otherwise rest of the colon was normal.    DESCRIPTION OF PROCEDURE:  Patient was brought to the operating suite, placed supine on the operating room table.  After adequate inhalational anesthesia was administered, the patient was placed up in stirrups and the abdomen and perineal area were prepped and draped in usual sterile fashion, and a Ga catheter was placed.  An incision was made in the midline, taken down from the pubic tubercle up just to

## 2024-07-15 NOTE — PROGRESS NOTES
1155 pt arrived to PACU, awakens to voice. Abdominal site CDI with 1 SOPHIE drain in place. Ga in place, draining clear yellow urine. VSS  1158 pt screaming out in pain, medicated with 0.5 mg dilaudid  1159 pt nauseated, medicated with 0.625 mg droperidol  1203 no change in pain status, medicated with 50 mcg fentanyl  1208 pt resting, resp easy. VSS  1210 reported off to Ananth LUCIA

## 2024-07-15 NOTE — PROGRESS NOTES
Hospitalist Progress Note    Patient:  Sera Breaux      Unit/Bed:5K-28/028-A    YOB: 1965    MRN: [id number]       Acct: [id number]     PCP: Bernadette Oconnell APRN - CNP    Date of Admission: 7/8/2024    Assessment/Plan:    Complicated Diverticulitis, progressive and recurrent: CT abdomen /pelvis  done 7/8/24 showed:Uncomplicated, recurrent/progressive sigmoid diverticulitis   It is reported patient indicated  has been on Augmentin for the past month with no improvement.  Merrem  was initiated.  Surgery team following  colon resection with anastomosis  rescheduled for Monday 7/15 Patient on TPN /clear liquid per surgery,surgery planning bowel prep on Sunday seen notes appreciate input    7/13  patient denies abdominal pain, but states gets mild abdominal cramp following BM.No nausea reported    7/14 TPN in progress  patient for surgery tomorrow    7/15 Surgery following patient went to OR today is s/p Low anterior resection.   Incidental appendectomy    2.History of Migraine headache   Continue home meds    3. Rheumatoid Arthritis  Stable  On Prednisone    4.History of Malignant Melanoma of Skin of L Leg: completed adjuvant Opdivo, follows with oncology for surveillance    5.Malnutrition most likely due to recurrent progressive diverticulitis  Patient currently on TPN/clears   Dietary on consult   Patient for surgery on Monday 7/15         Anticipated Discharge in : TBD    Active Hospital Problems    Diagnosis Date Noted    Severe malnutrition (HCC) [E43] 07/11/2024    Diverticulitis of colon [K57.32] 07/09/2024    Diverticulitis [K57.92] 07/08/2024         Chief Complaint: LLQ pain     Hospital Course: Initial HPI 7/8/2024 per chart review:  Patient presents to the ED with LLQ pain for the past 6 weeks.  Patient has been treated with oral antibiotics for the past one month for persistent LLQ pain with diverticulitis noted on imaging in mid June.  Patient was treated empirically in late May when she

## 2024-07-15 NOTE — PROGRESS NOTES
1210- pt care received from Dana LUCIA, pt intermittently groaning out in pain then resting down with eyes closed, resp easy and unlabored, VSS, pt appears in no acute distress, fentanyl given  1215- dilaudid given  1225- pt resting in bed with eyes closed, resp easy and unlabored, VSS, pt appears in no acute distress  1226- report called to Johana LUCIA on 5K  1230- pt's  not in waiting room  1235- pt meets criteria for discharge from pacu  1239- pt transported to floor instable condition

## 2024-07-16 LAB
ANION GAP SERPL CALC-SCNC: 10 MEQ/L (ref 8–16)
BUN SERPL-MCNC: 14 MG/DL (ref 7–22)
CA-I BLD ISE-SCNC: 1.2 MMOL/L (ref 1.12–1.32)
CALCIUM SERPL-MCNC: 8.7 MG/DL (ref 8.5–10.5)
CHLORIDE SERPL-SCNC: 102 MEQ/L (ref 98–111)
CO2 SERPL-SCNC: 25 MEQ/L (ref 23–33)
CREAT SERPL-MCNC: 0.3 MG/DL (ref 0.4–1.2)
DEPRECATED RDW RBC AUTO: 43.5 FL (ref 35–45)
ERYTHROCYTE [DISTWIDTH] IN BLOOD BY AUTOMATED COUNT: 12.3 % (ref 11.5–14.5)
GFR SERPL CREATININE-BSD FRML MDRD: > 90 ML/MIN/1.73M2
GLUCOSE BLD STRIP.AUTO-MCNC: 106 MG/DL (ref 70–108)
GLUCOSE BLD STRIP.AUTO-MCNC: 112 MG/DL (ref 70–108)
GLUCOSE BLD STRIP.AUTO-MCNC: 120 MG/DL (ref 70–108)
GLUCOSE BLD STRIP.AUTO-MCNC: 125 MG/DL (ref 70–108)
GLUCOSE BLD STRIP.AUTO-MCNC: 139 MG/DL (ref 70–108)
GLUCOSE SERPL-MCNC: 118 MG/DL (ref 70–108)
HCT VFR BLD AUTO: 39.6 % (ref 37–47)
HGB BLD-MCNC: 13 GM/DL (ref 12–16)
MAGNESIUM SERPL-MCNC: 1.8 MG/DL (ref 1.6–2.4)
MCH RBC QN AUTO: 31.9 PG (ref 26–33)
MCHC RBC AUTO-ENTMCNC: 32.8 GM/DL (ref 32.2–35.5)
MCV RBC AUTO: 97.1 FL (ref 81–99)
PHOSPHATE SERPL-MCNC: 2.2 MG/DL (ref 2.4–4.7)
PLATELET # BLD AUTO: 292 THOU/MM3 (ref 130–400)
PMV BLD AUTO: 10.7 FL (ref 9.4–12.4)
POTASSIUM SERPL-SCNC: 4 MEQ/L (ref 3.5–5.2)
RBC # BLD AUTO: 4.08 MILL/MM3 (ref 4.2–5.4)
SODIUM SERPL-SCNC: 137 MEQ/L (ref 135–145)
WBC # BLD AUTO: 9.6 THOU/MM3 (ref 4.8–10.8)

## 2024-07-16 PROCEDURE — 2580000003 HC RX 258: Performed by: SURGERY

## 2024-07-16 PROCEDURE — 36415 COLL VENOUS BLD VENIPUNCTURE: CPT

## 2024-07-16 PROCEDURE — 6370000000 HC RX 637 (ALT 250 FOR IP): Performed by: SURGERY

## 2024-07-16 PROCEDURE — 84100 ASSAY OF PHOSPHORUS: CPT

## 2024-07-16 PROCEDURE — 85027 COMPLETE CBC AUTOMATED: CPT

## 2024-07-16 PROCEDURE — 99232 SBSQ HOSP IP/OBS MODERATE 35: CPT | Performed by: NURSE PRACTITIONER

## 2024-07-16 PROCEDURE — 80048 BASIC METABOLIC PNL TOTAL CA: CPT

## 2024-07-16 PROCEDURE — 1200000000 HC SEMI PRIVATE

## 2024-07-16 PROCEDURE — 83735 ASSAY OF MAGNESIUM: CPT

## 2024-07-16 PROCEDURE — 6360000002 HC RX W HCPCS: Performed by: SURGERY

## 2024-07-16 PROCEDURE — 2500000003 HC RX 250 WO HCPCS: Performed by: SURGERY

## 2024-07-16 PROCEDURE — 82330 ASSAY OF CALCIUM: CPT

## 2024-07-16 PROCEDURE — 82948 REAGENT STRIP/BLOOD GLUCOSE: CPT

## 2024-07-16 RX ADMIN — PREDNISONE 2 MG: 1 TABLET ORAL at 08:36

## 2024-07-16 RX ADMIN — HYDROMORPHONE HYDROCHLORIDE 0.5 MG: 1 INJECTION, SOLUTION INTRAMUSCULAR; INTRAVENOUS; SUBCUTANEOUS at 03:21

## 2024-07-16 RX ADMIN — MEROPENEM 1000 MG: 1 INJECTION INTRAVENOUS at 08:59

## 2024-07-16 RX ADMIN — SODIUM CHLORIDE, PRESERVATIVE FREE 10 ML: 5 INJECTION INTRAVENOUS at 11:12

## 2024-07-16 RX ADMIN — SODIUM PHOSPHATE, MONOBASIC, MONOHYDRATE AND SODIUM PHOSPHATE, DIBASIC, ANHYDROUS 15 MMOL: 142; 276 INJECTION, SOLUTION INTRAVENOUS at 12:43

## 2024-07-16 RX ADMIN — MEROPENEM 1000 MG: 1 INJECTION INTRAVENOUS at 23:49

## 2024-07-16 RX ADMIN — HYDROMORPHONE HYDROCHLORIDE 0.5 MG: 1 INJECTION, SOLUTION INTRAMUSCULAR; INTRAVENOUS; SUBCUTANEOUS at 08:24

## 2024-07-16 RX ADMIN — HYDROMORPHONE HYDROCHLORIDE 0.5 MG: 1 INJECTION, SOLUTION INTRAMUSCULAR; INTRAVENOUS; SUBCUTANEOUS at 18:52

## 2024-07-16 RX ADMIN — SODIUM CHLORIDE, PRESERVATIVE FREE 10 ML: 5 INJECTION INTRAVENOUS at 08:24

## 2024-07-16 RX ADMIN — HYDROMORPHONE HYDROCHLORIDE 0.5 MG: 1 INJECTION, SOLUTION INTRAMUSCULAR; INTRAVENOUS; SUBCUTANEOUS at 11:12

## 2024-07-16 RX ADMIN — SODIUM CHLORIDE, PRESERVATIVE FREE 10 ML: 5 INJECTION INTRAVENOUS at 08:35

## 2024-07-16 RX ADMIN — HYDROMORPHONE HYDROCHLORIDE 0.5 MG: 1 INJECTION, SOLUTION INTRAMUSCULAR; INTRAVENOUS; SUBCUTANEOUS at 13:45

## 2024-07-16 RX ADMIN — HYDROMORPHONE HYDROCHLORIDE 0.5 MG: 1 INJECTION, SOLUTION INTRAMUSCULAR; INTRAVENOUS; SUBCUTANEOUS at 23:12

## 2024-07-16 RX ADMIN — SODIUM CHLORIDE: 234 INJECTION, SOLUTION INTRAVENOUS at 19:00

## 2024-07-16 RX ADMIN — ONDANSETRON 4 MG: 2 INJECTION INTRAMUSCULAR; INTRAVENOUS at 18:40

## 2024-07-16 RX ADMIN — MEROPENEM 1000 MG: 1 INJECTION INTRAVENOUS at 16:46

## 2024-07-16 RX ADMIN — MEROPENEM 1000 MG: 1 INJECTION INTRAVENOUS at 00:42

## 2024-07-16 RX ADMIN — SODIUM CHLORIDE, PRESERVATIVE FREE 40 MG: 5 INJECTION INTRAVENOUS at 08:35

## 2024-07-16 RX ADMIN — ONDANSETRON 4 MG: 2 INJECTION INTRAMUSCULAR; INTRAVENOUS at 06:56

## 2024-07-16 RX ADMIN — HYDROMORPHONE HYDROCHLORIDE 0.5 MG: 1 INJECTION, SOLUTION INTRAMUSCULAR; INTRAVENOUS; SUBCUTANEOUS at 05:59

## 2024-07-16 RX ADMIN — HYDROMORPHONE HYDROCHLORIDE 0.5 MG: 1 INJECTION, SOLUTION INTRAMUSCULAR; INTRAVENOUS; SUBCUTANEOUS at 01:21

## 2024-07-16 NOTE — PROGRESS NOTES
[facility]   Dr. Kb Giordano MD  Daily Progress Note  Pt Name: Sera Breaux  Medical Record Number: [id number]  Date of Birth 1965   Today's Date: 7/16/2024    POD# 1    CHIEF COMPLAINTs/p LAR    SUBJECTIVE  Patient has some nausea    OBJECTIVE  CURRENT VITALS /70   Pulse 78   Temp 98.4 °F (36.9 °C) (Oral)   Resp 16   Ht 1.524 m (5')   Wt 40.7 kg (89 lb 11.6 oz)   LMP 11/06/2011   SpO2 98%   BMI 17.52 kg/m²   LUNGS: Lungs clear   ABDOMEN: soft   WOUNDS: dressing dry  24 HR INTAKE/OUTPUT :   Intake/Output Summary (Last 24 hours) at 7/16/2024 1216  Last data filed at 7/16/2024 0715  Gross per 24 hour   Intake 3574.47 ml   Output 2810 ml   Net 764.47 ml     DRAIN/TUBE OUTPUT :      LABS  CBC :   Lab Results   Component Value Date/Time    WBC 9.6 07/16/2024 06:40 AM    HGB 13.0 07/16/2024 06:40 AM    HCT 39.6 07/16/2024 06:40 AM     07/16/2024 06:40 AM     BMP:   Lab Results   Component Value Date/Time     07/16/2024 06:40 AM    K 4.0 07/16/2024 06:40 AM    K 3.5 07/12/2024 05:55 AM     07/16/2024 06:40 AM    CO2 25 07/16/2024 06:40 AM    BUN 14 07/16/2024 06:40 AM    CREATININE 0.3 07/16/2024 06:40 AM    MG 1.8 07/16/2024 06:40 AM    PHOS 2.2 07/16/2024 06:40 AM       ASSESSMENT  1. Stable discussed OR  will be slo in starting diet due to steroid use potential for anas leak      PLAN  1. contTPN      Kb Giordano MD  Electronically signed 7/16/2024 at 12:16 PM

## 2024-07-16 NOTE — PROGRESS NOTES
Pharmacy Consult for TPN/PPN Monitoring & Adjustment  IV Access: central      Dosing weight: 41 kg  Current insulin regimen: medium sliding scale  Diet (excluding TPN): NPO  Maintenance fluid: none    Plan:  See components of tonight's TPN bag in the table below:  Date 7/12/2024 7/13/24 7/14/24 7/15/24 7/16/24   Total kcal/kg 15 15 15 20 20   Total kcal 615 615 615 820 820   Protein g/kg 1.2 1.2 1.2 1.2 1.2   Protein g 49.2 49.2 49.2 49.2 49.2   Protein kcal (4 kcal/g) 196.8 196.8 196.8 196.8 196.8   Lipid % 20 20 20 30 30   Lipid g 12.3 12.3 12.3 24.6 24.6   Lipid kcal (10 kcal/g) 123 123 123 246 246   Dextrose g 86.8 86.8 86.8 110.9 110.9   Dextrose kcal (3.4 kcal/g) 295.2 295.2 295.2 377.2 377.2   Infusion Rate (mL/hr) 75 75 75 75 75   Na Acetate (mEq) 40 40 40 40 40   Na Chloride (mEq) 140 140 160 160 160   Na Phos (mmol)  (3 mmol = 4 mEq Na) 0 0 0 0 0   Total Na (mEq) 180 180 200 200 200   K Acetate (mEq) 30 30 40 40 40   K Chloride (mEq) 0 0 0 0 0   K Phos (mmol)  (15 mmol = 22 mEq K) 6 6 12 12 15   Total K (mEq) 38.8 38.8 57.6 57.6 62   Ca Gluconate (mEq)  (1 g = 4.65 mEq) 0 0 0 0 0   Mag Sulfate (mEq)  (1 g = 8.12 mEq) 8.12 8.12 8.12 8.12 8.12   Multivitamin (mL) 10 10 10 10 10   Trace Elements (mL) 1 1 1 1 1     Summary of changes for tonight's TPN:   No macronutrient changes per dietary  Increase Kphos to 15 mmol     Electrolyte Replacement:   Sodium phosphate: 15 mmol    Monitoring:  BMP, Mag, iCal, & Phos ordered for tomorrow with AM labs.    Pharmacy will continue to follow daily. Thank you for the consult.    Demond Guzmán, PharmD, BCPS  7/16/2024  11:51 AM

## 2024-07-16 NOTE — PROGRESS NOTES
Comprehensive Nutrition Assessment    Type and Reason for Visit:  Reassess    Nutrition Recommendations/Plan:   Recommend no TPN macronutrient changes today (d/t low phosphorus level).  Diet vs. NPO per surgery.  Recommend continue PN until pt. Tolerating FL diet or greater.  Will adjust PN as labs, pt. Status allow.     Malnutrition Assessment:  Malnutrition Status:  Severe malnutrition (07/11/24 1501)    Context:  Acute Illness     Findings of the 6 clinical characteristics of malnutrition:  Energy Intake:   (Clear liquids 3 days; slight decrease in PO intake on and off this past month)  Weight Loss:  Unable to assess (chronically underweight; only stated weight this admit so far)     Body Fat Loss:  Moderate body fat loss Buccal region, Fat Overlying Ribs, Triceps   Muscle Mass Loss:  Moderate muscle mass loss Temples (temporalis), Scapula (trapezius), Clavicles (pectoralis & deltoids), Thigh (quadriceps), Calf (gastrocnemius)  Fluid Accumulation:  No significant fluid accumulation     Strength:  Not Performed    Nutrition Assessment:     Pt. severely malnourished AEB criteria listed above. At risk for further nutritional compromise r/t admit with complicated diverticulitis - progressive and recurrent, migraines, PICC placed 7/11 and initiation of TPN, and underlying medical condition (PMHx: TRINI, RA, melanoma 2018 and 2021, medical marijuana use since 2020).     Nutrition Related Findings:    Pt. Report/Treatments/Miscellaneous:   7/16: pt. Seen w/ family; reports feeling a little better today; had some dry heaving this am; taking a few ice chips during visit; denies passing any flatus yet; is burping; TPN infusing at 75 ml/hr; pt. Denies any questions on PN  7/15: attempted to see pt - off unit in surgery (colon resection), discussed PN changes w/ pharmacy  7/12: Patient seen, sitting up in bed with breakfast tray of clear liquids in front of her. Per patient she has been doing well with the clear liquids

## 2024-07-16 NOTE — PROGRESS NOTES
Hospitalist Progress Note    Patient:  Sera Breaux      Unit/Bed:5K-28/028-A    YOB: 1965    MRN: [id number]       Acct: [id number]     PCP: Bernadette Oconnell APRN - CNP    Date of Admission: 7/8/2024    Assessment/Plan:    Complicated Diverticulitis, progressive and recurrent: CT abdomen /pelvis  done 7/8/24 showed:Uncomplicated, recurrent/progressive sigmoid diverticulitis   It is reported patient indicated  has been on Augmentin for the past month with no improvement.  Merrem  was initiated.  Surgery team following  colon resection with anastomosis  rescheduled for Monday 7/15 Patient on TPN /clear liquid per surgery,surgery planning bowel prep on Sunday seen notes appreciate input    7/13  patient denies abdominal pain, but states gets mild abdominal cramp following BM.No nausea reported    7/14 TPN in progress  patient for surgery tomorrow    7/15 Surgery following patient went to OR today is s/p Low anterior resection.   Incidental appendectomy    7/16 surgery yesterday 7/15 patient reports incisional pain at 5/10,denies nausea,fever or chills.SOPHIE drain to bulb sunction    2.History of Migraine headache   Continue home meds    3. Rheumatoid Arthritis  Stable  On Prednisone    4.History of Malignant Melanoma of Skin of L Leg: completed adjuvant Opdivo, follows with oncology for surveillance    5.Malnutrition most likely due to recurrent progressive diverticulitis  Patient currently on TPN/clears   Dietary on consult   Patient for surgery on Monday 7/15         Anticipated Discharge in : TBD    Active Hospital Problems    Diagnosis Date Noted    Severe malnutrition (HCC) [E43] 07/11/2024    Diverticulitis of colon [K57.32] 07/09/2024    Diverticulitis [K57.92] 07/08/2024         Chief Complaint: LLQ pain     Hospital Course: Initial HPI 7/8/2024 per chart review:  Patient presents to the ED with LLQ pain for the past 6 weeks.  Patient has been treated with oral antibiotics for the past one month

## 2024-07-16 NOTE — CARE COORDINATION
7/16/24, 2:47 PM EDT    DISCHARGE ON GOING EVALUATION    Sera Breaux       [facility] day: 8  Location: [facility]28/028-A Reason for admit: Diverticulitis [K57.92]  Diverticulitis of colon [K57.32]     Procedures:   7/11 PICC  7/15   1. Low anterior resection.  2. Incidental appendectomy.    Barriers to Discharge: Hospitalist and General Surgery following, TPN per pharmacy dosing, Lovenox, ISS, Merrem, IV Protonix, prn Dilaudid and Zofran, electrolyte replacement protocol, a.m. labs, NPO, up with assistance.      PCP: Bernadette Oconnell APRN - CNP  Readmission Risk Score: 15.6    Patient Goals/Plan/Treatment Preferences: Sera is from home with her . No needs at discharge.

## 2024-07-16 NOTE — PLAN OF CARE
weight and lab values     Problem: Discharge Planning  Goal: Discharge to home or other facility with appropriate resources  Outcome: Progressing  Flowsheets (Taken 7/16/2024 1027)  Discharge to home or other facility with appropriate resources:   Identify barriers to discharge with patient and caregiver   Identify discharge learning needs (meds, wound care, etc)   Refer to discharge planning if patient needs post-hospital services based on physician order or complex needs related to functional status, cognitive ability or social support system   Arrange for needed discharge resources and transportation as appropriate     Problem: Safety - Adult  Goal: Free from fall injury  Outcome: Progressing  Flowsheets (Taken 7/16/2024 1027)  Free From Fall Injury: Instruct family/caregiver on patient safety     Problem: Nutrition Deficit:  Goal: Optimize nutritional status  Outcome: Progressing  Flowsheets (Taken 7/16/2024 1027)  Nutrient intake appropriate for improving, restoring, or maintaining nutritional needs:   Monitor oral intake, labs, and treatment plans   Assess nutritional status and recommend course of action   Recommend, monitor, and adjust tube feedings and TPN/PPN based on assessed needs     Problem: Musculoskeletal - Adult  Goal: Return mobility to safest level of function  7/16/2024 1027 by Erickson Tucker, RN  Outcome: Progressing  Flowsheets (Taken 7/16/2024 1027)  Return Mobility to Safest Level of Function:   Assess patient stability and activity tolerance for standing, transferring and ambulating with or without assistive devices   Assist with transfers and ambulation using safe patient handling equipment as needed   Ensure adequate protection for wounds/incisions during mobilization   Instruct patient/family in ordered activity level  Care plan reviewed with patient and spouse.  Patient and spouse verbalize understanding of the plan of care and contribute to goal setting.

## 2024-07-16 NOTE — PLAN OF CARE
Problem: Pain  Goal: Verbalizes/displays adequate comfort level or baseline comfort level  Outcome: Progressing     Problem: Skin/Tissue Integrity - Adult  Goal: Skin integrity remains intact  Outcome: Progressing     Problem: Gastrointestinal - Adult  Goal: Minimal or absence of nausea and vomiting  Outcome: Progressing     Problem: Musculoskeletal - Adult  Goal: Return mobility to safest level of function  Outcome: Progressing

## 2024-07-17 LAB
ANION GAP SERPL CALC-SCNC: 11 MEQ/L (ref 8–16)
BUN SERPL-MCNC: 12 MG/DL (ref 7–22)
CA-I BLD ISE-SCNC: 1.19 MMOL/L (ref 1.12–1.32)
CALCIUM SERPL-MCNC: 8.3 MG/DL (ref 8.5–10.5)
CHLORIDE SERPL-SCNC: 105 MEQ/L (ref 98–111)
CO2 SERPL-SCNC: 26 MEQ/L (ref 23–33)
CREAT SERPL-MCNC: 0.3 MG/DL (ref 0.4–1.2)
GFR SERPL CREATININE-BSD FRML MDRD: > 90 ML/MIN/1.73M2
GLUCOSE BLD STRIP.AUTO-MCNC: 118 MG/DL (ref 70–108)
GLUCOSE BLD STRIP.AUTO-MCNC: 129 MG/DL (ref 70–108)
GLUCOSE BLD STRIP.AUTO-MCNC: 130 MG/DL (ref 70–108)
GLUCOSE BLD STRIP.AUTO-MCNC: 81 MG/DL (ref 70–108)
GLUCOSE SERPL-MCNC: 100 MG/DL (ref 70–108)
MAGNESIUM SERPL-MCNC: 2 MG/DL (ref 1.6–2.4)
PHOSPHATE SERPL-MCNC: 2.1 MG/DL (ref 2.4–4.7)
POTASSIUM SERPL-SCNC: 3.9 MEQ/L (ref 3.5–5.2)
SODIUM SERPL-SCNC: 142 MEQ/L (ref 135–145)

## 2024-07-17 PROCEDURE — 83735 ASSAY OF MAGNESIUM: CPT

## 2024-07-17 PROCEDURE — 6360000002 HC RX W HCPCS

## 2024-07-17 PROCEDURE — 2580000003 HC RX 258

## 2024-07-17 PROCEDURE — 6360000002 HC RX W HCPCS: Performed by: NURSE PRACTITIONER

## 2024-07-17 PROCEDURE — 2580000003 HC RX 258: Performed by: SURGERY

## 2024-07-17 PROCEDURE — 82948 REAGENT STRIP/BLOOD GLUCOSE: CPT

## 2024-07-17 PROCEDURE — 99232 SBSQ HOSP IP/OBS MODERATE 35: CPT | Performed by: NURSE PRACTITIONER

## 2024-07-17 PROCEDURE — 2500000003 HC RX 250 WO HCPCS

## 2024-07-17 PROCEDURE — 80048 BASIC METABOLIC PNL TOTAL CA: CPT

## 2024-07-17 PROCEDURE — 6360000002 HC RX W HCPCS: Performed by: SURGERY

## 2024-07-17 PROCEDURE — 1200000000 HC SEMI PRIVATE

## 2024-07-17 PROCEDURE — 84100 ASSAY OF PHOSPHORUS: CPT

## 2024-07-17 PROCEDURE — 82330 ASSAY OF CALCIUM: CPT

## 2024-07-17 PROCEDURE — 36415 COLL VENOUS BLD VENIPUNCTURE: CPT

## 2024-07-17 RX ORDER — PROCHLORPERAZINE EDISYLATE 5 MG/ML
10 INJECTION INTRAMUSCULAR; INTRAVENOUS EVERY 6 HOURS PRN
Status: DISCONTINUED | OUTPATIENT
Start: 2024-07-17 | End: 2024-07-23 | Stop reason: HOSPADM

## 2024-07-17 RX ORDER — PROCHLORPERAZINE EDISYLATE 5 MG/ML
10 INJECTION INTRAMUSCULAR; INTRAVENOUS ONCE
Status: COMPLETED | OUTPATIENT
Start: 2024-07-17 | End: 2024-07-17

## 2024-07-17 RX ADMIN — MEROPENEM 1000 MG: 1 INJECTION INTRAVENOUS at 16:30

## 2024-07-17 RX ADMIN — SODIUM PHOSPHATE, MONOBASIC, MONOHYDRATE AND SODIUM PHOSPHATE, DIBASIC, ANHYDROUS 15 MMOL: 142; 276 INJECTION, SOLUTION INTRAVENOUS at 11:17

## 2024-07-17 RX ADMIN — PROCHLORPERAZINE EDISYLATE 10 MG: 5 INJECTION INTRAMUSCULAR; INTRAVENOUS at 07:32

## 2024-07-17 RX ADMIN — PROCHLORPERAZINE EDISYLATE 10 MG: 5 INJECTION INTRAMUSCULAR; INTRAVENOUS at 16:58

## 2024-07-17 RX ADMIN — HYDROMORPHONE HYDROCHLORIDE 0.5 MG: 1 INJECTION, SOLUTION INTRAMUSCULAR; INTRAVENOUS; SUBCUTANEOUS at 04:41

## 2024-07-17 RX ADMIN — MEROPENEM 1000 MG: 1 INJECTION INTRAVENOUS at 08:06

## 2024-07-17 RX ADMIN — WATER 40 MG: 1 INJECTION INTRAMUSCULAR; INTRAVENOUS; SUBCUTANEOUS at 17:42

## 2024-07-17 RX ADMIN — ONDANSETRON 4 MG: 2 INJECTION INTRAMUSCULAR; INTRAVENOUS at 04:22

## 2024-07-17 RX ADMIN — SODIUM CHLORIDE: 234 INJECTION, SOLUTION INTRAVENOUS at 18:13

## 2024-07-17 RX ADMIN — HYDROMORPHONE HYDROCHLORIDE 0.5 MG: 1 INJECTION, SOLUTION INTRAMUSCULAR; INTRAVENOUS; SUBCUTANEOUS at 16:59

## 2024-07-17 RX ADMIN — SODIUM CHLORIDE, PRESERVATIVE FREE 40 MG: 5 INJECTION INTRAVENOUS at 08:00

## 2024-07-17 NOTE — PLAN OF CARE
Problem: Pain  Goal: Verbalizes/displays adequate comfort level or baseline comfort level  Outcome: Progressing   Pain Assessment: 0-10  Pain Level: 6   Patient's Stated Pain Goal: 0 - No pain   Is pain goal met at this time?  Yes     Non-Pharmaceutical Pain Intervention(s): Ice    Problem: Skin/Tissue Integrity - Adult  Goal: Skin integrity remains intact  Outcome: Progressing   Skin assessment completed.  Patient turned every 2 hours and as needed.  No skin breakdown this shift.      Problem: Gastrointestinal - Adult  Goal: Minimal or absence of nausea and vomiting  Outcome: Progressing     Problem: Gastrointestinal - Adult  Goal: Maintains or returns to baseline bowel function  Outcome: Progressing     Problem: Gastrointestinal - Adult  Goal: Maintains adequate nutritional intake  Outcome: Progressing     Problem: Musculoskeletal - Adult  Goal: Return mobility to safest level of function  Outcome: Progressing     Problem: Discharge Planning  Goal: Discharge to home or other facility with appropriate resources  Outcome: Progressing     Problem: Safety - Adult  Goal: Free from fall injury  Outcome: Progressing   All fall precautions in place. Bed in low position, alarm activated and appropriate use of call light.     Problem: Nutrition Deficit:  Goal: Optimize nutritional status  Outcome: Progressing     Care plan reviewed with patient. Patient verbalizes understanding with the plan of care.

## 2024-07-17 NOTE — PROGRESS NOTES
chips during visit; denies passing any flatus yet; is burping; TPN infusing at 75 ml/hr; pt. Denies any questions on PN  7/15: attempted to see pt - off unit in surgery (colon resection), discussed PN changes w/ pharmacy  7/12: Patient seen, sitting up in bed with breakfast tray of clear liquids in front of her. Per patient she has been doing well with the clear liquids and she has been starving. Discussed adjusting TPN today to include fats and meet nutrition needs in preparation for surgery. Patient is happy that surgery was pushed back until Monday so that she can nutritionally prepare for it. Patient had no further questions regarding TPN/ nutrition at this time.   7/11: Pt seen, endorses that her appetite has been good recently with some slight decrease r/t recent illness/abdominal pain. She reports that she presented to the ED a couple of different times towards the end of June in which she was having emesis and not able to eat adequately. Pt chronically underweight and notes that she finds it very challenging to gain weight (note appears that malnutrition is acute on chronic). She states the last couple of days PTA, she was eating normally with ~3 meals and snacking frequently in between because she gets \"so hungry\" and has the goal of weight gain. Pt tolerating clear liquid diet - denies N/V. Discussed TPN start, denies questions or concerns at this time.   GI Status: BM 7/15  Pertinent Labs: 7/17: Glucose 100, BUN 12, Cr 0.3, Potassium 3.9, Magnesium 2.0, Phosphorus 2.1  Pertinent Meds: Insulin, PPI, Dilaudid, Deltasone      Wound Type: Surgical Incision (7/15 lower anterior resection, appendectomy)       Current Nutrition Intake & Therapies:    Average Meal Intake: NPO  Average Supplements Intake: None Ordered  Diet NPO  PN-Adult  3 IN 1 Central Line (Custom)  Current Parenteral Nutrition Orders:  Type and Formula: 3-in-1 Custom (dosing wt: 41 kgm, 20 kcals/kgm, 1.2 gm protein/kgm and 30% kcals from

## 2024-07-17 NOTE — PROGRESS NOTES
Hospitalist Progress Note    Patient:  Sera Breaux      Unit/Bed:5K-28/028-A    YOB: 1965    MRN: [id number]       Acct: [id number]     PCP: Bernadette Oconnell APRN - CNP    Date of Admission: 7/8/2024    Assessment/Plan:    Complicated Diverticulitis, progressive and recurrent: CT abdomen /pelvis  done 7/8/24 showed:Uncomplicated, recurrent/progressive sigmoid diverticulitis   It is reported patient indicated  has been on Augmentin for the past month with no improvement.  Merrem  was initiated.  Surgery team following  colon resection with anastomosis  rescheduled for Monday 7/15 Patient on TPN /clear liquid per surgery,surgery planning bowel prep on Sunday seen notes appreciate input    7/13  patient denies abdominal pain, but states gets mild abdominal cramp following BM.No nausea reported    7/14 TPN in progress  patient for surgery tomorrow    7/15 Surgery following patient went to OR today is s/p Low anterior resection.   Incidental appendectomy    7/16 surgery yesterday 7/15 patient reports incisional pain at 5/10,denies nausea,fever or chills.SOPHIE drain to bulb sunction    7/17 patient reports intermittent nausea, add Compazine  Reports Incisional pain at 4/10     2.History of Migraine headache   Continue home meds    3. Rheumatoid Arthritis  Stable  On Prednisone    4.History of Malignant Melanoma of Skin of L Leg: completed adjuvant Opdivo, follows with oncology for surveillance    5.Malnutrition most likely due to recurrent progressive diverticulitis  Patient currently on TPN/clears   Dietary on consult   Patient for surgery on Monday 7/15         Anticipated Discharge in : TBD    Active Hospital Problems    Diagnosis Date Noted    Severe malnutrition (HCC) [E43] 07/11/2024    Diverticulitis of colon [K57.32] 07/09/2024    Diverticulitis [K57.92] 07/08/2024         Chief Complaint: LLQ pain     Hospital Course: Initial HPI 7/8/2024 per chart review:  Patient presents to the ED with LLQ pain

## 2024-07-17 NOTE — PROGRESS NOTES
Pharmacy Consult for TPN/PPN Monitoring & Adjustment  IV Access: central      Dosing weight: 41 kg  Current insulin regimen: medium sliding scale  Diet (excluding TPN): NPO  Maintenance fluid: none    Plan:  See components of tonight's TPN bag in the table below:  Date 7/12/2024 7/13/24 7/14/24 7/15/24 7/16/24 7/17/24   Total kcal/kg 15 15 15 20 20 24   Total kcal 615 615 615 820 820 984   Protein g/kg 1.2 1.2 1.2 1.2 1.2 2   Protein g 49.2 49.2 49.2 49.2 49.2 82   Protein kcal (4 kcal/g) 196.8 196.8 196.8 196.8 196.8 328   Lipid % 20 20 20 30 30 30   Lipid g 12.3 12.3 12.3 24.6 24.6 29.5   Lipid kcal (10 kcal/g) 123 123 123 246 246 295.2   Dextrose g 86.8 86.8 86.8 110.9 110.9 106   Dextrose kcal (3.4 kcal/g) 295.2 295.2 295.2 377.2 377.2 360.8   Infusion Rate (mL/hr) 75 75 75 75 75 75   Na Acetate (mEq) 40 40 40 40 40 40   Na Chloride (mEq) 140 140 160 160 160 160   Na Phos (mmol)  (3 mmol = 4 mEq Na) 0 0 0 0 0 0   Total Na (mEq) 180 180 200 200 200 200   K Acetate (mEq) 30 30 40 40 40 40   K Chloride (mEq) 0 0 0 0 0 0   K Phos (mmol)  (15 mmol = 22 mEq K) 6 6 12 12 15 18   Total K (mEq) 38.8 38.8 57.6 57.6 62 66.4   Ca Gluconate (mEq)  (1 g = 4.65 mEq) 0 0 0 0 0 0   Mag Sulfate (mEq)  (1 g = 8.12 mEq) 8.12 8.12 8.12 8.12 8.12 8.12   Multivitamin (mL) 10 10 10 10 10 10   Trace Elements (mL) 1 1 1 1 1 1     Summary of changes for tonight's TPN:   Please refer to the dietician's note for a summary of macronutrient changes.   Increase potassium phosphate to 18 mmol    Electrolyte Replacement:   Sodium phosphate: 15 mmol    Monitoring:  BMP, Mag, iCal, & Phos ordered for tomorrow with AM labs.    Pharmacy will continue to follow daily. Thank you for the consult.    Maegan Aragon, PharmD  10:55 AM  7/17/2024

## 2024-07-17 NOTE — PLAN OF CARE
Problem: Pain  Goal: Verbalizes/displays adequate comfort level or baseline comfort level  Outcome: Progressing  Flowsheets (Taken 7/17/2024 1546)  Verbalizes/displays adequate comfort level or baseline comfort level:   Encourage patient to monitor pain and request assistance   Assess pain using appropriate pain scale     Problem: Skin/Tissue Integrity - Adult  Goal: Skin integrity remains intact  Outcome: Progressing  Flowsheets  Taken 7/17/2024 1546  Skin Integrity Remains Intact:   Monitor for areas of redness and/or skin breakdown   Assess vascular access sites hourly  Taken 7/17/2024 0757  Skin Integrity Remains Intact: Monitor for areas of redness and/or skin breakdown     Problem: Gastrointestinal - Adult  Goal: Minimal or absence of nausea and vomiting  Outcome: Progressing  Flowsheets  Taken 7/17/2024 1546  Minimal or absence of nausea and vomiting:   Administer IV fluids as ordered to ensure adequate hydration   Administer ordered antiemetic medications as needed  Taken 7/17/2024 0757  Minimal or absence of nausea and vomiting: Administer IV fluids as ordered to ensure adequate hydration     Problem: Gastrointestinal - Adult  Goal: Maintains or returns to baseline bowel function  Outcome: Progressing  Flowsheets  Taken 7/17/2024 1546  Maintains or returns to baseline bowel function:   Assess bowel function   Administer IV fluids as ordered to ensure adequate hydration  Taken 7/17/2024 0757  Maintains or returns to baseline bowel function: Assess bowel function     Problem: Gastrointestinal - Adult  Goal: Maintains adequate nutritional intake  Outcome: Progressing  Flowsheets  Taken 7/17/2024 1546  Maintains adequate nutritional intake:   Obtain nutritional consult as needed   Identify factors contributing to decreased intake, treat as appropriate  Taken 7/17/2024 0757  Maintains adequate nutritional intake: Monitor percentage of each meal consumed     Problem: Discharge Planning  Goal: Discharge to home

## 2024-07-17 NOTE — PROGRESS NOTES
SurgPOD#2  pt feels some nausea not taking prednisone  will give IV as solumedrol dressing removed Incision intact  SOPHIE bloody cont loza

## 2024-07-18 LAB
ANION GAP SERPL CALC-SCNC: 7 MEQ/L (ref 8–16)
BUN SERPL-MCNC: 16 MG/DL (ref 7–22)
CA-I BLD ISE-SCNC: 1.24 MMOL/L (ref 1.12–1.32)
CALCIUM SERPL-MCNC: 8.3 MG/DL (ref 8.5–10.5)
CHLORIDE SERPL-SCNC: 110 MEQ/L (ref 98–111)
CO2 SERPL-SCNC: 26 MEQ/L (ref 23–33)
CREAT SERPL-MCNC: 0.3 MG/DL (ref 0.4–1.2)
DEPRECATED RDW RBC AUTO: 44.8 FL (ref 35–45)
ERYTHROCYTE [DISTWIDTH] IN BLOOD BY AUTOMATED COUNT: 12.3 % (ref 11.5–14.5)
GFR SERPL CREATININE-BSD FRML MDRD: > 90 ML/MIN/1.73M2
GLUCOSE BLD STRIP.AUTO-MCNC: 117 MG/DL (ref 70–108)
GLUCOSE BLD STRIP.AUTO-MCNC: 131 MG/DL (ref 70–108)
GLUCOSE BLD STRIP.AUTO-MCNC: 132 MG/DL (ref 70–108)
GLUCOSE BLD STRIP.AUTO-MCNC: 153 MG/DL (ref 70–108)
GLUCOSE BLD STRIP.AUTO-MCNC: 94 MG/DL (ref 70–108)
GLUCOSE SERPL-MCNC: 106 MG/DL (ref 70–108)
HCT VFR BLD AUTO: 35.5 % (ref 37–47)
HGB BLD-MCNC: 11.4 GM/DL (ref 12–16)
MAGNESIUM SERPL-MCNC: 2.1 MG/DL (ref 1.6–2.4)
MAGNESIUM SERPL-MCNC: 2.1 MG/DL (ref 1.6–2.4)
MCH RBC QN AUTO: 31.8 PG (ref 26–33)
MCHC RBC AUTO-ENTMCNC: 32.1 GM/DL (ref 32.2–35.5)
MCV RBC AUTO: 99.2 FL (ref 81–99)
PHOSPHATE SERPL-MCNC: 1.7 MG/DL (ref 2.4–4.7)
PHOSPHATE SERPL-MCNC: 2.1 MG/DL (ref 2.4–4.7)
PLATELET # BLD AUTO: 252 THOU/MM3 (ref 130–400)
PMV BLD AUTO: 11.3 FL (ref 9.4–12.4)
POTASSIUM SERPL-SCNC: 3.6 MEQ/L (ref 3.5–5.2)
RBC # BLD AUTO: 3.58 MILL/MM3 (ref 4.2–5.4)
SODIUM SERPL-SCNC: 143 MEQ/L (ref 135–145)
WBC # BLD AUTO: 6.6 THOU/MM3 (ref 4.8–10.8)

## 2024-07-18 PROCEDURE — 99232 SBSQ HOSP IP/OBS MODERATE 35: CPT | Performed by: NURSE PRACTITIONER

## 2024-07-18 PROCEDURE — 2500000003 HC RX 250 WO HCPCS: Performed by: SURGERY

## 2024-07-18 PROCEDURE — 80048 BASIC METABOLIC PNL TOTAL CA: CPT

## 2024-07-18 PROCEDURE — 2580000003 HC RX 258: Performed by: SURGERY

## 2024-07-18 PROCEDURE — 84100 ASSAY OF PHOSPHORUS: CPT

## 2024-07-18 PROCEDURE — 83735 ASSAY OF MAGNESIUM: CPT

## 2024-07-18 PROCEDURE — 1200000000 HC SEMI PRIVATE

## 2024-07-18 PROCEDURE — 85027 COMPLETE CBC AUTOMATED: CPT

## 2024-07-18 PROCEDURE — 36415 COLL VENOUS BLD VENIPUNCTURE: CPT

## 2024-07-18 PROCEDURE — 6360000002 HC RX W HCPCS: Performed by: SURGERY

## 2024-07-18 PROCEDURE — 6370000000 HC RX 637 (ALT 250 FOR IP): Performed by: SURGERY

## 2024-07-18 PROCEDURE — 82948 REAGENT STRIP/BLOOD GLUCOSE: CPT

## 2024-07-18 PROCEDURE — 82330 ASSAY OF CALCIUM: CPT

## 2024-07-18 PROCEDURE — 36592 COLLECT BLOOD FROM PICC: CPT

## 2024-07-18 RX ADMIN — HYDROMORPHONE HYDROCHLORIDE 0.5 MG: 1 INJECTION, SOLUTION INTRAMUSCULAR; INTRAVENOUS; SUBCUTANEOUS at 05:45

## 2024-07-18 RX ADMIN — MEROPENEM 1000 MG: 1 INJECTION INTRAVENOUS at 07:58

## 2024-07-18 RX ADMIN — Medication 3 MG: at 20:38

## 2024-07-18 RX ADMIN — SODIUM CHLORIDE: 234 INJECTION, SOLUTION INTRAVENOUS at 17:55

## 2024-07-18 RX ADMIN — SODIUM PHOSPHATE, MONOBASIC, MONOHYDRATE AND SODIUM PHOSPHATE, DIBASIC, ANHYDROUS 15 MMOL: 142; 276 INJECTION, SOLUTION INTRAVENOUS at 14:15

## 2024-07-18 RX ADMIN — SODIUM CHLORIDE, PRESERVATIVE FREE 10 ML: 5 INJECTION INTRAVENOUS at 07:59

## 2024-07-18 RX ADMIN — MEROPENEM 1000 MG: 1 INJECTION INTRAVENOUS at 23:57

## 2024-07-18 RX ADMIN — MEROPENEM 1000 MG: 1 INJECTION INTRAVENOUS at 16:41

## 2024-07-18 RX ADMIN — SODIUM CHLORIDE, PRESERVATIVE FREE 40 MG: 5 INJECTION INTRAVENOUS at 07:59

## 2024-07-18 RX ADMIN — HYDROMORPHONE HYDROCHLORIDE 0.5 MG: 1 INJECTION, SOLUTION INTRAMUSCULAR; INTRAVENOUS; SUBCUTANEOUS at 20:44

## 2024-07-18 RX ADMIN — WATER 40 MG: 1 INJECTION INTRAMUSCULAR; INTRAVENOUS; SUBCUTANEOUS at 08:52

## 2024-07-18 RX ADMIN — MEROPENEM 1000 MG: 1 INJECTION INTRAVENOUS at 00:10

## 2024-07-18 NOTE — PROGRESS NOTES
[facility]   Dr. Kb Giordano MD  Daily Progress Note  Pt Name: Sera Breaux  Medical Record Number: [id number]  Date of Birth 1965   Today's Date: 7/18/2024    POD# 3    CHIEF COMPLAINTnausea better pt had solumedrol    SUBJECTIVE  Patient feels better no flatus yet    OBJECTIVE  CURRENT VITALS /80   Pulse 75   Temp 98.1 °F (36.7 °C) (Oral)   Resp 16   Ht 1.524 m (5')   Wt 40.7 kg (89 lb 11.6 oz)   LMP 11/06/2011   SpO2 97%   BMI 17.52 kg/m²   LUNGS: Lungs clear   ABDOMEN: soft tender asexpected BSfew  WOUNDS: intact SOPHIE serosang  24 HR INTAKE/OUTPUT :   Intake/Output Summary (Last 24 hours) at 7/18/2024 0828  Last data filed at 7/18/2024 0649  Gross per 24 hour   Intake 60 ml   Output 2310 ml   Net -2250 ml     DRAIN/TUBE OUTPUT :      LABS  CBC :   Lab Results   Component Value Date/Time    WBC 6.6 07/18/2024 05:55 AM    HGB 11.4 07/18/2024 05:55 AM    HCT 35.5 07/18/2024 05:55 AM     07/18/2024 05:55 AM     BMP:   Lab Results   Component Value Date/Time     07/17/2024 06:15 AM    K 3.9 07/17/2024 06:15 AM    K 3.5 07/12/2024 05:55 AM     07/17/2024 06:15 AM    CO2 26 07/17/2024 06:15 AM    BUN 12 07/17/2024 06:15 AM    CREATININE 0.3 07/17/2024 06:15 AM    MG 2.0 07/17/2024 06:15 AM    PHOS 2.1 07/17/2024 06:15 AM   FINAL DIAGNOSIS:   A.  Appendix, appendectomy:    No significant pathologic findings     B.  Rectosigmoid colon, resection:    Diverticulosis with focal fibrosis and chronic inflammation    Polypoid fragment of granulation tissue    Negative for malignancy     Specimen:   A) APPENDIX   B) SIGMOID COLON, (RECTOSIGMOID)         ASSESSMENT  1. Path as noted above      PLAN  1. Cont TPN/NPO  await bowel fnc      Kb Giordano MD  Electronically signed 7/18/2024 at 8:28 AM

## 2024-07-18 NOTE — PROGRESS NOTES
Comprehensive Nutrition Assessment    Type and Reason for Visit:  Initial, Positive Nutrition Screen (Weight Loss)    Nutrition Recommendations/Plan:   TPN changes for next bag- increase kcals to 28 kcal/kgm. Continue with 2 gm/kgm protein and 30% kcals from lipids based on dosing weight of 41 kgm.  Diet vs. NPO per surgery.  Recommend continue PN until pt. able to tolerate Full Liquid diet or greater.  Will adjust TPN as labs and pt. status allow.     Malnutrition Assessment:  Malnutrition Status:  Severe malnutrition (07/11/24 1501)    Context:  Acute Illness     Findings of the 6 clinical characteristics of malnutrition:  Energy Intake:   (Clear liquids 3 days; slight decrease in PO intake on and off this past month)  Weight Loss:  Unable to assess (chronically underweight; only stated weight this admit so far)     Body Fat Loss:  Moderate body fat loss Buccal region, Fat Overlying Ribs, Triceps   Muscle Mass Loss:  Moderate muscle mass loss Temples (temporalis), Scapula (trapezius), Clavicles (pectoralis & deltoids), Thigh (quadriceps), Calf (gastrocnemius)  Fluid Accumulation:  No significant fluid accumulation     Strength:  Not Performed    Nutrition Assessment: Pt improving from a nutritional standpoint AEB pt NPO but is tolerating 3-in-1 TPN at present- not yet at goal. Remains at risk for further nutritional compromise r/t admit with complicated diverticulitis - progressive and recurrent, migraines, PICC placed 7/11 and initiation of TPN, and underlying medical condition (PMHx: TRINI, RA, melanoma 2018 and 2021, medical marijuana use since 2020).     Nutrition Related Findings:  '  Pt. Report/Treatments/Miscellaneous:   7/18: Pt seen this morning resting in bed; pt report she feels better today. Pt denies any N/V and reports minimal abdominal pain. No flatus yet. TPN running at 75 ml/hr.  7/17: pt. Seen while ambulating in halls w/ RN; reports having some nausea, dry heaves; states vomited yesterday;

## 2024-07-18 NOTE — PROGRESS NOTES
Pharmacy Consult for TPN/PPN Monitoring & Adjustment  IV Access: central      Dosing weight: 41 kg  Current insulin regimen: medium sliding scale Q6H  Diet (excluding TPN): NPO  Maintenance fluid: none    Plan:  See components of tonight's TPN bag in the table below:  Date 7/12/2024 7/13/24 7/14/24 7/15/24 7/16/24 7/17/24 7/18/24   Total kcal/kg 15 15 15 20 20 24 28   Total kcal 615 615 615 820    Protein g/kg 1.2 1.2 1.2 1.2 1.2 2 2   Protein g 49.2 49.2 49.2 49.2 49.2 82 82   Protein kcal (4 kcal/g) 196.8 196.8 196.8 196.8 196.8 328 328   Lipid % 20 20 20 30 30 30 30   Lipid g 12.3 12.3 12.3 24.6 24.6 29.5 34.4   Lipid kcal (10 kcal/g) 123 123 123 246 246 295.2 344.4   Dextrose g 86.8 86.8 86.8 110.9 110.9 106 139.9   Dextrose kcal (3.4 kcal/g) 295.2 295.2 295.2 377.2 377.2 360.8 475.6   Infusion Rate (mL/hr) 75 75 75 75 75 75 75   Na Acetate (mEq) 40 40 40 40 40 40 40   Na Chloride (mEq) 140 140 160 160 160 160 160   Na Phos (mmol)  (3 mmol = 4 mEq Na) 0 0 0 0 0 0 0   Total Na (mEq) 180 180 200 200 200 200 200   K Acetate (mEq) 30 30 40 40 40 40 40   K Chloride (mEq) 0 0 0 0 0 0 0   K Phos (mmol)  (15 mmol = 22 mEq K) 6 6 12 12 15 18 21   Total K (mEq) 38.8 38.8 57.6 57.6 62 66.4 70.8   Ca Gluconate (mEq)  (1 g = 4.65 mEq) 0 0 0 0 0 0 0   Mag Sulfate (mEq)  (1 g = 8.12 mEq) 8.12 8.12 8.12 8.12 8.12 8.12 8.12   Multivitamin (mL) 10 10 10 10 10 10 10   Trace Elements (mL) 1 1 1 1 1 1 1     Summary of changes for tonight's TPN:   See dietician's note for summary of macronutrients  Increase potassium phosphate to 21 mmol    Electrolyte Replacement:   Sodium phosphate: 15 mmol    Monitoring:  BMP, Mag, iCal, & Phos ordered for tomorrow with AM labs.    Pharmacy will continue to follow daily. Thank you for the consult.  Maribell Sandoval, PharmD, BCPS 7/18/2024 12:30 PM

## 2024-07-18 NOTE — PROGRESS NOTES
Hospitalist Progress Note    Patient:  Sera Breaux      Unit/Bed:5K-28/028-A    YOB: 1965    MRN: [id number]       Acct: [id number]     PCP: Bernadette Oconnell APRN - CNP    Date of Admission: 7/8/2024    Assessment/Plan:    Complicated Diverticulitis, progressive and recurrent: CT abdomen /pelvis  done 7/8/24 showed:Uncomplicated, recurrent/progressive sigmoid diverticulitis   It is reported patient indicated  has been on Augmentin for the past month with no improvement.  Merrem  was initiated.  Surgery team following  colon resection with anastomosis  rescheduled for Monday 7/15 Patient on TPN /clear liquid per surgery,surgery planning bowel prep on Sunday seen notes appreciate input    7/13  patient denies abdominal pain, but states gets mild abdominal cramp following BM.No nausea reported    7/14 TPN in progress  patient for surgery tomorrow    7/15 Surgery following patient went to OR today is s/p Low anterior resection.   Incidental appendectomy    7/16 surgery yesterday 7/15 patient reports incisional pain at 5/10,denies nausea,fever or chills.SOPHIE drain to bulb sunction    7/17 patient reports intermittent nausea, add Compazine  Reports Incisional pain at 4/10     7/18 patient reports nausea improved, said incisional wound pain mild rates 3/10  Patient reports burping but has not passed flatus     2.History of Migraine headache   Continue home meds    3. Rheumatoid Arthritis  Stable  On Prednisone    4.History of Malignant Melanoma of Skin of L Leg: completed adjuvant Opdivo, follows with oncology for surveillance    5.Malnutrition most likely due to recurrent progressive diverticulitis  Patient currently on TPN/clears   Dietary on consult   Patient for surgery on Monday 7/15         Anticipated Discharge in : TBD    Active Hospital Problems    Diagnosis Date Noted    Severe malnutrition (HCC) [E43] 07/11/2024    Diverticulitis of colon [K57.32] 07/09/2024    Diverticulitis [K57.92] 07/08/2024

## 2024-07-18 NOTE — PLAN OF CARE
Problem: Pain  Goal: Verbalizes/displays adequate comfort level or baseline comfort level  7/18/2024 0319 by Kacey Owen RN  Outcome: Progressing   Pain Assessment: None - Denies Pain  Pain Level: 8   Patient's Stated Pain Goal: 4   Is pain goal met at this time?  Yes     Non-Pharmaceutical Pain Intervention(s): Repositioned, Rest    Problem: Skin/Tissue Integrity - Adult  Goal: Skin integrity remains intact  7/18/2024 0319 by Kacey Owen RN  Outcome: Progressing   Skin assessment completed.  Patient turned every 2 hours and as needed.  No skin breakdown this shift.      Problem: Gastrointestinal - Adult  Goal: Minimal or absence of nausea and vomiting  7/18/2024 0319 by Kacey Owen RN  Outcome: Progressing     Problem: Gastrointestinal - Adult  Goal: Maintains or returns to baseline bowel function  7/18/2024 0319 by Kacey Owen RN  Outcome: Progressing     Problem: Gastrointestinal - Adult  Goal: Maintains adequate nutritional intake  7/18/2024 0319 by Kacey Owen RN  Outcome: Progressing     Problem: Musculoskeletal - Adult  Goal: Return mobility to safest level of function  7/18/2024 0319 by Kacey Owen RN  Outcome: Progressing     Problem: Discharge Planning  Goal: Discharge to home or other facility with appropriate resources  7/18/2024 0319 by Kacey Owen RN  Outcome: Progressing     Problem: Safety - Adult  Goal: Free from fall injury  7/18/2024 0319 by Kacey Owen RN  Outcome: Progressing   All fall precautions in place. Bed in low position, alarm activated and appropriate use of call light.     Problem: Nutrition Deficit:  Goal: Optimize nutritional status  7/18/2024 0319 by Kacey Owen RN  Outcome: Progressing     Care plan reviewed with patient. Patient verbalizes understanding with the plan of care.

## 2024-07-18 NOTE — CARE COORDINATION
7/18/24, 2:49 PM EDT    DISCHARGE ON GOING EVALUATION    Sera Breaux       [facility] day: 10  Location: [facility]28/028-A Reason for admit: Diverticulitis [K57.92]  Diverticulitis of colon [K57.32]     Procedures:   7/11 PICC  7/15   1. Low anterior resection.  2. Incidental appendectomy.    Barriers to Discharge: Hospitalist and General Surgery following, patient remains on TPN, Lovenox, ISS, Merrem, Solu-Medrol daily, IV Protonix, prn Dilaudid, Compazine, and Zofran, electrolyte replacement protocol, daily labs, NPO, up with assistance.     PCP: Bernadette Oconnell APRN - CNP  Readmission Risk Score: 16.7    Patient Goals/Plan/Treatment Preferences: Sera resides at home with her . No needs at discharge.

## 2024-07-19 LAB
ANION GAP SERPL CALC-SCNC: 6 MEQ/L (ref 8–16)
BASOPHILS ABSOLUTE: 0.1 THOU/MM3 (ref 0–0.1)
BASOPHILS NFR BLD AUTO: 0.8 %
BUN SERPL-MCNC: 21 MG/DL (ref 7–22)
CA-I BLD ISE-SCNC: 1.11 MMOL/L (ref 1.12–1.32)
CALCIUM SERPL-MCNC: 8.2 MG/DL (ref 8.5–10.5)
CHLORIDE SERPL-SCNC: 113 MEQ/L (ref 98–111)
CO2 SERPL-SCNC: 27 MEQ/L (ref 23–33)
CREAT SERPL-MCNC: 0.3 MG/DL (ref 0.4–1.2)
DEPRECATED RDW RBC AUTO: 46.2 FL (ref 35–45)
EOSINOPHIL NFR BLD AUTO: 0.8 %
EOSINOPHILS ABSOLUTE: 0.1 THOU/MM3 (ref 0–0.4)
ERYTHROCYTE [DISTWIDTH] IN BLOOD BY AUTOMATED COUNT: 12.8 % (ref 11.5–14.5)
GFR SERPL CREATININE-BSD FRML MDRD: > 90 ML/MIN/1.73M2
GLUCOSE BLD STRIP.AUTO-MCNC: 107 MG/DL (ref 70–108)
GLUCOSE BLD STRIP.AUTO-MCNC: 151 MG/DL (ref 70–108)
GLUCOSE BLD STRIP.AUTO-MCNC: 99 MG/DL (ref 70–108)
GLUCOSE SERPL-MCNC: 100 MG/DL (ref 70–108)
HCT VFR BLD AUTO: 33.9 % (ref 37–47)
HGB BLD-MCNC: 10.9 GM/DL (ref 12–16)
IMM GRANULOCYTES # BLD AUTO: 0.02 THOU/MM3 (ref 0–0.07)
IMM GRANULOCYTES NFR BLD AUTO: 0.3 %
LYMPHOCYTES ABSOLUTE: 1.3 THOU/MM3 (ref 1–4.8)
LYMPHOCYTES NFR BLD AUTO: 17.9 %
MAGNESIUM SERPL-MCNC: 2 MG/DL (ref 1.6–2.4)
MCH RBC QN AUTO: 32 PG (ref 26–33)
MCHC RBC AUTO-ENTMCNC: 32.2 GM/DL (ref 32.2–35.5)
MCV RBC AUTO: 99.4 FL (ref 81–99)
MONOCYTES ABSOLUTE: 0.4 THOU/MM3 (ref 0.4–1.3)
MONOCYTES NFR BLD AUTO: 6.2 %
NEUTROPHILS ABSOLUTE: 5.3 THOU/MM3 (ref 1.8–7.7)
NEUTROPHILS NFR BLD AUTO: 74 %
NRBC BLD AUTO-RTO: 0 /100 WBC
PHOSPHATE SERPL-MCNC: 2 MG/DL (ref 2.4–4.7)
PLATELET # BLD AUTO: 272 THOU/MM3 (ref 130–400)
PMV BLD AUTO: 11 FL (ref 9.4–12.4)
POTASSIUM SERPL-SCNC: 3.1 MEQ/L (ref 3.5–5.2)
RBC # BLD AUTO: 3.41 MILL/MM3 (ref 4.2–5.4)
REASON FOR REJECTION: NORMAL
REASON FOR REJECTION: NORMAL
REJECTED TEST: NORMAL
REJECTED TEST: NORMAL
SODIUM SERPL-SCNC: 146 MEQ/L (ref 135–145)
WBC # BLD AUTO: 7.1 THOU/MM3 (ref 4.8–10.8)

## 2024-07-19 PROCEDURE — 36415 COLL VENOUS BLD VENIPUNCTURE: CPT

## 2024-07-19 PROCEDURE — 82330 ASSAY OF CALCIUM: CPT

## 2024-07-19 PROCEDURE — 6360000002 HC RX W HCPCS: Performed by: SURGERY

## 2024-07-19 PROCEDURE — 6370000000 HC RX 637 (ALT 250 FOR IP): Performed by: SURGERY

## 2024-07-19 PROCEDURE — 2500000003 HC RX 250 WO HCPCS: Performed by: SURGERY

## 2024-07-19 PROCEDURE — 83735 ASSAY OF MAGNESIUM: CPT

## 2024-07-19 PROCEDURE — 1200000000 HC SEMI PRIVATE

## 2024-07-19 PROCEDURE — 82948 REAGENT STRIP/BLOOD GLUCOSE: CPT

## 2024-07-19 PROCEDURE — 36592 COLLECT BLOOD FROM PICC: CPT

## 2024-07-19 PROCEDURE — 84100 ASSAY OF PHOSPHORUS: CPT

## 2024-07-19 PROCEDURE — 80048 BASIC METABOLIC PNL TOTAL CA: CPT

## 2024-07-19 PROCEDURE — 85025 COMPLETE CBC W/AUTO DIFF WBC: CPT

## 2024-07-19 PROCEDURE — 2580000003 HC RX 258: Performed by: SURGERY

## 2024-07-19 PROCEDURE — 99232 SBSQ HOSP IP/OBS MODERATE 35: CPT | Performed by: NURSE PRACTITIONER

## 2024-07-19 RX ADMIN — POTASSIUM BICARBONATE 40 MEQ: 782 TABLET, EFFERVESCENT ORAL at 09:24

## 2024-07-19 RX ADMIN — WATER 40 MG: 1 INJECTION INTRAMUSCULAR; INTRAVENOUS; SUBCUTANEOUS at 08:13

## 2024-07-19 RX ADMIN — SODIUM CHLORIDE, PRESERVATIVE FREE 40 MG: 5 INJECTION INTRAVENOUS at 08:13

## 2024-07-19 RX ADMIN — SODIUM CHLORIDE, PRESERVATIVE FREE 10 ML: 5 INJECTION INTRAVENOUS at 08:13

## 2024-07-19 RX ADMIN — SODIUM PHOSPHATE, MONOBASIC, MONOHYDRATE AND SODIUM PHOSPHATE, DIBASIC, ANHYDROUS 15 MMOL: 142; 276 INJECTION, SOLUTION INTRAVENOUS at 11:39

## 2024-07-19 RX ADMIN — MEROPENEM 1000 MG: 1 INJECTION INTRAVENOUS at 15:35

## 2024-07-19 RX ADMIN — SODIUM CHLORIDE: 234 INJECTION INTRAMUSCULAR; INTRAVENOUS; SUBCUTANEOUS at 18:13

## 2024-07-19 RX ADMIN — MEROPENEM 1000 MG: 1 INJECTION INTRAVENOUS at 08:22

## 2024-07-19 NOTE — PROGRESS NOTES
Pharmacy Consult for TPN/PPN Monitoring & Adjustment  IV Access: central      Dosing weight: 41 kg  Current insulin regimen: medium sliding scale  Diet (excluding TPN): Clear liquid diet  Maintenance fluid: none    Plan:  See components of tonight's TPN bag in the table below:  Date 7/12/2024 7/13/24 7/14/24 7/15/24 7/16/24 7/17/24 7/18/24 7/19/24   Total kcal/kg 15 15 15 20 20 24 28 28   Total kcal 615 615 615 820  1148   Protein g/kg 1.2 1.2 1.2 1.2 1.2 2 2 2   Protein g 49.2 49.2 49.2 49.2 49.2 82 82 82   Protein kcal (4 kcal/g) 196.8 196.8 196.8 196.8 196.8 328 328 328   Lipid % 20 20 20 30 30 30 30 30   Lipid g 12.3 12.3 12.3 24.6 24.6 29.5 34.4 34.4   Lipid kcal (10 kcal/g) 123 123 123 246 246 295.2 344.4 344.4   Dextrose g 86.8 86.8 86.8 110.9 110.9 106 139.9 139.9   Dextrose kcal (3.4 kcal/g) 295.2 295.2 295.2 377.2 377.2 360.8 475.6 475.6   Infusion Rate (mL/hr) 75 75 75 75 75 75 75 75   Na Acetate (mEq) 40 40 40 40 40 40 40 40   Na Chloride (mEq) 140 140 160 160 160 160 160 120   Na Phos (mmol)  (3 mmol = 4 mEq Na) 0 0 0 0 0 0 0 0   Total Na (mEq) 180 180 200 200 200 200 200 160   K Acetate (mEq) 30 30 40 40 40 40 40 50   K Chloride (mEq) 0 0 0 0 0 0 0 0   K Phos (mmol)  (15 mmol = 22 mEq K) 6 6 12 12 15 18 21 27   Total K (mEq) 38.8 38.8 57.6 57.6 62 66.4 70.8 89.6   Ca Gluconate (mEq)  (1 g = 4.65 mEq) 0 0 0 0 0 0 0 0   Mag Sulfate (mEq)  (1 g = 8.12 mEq) 8.12 8.12 8.12 8.12 8.12 8.12 8.12 8.12   Multivitamin (mL) 10 10 10 10 10 10 10 10   Trace Elements (mL) 1 1 1 1 1 1 1 1     Summary of changes for tonight's TPN:   No macronutrient changes per dietary  Decrease Na chloride to 120 mEq  Increase Kphos to 27 mmol  Increase K acetate to 50 mEq    Electrolyte Replacement:   Potassium chloride: 40 mEq (RN off of protocol)  sodium phosphate: 15 mmol    Monitoring:  BMP, Mag, iCal, & Phos ordered for tomorrow with AM labs.    Pharmacy will continue to follow daily. Thank you for the

## 2024-07-19 NOTE — PROGRESS NOTES
Comprehensive Nutrition Assessment    Type and Reason for Visit:  Reassess (TPN macronutrients)    Nutrition Recommendations/Plan:   Continue current TPN macronutrients for next bag: dose weight 41 kgm, 28 kcal/kgm, 2 grams protein/kgm, 30% lipid kcals - spoke with Pharmacist   Diet progression as per Surgery   Recommend continue TPN until patient able to tolerate full liquid diet or greater     Malnutrition Assessment:  Malnutrition Status:  Severe malnutrition (07/11/24 1501)    Context:  Acute Illness     Findings of the 6 clinical characteristics of malnutrition:  Energy Intake:   (Clear liquids 3 days; slight decrease in PO intake on and off this past month)  Weight Loss:  Unable to assess (chronically underweight; only stated weight this admit so far)     Body Fat Loss:  Moderate body fat loss Buccal region, Fat Overlying Ribs, Triceps   Muscle Mass Loss:  Moderate muscle mass loss Temples (temporalis), Scapula (trapezius), Clavicles (pectoralis & deltoids), Thigh (quadriceps), Calf (gastrocnemius)  Fluid Accumulation:  No significant fluid accumulation     Strength:  Not Performed    Nutrition Assessment:     Pt improving from a nutritional standpoint AEB receiving 3-in-1 TPN at present (not yet at goal) while NPO. Remains at risk for further nutritional compromise r/t admit with complicated diverticulitis - progressive and recurrent, migraines, PICC placed 7/11 and initiation of TPN, and underlying medical condition (PMHx: TRINI, RA, melanoma 2018 and 2021, medical marijuana use since 2020).     Nutrition Related Findings:    Pt. Report/Treatments/Miscellaneous: Patient seen while brushing teeth earlier today, reports feeling better today, denies nausea, had 2 stools today, TPN infusing at 75ml/ hour   7/18: Pt seen this morning resting in bed; pt report she feels better today. Pt denies any N/V and reports minimal abdominal pain. No flatus yet. TPN running at 75 ml/hr.  7/17: pt. Seen while ambulating in

## 2024-07-19 NOTE — PLAN OF CARE
Problem: Pain  Goal: Verbalizes/displays adequate comfort level or baseline comfort level  7/19/2024 1103 by Alyssa Conrad RN  Outcome: Progressing  Flowsheets (Taken 7/19/2024 1103)  Verbalizes/displays adequate comfort level or baseline comfort level:   Encourage patient to monitor pain and request assistance   Assess pain using appropriate pain scale   Administer analgesics based on type and severity of pain and evaluate response   Implement non-pharmacological measures as appropriate and evaluate response     Problem: Skin/Tissue Integrity - Adult  Goal: Skin integrity remains intact  7/19/2024 1103 by Alyssa Conrad RN  Outcome: Progressing  Flowsheets (Taken 7/19/2024 1103)  Skin Integrity Remains Intact:   Monitor for areas of redness and/or skin breakdown   Assess vascular access sites hourly     Problem: Gastrointestinal - Adult  Goal: Minimal or absence of nausea and vomiting  7/19/2024 1103 by Alyssa Conrad RN  Outcome: Progressing  Flowsheets (Taken 7/19/2024 1103)  Minimal or absence of nausea and vomiting:   Administer IV fluids as ordered to ensure adequate hydration   Administer ordered antiemetic medications as needed   Provide nonpharmacologic comfort measures as appropriate   Advance diet as tolerated, if ordered   Nutrition consult to assist patient with adequate nutrition and appropriate food choices     Problem: Gastrointestinal - Adult  Goal: Maintains or returns to baseline bowel function  7/19/2024 1103 by Alyssa Conrad RN  Outcome: Progressing  Flowsheets (Taken 7/19/2024 1103)  Maintains or returns to baseline bowel function:   Assess bowel function   Encourage oral fluids to ensure adequate hydration   Administer IV fluids as ordered to ensure adequate hydration   Administer ordered medications as needed   Encourage mobilization and activity   Nutrition consult to assist patient with appropriate food choices     Problem: Gastrointestinal - Adult  Goal: Maintains adequate nutritional

## 2024-07-19 NOTE — PLAN OF CARE
Problem: Pain  Goal: Verbalizes/displays adequate comfort level or baseline comfort level  Outcome: Progressing     Problem: Skin/Tissue Integrity - Adult  Goal: Skin integrity remains intact  Outcome: Progressing  Flowsheets (Taken 7/18/2024 2000)  Skin Integrity Remains Intact: Monitor for areas of redness and/or skin breakdown     Problem: Gastrointestinal - Adult  Goal: Minimal or absence of nausea and vomiting  Outcome: Progressing  Flowsheets (Taken 7/18/2024 2000)  Minimal or absence of nausea and vomiting:   Administer IV fluids as ordered to ensure adequate hydration   Maintain NPO status until nausea and vomiting are resolved   Administer ordered antiemetic medications as needed     Problem: Gastrointestinal - Adult  Goal: Maintains or returns to baseline bowel function  Outcome: Progressing  Flowsheets (Taken 7/18/2024 2000)  Maintains or returns to baseline bowel function:   Assess bowel function   Encourage oral fluids to ensure adequate hydration   Administer IV fluids as ordered to ensure adequate hydration     Problem: Gastrointestinal - Adult  Goal: Maintains adequate nutritional intake  Outcome: Progressing  Flowsheets (Taken 7/18/2024 2000)  Maintains adequate nutritional intake:   Monitor percentage of each meal consumed   Identify factors contributing to decreased intake, treat as appropriate     Problem: Musculoskeletal - Adult  Goal: Return mobility to safest level of function  Outcome: Progressing  Flowsheets (Taken 7/18/2024 2000)  Return Mobility to Safest Level of Function:   Assess patient stability and activity tolerance for standing, transferring and ambulating with or without assistive devices   Ensure adequate protection for wounds/incisions during mobilization     Problem: Discharge Planning  Goal: Discharge to home or other facility with appropriate resources  Outcome: Progressing  Flowsheets (Taken 7/18/2024 2000)  Discharge to home or other facility with appropriate resources:

## 2024-07-19 NOTE — PROGRESS NOTES
Hospitalist Progress Note    Patient:  Sera Breaux      Unit/Bed:5K-28/028-A    YOB: 1965    MRN: [id number]       Acct: [id number]     PCP: Bernadette Oconnell APRN - CNP    Date of Admission: 7/8/2024    Assessment/Plan:    Complicated Diverticulitis, progressive and recurrent: CT abdomen /pelvis  done 7/8/24 showed:Uncomplicated, recurrent/progressive sigmoid diverticulitis   It is reported patient indicated  has been on Augmentin for the past month with no improvement.  Merrem  was initiated.  Surgery team following  colon resection with anastomosis  rescheduled for Monday 7/15 Patient on TPN /clear liquid per surgery,surgery planning bowel prep on Sunday seen notes appreciate input    7/13  patient denies abdominal pain, but states gets mild abdominal cramp following BM.No nausea reported    7/14 TPN in progress  patient for surgery tomorrow    7/15 Surgery following patient went to OR today is s/p Low anterior resection.   Incidental appendectomy    7/16 surgery yesterday 7/15 patient reports incisional pain at 5/10,denies nausea,fever or chills.SOPHIE drain to bulb sunction    7/17 patient reports intermittent nausea, add Compazine  Reports Incisional pain at 4/10     7/18 patient reports nausea improved, said incisional wound pain mild rates 3/10  Patient reports burping but has not passed flatus     7/19 Patient reports loose BM x2   Now on clear liquid denies nausea  Ga d/c patient able to void without difficulties  Reports pain under control      2.History of Migraine headache   Continue home meds    3. Rheumatoid Arthritis  Stable  On Prednisone    4.History of Malignant Melanoma of Skin of L Leg: completed adjuvant Opdivo, follows with oncology for surveillance    5.Malnutrition most likely due to recurrent progressive diverticulitis  Patient currently on TPN/clears   Dietary on consult   Patient for surgery on Monday 7/15         Anticipated Discharge in : TBD    Active Hospital Problems

## 2024-07-20 LAB
ANION GAP SERPL CALC-SCNC: 10 MEQ/L (ref 8–16)
BUN SERPL-MCNC: 16 MG/DL (ref 7–22)
CA-I BLD ISE-SCNC: 1.19 MMOL/L (ref 1.12–1.32)
CALCIUM SERPL-MCNC: 8.3 MG/DL (ref 8.5–10.5)
CHLORIDE SERPL-SCNC: 105 MEQ/L (ref 98–111)
CO2 SERPL-SCNC: 26 MEQ/L (ref 23–33)
CREAT SERPL-MCNC: 0.3 MG/DL (ref 0.4–1.2)
DEPRECATED RDW RBC AUTO: 44.9 FL (ref 35–45)
ERYTHROCYTE [DISTWIDTH] IN BLOOD BY AUTOMATED COUNT: 12.5 % (ref 11.5–14.5)
GFR SERPL CREATININE-BSD FRML MDRD: > 90 ML/MIN/1.73M2
GLUCOSE BLD STRIP.AUTO-MCNC: 102 MG/DL (ref 70–108)
GLUCOSE BLD STRIP.AUTO-MCNC: 107 MG/DL (ref 70–108)
GLUCOSE BLD STRIP.AUTO-MCNC: 139 MG/DL (ref 70–108)
GLUCOSE BLD STRIP.AUTO-MCNC: 140 MG/DL (ref 70–108)
GLUCOSE SERPL-MCNC: 90 MG/DL (ref 70–108)
HCT VFR BLD AUTO: 33.1 % (ref 37–47)
HGB BLD-MCNC: 11.1 GM/DL (ref 12–16)
MAGNESIUM SERPL-MCNC: 1.9 MG/DL (ref 1.6–2.4)
MCH RBC QN AUTO: 32.6 PG (ref 26–33)
MCHC RBC AUTO-ENTMCNC: 33.5 GM/DL (ref 32.2–35.5)
MCV RBC AUTO: 97.4 FL (ref 81–99)
PHOSPHATE SERPL-MCNC: 2.5 MG/DL (ref 2.4–4.7)
PLATELET # BLD AUTO: 280 THOU/MM3 (ref 130–400)
PMV BLD AUTO: 10.8 FL (ref 9.4–12.4)
POTASSIUM SERPL-SCNC: 3.4 MEQ/L (ref 3.5–5.2)
RBC # BLD AUTO: 3.4 MILL/MM3 (ref 4.2–5.4)
SODIUM SERPL-SCNC: 141 MEQ/L (ref 135–145)
WBC # BLD AUTO: 8.1 THOU/MM3 (ref 4.8–10.8)

## 2024-07-20 PROCEDURE — 6370000000 HC RX 637 (ALT 250 FOR IP): Performed by: SURGERY

## 2024-07-20 PROCEDURE — 6360000002 HC RX W HCPCS: Performed by: SURGERY

## 2024-07-20 PROCEDURE — 2580000003 HC RX 258: Performed by: SURGERY

## 2024-07-20 PROCEDURE — 82330 ASSAY OF CALCIUM: CPT

## 2024-07-20 PROCEDURE — 36415 COLL VENOUS BLD VENIPUNCTURE: CPT

## 2024-07-20 PROCEDURE — 85027 COMPLETE CBC AUTOMATED: CPT

## 2024-07-20 PROCEDURE — 2500000003 HC RX 250 WO HCPCS: Performed by: SURGERY

## 2024-07-20 PROCEDURE — 1200000000 HC SEMI PRIVATE

## 2024-07-20 PROCEDURE — 84100 ASSAY OF PHOSPHORUS: CPT

## 2024-07-20 PROCEDURE — 83735 ASSAY OF MAGNESIUM: CPT

## 2024-07-20 PROCEDURE — 82948 REAGENT STRIP/BLOOD GLUCOSE: CPT

## 2024-07-20 PROCEDURE — 80048 BASIC METABOLIC PNL TOTAL CA: CPT

## 2024-07-20 PROCEDURE — 99232 SBSQ HOSP IP/OBS MODERATE 35: CPT | Performed by: PHYSICIAN ASSISTANT

## 2024-07-20 RX ADMIN — ONDANSETRON 4 MG: 2 INJECTION INTRAMUSCULAR; INTRAVENOUS at 09:39

## 2024-07-20 RX ADMIN — WATER 40 MG: 1 INJECTION INTRAMUSCULAR; INTRAVENOUS; SUBCUTANEOUS at 09:39

## 2024-07-20 RX ADMIN — MEROPENEM 1000 MG: 1 INJECTION INTRAVENOUS at 09:39

## 2024-07-20 RX ADMIN — SODIUM CHLORIDE, PRESERVATIVE FREE 40 MG: 5 INJECTION INTRAVENOUS at 09:39

## 2024-07-20 RX ADMIN — SODIUM PHOSPHATE, MONOBASIC, MONOHYDRATE AND SODIUM PHOSPHATE, DIBASIC, ANHYDROUS 15 MMOL: 142; 276 INJECTION, SOLUTION INTRAVENOUS at 15:44

## 2024-07-20 RX ADMIN — SODIUM CHLORIDE, PRESERVATIVE FREE 10 ML: 5 INJECTION INTRAVENOUS at 21:36

## 2024-07-20 RX ADMIN — MEROPENEM 1000 MG: 1 INJECTION INTRAVENOUS at 15:58

## 2024-07-20 RX ADMIN — SODIUM CHLORIDE: 234 INJECTION INTRAMUSCULAR; INTRAVENOUS; SUBCUTANEOUS at 19:02

## 2024-07-20 RX ADMIN — SODIUM CHLORIDE, PRESERVATIVE FREE 10 ML: 5 INJECTION INTRAVENOUS at 21:35

## 2024-07-20 RX ADMIN — POTASSIUM CHLORIDE 40 MEQ: 1500 TABLET, EXTENDED RELEASE ORAL at 11:19

## 2024-07-20 RX ADMIN — MEROPENEM 1000 MG: 1 INJECTION INTRAVENOUS at 00:29

## 2024-07-20 NOTE — PROGRESS NOTES
Patient has moderate amount of serous yellow drainage leaking from SOPHIE insertion site. Cleansed with CHG soap and patted dry. New split gauze applied. Site free from S/S of infection. SOPHIE drain having serosanguinous output.

## 2024-07-20 NOTE — PROGRESS NOTES
Surg POD#5  wbc 8.1  had more BM overnite no evidense stool in SOPHIE  will inc to full liq abd soft incision good

## 2024-07-20 NOTE — PLAN OF CARE
Problem: Pain  Goal: Verbalizes/displays adequate comfort level or baseline comfort level  Outcome: Progressing     Problem: Skin/Tissue Integrity - Adult  Goal: Skin integrity remains intact  Outcome: Progressing  Flowsheets (Taken 7/19/2024 2200)  Skin Integrity Remains Intact: Monitor for areas of redness and/or skin breakdown     Problem: Gastrointestinal - Adult  Goal: Minimal or absence of nausea and vomiting  Outcome: Progressing  Flowsheets (Taken 7/19/2024 2200)  Minimal or absence of nausea and vomiting:   Administer IV fluids as ordered to ensure adequate hydration   Administer ordered antiemetic medications as needed     Problem: Gastrointestinal - Adult  Goal: Maintains or returns to baseline bowel function  Outcome: Progressing  Flowsheets (Taken 7/19/2024 2200)  Maintains or returns to baseline bowel function:   Assess bowel function   Encourage oral fluids to ensure adequate hydration   Administer IV fluids as ordered to ensure adequate hydration     Problem: Gastrointestinal - Adult  Goal: Maintains adequate nutritional intake  Outcome: Progressing  Flowsheets (Taken 7/19/2024 2200)  Maintains adequate nutritional intake:   Monitor percentage of each meal consumed   Identify factors contributing to decreased intake, treat as appropriate     Problem: Musculoskeletal - Adult  Goal: Return mobility to safest level of function  Outcome: Progressing  Flowsheets (Taken 7/19/2024 2200)  Return Mobility to Safest Level of Function:   Assist with transfers and ambulation using safe patient handling equipment as needed   Assess patient stability and activity tolerance for standing, transferring and ambulating with or without assistive devices   Ensure adequate protection for wounds/incisions during mobilization     Problem: Discharge Planning  Goal: Discharge to home or other facility with appropriate resources  Outcome: Progressing  Flowsheets (Taken 7/19/2024 2200)  Discharge to home or other facility

## 2024-07-20 NOTE — PROGRESS NOTES
Hospitalist Progress Note    Patient:  Sera Breaux      Unit/Bed:5K-28/028-A    YOB: 1965    MRN: [id number]       Acct: [id number]     PCP: Bernadette Oconnell APRN - CNP    Date of Admission: 7/8/2024    Assessment/Plan:    Complicated Diverticulitis, progressive and recurrent:   General Surgery consulted, patient s/p lower anterior resection with incidental appendectomy performed by Dr. Giordano 7/15/24  Diet advanced to full liquid  Continue analgesics and antiemetics PRN      2.History of Migraine headache:  -Continue home medications     3. Hypokalemia: Potassium 3.4 7/20/24   -Potassium replacement protocol ordered   -Repeat BMP tomorrow    Rheumatoid Arthritis:  -Continue Prednisone     4.History of Malignant Melanoma of Skin of L Leg: completed adjuvant Opdivo, follows with oncology for surveillance     5.Malnutrition most likely due to recurrent progressive diverticulitis:  -Dietician consulted    Chief Complaint: LLQ pain      Subjective: 59 y.o. female admitted to the hospitalist service with LLQ pain. Patient s/p lower anterior resection with incidental appendectomy performed by Dr. Giordano 7/15/24. Patient denies chest pain. She endorses nausea but denies vomiting.      Medications:    Infusion Medications    PN-Adult  3 IN 1 Central Line (Custom)      PN-Adult  3 IN 1 Central Line (Custom) 75 mL/hr at 07/19/24 1813    sodium chloride Stopped (07/15/24 1013)    sodium chloride 20 mL/hr at 07/14/24 0327     Scheduled Medications    sodium phosphate IVPB (CENTRAL line)  15 mmol IntraVENous Once    potassium bicarb-citric acid  40 mEq Oral Once    methylPREDNISolone  40 mg IntraVENous Daily    insulin lispro  0-8 Units SubCUTAneous Q6H    pantoprazole (PROTONIX) 40 mg in sodium chloride (PF) 0.9 % 10 mL injection  40 mg IntraVENous Daily    sodium chloride flush  5-40 mL IntraVENous 2 times per day    sodium chloride flush  5-40 mL IntraVENous 2 times per day    enoxaparin  30 mg

## 2024-07-20 NOTE — PROGRESS NOTES
Pharmacy Consult for TPN/PPN Monitoring & Adjustment  IV Access: central      Dosing weight: 41 kg  Current insulin regimen: medium sliding scale  Diet (excluding TPN): Clear liquid diet  Maintenance fluid: none    Plan:  See components of tonight's TPN bag in the table below:  Date 7/16/24 7/17/24 7/18/24 7/19/24 7/20/2024   Total kcal/kg 20 24 28 28 28   Total kcal  1148 1148   Protein g/kg 1.2 2 2 2 2   Protein g 49.2 82 82 82 82   Protein kcal (4 kcal/g) 196.8 328 328 328 328   Lipid % 30 30 30 30 30   Lipid g 24.6 29.5 34.4 34.4 34.4   Lipid kcal (10 kcal/g) 246 295.2 344.4 344.4 344.4   Dextrose g 110.9 106 139.9 139.9 139.9   Dextrose kcal (3.4 kcal/g) 377.2 360.8 475.6 475.6 475.6   Infusion Rate (mL/hr) 75 75 75 75 75   Na Acetate (mEq) 40 40 40 40 40   Na Chloride (mEq) 160 160 160 120 120   Na Phos (mmol)  (3 mmol = 4 mEq Na) 0 0 0 0 0   Total Na (mEq) 200 200 200 160 160   K Acetate (mEq) 40 40 40 50 50   K Chloride (mEq) 0 0 0 0 0   K Phos (mmol)  (15 mmol = 22 mEq K) 15 18 21 27 27   Total K (mEq) 62 66.4 70.8 89.6 89.6   Ca Gluconate (mEq)  (1 g = 4.65 mEq) 0 0 0 0 0   Mag Sulfate (mEq)  (1 g = 8.12 mEq) 8.12 8.12 8.12 8.12 8.12   Multivitamin (mL) 10 10 10 10 10   Trace Elements (mL) 1 1 1 1 1      Summary of changes for tonight's TPN:   No change    Electrolyte Replacement:   Potassium chloride: 40 mEq  Sodium phosphate: 15 mmol    Monitoring:  BMP, Mag, iCal, & Phos ordered for tomorrow with AM labs.    Pharmacy will continue to follow daily. Thank you for the consult.    Carmen Singh, JamesD, BCPS  7/20/2024  10:36 AM

## 2024-07-20 NOTE — PLAN OF CARE
Problem: Pain  Goal: Verbalizes/displays adequate comfort level or baseline comfort level  7/20/2024 1852 by Mellisa Recinos, RN  Outcome: Progressing  Flowsheets (Taken 7/20/2024 1852)  Verbalizes/displays adequate comfort level or baseline comfort level:   Encourage patient to monitor pain and request assistance   Consider cultural and social influences on pain and pain management   Administer analgesics based on type and severity of pain and evaluate response   Assess pain using appropriate pain scale  7/20/2024 1852 by Mellisa Recinos, RN  Outcome: Progressing  Flowsheets (Taken 7/20/2024 1852)  Verbalizes/displays adequate comfort level or baseline comfort level:   Encourage patient to monitor pain and request assistance   Consider cultural and social influences on pain and pain management   Administer analgesics based on type and severity of pain and evaluate response   Assess pain using appropriate pain scale     Problem: Skin/Tissue Integrity - Adult  Goal: Skin integrity remains intact  Outcome: Progressing  Flowsheets (Taken 7/20/2024 1852)  Skin Integrity Remains Intact:   Monitor for areas of redness and/or skin breakdown   Assess vascular access sites hourly     Problem: Gastrointestinal - Adult  Goal: Minimal or absence of nausea and vomiting  Outcome: Progressing  Flowsheets (Taken 7/20/2024 1852)  Minimal or absence of nausea and vomiting:   Nasogastric tube to low intermittent suction as ordered   Administer IV fluids as ordered to ensure adequate hydration   Maintain NPO status until nausea and vomiting are resolved   Provide nonpharmacologic comfort measures as appropriate  Goal: Maintains or returns to baseline bowel function  Outcome: Progressing  Flowsheets (Taken 7/20/2024 1852)  Maintains or returns to baseline bowel function:   Assess bowel function   Administer IV fluids as ordered to ensure adequate hydration   Encourage oral fluids to ensure adequate hydration  Goal: Maintains

## 2024-07-21 LAB
ANION GAP SERPL CALC-SCNC: 7 MEQ/L (ref 8–16)
BUN SERPL-MCNC: 17 MG/DL (ref 7–22)
CA-I BLD ISE-SCNC: 1.21 MMOL/L (ref 1.12–1.32)
CALCIUM SERPL-MCNC: 8.3 MG/DL (ref 8.5–10.5)
CHLORIDE SERPL-SCNC: 106 MEQ/L (ref 98–111)
CO2 SERPL-SCNC: 25 MEQ/L (ref 23–33)
CREAT SERPL-MCNC: 0.3 MG/DL (ref 0.4–1.2)
GFR SERPL CREATININE-BSD FRML MDRD: > 90 ML/MIN/1.73M2
GLUCOSE BLD STRIP.AUTO-MCNC: 106 MG/DL (ref 70–108)
GLUCOSE BLD STRIP.AUTO-MCNC: 112 MG/DL (ref 70–108)
GLUCOSE BLD STRIP.AUTO-MCNC: 114 MG/DL (ref 70–108)
GLUCOSE BLD STRIP.AUTO-MCNC: 116 MG/DL (ref 70–108)
GLUCOSE BLD STRIP.AUTO-MCNC: 99 MG/DL (ref 70–108)
GLUCOSE SERPL-MCNC: 91 MG/DL (ref 70–108)
MAGNESIUM SERPL-MCNC: 2 MG/DL (ref 1.6–2.4)
PHOSPHATE SERPL-MCNC: 2.7 MG/DL (ref 2.4–4.7)
POTASSIUM SERPL-SCNC: 3.8 MEQ/L (ref 3.5–5.2)
SODIUM SERPL-SCNC: 138 MEQ/L (ref 135–145)

## 2024-07-21 PROCEDURE — 6360000002 HC RX W HCPCS: Performed by: SURGERY

## 2024-07-21 PROCEDURE — 83735 ASSAY OF MAGNESIUM: CPT

## 2024-07-21 PROCEDURE — 80048 BASIC METABOLIC PNL TOTAL CA: CPT

## 2024-07-21 PROCEDURE — 82948 REAGENT STRIP/BLOOD GLUCOSE: CPT

## 2024-07-21 PROCEDURE — 36415 COLL VENOUS BLD VENIPUNCTURE: CPT

## 2024-07-21 PROCEDURE — 99232 SBSQ HOSP IP/OBS MODERATE 35: CPT | Performed by: PHYSICIAN ASSISTANT

## 2024-07-21 PROCEDURE — 2580000003 HC RX 258: Performed by: SURGERY

## 2024-07-21 PROCEDURE — 2500000003 HC RX 250 WO HCPCS: Performed by: SURGERY

## 2024-07-21 PROCEDURE — 84100 ASSAY OF PHOSPHORUS: CPT

## 2024-07-21 PROCEDURE — 82330 ASSAY OF CALCIUM: CPT

## 2024-07-21 PROCEDURE — 1200000000 HC SEMI PRIVATE

## 2024-07-21 RX ADMIN — SODIUM CHLORIDE, PRESERVATIVE FREE 40 MG: 5 INJECTION INTRAVENOUS at 07:47

## 2024-07-21 RX ADMIN — MEROPENEM 1000 MG: 1 INJECTION INTRAVENOUS at 00:12

## 2024-07-21 RX ADMIN — WATER 40 MG: 1 INJECTION INTRAMUSCULAR; INTRAVENOUS; SUBCUTANEOUS at 07:49

## 2024-07-21 RX ADMIN — SODIUM CHLORIDE: 234 INJECTION INTRAMUSCULAR; INTRAVENOUS; SUBCUTANEOUS at 19:40

## 2024-07-21 RX ADMIN — MEROPENEM 1000 MG: 1 INJECTION INTRAVENOUS at 07:47

## 2024-07-21 RX ADMIN — MEROPENEM 1000 MG: 1 INJECTION INTRAVENOUS at 16:40

## 2024-07-21 NOTE — PROGRESS NOTES
Hospitalist Progress Note    Patient:  Sera Breaux      Unit/Bed:5K-28/028-A    YOB: 1965    MRN: [id number]       Acct: [id number]     PCP: Bernadette Oconnell APRN - CNP    Date of Admission: 7/8/2024    Assessment/Plan:    Complicated Diverticulitis, progressive and recurrent:   General Surgery consulted, patient s/p lower anterior resection with incidental appendectomy performed by Dr. Giordano 7/15/24  Diet advanced from full liquid to regular by Dr. Giordano, patient also continues on TPN for now  Continue analgesics and antiemetics PRN      2.History of Migraine headache:  -Continue home medications     3. Hypokalemia: Resolved 7/21/24, Potassium 3.4 7/20/24   -Potassium replacement protocol ordered   -Trend PRN    Rheumatoid Arthritis:  -Continue Prednisone     4.History of Malignant Melanoma of Skin of L Leg: completed adjuvant Opdivo, follows with oncology for surveillance     5.Malnutrition most likely due to recurrent progressive diverticulitis:  -Dietician consulted    Chief Complaint: LLQ pain      Subjective: 59 y.o. female admitted to the hospitalist service with LLQ pain. Patient s/p lower anterior resection with incidental appendectomy performed by Dr. Giordano 7/15/24. Patient denies nausea or vomiting. She rates her pain a 1/10 in severity. She denies chest pain. She denies SIB. She endorses BM. She continues on TPN but diet was advanced today by Dr. Giordano with general surgery.      Medications:    Infusion Medications    PN-Adult  3 IN 1 Central Line (Custom) 75 mL/hr at 07/20/24 1902    sodium chloride Stopped (07/15/24 1013)    sodium chloride 20 mL/hr at 07/14/24 0327     Scheduled Medications    potassium bicarb-citric acid  40 mEq Oral Once    methylPREDNISolone  40 mg IntraVENous Daily    insulin lispro  0-8 Units SubCUTAneous Q6H    pantoprazole (PROTONIX) 40 mg in sodium chloride (PF) 0.9 % 10 mL injection  40 mg IntraVENous Daily    sodium chloride flush  5-40

## 2024-07-21 NOTE — PROGRESS NOTES
Surg POD#6Patient tolerated full liquids is having bowel movementsAbdominal pain is minimal SOPHIE shows no evidence of stool suture line intact Steri-Strips in placePatient appears to be doing wellWill increase to regular dietWill switch to oral prednisone in the morning

## 2024-07-21 NOTE — PROGRESS NOTES
Patient tolerating regular diet. Walked in powers 3x this shift. CHG shower completed. Incision remains clean dry and intact. SOPHIE site still leaking small amount of serous yellow fluid. Dressing changed.

## 2024-07-21 NOTE — PLAN OF CARE
Problem: Pain  Goal: Verbalizes/displays adequate comfort level or baseline comfort level  Outcome: Progressing  Flowsheets (Taken 7/21/2024 0857)  Verbalizes/displays adequate comfort level or baseline comfort level:   Encourage patient to monitor pain and request assistance   Assess pain using appropriate pain scale   Administer analgesics based on type and severity of pain and evaluate response  Note: Patient denies pain so far this shift.      Problem: Skin/Tissue Integrity - Adult  Goal: Skin integrity remains intact  Outcome: Progressing  Flowsheets (Taken 7/21/2024 0857)  Skin Integrity Remains Intact:   Monitor for areas of redness and/or skin breakdown   Assess vascular access sites hourly  Note: Midline incision with SOPHIE drain. CHG bath daily.      Problem: Gastrointestinal - Adult  Goal: Minimal or absence of nausea and vomiting  Outcome: Progressing  Flowsheets (Taken 7/21/2024 0857)  Minimal or absence of nausea and vomiting:   Nasogastric tube to low intermittent suction as ordered   Administer IV fluids as ordered to ensure adequate hydration   Nutrition consult to assist patient with adequate nutrition and appropriate food choices  Goal: Maintains or returns to baseline bowel function  Outcome: Progressing  Flowsheets (Taken 7/21/2024 0857)  Maintains or returns to baseline bowel function:   Assess bowel function   Administer IV fluids as ordered to ensure adequate hydration   Encourage oral fluids to ensure adequate hydration     Problem: Musculoskeletal - Adult  Goal: Return mobility to safest level of function  Outcome: Progressing  Flowsheets (Taken 7/21/2024 0857)  Return Mobility to Safest Level of Function:   Assess patient stability and activity tolerance for standing, transferring and ambulating with or without assistive devices   Assist with transfers and ambulation using safe patient handling equipment as needed   Ensure adequate protection for wounds/incisions during mobilization  Note:

## 2024-07-22 LAB
ANION GAP SERPL CALC-SCNC: 11 MEQ/L (ref 8–16)
BUN SERPL-MCNC: 22 MG/DL (ref 7–22)
CA-I BLD ISE-SCNC: 1.2 MMOL/L (ref 1.12–1.32)
CALCIUM SERPL-MCNC: 8.5 MG/DL (ref 8.5–10.5)
CHLORIDE SERPL-SCNC: 106 MEQ/L (ref 98–111)
CO2 SERPL-SCNC: 23 MEQ/L (ref 23–33)
CREAT SERPL-MCNC: 0.3 MG/DL (ref 0.4–1.2)
GFR SERPL CREATININE-BSD FRML MDRD: > 90 ML/MIN/1.73M2
GLUCOSE BLD STRIP.AUTO-MCNC: 103 MG/DL (ref 70–108)
GLUCOSE BLD STRIP.AUTO-MCNC: 105 MG/DL (ref 70–108)
GLUCOSE BLD STRIP.AUTO-MCNC: 78 MG/DL (ref 70–108)
GLUCOSE SERPL-MCNC: 83 MG/DL (ref 70–108)
MAGNESIUM SERPL-MCNC: 2.1 MG/DL (ref 1.6–2.4)
PHOSPHATE SERPL-MCNC: 2.9 MG/DL (ref 2.4–4.7)
POTASSIUM SERPL-SCNC: 3.7 MEQ/L (ref 3.5–5.2)
SODIUM SERPL-SCNC: 140 MEQ/L (ref 135–145)

## 2024-07-22 PROCEDURE — 2580000003 HC RX 258: Performed by: SURGERY

## 2024-07-22 PROCEDURE — 80048 BASIC METABOLIC PNL TOTAL CA: CPT

## 2024-07-22 PROCEDURE — 36415 COLL VENOUS BLD VENIPUNCTURE: CPT

## 2024-07-22 PROCEDURE — 1200000000 HC SEMI PRIVATE

## 2024-07-22 PROCEDURE — 82330 ASSAY OF CALCIUM: CPT

## 2024-07-22 PROCEDURE — 84100 ASSAY OF PHOSPHORUS: CPT

## 2024-07-22 PROCEDURE — 99232 SBSQ HOSP IP/OBS MODERATE 35: CPT | Performed by: PHYSICIAN ASSISTANT

## 2024-07-22 PROCEDURE — 82948 REAGENT STRIP/BLOOD GLUCOSE: CPT

## 2024-07-22 PROCEDURE — 6370000000 HC RX 637 (ALT 250 FOR IP): Performed by: PHYSICIAN ASSISTANT

## 2024-07-22 PROCEDURE — 83735 ASSAY OF MAGNESIUM: CPT

## 2024-07-22 RX ORDER — PREDNISONE 1 MG/1
3 TABLET ORAL EVERY EVENING
Status: DISCONTINUED | OUTPATIENT
Start: 2024-07-22 | End: 2024-07-23 | Stop reason: HOSPADM

## 2024-07-22 RX ORDER — INSULIN LISPRO 100 [IU]/ML
0-8 INJECTION, SOLUTION INTRAVENOUS; SUBCUTANEOUS
Status: DISCONTINUED | OUTPATIENT
Start: 2024-07-23 | End: 2024-07-23 | Stop reason: HOSPADM

## 2024-07-22 RX ADMIN — PREDNISONE 3 MG: 1 TABLET ORAL at 20:18

## 2024-07-22 RX ADMIN — SODIUM CHLORIDE, PRESERVATIVE FREE 10 ML: 5 INJECTION INTRAVENOUS at 20:18

## 2024-07-22 NOTE — PROGRESS NOTES
very challenging to gain weight (note appears that malnutrition is acute on chronic). She states the last couple of days PTA, she was eating normally with ~3 meals and snacking frequently in between because she gets \"so hungry\" and has the goal of weight gain. Pt tolerating clear liquid diet - denies N/V. Discussed TPN start, denies questions or concerns at this time.   GI Status: BM 7/21  Pertinent Labs: 7/22: Glucose 83, BUN 22, Cr 0.3, Potassium 3.7, Magnesium 2.1, Phosphorus 2.9  Pertinent Meds: Insulin, Compazine, Zofran, Dilaudid      Wound Type: Surgical Incision (7/15 lower anterior resection, appendectomy)       Current Nutrition Intake & Therapies:    Average Meal Intake: 1-25%, 51-75%  Average Supplements Intake:  (consuming from home)  PN-Adult  3 IN 1 Central Line (Custom)  ADULT DIET; Regular  Current Parenteral Nutrition Orders:  Type and Formula: 3-in-1 Custom (28 kcal/kgm, 2 grams protein/kgm, 30% lipid kcals, dose weight 41 kgm)   Lipids: Daily  Duration: Continuous  Rate/Volume: 75 ml/hr  Current PN Order Provides: 1148 kcal, 82 grams protein, 140 grams dextrose and 34 grams lipids/24 hours  Goal PN Orders Provides: continue current TPN macronutrients    Anthropometric Measures:  Height: 152.4 cm (5')  Ideal Body Weight (IBW): 100 lbs (45 kg)    Admission Body Weight: 40.8 kg (90 lb) (7/8 stated weight, no edema)  Current Body Weight: 38.6 kg (85 lb 3.2 oz) (7/22 no edema), 89.9 % IBW.    Current BMI (kg/m2): 16.6  Usual Body Weight:  (per pt report: 90#, per EMR: 9/5/19: 100# 8 oz, 5/29/23: 87# 8 oz, 9/28/23: 87# 13 oz)     Weight Adjustment For: No Adjustment                 BMI Categories: Underweight (BMI less than 18.5)    Estimated Daily Nutrient Needs:  Energy Requirements Based On: Kcal/kg  Weight Used for Energy Requirements: Admission (40.8 kg)  Energy (kcal/day): 3542-4963+ kcals (30-35+ kcals/kg) - to promote desired weight gain  Weight Used for Protein Requirements: Admission (40.8

## 2024-07-22 NOTE — CARE COORDINATION
7/22/24, 2:19 PM EDT    DISCHARGE ON GOING EVALUATION    Sera CHERRY [facility] day: 14  Location: [facility]28/028-A Reason for admit: Diverticulitis [K57.92]  Diverticulitis of colon [K57.32]     Procedures:   7/11 PICC placement   7/15 OR w/ Dr. Giordano: LOWER ANTERIOR RESECTION, INCIDENTIAL APPENDECTOMY     Imaging since last note: none     Barriers to Discharge: Hospitalist and General surgery following. Dietitian. TPN via PICC- stop after current bag complete. (+) BM. Tolerating diet. Creat 0.3.    PCP: Bernadette Oconnell APRN - CNP  Readmission Risk Score: 16.3    Patient Goals/Plan/Treatment Preferences: Plans to return home w/ . Denies discharge needs.

## 2024-07-22 NOTE — PROGRESS NOTES
Hospitalist Progress Note    Patient:  Sera Breaux      Unit/Bed:5K-28/028-A    YOB: 1965    MRN: [id number]       Acct: [id number]     PCP: Bernadette Oconnell APRN - CNP    Date of Admission: 7/8/2024    Assessment/Plan:    Complicated Diverticulitis, progressive and recurrent:   General Surgery consulted, patient s/p lower anterior resection with incidental appendectomy performed by Dr. Giordano 7/15/24  Diet advanced from full liquid to regular by Dr. Giordano  TPN discontinued  Continue analgesics and antiemetics PRN      2.History of Migraine headache:  -Continue home medications     3. Hypokalemia: Resolved 7/21/24, Potassium 3.4 7/20/24   -Potassium replacement protocol ordered   -Trend PRN    Rheumatoid Arthritis:  -Prednisone resumed 7/22/24     4.History of Malignant Melanoma of Skin of L Leg: completed adjuvant Opdivo, follows with oncology for surveillance     5.Malnutrition most likely due to recurrent progressive diverticulitis:  -Dietician consulted    General surgery anticipating d/c tomorrow    Chief Complaint: LLQ pain      Subjective: 59 y.o. female admitted to the hospitalist service with LLQ pain. Patient s/p lower anterior resection with incidental appendectomy performed by Dr. Giordano 7/15/24. Patient seen while she was brushing her teeth. She denies nausea or vomiting. She denies chest pain. General surgery anticipates d/c tomorrow. PO prednisone resumed.     Medications:    Infusion Medications    PN-Adult  3 IN 1 Central Line (Custom) 75 mL/hr at 07/22/24 0903    sodium chloride Stopped (07/15/24 1013)    sodium chloride 20 mL/hr at 07/22/24 0903     Scheduled Medications    potassium bicarb-citric acid  40 mEq Oral Once    insulin lispro  0-8 Units SubCUTAneous Q6H    sodium chloride flush  5-40 mL IntraVENous 2 times per day    sodium chloride flush  5-40 mL IntraVENous 2 times per day    enoxaparin  30 mg SubCUTAneous Daily    melatonin  3 mg Oral Nightly     PRN

## 2024-07-22 NOTE — PLAN OF CARE
Problem: Pain  Goal: Verbalizes/displays adequate comfort level or baseline comfort level  7/22/2024 1122 by Sturgeon, Cara B, RN  Outcome: Progressing  Flowsheets (Taken 7/22/2024 1122)  Verbalizes/displays adequate comfort level or baseline comfort level:   Encourage patient to monitor pain and request assistance   Assess pain using appropriate pain scale   Implement non-pharmacological measures as appropriate and evaluate response   Administer analgesics based on type and severity of pain and evaluate response   Consider cultural and social influences on pain and pain management   Notify Licensed Independent Practitioner if interventions unsuccessful or patient reports new pain     Problem: Skin/Tissue Integrity - Adult  Goal: Skin integrity remains intact  7/22/2024 1122 by Sturgeon, Cara B, RN  Outcome: Progressing  Flowsheets (Taken 7/22/2024 1122)  Skin Integrity Remains Intact:   Assess vascular access sites hourly   Monitor for areas of redness and/or skin breakdown     Problem: Gastrointestinal - Adult  Goal: Minimal or absence of nausea and vomiting  7/22/2024 1122 by Sturgeon, Cara B, RN  Outcome: Progressing  Flowsheets (Taken 7/22/2024 1122)  Minimal or absence of nausea and vomiting:   Administer IV fluids as ordered to ensure adequate hydration   Administer ordered antiemetic medications as needed   Advance diet as tolerated, if ordered     Problem: Musculoskeletal - Adult  Goal: Return mobility to safest level of function  7/22/2024 1122 by Sturgeon, Cara B, RN  Outcome: Progressing  Flowsheets (Taken 7/22/2024 1122)  Return Mobility to Safest Level of Function:   Assess patient stability and activity tolerance for standing, transferring and ambulating with or without assistive devices   Ensure adequate protection for wounds/incisions during mobilization     Problem: Discharge Planning  Goal: Discharge to home or other facility with appropriate resources  7/22/2024 1122 by Sturgeon, Cara B,

## 2024-07-22 NOTE — PROGRESS NOTES
Surg POD#7  abd soft further BM  d/c SOPHIE stop solumedrol chg back to po prednisone  probable d/c tomorrow

## 2024-07-22 NOTE — PLAN OF CARE
Problem: Pain  Goal: Verbalizes/displays adequate comfort level or baseline comfort level  Outcome: Progressing     Problem: Skin/Tissue Integrity - Adult  Goal: Skin integrity remains intact  Outcome: Progressing  Flowsheets (Taken 7/21/2024 2130)  Skin Integrity Remains Intact: Monitor for areas of redness and/or skin breakdown     Problem: Gastrointestinal - Adult  Goal: Minimal or absence of nausea and vomiting  Outcome: Progressing  Flowsheets (Taken 7/21/2024 2130)  Minimal or absence of nausea and vomiting:   Administer IV fluids as ordered to ensure adequate hydration   Provide nonpharmacologic comfort measures as appropriate   Administer ordered antiemetic medications as needed     Problem: Gastrointestinal - Adult  Goal: Maintains or returns to baseline bowel function  Outcome: Progressing  Flowsheets (Taken 7/21/2024 2130)  Maintains or returns to baseline bowel function:   Administer ordered medications as needed   Administer IV fluids as ordered to ensure adequate hydration   Assess bowel function     Problem: Gastrointestinal - Adult  Goal: Maintains adequate nutritional intake  Outcome: Progressing  Flowsheets (Taken 7/21/2024 2130)  Maintains adequate nutritional intake:   Monitor percentage of each meal consumed   Identify factors contributing to decreased intake, treat as appropriate     Problem: Musculoskeletal - Adult  Goal: Return mobility to safest level of function  Outcome: Progressing  Flowsheets (Taken 7/21/2024 2130)  Return Mobility to Safest Level of Function:   Assess patient stability and activity tolerance for standing, transferring and ambulating with or without assistive devices   Assist with transfers and ambulation using safe patient handling equipment as needed   Ensure adequate protection for wounds/incisions during mobilization     Problem: Discharge Planning  Goal: Discharge to home or other facility with appropriate resources  Outcome: Progressing  Flowsheets (Taken

## 2024-07-23 VITALS
HEART RATE: 92 BPM | TEMPERATURE: 98.5 F | BODY MASS INDEX: 16.73 KG/M2 | SYSTOLIC BLOOD PRESSURE: 108 MMHG | WEIGHT: 85.2 LBS | HEIGHT: 60 IN | OXYGEN SATURATION: 98 % | RESPIRATION RATE: 16 BRPM | DIASTOLIC BLOOD PRESSURE: 69 MMHG

## 2024-07-23 PROCEDURE — 99238 HOSP IP/OBS DSCHRG MGMT 30/<: CPT | Performed by: PHYSICIAN ASSISTANT

## 2024-07-23 PROCEDURE — 2580000003 HC RX 258: Performed by: SURGERY

## 2024-07-23 RX ORDER — ONDANSETRON 4 MG/1
4 TABLET, ORALLY DISINTEGRATING ORAL EVERY 8 HOURS PRN
Qty: 20 TABLET | Refills: 0 | Status: SHIPPED | OUTPATIENT
Start: 2024-07-23

## 2024-07-23 RX ADMIN — SODIUM CHLORIDE, PRESERVATIVE FREE 10 ML: 5 INJECTION INTRAVENOUS at 07:47

## 2024-07-23 NOTE — DISCHARGE SUMMARY
Hospital Medicine Discharge Summary      Patient Identification:   Sera Breaux   : 1965  MRN: [id number]   Account: [id number]      Patient's PCP: Bernadette Oconnell APRN - CNP    Admit Date: 2024     Discharge Date: 24    Admitting Physician: No admitting provider for patient encounter.     Discharge Physician: Iván Bartholomew PA-C     Sera Breaux is a 59 y.o. female admitted to [facility] on 2024.      HPI On Admission From H&P:    \"Patient presents to the ED with LLQ pain for the past 6 weeks.  Patient has been treated with oral antibiotics for the past one month for persistent LLQ pain with diverticulitis noted on imaging in mid .  Patient was treated empirically in late May when she developed symptoms of LLQ and loose stools.  Patient continues to have pain and anorexia.  She denies blood in her stool.  She has had weight loss due to reduced appetite.     Patient is admitted for IV antibiotic therapy for persistent diverticulitis.\"    Assessment/Plan With Discharge Diagnoses:      Complicated Diverticulitis, progressive and recurrent:   General Surgery consulted, patient s/p lower anterior resection with incidental appendectomy performed by Dr. Giordano 7/15/24  Diet advanced from full liquid to regular by Dr. Giordano, pt tolerated, okay for discharge from general surgery perspective with outpatient f/u  TPN discontinued  Patient prescribed Zofran     2.History of Migraine headache:  -Continue home medications     3. Hypokalemia: Resolved 24, Potassium 3.4 24              -Potassium replacement protocol ordered              -Trend PRN     Rheumatoid Arthritis:  -Prednisone resumed 24     4.History of Malignant Melanoma of Skin of L Leg: completed adjuvant Opdivo, follows with oncology for surveillance     5.Malnutrition most likely due to recurrent progressive diverticulitis:  -Dietician consulted    Exam:     Vitals:  Vitals:    24 1534

## 2024-07-23 NOTE — PROGRESS NOTES
07/23/24 1349   Encounter Summary   Encounter Overview/Reason Spiritual/Emotional Needs   Service Provided For Patient   Referral/Consult From Rounding   Support System Family members   Last Encounter  07/23/24   Complexity of Encounter Moderate   Begin Time 1339   End Time  1349   Total Time Calculated 10 min   Spiritual/Emotional needs   Type Spiritual Support   Assessment/Intervention/Outcome   Assessment Coping   Intervention Prayer (assurance of)/[place];Nurtured Hope;Sustaining Presence/Ministry of presence;Active listening   Outcome Comfort;Coping;Encouraged     Assessment:  In my encounter with the 59 yr old patient, while rounding  the unit 5K,  I provided spiritual care to patient through conversation, I also came to assess the patient's spiritual needs present. The pt was admitted due to diverticulitis.     Interventions:  I provided prayer, emotional support and words of comfort.  provided a listening presence and encouraged pt to share their beliefs and how I can support them during their hospitalization.     Outcomes:  The patient was encouraged and didn't share any further spiritual needs at this time.     Plan:  Chaplains will follow-up at a later time for assessment of any spiritual care needs present

## 2024-07-23 NOTE — CARE COORDINATION
7/23/24, 10:36 AM EDT    Patient goals/plan/ treatment preferences discussed by  and .  Patient goals/plan/ treatment preferences reviewed with patient/ family.  Patient/ family verbalize understanding of discharge plan and are in agreement with goal/plan/treatment preferences.  Understanding was demonstrated using the teach back method.  AVS provided by RN at time of discharge, which includes all necessary medical information pertaining to the patients current course of illness, treatment, post-discharge goals of care, and treatment preferences.     Services At/After Discharge: None    Met w/ Sera. She is agreeable with discharge today. Plans to return home w/ . He will transport once off work around 4:30pm. Denies needs for DME, HH or OP services.

## 2025-06-04 ENCOUNTER — TRANSCRIBE ORDERS (OUTPATIENT)
Dept: ADMINISTRATIVE | Age: 60
End: 2025-06-04

## 2025-06-04 DIAGNOSIS — Z12.31 ENCOUNTER FOR SCREENING MAMMOGRAM FOR MALIGNANT NEOPLASM OF BREAST: Primary | ICD-10-CM

## 2025-08-25 ENCOUNTER — HOSPITAL ENCOUNTER (OUTPATIENT)
Dept: MAMMOGRAPHY | Age: 60
Discharge: HOME | End: 2025-08-25
Payer: COMMERCIAL

## 2025-08-25 VITALS — BODY MASS INDEX: 16.99 KG/M2 | WEIGHT: 90 LBS | HEIGHT: 61 IN

## 2025-08-25 DIAGNOSIS — Z12.31 ENCOUNTER FOR SCREENING MAMMOGRAM FOR MALIGNANT NEOPLASM OF BREAST: ICD-10-CM

## 2025-08-25 PROCEDURE — 77063 BREAST TOMOSYNTHESIS BI: CPT

## (undated) DEVICE — WIRE ORTH 1.1MM DIA 102MM SMOOTH DBL BAYNT TIP S STL K

## (undated) DEVICE — GOWN,SIRUS,NON REINFRCD,LARGE,SET IN SL: Brand: MEDLINE

## (undated) DEVICE — GLOVE ORANGE PI 8 1/2   MSG9085

## (undated) DEVICE — LINER SUCT CANSTR 1500CC SEMI RIG W/ POR HYDROPHOBIC SHUT

## (undated) DEVICE — CAP PROTCT YEL REFIL GEN FOR 0.45IN WIRE W SER PIN BALL

## (undated) DEVICE — SUTURE VICRYL + SZ 0 L18IN ABSRB TIE VCP106G

## (undated) DEVICE — 3M™ WARMING BLANKET, UPPER BODY, 10 PER CASE, 42268: Brand: BAIR HUGGER™

## (undated) DEVICE — YANKAUER,BULB TIP,W/O VENT,RIGID,STERILE: Brand: MEDLINE

## (undated) DEVICE — Device

## (undated) DEVICE — 1LYRTR 16FR10ML 100%SILI SNAP: Brand: MEDLINE INDUSTRIES, INC.

## (undated) DEVICE — BIT DRL L140MM DIA2MM QUIK CPL 3 FLUT CALIB DEPTH MRK W/O

## (undated) DEVICE — COVER ARMBRD W13XL28.5IN IMPERV BLU FOR OP RM

## (undated) DEVICE — PIN HLD SM MINI SHT KNEE SYS OPTETRAK

## (undated) DEVICE — GLOVE ORANGE PI 8   MSG9080

## (undated) DEVICE — TUBING, SUCTION, 1/4" X 20', STRAIGHT: Brand: MEDLINE INDUSTRIES, INC.

## (undated) DEVICE — BLADE SAW OSCIL SAG MED 5.5X18MM

## (undated) DEVICE — GLOVE ORANGE PI 7 1/2   MSG9075

## (undated) DEVICE — GLOVE SURG SZ 65 THK91MIL LTX FREE SYN POLYISOPRENE

## (undated) DEVICE — CHLORAPREP 26ML ORANGE

## (undated) DEVICE — SUTURE VICRYL SZ 3-0 L18IN ABSRB VLT L26MM SH 1/2 CIR J774D

## (undated) DEVICE — DRESSING ALG W3XL4IN TRNSPAR ANTIMIC WND CNTCT LAYR W/

## (undated) DEVICE — PACK PROCEDURE SURG ORTH BASIC SRHP LF

## (undated) DEVICE — PACK-MAJOR

## (undated) DEVICE — APPLIER CLP L L13IN TI MULT RNG HNDL 20 CLP STR LIGACLP

## (undated) DEVICE — PREP SOL PVP IODINE 4%  4 OZ/BTL

## (undated) DEVICE — GLOVE ORANGE PI 7   MSG9070

## (undated) DEVICE — PADDING CAST W6INXL4YD COT LO LINTING WYTEX

## (undated) DEVICE — SOLUTION IV IRRIG POUR BRL 0.9% SODIUM CHL 2F7124

## (undated) DEVICE — PADDING,UNDERCAST,COTTON, 4"X4YD STERILE: Brand: MEDLINE

## (undated) DEVICE — INTENDED FOR TISSUE SEPARATION, AND OTHER PROCEDURES THAT REQUIRE A SHARP SURGICAL BLADE TO PUNCTURE OR CUT.: Brand: BARD-PARKER ® CARBON RIB-BACK BLADES

## (undated) DEVICE — ADHESIVE SKIN CLOSURE WND 8.661X1.5 IN 22 CM LIQUIBAND SECUR

## (undated) DEVICE — BANDAGE,GAUZE,4.5"X4.1YD,STERILE,LF: Brand: MEDLINE

## (undated) DEVICE — SPLINT ORTH W4XL30IN WHT FBRGLS 1 SIDE FELT PD CNFRM LO

## (undated) DEVICE — SYRINGE MED 10ML LUERLOCK TIP W/O SFTY DISP

## (undated) DEVICE — SUTURE NONABSORBABLE MONOFILAMENT 4-0 FS-2 18 IN ETHILON 662H

## (undated) DEVICE — DUAL CUT SAGITTAL BLADE

## (undated) DEVICE — SUTURE VCRL SZ 3-0 L27IN ABSRB UD FS-2 L19MM 1/2 CIR J423H

## (undated) DEVICE — APPLICATOR MEDICATED 26 CC SOLUTION HI LT ORNG CHLORAPREP

## (undated) DEVICE — 450 ML BOTTLE OF 0.05% CHLORHEXIDINE GLUCONATE IN 99.95% STERILE WATER FOR IRRIGATION, USP AND APPLICATOR.: Brand: IRRISEPT ANTIMICROBIAL WOUND LAVAGE

## (undated) DEVICE — ELECTROSURGICAL PENCIL BUTTON SWITCH E-Z CLEAN COATED BLADE ELECTRODE 10 FT (3 M) CORD HOLSTER: Brand: MEGADYNE

## (undated) DEVICE — DRAPE,EXTREMITY,89X128,STERILE: Brand: MEDLINE

## (undated) DEVICE — SOLUTION PREP PAINT POV IOD FOR SKIN MUCOUS MEM

## (undated) DEVICE — TAPE,ELASTIC,FOAM,CURAD,4"X5.5YD,LF: Brand: CURAD

## (undated) DEVICE — STAPLER INT L60MM REG TISS BLU B FRM 8 FIRING 2 ROW AUTO

## (undated) DEVICE — BASIC SINGLE BASIN BTC-LF: Brand: MEDLINE INDUSTRIES, INC.

## (undated) DEVICE — SOLUTION SURG PREP POV IOD 7.5% 4 OZ

## (undated) DEVICE — SYRINGE MED 30ML STD CLR PLAS LUERLOCK TIP N CTRL DISP

## (undated) DEVICE — GOWN,SIRUS,NONRNF,SETINSLV,XL,20/CS: Brand: MEDLINE

## (undated) DEVICE — PADDING UNDERCAST W4INXL12FT RAYON POLY SYN NONADHESIVE

## (undated) DEVICE — PAD,ABDOMINAL,5"X9",ST,LF,25/BX: Brand: MEDLINE INDUSTRIES, INC.

## (undated) DEVICE — MARKER,SKIN,WI/RULER AND LABELS: Brand: MEDLINE

## (undated) DEVICE — BANDAGE COMPR E SGL LAYERED CLSR BGE W/ CLP W4INXL15FT

## (undated) DEVICE — GLOVE SURG SZ 7.5 L11.73IN FNGR THK9.8MIL STRW LTX POLYMER

## (undated) DEVICE — SPONGE LAP W18XL18IN WHT COT 4 PLY FLD STRUNG RADPQ DISP ST

## (undated) DEVICE — PACK,LAPAROSCOPY,PK II,SIRUS: Brand: MEDLINE

## (undated) DEVICE — PACK PIN JOINT TOTAL

## (undated) DEVICE — OSCILLATING SAW BLADE 1.35MM                                    FANNED STRYKER 2000

## (undated) DEVICE — GAUZE,SPONGE,8"X4",12PLY,XRAY,STRL,LF: Brand: MEDLINE

## (undated) DEVICE — RELOAD STPL H1.5X3.6XL60MM REG TISS BLU B FRM AUTO RET PIN

## (undated) DEVICE — SYRINGE IRRIG 60ML SFT PLIABLE BLB EZ TO GRP 1 HND USE W/

## (undated) DEVICE — SPONGE GZ W4XL4IN COT 12 PLY TYP VII WVN C FLD DSGN STERILE

## (undated) DEVICE — THIN OFFSET (9.0 X 0.38 X 25.0MM)

## (undated) DEVICE — SUTURE PROL SZ 2-0 L36IN NONABSORBABLE BLU SH L26MM 1/2 CIR 8523H

## (undated) DEVICE — HYPODERMIC SAFETY NEEDLE: Brand: MAGELLAN

## (undated) DEVICE — VORTEX VACUUM MIXING SYSTEM: Brand: VORTEX

## (undated) DEVICE — POSITIONER HD W8XH4XL8.5IN RASPBERRY FOAM SLT

## (undated) DEVICE — PRECISION (9.0 X 0.51 X 31.0MM)

## (undated) DEVICE — SPONGE GZ W4XL4IN COT 12 PLY TYP VII WVN C FLD DSGN

## (undated) DEVICE — DRAPE,U/SHT,SPLIT,FILM,60X84,STERILE: Brand: MEDLINE

## (undated) DEVICE — SUTURE VCRL SZ 3-0 L27IN ABSRB VLT CT-2 L26MM 1/2 CIR J332H

## (undated) DEVICE — SUTURE MCRYL SZ 4-0 L27IN ABSRB UD L19MM PS-2 1/2 CIR PRIM Y426H

## (undated) DEVICE — BANDAGE COMPR W4INXL12FT E DISP ESMARCH EVEN

## (undated) DEVICE — STAPLER INT L28CM DIA29MM CLS STPL H10-2.5MM OPN LEG L5.5MM

## (undated) DEVICE — SUTURE VCRL SZ 4-0 L27IN ABSRB UD L19MM FS-2 3/8 CIR REV J422H

## (undated) DEVICE — RELOAD STPL L75MM OPN H3.8MM CLS 1.5MM WIRE DIA0.2MM REG

## (undated) DEVICE — SUTURE VICRYL + 1 L18IN ABSRB UD CTX L48MM 1/2 CIR TAPERPOINT

## (undated) DEVICE — PENCIL SMK EVAC ALL IN 1 DSGN ENH VISIBILITY IMPROVED AIR

## (undated) DEVICE — BREAST HERNIA: Brand: MEDLINE INDUSTRIES, INC.

## (undated) DEVICE — SYRINGE CATH TIP 50ML

## (undated) DEVICE — DRAIN,WOUND,15FR,3/16,FULL-FLUTED: Brand: MEDLINE

## (undated) DEVICE — PATIENT RETURN ELECTRODE, SINGLE-USE, CONTACT QUALITY MONITORING, ADULT, WITH 9FT CORD, FOR PATIENTS WEIGING OVER 33LBS. (15KG): Brand: MEGADYNE

## (undated) DEVICE — SOLUTION IRRIG 3000ML 0.9% SOD CHL USP UROMATIC PLAS CONT

## (undated) DEVICE — SUTURE ETHLN SZ 3-0 L18IN NONABSORBABLE BLK FS-1 L24MM 3/8 663H

## (undated) DEVICE — SOLUTION IV 1000ML 0.9% SOD CHL PH 5 INJ USP VIAFLX PLAS

## (undated) DEVICE — TAPE,CLOTH/SILK,CURAD,3"X10YD,LF,40/CS: Brand: CURAD

## (undated) DEVICE — PREMIUM DRY TRAY LF: Brand: MEDLINE INDUSTRIES, INC.

## (undated) DEVICE — EVACUATOR SURG 100CC SIL BLB SUCT RESVR FOR CLS WND DRNGE

## (undated) DEVICE — SCREW BNE L12MM DIA2.7MM ANK S STL ST VAR ANG LOK FULL THRD
Type: IMPLANTABLE DEVICE | Site: FOOT | Status: NON-FUNCTIONAL
Removed: 2019-09-05

## (undated) DEVICE — REFLECTION FLEXIBLE DRILL 35MM: Brand: REFLECTION

## (undated) DEVICE — ELECTRODE PT RET AD L9FT HI MOIST COND ADH HYDRGEL CORDED

## (undated) DEVICE — PACK PROCEDURE SURG SET UP SRMC

## (undated) DEVICE — 4-PORT MANIFOLD: Brand: NEPTUNE 2

## (undated) DEVICE — GLOVE SURG SZ 75 L12IN FNGR THK94MIL STD WHT ISOLEX LTX

## (undated) DEVICE — STAPLER INT L75MM CUT LN L73MM STPL LN L77MM BLU B FRM 8

## (undated) DEVICE — TETRA-FLEX CF WOVEN LATEX FREE ELASTIC BANDAGE 6" X 5.5 YD: Brand: TETRA-FLEX™CF

## (undated) DEVICE — SOLUTION SCRB 4OZ 7.5% POVIDONE IOD ANTIMIC BTL